# Patient Record
Sex: FEMALE | Race: BLACK OR AFRICAN AMERICAN | NOT HISPANIC OR LATINO
[De-identification: names, ages, dates, MRNs, and addresses within clinical notes are randomized per-mention and may not be internally consistent; named-entity substitution may affect disease eponyms.]

---

## 2021-02-03 ENCOUNTER — APPOINTMENT (OUTPATIENT)
Dept: RHEUMATOLOGY | Facility: CLINIC | Age: 41
End: 2021-02-03

## 2021-02-09 ENCOUNTER — APPOINTMENT (OUTPATIENT)
Dept: RHEUMATOLOGY | Facility: CLINIC | Age: 41
End: 2021-02-09

## 2021-02-09 VITALS
DIASTOLIC BLOOD PRESSURE: 86 MMHG | BODY MASS INDEX: 22.35 KG/M2 | OXYGEN SATURATION: 96 % | HEART RATE: 100 BPM | RESPIRATION RATE: 16 BRPM | WEIGHT: 142.4 LBS | SYSTOLIC BLOOD PRESSURE: 118 MMHG | HEIGHT: 67 IN

## 2021-02-09 RX ORDER — ZOLPIDEM TARTRATE 10 MG/1
10 TABLET ORAL
Qty: 30 | Refills: 0 | Status: ACTIVE | COMMUNITY
Start: 2021-02-09

## 2021-02-09 RX ORDER — PREDNISONE 5 MG/1
5 TABLET ORAL DAILY
Qty: 90 | Refills: 1 | Status: ACTIVE | COMMUNITY
Start: 2021-02-09

## 2021-02-09 RX ORDER — LOSARTAN POTASSIUM 25 MG/1
25 TABLET, FILM COATED ORAL DAILY
Qty: 90 | Refills: 1 | Status: ACTIVE | COMMUNITY
Start: 2021-02-09

## 2021-02-09 RX ORDER — HYDROXYCHLOROQUINE SULFATE 200 MG/1
200 TABLET, FILM COATED ORAL
Qty: 180 | Refills: 0 | Status: ACTIVE | COMMUNITY
Start: 2021-02-09

## 2021-02-09 RX ORDER — PANTOPRAZOLE 20 MG/1
20 TABLET, DELAYED RELEASE ORAL DAILY
Qty: 30 | Refills: 2 | Status: ACTIVE | COMMUNITY
Start: 2021-02-09

## 2021-02-09 NOTE — PHYSICAL EXAM
[General Appearance - Alert] : alert [General Appearance - In No Acute Distress] : in no acute distress [General Appearance - Well Nourished] : well nourished [Extraocular Movements] : extraocular movements were intact [Outer Ear] : the ears and nose were normal in appearance [Examination Of The Oral Cavity] : the lips and gums were normal [Oropharynx] : the oropharynx was normal [] : no respiratory distress [Neck Appearance] : the appearance of the neck was normal [Auscultation Breath Sounds / Voice Sounds] : lungs were clear to auscultation bilaterally [Heart Rate And Rhythm] : heart rate was normal and rhythm regular [Heart Sounds] : normal S1 and S2 [Murmurs] : no murmurs [Heart Sounds Pericardial Friction Rub] : no pericardial rub [Cervical Lymph Nodes Enlarged Posterior Bilaterally] : posterior cervical [Supraclavicular Lymph Nodes Enlarged Bilaterally] : supraclavicular [Cervical Lymph Nodes Enlarged Anterior Bilaterally] : anterior cervical [Axillary Lymph Nodes Enlarged Bilaterally] : axillary [No CVA Tenderness] : no ~M costovertebral angle tenderness [No Spinal Tenderness] : no spinal tenderness [No Focal Deficits] : no focal deficits [FreeTextEntry1] : see hpi

## 2021-02-09 NOTE — HISTORY OF PRESENT ILLNESS
[FreeTextEntry1] : f/u SLE, diagnosed 2004.  Her features have included discoid rash, photosensitivity, arhritis, leukopenia, nephritis (class V), JAMAR, DNA, Sm.  She has additionally had alopecia, fever, costochondritis, lymphadenopathy, elevated cpk's, Raynaud's, E nodusum.  She has participated in clinical trials including studies of belimumab as well as vagus nerve stimulation.  She has chronic damage (scarring alopecia).  She additionally has had elevated transaminases, she has had a liver biopsy and results were consistent with drug reaction vs autoimmune related.  Withdrawal of potential drugs has not resulted in improvement of her enzymes.\par \par February 9, 2021  PtGA 6\par Not seen at the [facility] since January 2020.  She states that has been doing relatively well over the year and has continued on plaquenil 400mg and prednisone 5mg.  She was in the ER in August after experiencing nosebleeds for 3 days.  Her discoid lesions on her scalp have been relatively well controlled and only recently has there been activity in the right temporal region.  She has additionally noted recent alopecia. She continues to experience joint pains and swelling, she continues with costochondritis with pain extending to right ribs, +mild fatigue, she has also had eary satiety (has not followed up with GI secondary to COVID) and has lost ~15 lbs.  She denies fever, other rash, lymphadenopathy, chills,oral ulcers, dry eyes, chest pain, shortness of breath, cough, anosmia, ageusia, nausea, vomiting, diarrhea, abdominal pain.\par \par PE:  /86 142.4 lbs\par chronic scarring of scalp with active hypertrophic discoid lesion amidst right temporal scarring, synovitis of rt elbow and 2nd MCP, L 2nd and 5th PIP, tender L elbow, 1st rt MCP, 3-5 MCPs; Left MCPs and bilateral PIPs. +costochondral tenderness; no lymphadenopathy, oral/nasal ulcerations, nodules, periungal erythema. \par Lungs: Clear without wheeze, rales, rhonch; Cor: S1S2 without murmur, gallop or rub; Abd: soft, NT, BS+;, Back: no spinal or paraspinal tenderness; Ext: no CCE; Neuro: non-focal \par \par Imp:\par SLE w clinical activity with weight loss, musculoskeletal and cutaneous activity (discoid as well as alopecia).  Will check labs; have discussed options of belimumab vs clinical trial.\par COVID-  she will continue to isolate, practice social distancing, masks and hand hygiene.  Have discussed risks and benefits of covid vaccine.  Have discussed postponing (if possible initiation of a biologic or entry into a clinical trial until vaccinated)\par HM:  she continues on low dose steroids, was given a course of weekly vit D by her pcp; will check level.  Will consider obtaining DEXA.\par \par Infections: no;  cSLEDAI =6

## 2021-03-08 ENCOUNTER — APPOINTMENT (OUTPATIENT)
Dept: RHEUMATOLOGY | Facility: CLINIC | Age: 41
End: 2021-03-08

## 2021-03-22 ENCOUNTER — LABORATORY RESULT (OUTPATIENT)
Age: 41
End: 2021-03-22

## 2021-03-22 ENCOUNTER — APPOINTMENT (OUTPATIENT)
Dept: RHEUMATOLOGY | Facility: CLINIC | Age: 41
End: 2021-03-22

## 2021-03-22 VITALS
DIASTOLIC BLOOD PRESSURE: 94 MMHG | RESPIRATION RATE: 14 BRPM | BODY MASS INDEX: 22.65 KG/M2 | WEIGHT: 144.6 LBS | HEART RATE: 92 BPM | OXYGEN SATURATION: 98 % | SYSTOLIC BLOOD PRESSURE: 129 MMHG

## 2021-03-22 NOTE — PHYSICAL EXAM
[General Appearance - Alert] : alert [General Appearance - In No Acute Distress] : in no acute distress [General Appearance - Well Nourished] : well nourished [Extraocular Movements] : extraocular movements were intact [Outer Ear] : the ears and nose were normal in appearance [Examination Of The Oral Cavity] : the lips and gums were normal [Oropharynx] : the oropharynx was normal [Neck Appearance] : the appearance of the neck was normal [] : no respiratory distress [Auscultation Breath Sounds / Voice Sounds] : lungs were clear to auscultation bilaterally [Heart Rate And Rhythm] : heart rate was normal and rhythm regular [Heart Sounds] : normal S1 and S2 [Murmurs] : no murmurs [Heart Sounds Pericardial Friction Rub] : no pericardial rub [Cervical Lymph Nodes Enlarged Posterior Bilaterally] : posterior cervical [Cervical Lymph Nodes Enlarged Anterior Bilaterally] : anterior cervical [Supraclavicular Lymph Nodes Enlarged Bilaterally] : supraclavicular [Axillary Lymph Nodes Enlarged Bilaterally] : axillary [No CVA Tenderness] : no ~M costovertebral angle tenderness [No Spinal Tenderness] : no spinal tenderness [FreeTextEntry1] : see hpi [No Focal Deficits] : no focal deficits

## 2021-03-22 NOTE — HISTORY OF PRESENT ILLNESS
[FreeTextEntry1] : f/u SLE, diagnosed 2004.  Her features have included discoid rash, photosensitivity, arhritis, leukopenia, nephritis (class V), JAMAR, DNA, Sm.  She has additionally had alopecia, fever, costochondritis, lymphadenopathy, elevated cpk's, Raynaud's, E nodusum.  She has participated in clinical trials including studies of belimumab as well as vagus nerve stimulation.  She has chronic damage (scarring alopecia).  She additionally has had elevated transaminases, she has had a liver biopsy and results were consistent with drug reaction vs autoimmune related.  Withdrawal of potential drugs has not resulted in improvement of her enzymes; treatment with 80mg prednisone additionally without effect.\par \par February 9, 2021  PtGA 6\par Not seen at the [facility] since January 2020.  She states that has been doing relatively well over the year and has continued on plaquenil 400mg and prednisone 5mg.  She was in the ER in August after experiencing nosebleeds for 3 days.  Her discoid lesions on her scalp have been relatively well controlled and only recently has there been activity in the right temporal region.  She has additionally noted recent alopecia. She continues to experience joint pains and swelling, she continues with costochondritis with pain extending to right ribs, +mild fatigue, she has also had eary satiety (has not followed up with GI secondary to COVID) and has lost ~15 lbs.  She denies fever, other rash, lymphadenopathy, chills,oral ulcers, dry eyes, chest pain, shortness of breath, cough, anosmia, ageusia, nausea, vomiting, diarrhea, abdominal pain.\par \par Mar 22, 2021 PtGA 6.2\par f/u SLE; she notes increase in her joint pains, particularly her hands as well as chest wall tenderness despite increased steroids to 10mg.  She was evaluated recently by Dr Magana and received meds for sinusitis.  Of concern, her most recent labs with increased Upcr, transaminases, DNA and ESR both elevated (C3 normal, C4 was not performed).  No fever, rash, anorexia, weight loss, alopecia, lymphadenopathy, chills, joint pain/swelling/stiffness, oral ulcers, fatigue, dry eyes, chest pain, shortness of breath, cough, anosmia, ageusia, nausea, vomiting, diarrhea, abdominal pain.  She has an appointment with GI tomorrow for evaluation of her liver abnormality.\par \par \par PE:  /94 144.4 lbs\par chronic scarring of scalp with active hypertrophic discoid lesion amidst right temporal scarring, synovitis of rt elbow and 2nd MCP, tender L elbow, and 1st rt MCP, 3-5 MCPs bilaterally with right>left; +costochondral tenderness; +oral ulceration on hard palatte; no lymphadenopathy, nodules, periungal erythema. Lungs: Clear without wheeze, rales, rhonch; Cor: S1S2 without murmur, gallop or rub; Abd: soft, NT, BS+;, Back: no spinal or paraspinal tenderness; Ext: no CCE; Neuro: non-focal \par \par Imp:\par SLE w clinical activity with weight loss, musculoskeletal and cutaneous activity (discoid, oral ulcer as well as alopecia), Upcr elevated.  She is seeing GI tomorrow, have given most recent labs and have asked that GI call me.  Will give voltaren gel; Will check labs; have discussed belimumab previously--may need to evaluate kidneys.  Will defer treatment decisions until after GI..\par COVID-  she will continue to isolate, practice social distancing, masks and hand hygiene.  Have discussed risks and benefits of covid vaccine.  Have discussed postponing (if possible initiation of a biologic or entry into a clinical trial until vaccinated)\par HM:  she continues on low dose steroids, was given a course of weekly vit D by her pcp; will give vit D.  Will consider obtaining DEXA.\par \par Infections: no;  cSLEDAI =6

## 2021-04-05 LAB
ALBUMIN SERPL ELPH-MCNC: 3.5 G/DL
ALP BLD-CCNC: 158 U/L
ALT SERPL-CCNC: 90 U/L
ANION GAP SERPL CALC-SCNC: 18 MMOL/L
APPEARANCE: ABNORMAL
AST SERPL-CCNC: 204 U/L
BASOPHILS # BLD AUTO: 0.01 K/UL
BASOPHILS NFR BLD AUTO: 0.2 %
BILIRUB SERPL-MCNC: 1 MG/DL
BILIRUBIN URINE: ABNORMAL
BLOOD URINE: NEGATIVE
BUN SERPL-MCNC: 5 MG/DL
C3 SERPL-MCNC: 131 MG/DL
C4 SERPL-MCNC: 16 MG/DL
CALCIUM SERPL-MCNC: 9 MG/DL
CHLORIDE SERPL-SCNC: 96 MMOL/L
CO2 SERPL-SCNC: 22 MMOL/L
COLOR: YELLOW
CREAT SERPL-MCNC: 0.61 MG/DL
CREAT SPEC-SCNC: 296 MG/DL
CREAT/PROT UR: 0.8 RATIO
DSDNA AB SER-ACNC: 124 IU/ML
EOSINOPHIL # BLD AUTO: 0.01 K/UL
EOSINOPHIL NFR BLD AUTO: 0.2 %
ERYTHROCYTE [SEDIMENTATION RATE] IN BLOOD BY WESTERGREN METHOD: 101 MM/HR
GGT SERPL-CCNC: 803 U/L
GLUCOSE QUALITATIVE U: NEGATIVE
GLUCOSE SERPL-MCNC: 49 MG/DL
HCT VFR BLD CALC: 42.1 %
HGB BLD-MCNC: 14.1 G/DL
IMM GRANULOCYTES NFR BLD AUTO: 0.7 %
KETONES URINE: NEGATIVE
LEUKOCYTE ESTERASE URINE: NEGATIVE
LYMPHOCYTES # BLD AUTO: 0.78 K/UL
LYMPHOCYTES NFR BLD AUTO: 18.4 %
MAN DIFF?: NORMAL
MCHC RBC-ENTMCNC: 33.5 GM/DL
MCHC RBC-ENTMCNC: 35.1 PG
MCV RBC AUTO: 104.7 FL
MONOCYTES # BLD AUTO: 0.32 K/UL
MONOCYTES NFR BLD AUTO: 7.5 %
NEUTROPHILS # BLD AUTO: 3.09 K/UL
NEUTROPHILS NFR BLD AUTO: 73 %
NITRITE URINE: NEGATIVE
PH URINE: 6.5
PLATELET # BLD AUTO: 211 K/UL
POTASSIUM SERPL-SCNC: 3.9 MMOL/L
PROT SERPL-MCNC: 7.4 G/DL
PROT UR-MCNC: 235 MG/DL
PROTEIN URINE: ABNORMAL
RBC # BLD: 4.02 M/UL
RBC # FLD: 13.4 %
SODIUM SERPL-SCNC: 137 MMOL/L
SPECIFIC GRAVITY URINE: 1.02
UROBILINOGEN URINE: ABNORMAL
WBC # FLD AUTO: 4.24 K/UL

## 2022-03-02 ENCOUNTER — APPOINTMENT (OUTPATIENT)
Dept: RHEUMATOLOGY | Facility: CLINIC | Age: 42
End: 2022-03-02

## 2022-03-09 ENCOUNTER — APPOINTMENT (OUTPATIENT)
Dept: RHEUMATOLOGY | Facility: CLINIC | Age: 42
End: 2022-03-09

## 2022-03-16 ENCOUNTER — APPOINTMENT (OUTPATIENT)
Dept: RHEUMATOLOGY | Facility: CLINIC | Age: 42
End: 2022-03-16

## 2022-03-16 ENCOUNTER — LABORATORY RESULT (OUTPATIENT)
Age: 42
End: 2022-03-16

## 2022-03-16 VITALS
DIASTOLIC BLOOD PRESSURE: 89 MMHG | SYSTOLIC BLOOD PRESSURE: 138 MMHG | HEART RATE: 107 BPM | WEIGHT: 131.5 LBS | RESPIRATION RATE: 15 BRPM | HEIGHT: 67 IN | BODY MASS INDEX: 20.64 KG/M2 | TEMPERATURE: 97.7 F | OXYGEN SATURATION: 98 %

## 2022-03-17 NOTE — PHYSICAL EXAM
[General Appearance - Alert] : alert [General Appearance - In No Acute Distress] : in no acute distress [General Appearance - Well Nourished] : well nourished [Extraocular Movements] : extraocular movements were intact [Outer Ear] : the ears and nose were normal in appearance [Examination Of The Oral Cavity] : the lips and gums were normal [Oropharynx] : the oropharynx was normal [Neck Appearance] : the appearance of the neck was normal [] : no respiratory distress [Auscultation Breath Sounds / Voice Sounds] : lungs were clear to auscultation bilaterally [Heart Sounds] : normal S1 and S2 [Heart Rate And Rhythm] : heart rate was normal and rhythm regular [Murmurs] : no murmurs [Heart Sounds Pericardial Friction Rub] : no pericardial rub [Bowel Sounds] : normal bowel sounds [Abdomen Soft] : soft [Abdomen Tenderness] : non-tender [Cervical Lymph Nodes Enlarged Anterior Bilaterally] : anterior cervical [Cervical Lymph Nodes Enlarged Posterior Bilaterally] : posterior cervical [Supraclavicular Lymph Nodes Enlarged Bilaterally] : supraclavicular [No CVA Tenderness] : no ~M costovertebral angle tenderness [No Spinal Tenderness] : no spinal tenderness [Abnormal Walk] : normal gait [No Focal Deficits] : no focal deficits [FreeTextEntry1] : see hpi

## 2022-03-17 NOTE — HISTORY OF PRESENT ILLNESS
[FreeTextEntry1] : f/u SLE, diagnosed 2004.  Her features have included discoid rash, photosensitivity, arhritis, leukopenia, nephritis (class V), JAMAR, DNA, Sm.  She has additionally had alopecia, fever, costochondritis, lymphadenopathy, elevated cpk's, Raynaud's, E nodusum.  She has participated in clinical trials including studies of belimumab as well as vagus nerve stimulation.  She has chronic damage (scarring alopecia).  She additionally has had elevated transaminases, she has had a liver biopsy and results were consistent with drug reaction vs autoimmune related.  Withdrawal of potential drugs has not resulted in improvement of her enzymes; treatment with 80mg prednisone additionally without effect.\par \par February 9, 2021  PtGA 6\par Not seen at the [facility] since January 2020.  She states that has been doing relatively well over the year and has continued on plaquenil 400mg and prednisone 5mg.  She was in the ER in August after experiencing nosebleeds for 3 days.  Her discoid lesions on her scalp have been relatively well controlled and only recently has there been activity in the right temporal region.  She has additionally noted recent alopecia. She continues to experience joint pains and swelling, she continues with costochondritis with pain extending to right ribs, +mild fatigue, she has also had eary satiety (has not followed up with GI secondary to COVID) and has lost ~15 lbs.  She denies fever, other rash, lymphadenopathy, chills,oral ulcers, dry eyes, chest pain, shortness of breath, cough, anosmia, ageusia, nausea, vomiting, diarrhea, abdominal pain.\par \par Mar 22, 2021 PtGA 6.2\par f/u SLE; she notes increase in her joint pains, particularly her hands as well as chest wall tenderness despite increased steroids to 10mg.  She was evaluated recently by Dr Magana and received meds for sinusitis.  Of concern, her most recent labs with increased Upcr, transaminases, DNA and ESR both elevated (C3 normal, C4 was not performed).  No fever, rash, anorexia, weight loss, alopecia, lymphadenopathy, chills, joint pain/swelling/stiffness, oral ulcers, fatigue, dry eyes, chest pain, shortness of breath, cough, anosmia, ageusia, nausea, vomiting, diarrhea, abdominal pain.  She has an appointment with GI tomorrow for evaluation of her liver abnormality.\par \par Mar 16, 2022 PtGA 5.4\par f/u SLE--she has not been seen for a year (despite calls for follow-up as well as a call to her pcp regarding my concern).  She states that she has not been doing well, but there was too much going on for her to focus on her own health.  She has had continued joint pains, costocondritis, skin inflammation (with alopecia and extension of her scarring alopecia).  She was additionally hospitalized for 3 days last april with vomiting; she received an outpatient endoscopy and colonscopy and was told of ulcers in her esophagus and given nexium.  She was referred to hepatology for her continued elevation of her lft's but did not make the appointment.  Of note, she has had loss of appetite and early satiety (with a 15 lb weight loss).  She has received the COVID Pfizer vaccine on 9/24/21 and 10/14/21 with fatigue, chills after the second vaccination.  Additionally, she has had COVID in December (she had a headache, cough and congestion) and prolonged fatigue and congestion following the acute illness.  Her current symptoms include anorexia, joint pains, costochondritis, alopecia, scalp rash and fatigue.  No fever,lymphadenopathy, chills, joral ulcers, cough, anosmia, ageusia, nausea, vomiting, diarrhea, abdominal pain. \par PE:  /89 131.5 lbs\par chronic scarring of scalp with active hypertrophic discoid lesion amidst right temporal scarring,and crown; the scarred regions are larger than previous;  synovitis of rt elbow and 3rd and 4thPIPs and L 2nd and 3rd PIP with tenderness of MCPs, PIPs and right knee.   +costochondral tenderness; no oral/nasal ulcers (previously seen oral ulceration on hard palatte is not present); no lymphadenopathy, nodules, periungal erythema. Lungs: Clear without wheeze, rales, rhonch; Cor: S1S2 without murmur, gallop or rub; Abd: soft, NT, BS+;, Back: no spinal or paraspinal tenderness; Ext: no CCE; Neuro: non-focal \par \par Imp:\par SLE w clinical activity with weight loss, musculoskeletal and cutaneous activity (discoid, alocpecia, No recent labs, including no Upcr (which was elevated last year).  Will check labs, but she needs evaluation by GI and hepatology.  Unclear if there is renal involvement.  Have discussed difficulty in moving ahead with treatment as we do not know if there hepatic or renal issues are present, or if present, are related to an autoimmune driven inflammatoy etiology.  She will make hepatology appointment.  In addition, she will need opthoalmology follow-up.  \par COVID-  now almost 3 months since infection--have advised that she receive booster.  She will continue to follow cdc guidlelines. \par HM:  she continues on low dose steroids, was given a course of weekly vit D by her pcp;  Will consider obtaining DEXA in the future\par \par Infections: no;  cSLEDAI =8

## 2022-04-13 ENCOUNTER — APPOINTMENT (OUTPATIENT)
Dept: RHEUMATOLOGY | Facility: CLINIC | Age: 42
End: 2022-04-13

## 2022-04-18 LAB
ALBUMIN SERPL ELPH-MCNC: 3.5 G/DL
ALP BLD-CCNC: 173 U/L
ALT SERPL-CCNC: 85 U/L
ANION GAP SERPL CALC-SCNC: 18 MMOL/L
APPEARANCE: CLEAR
AST SERPL-CCNC: 167 U/L
BASOPHILS # BLD AUTO: 0.01 K/UL
BASOPHILS NFR BLD AUTO: 0.3 %
BILIRUB SERPL-MCNC: 1.2 MG/DL
BILIRUBIN URINE: NEGATIVE
BLOOD URINE: NEGATIVE
BUN SERPL-MCNC: 8 MG/DL
C3 SERPL-MCNC: 138 MG/DL
C4 SERPL-MCNC: 12 MG/DL
CALCIUM SERPL-MCNC: 8.9 MG/DL
CHLORIDE SERPL-SCNC: 96 MMOL/L
CO2 SERPL-SCNC: 23 MMOL/L
COLOR: YELLOW
CREAT SERPL-MCNC: 0.64 MG/DL
CREAT SPEC-SCNC: 152 MG/DL
CREAT/PROT UR: 2 RATIO
DSDNA AB SER-ACNC: 95 IU/ML
EGFR: 113 ML/MIN/1.73M2
EOSINOPHIL # BLD AUTO: 0.01 K/UL
EOSINOPHIL NFR BLD AUTO: 0.3 %
ERYTHROCYTE [SEDIMENTATION RATE] IN BLOOD BY WESTERGREN METHOD: 79 MM/HR
GGT SERPL-CCNC: 896 U/L
GLUCOSE QUALITATIVE U: NEGATIVE
GLUCOSE SERPL-MCNC: 80 MG/DL
HCT VFR BLD CALC: 43.5 %
HGB BLD-MCNC: 14.2 G/DL
IMM GRANULOCYTES NFR BLD AUTO: 0.3 %
KETONES URINE: NEGATIVE
LEUKOCYTE ESTERASE URINE: NEGATIVE
LYMPHOCYTES # BLD AUTO: 0.75 K/UL
LYMPHOCYTES NFR BLD AUTO: 24.6 %
MAN DIFF?: NORMAL
MCHC RBC-ENTMCNC: 32.6 GM/DL
MCHC RBC-ENTMCNC: 35 PG
MCV RBC AUTO: 107.1 FL
MONOCYTES # BLD AUTO: 0.32 K/UL
MONOCYTES NFR BLD AUTO: 10.5 %
NEUTROPHILS # BLD AUTO: 1.95 K/UL
NEUTROPHILS NFR BLD AUTO: 64 %
NITRITE URINE: NEGATIVE
PH URINE: 7
PLATELET # BLD AUTO: 199 K/UL
POTASSIUM SERPL-SCNC: 3.8 MMOL/L
PROT SERPL-MCNC: 8 G/DL
PROT UR-MCNC: 299 MG/DL
PROTEIN URINE: ABNORMAL
RBC # BLD: 4.06 M/UL
RBC # FLD: 14.6 %
SODIUM SERPL-SCNC: 137 MMOL/L
SPECIFIC GRAVITY URINE: 1.02
UROBILINOGEN URINE: ABNORMAL
WBC # FLD AUTO: 3.05 K/UL

## 2022-04-19 ENCOUNTER — LABORATORY RESULT (OUTPATIENT)
Age: 42
End: 2022-04-19

## 2022-04-19 ENCOUNTER — APPOINTMENT (OUTPATIENT)
Dept: RHEUMATOLOGY | Facility: CLINIC | Age: 42
End: 2022-04-19

## 2022-04-19 ENCOUNTER — RESULT REVIEW (OUTPATIENT)
Age: 42
End: 2022-04-19

## 2022-04-19 VITALS
WEIGHT: 131.4 LBS | HEART RATE: 86 BPM | RESPIRATION RATE: 16 BRPM | OXYGEN SATURATION: 99 % | BODY MASS INDEX: 20.58 KG/M2 | SYSTOLIC BLOOD PRESSURE: 151 MMHG | DIASTOLIC BLOOD PRESSURE: 88 MMHG

## 2022-04-19 RX ORDER — MUPIROCIN 20 MG/G
2 OINTMENT TOPICAL TWICE DAILY
Qty: 1 | Refills: 2 | Status: COMPLETED | COMMUNITY
Start: 2022-04-19

## 2022-04-19 NOTE — HISTORY OF PRESENT ILLNESS
[FreeTextEntry1] : f/u SLE, diagnosed 2004.  Her features have included discoid rash, photosensitivity, arhritis, leukopenia, nephritis (class V), JAMAR, DNA, Sm.  She has additionally had alopecia, fever, costochondritis, lymphadenopathy, elevated cpk's, Raynaud's, E nodusum.  She has participated in clinical trials including studies of belimumab as well as vagus nerve stimulation.  She has chronic damage (scarring alopecia).  She additionally has had elevated transaminases, she has had a liver biopsy and results were consistent with drug reaction vs autoimmune related.  Withdrawal of potential drugs has not resulted in improvement of her enzymes; treatment with 80mg prednisone additionally without effect.\par \par February 9, 2021  PtGA 6\par Not seen at the [facility] since January 2020.  She states that has been doing relatively well over the year and has continued on plaquenil 400mg and prednisone 5mg.  She was in the ER in August after experiencing nosebleeds for 3 days.  Her discoid lesions on her scalp have been relatively well controlled and only recently has there been activity in the right temporal region.  She has additionally noted recent alopecia. She continues to experience joint pains and swelling, she continues with costochondritis with pain extending to right ribs, +mild fatigue, she has also had eary satiety (has not followed up with GI secondary to COVID) and has lost ~15 lbs.  She denies fever, other rash, lymphadenopathy, chills,oral ulcers, dry eyes, chest pain, shortness of breath, cough, anosmia, ageusia, nausea, vomiting, diarrhea, abdominal pain.\par \par Mar 22, 2021 PtGA 6.2\par f/u SLE; she notes increase in her joint pains, particularly her hands as well as chest wall tenderness despite increased steroids to 10mg.  She was evaluated recently by Dr Magana and received meds for sinusitis.  Of concern, her most recent labs with increased Upcr, transaminases, DNA and ESR both elevated (C3 normal, C4 was not performed).  No fever, rash, anorexia, weight loss, alopecia, lymphadenopathy, chills, joint pain/swelling/stiffness, oral ulcers, fatigue, dry eyes, chest pain, shortness of breath, cough, anosmia, ageusia, nausea, vomiting, diarrhea, abdominal pain.  She has an appointment with GI tomorrow for evaluation of her liver abnormality.\par \par Mar 16, 2022 PtGA 5.4\par f/u SLE--she has not been seen for a year (despite calls for follow-up as well as a call to her pcp regarding my concern).  She states that she has not been doing well, but there was too much going on for her to focus on her own health.  She has had continued joint pains, costocondritis, skin inflammation (with alopecia and extension of her scarring alopecia).  She was additionally hospitalized for 3 days last april with vomiting; she received an outpatient endoscopy and colonscopy and was told of ulcers in her esophagus and given nexium.  She was referred to hepatology for her continued elevation of her lft's but did not make the appointment.  Of note, she has had loss of appetite and early satiety (with a 15 lb weight loss).  She has received the COVID Pfizer vaccine on 9/24/21 and 10/14/21 with fatigue, chills after the second vaccination.  Additionally, she has had COVID in December (she had a headache, cough and congestion) and prolonged fatigue and congestion following the acute illness.  Her current symptoms include anorexia, joint pains, costochondritis, alopecia, scalp rash and fatigue.  No fever,lymphadenopathy, chills, joral ulcers, cough, anosmia, ageusia, nausea, vomiting, diarrhea, abdominal pain.\par \par April 19, 2022\par f/u SLE-- she is without significant change from last visit.  She does have a follow-up with GI, however, not until June 7.  I have called the office to move the appointment to an earlier date (but have not heard back).  She still notes joint pain affecting her elbows and fingers (right > left), additionally has had a flare of her scalp, but states that it has become less active.  Her costochondritis continues.  No fever, anorexia, further weight loss, other rash, edema, katiana urine (her Upcr was elevated at last visit), nausea, vomiting, diarrhea, abdominal pain,  oral ulcers, fatigue, dry eyes, chest pain, shortness of breath .  \par PE:  /88 131.4 lbs\par chronic scarring of scalp with active hypertrophic discoid lesion amidst right temporal scarring,and crown; the scarred regions are larger than in the past, but not greater than those seen on previous visit;  tr synovitis of rt elbow and 3rd and 4thPIPs and L 2nd and 3rd PIP with tenderness of MCPs, PIPs; additionally with tenderness along bilateral 2nd finger digits; knees without tenderness.   +costochondral tenderness; no oral/nasal ulcers (previously seen oral ulceration on hard palatte is not present); no lymphadenopathy, nodules, periungal erythema. Lungs: Clear without wheeze, rales, rhonch; Cor: S1S2 without murmur, gallop or rub; Abd: soft, NT, BS+;, Back: no spinal or paraspinal tenderness; Ext: no CCE; Neuro: non-focal \par \par Imp:\par SLE w clinical activity with musculoskeletal and cutaneous activity (discoid, alocpecia) with stableization of weight. Labs from last visit continue to show hepatic abnormalities as well as elevated Upcr.   Will check labs, will consider repeat imaging and potential IR based liver biopsy if an earlier appointment cannot be obtained.  Have rediscussed difficulty in moving ahead with treatment given uncertainty of hepatic abnormalities.  In addition, she will need opthoalmology follow-up.  \par COVID-  now almost 4 months since infection--have advised that she receive booster.  She will continue to follow cdc guidlelines. \par HM:  she continues on low dose steroids, was given a course of weekly vit D by her pcp;  Will need  DEXA.\par \par Infections: no;  cSLEDAI =8

## 2022-04-19 NOTE — PHYSICAL EXAM
[General Appearance - Alert] : alert [General Appearance - In No Acute Distress] : in no acute distress [General Appearance - Well Nourished] : well nourished [Extraocular Movements] : extraocular movements were intact [Outer Ear] : the ears and nose were normal in appearance [Examination Of The Oral Cavity] : the lips and gums were normal [Oropharynx] : the oropharynx was normal [Neck Appearance] : the appearance of the neck was normal [] : no respiratory distress [Auscultation Breath Sounds / Voice Sounds] : lungs were clear to auscultation bilaterally [Heart Rate And Rhythm] : heart rate was normal and rhythm regular [Heart Sounds] : normal S1 and S2 [Murmurs] : no murmurs [Heart Sounds Pericardial Friction Rub] : no pericardial rub [Bowel Sounds] : normal bowel sounds [Abdomen Soft] : soft [Abdomen Tenderness] : non-tender [Cervical Lymph Nodes Enlarged Posterior Bilaterally] : posterior cervical [Cervical Lymph Nodes Enlarged Anterior Bilaterally] : anterior cervical [Supraclavicular Lymph Nodes Enlarged Bilaterally] : supraclavicular [No CVA Tenderness] : no ~M costovertebral angle tenderness [No Spinal Tenderness] : no spinal tenderness [Abnormal Walk] : normal gait [No Focal Deficits] : no focal deficits [FreeTextEntry1] : see hpi

## 2022-04-29 RX ORDER — FLUOCINOLONE ACETONIDE 0.11 MG/ML
0.01 OIL TOPICAL
Qty: 1 | Refills: 1 | Status: ACTIVE | COMMUNITY
Start: 2022-04-29 | End: 1900-01-01

## 2022-05-16 ENCOUNTER — APPOINTMENT (OUTPATIENT)
Dept: RHEUMATOLOGY | Facility: CLINIC | Age: 42
End: 2022-05-16

## 2022-05-23 ENCOUNTER — APPOINTMENT (OUTPATIENT)
Dept: RHEUMATOLOGY | Facility: CLINIC | Age: 42
End: 2022-05-23

## 2022-05-23 ENCOUNTER — LABORATORY RESULT (OUTPATIENT)
Age: 42
End: 2022-05-23

## 2022-05-23 VITALS
HEIGHT: 67 IN | TEMPERATURE: 97.8 F | BODY MASS INDEX: 20.76 KG/M2 | HEART RATE: 77 BPM | WEIGHT: 132.25 LBS | DIASTOLIC BLOOD PRESSURE: 82 MMHG | RESPIRATION RATE: 15 BRPM | OXYGEN SATURATION: 99 % | SYSTOLIC BLOOD PRESSURE: 133 MMHG

## 2022-05-23 RX ORDER — CLOBETASOL PROPIONATE 0.5 MG/G
0.05 CREAM TOPICAL TWICE DAILY
Qty: 1 | Refills: 1 | Status: ACTIVE | COMMUNITY
Start: 2022-04-19 | End: 1900-01-01

## 2022-05-23 NOTE — HISTORY OF PRESENT ILLNESS
[FreeTextEntry1] : The Virtual Face to Face Study, visit 1 was conducted at this visit.  A virtual visit was followed by a face to face visit.\par f/u SLE, diagnosed 2004.  Her features have included discoid rash, photosensitivity, arhritis, leukopenia, nephritis (class V), JAMAR, DNA, Sm.  She has additionally had alopecia, fever, costochondritis, lymphadenopathy, elevated cpk's, Raynaud's, E nodusum.  She has participated in clinical trials including studies of belimumab as well as vagus nerve stimulation.  She has chronic damage (scarring alopecia).  She additionally has had elevated transaminases, she has had a liver biopsy and results were consistent with drug reaction vs autoimmune related.  Withdrawal of potential drugs has not resulted in improvement of her enzymes; treatment with 80mg prednisone additionally without effect.\par \par February 9, 2021  PtGA 6\par Not seen at the [facility] since January 2020.  She states that has been doing relatively well over the year and has continued on plaquenil 400mg and prednisone 5mg.  She was in the ER in August after experiencing nosebleeds for 3 days.  Her discoid lesions on her scalp have been relatively well controlled and only recently has there been activity in the right temporal region.  She has additionally noted recent alopecia. She continues to experience joint pains and swelling, she continues with costochondritis with pain extending to right ribs, +mild fatigue, she has also had eary satiety (has not followed up with GI secondary to COVID) and has lost ~15 lbs.  She denies fever, other rash, lymphadenopathy, chills,oral ulcers, dry eyes, chest pain, shortness of breath, cough, anosmia, ageusia, nausea, vomiting, diarrhea, abdominal pain.\par \par Mar 22, 2021 PtGA 6.2\par f/u SLE; she notes increase in her joint pains, particularly her hands as well as chest wall tenderness despite increased steroids to 10mg.  She was evaluated recently by Dr Magana and received meds for sinusitis.  Of concern, her most recent labs with increased Upcr, transaminases, DNA and ESR both elevated (C3 normal, C4 was not performed).  No fever, rash, anorexia, weight loss, alopecia, lymphadenopathy, chills, joint pain/swelling/stiffness, oral ulcers, fatigue, dry eyes, chest pain, shortness of breath, cough, anosmia, ageusia, nausea, vomiting, diarrhea, abdominal pain.  She has an appointment with GI tomorrow for evaluation of her liver abnormality.\par \par Mar 16, 2022 PtGA 5.4\par f/u SLE--she has not been seen for a year (despite calls for follow-up as well as a call to her pcp regarding my concern).  She states that she has not been doing well, but there was too much going on for her to focus on her own health.  She has had continued joint pains, costocondritis, skin inflammation (with alopecia and extension of her scarring alopecia).  She was additionally hospitalized for 3 days last april with vomiting; she received an outpatient endoscopy and colonscopy and was told of ulcers in her esophagus and given nexium.  She was referred to hepatology for her continued elevation of her lft's but did not make the appointment.  Of note, she has had loss of appetite and early satiety (with a 15 lb weight loss).  She has received the COVID Pfizer vaccine on 9/24/21 and 10/14/21 with fatigue, chills after the second vaccination.  Additionally, she has had COVID in December (she had a headache, cough and congestion) and prolonged fatigue and congestion following the acute illness.  Her current symptoms include anorexia, joint pains, costochondritis, alopecia, scalp rash and fatigue.  No fever,lymphadenopathy, chills, joral ulcers, cough, anosmia, ageusia, nausea, vomiting, diarrhea, abdominal pain.\par \par April 19, 2022\par f/u SLE-- she is without significant change from last visit.  She does have a follow-up with GI, however, not until June 7.  I have called the office to move the appointment to an earlier date (but have not heard back).  She still notes joint pain affecting her elbows and fingers (right > left), additionally has had a flare of her scalp, but states that it has become less active.  Her costochondritis continues.  No fever, anorexia, further weight loss, other rash, edema, katiana urine (her Upcr was elevated at last visit), nausea, vomiting, diarrhea, abdominal pain,  oral ulcers, fatigue, dry eyes, chest pain, shortness of breath .  \par \par May 23, 2022\par f/u SLE, she continues without significant change.  She has a visit with GI on 6/7 and w her PDP on 6/9.  Her abdomenal US is scheduled tomorrow morning.  Her most recent labs from April show consistently elevated transaminases; she additionally has proteinuria.  (A call to GI following her appointment to facilitate her work-up was made, but no return and the US was scheduled.)  She continues with hand/finger pain which has been made worse as she is typing for her son; she continues with fatigue and inability to stay asleep, she continues with costochondritis.  She is not tolerating flucinolone to her scalp (her insurance company required documentation of their preferred agent so she was initiated on this) and there has been no significant change/improvement since her last visit.  She continues without fever, anorexia, weight loss, alopecia, lymphadenopathy, chills, oral ulcers, fatigue, dry eyes, chest pain, shortness of breath, cough, anosmia, ageusia, nausea, vomiting, diarrhea, abdominal pain. \par PE:  NAD\par chronic scarring of scalp with active hypertrophic discoid lesion amidst right temporal scarring,and crown; the scarred regions are larger than in the past, but not greater than those seen on previous visit;  tr synovitis of L 2nd and 3rd PIP with tenderness of MCPs, PIPs; additionally with tenderness along bilateral 2nd finger digits; knees without tenderness.   +costochondral tenderness; no oral/nasal ulcers (previously seen oral ulceration on hard palatte is not present), but +palatal erythema; no lymphadenopathy, nodules, periungal erythema. Lungs: Clear without wheeze, rales, rhonch; Cor: S1S2 without murmur, gallop or rub; Abd: soft, NT, BS+;, Back: no spinal or paraspinal tenderness; Ext: no CCE; Neuro: non-focal \par \par Imp:\par SLE w clinical activity with musculoskeletal and cutaneous activity (discoid, alocpecia) with stableization of weight. Labs from last visit continue to show hepatic abnormalities as well as elevated Upcr.   Will check labs, will contact GI to discuss MRI/ and subsequent potential IR based liver biopsy.  Have rediscussed difficulty in moving ahead with treatment given uncertainty of hepatic abnormalities.  In addition, she will need opthoalmology follow-up.  Will prescribe clobetasol as she has not had a response to flucinolone.\par COVID-  now almost 5months since infection--have advised that she receive booster.  She will continue to follow cdc guidlelines. \par HM:  she continues on low dose steroids, was given a course of weekly vit D by her pcp;  Will need  DEXA.\par \par Infections: no;  cSLEDAI =8

## 2022-05-24 ENCOUNTER — APPOINTMENT (OUTPATIENT)
Dept: ULTRASOUND IMAGING | Facility: CLINIC | Age: 42
End: 2022-05-24
Payer: COMMERCIAL

## 2022-05-24 ENCOUNTER — OUTPATIENT (OUTPATIENT)
Dept: OUTPATIENT SERVICES | Facility: HOSPITAL | Age: 42
LOS: 1 days | End: 2022-05-24
Payer: COMMERCIAL

## 2022-05-24 DIAGNOSIS — R79.89 OTHER SPECIFIED ABNORMAL FINDINGS OF BLOOD CHEMISTRY: ICD-10-CM

## 2022-05-24 PROCEDURE — 76705 ECHO EXAM OF ABDOMEN: CPT | Mod: 26

## 2022-05-24 PROCEDURE — 76705 ECHO EXAM OF ABDOMEN: CPT

## 2022-05-31 LAB
ACE BLD-CCNC: 61 U/L
ALBUMIN SERPL ELPH-MCNC: 3.4 G/DL
ALBUMIN SERPL ELPH-MCNC: 3.5 G/DL
ALP BLD-CCNC: 158 U/L
ALP BLD-CCNC: 168 U/L
ALT SERPL-CCNC: 103 U/L
ALT SERPL-CCNC: 104 U/L
ANION GAP SERPL CALC-SCNC: 12 MMOL/L
ANION GAP SERPL CALC-SCNC: 14 MMOL/L
APPEARANCE: ABNORMAL
APPEARANCE: CLEAR
AST SERPL-CCNC: 240 U/L
AST SERPL-CCNC: 249 U/L
BASOPHILS # BLD AUTO: 0.01 K/UL
BASOPHILS # BLD AUTO: 0.02 K/UL
BASOPHILS NFR BLD AUTO: 0.4 %
BASOPHILS NFR BLD AUTO: 0.5 %
BILIRUB SERPL-MCNC: 0.6 MG/DL
BILIRUB SERPL-MCNC: 0.6 MG/DL
BILIRUBIN URINE: ABNORMAL
BILIRUBIN URINE: NEGATIVE
BLOOD URINE: ABNORMAL
BLOOD URINE: NEGATIVE
BUN SERPL-MCNC: 4 MG/DL
BUN SERPL-MCNC: 7 MG/DL
C3 SERPL-MCNC: 106 MG/DL
C3 SERPL-MCNC: 135 MG/DL
C4 SERPL-MCNC: 10 MG/DL
C4 SERPL-MCNC: 13 MG/DL
CALCIUM SERPL-MCNC: 8.4 MG/DL
CALCIUM SERPL-MCNC: 9.2 MG/DL
CHLORIDE SERPL-SCNC: 102 MMOL/L
CHLORIDE SERPL-SCNC: 105 MMOL/L
CK SERPL-CCNC: 43 U/L
CO2 SERPL-SCNC: 22 MMOL/L
CO2 SERPL-SCNC: 24 MMOL/L
COLOR: YELLOW
COLOR: YELLOW
CREAT SERPL-MCNC: 0.5 MG/DL
CREAT SERPL-MCNC: 0.56 MG/DL
CREAT SPEC-SCNC: 109 MG/DL
CREAT SPEC-SCNC: 227 MG/DL
CREAT/PROT UR: 1.8 RATIO
CREAT/PROT UR: 3.2 RATIO
DSDNA AB SER-ACNC: 84 IU/ML
DSDNA AB SER-ACNC: 88 IU/ML
EGFR: 117 ML/MIN/1.73M2
EGFR: 120 ML/MIN/1.73M2
EOSINOPHIL # BLD AUTO: 0.02 K/UL
EOSINOPHIL # BLD AUTO: 0.03 K/UL
EOSINOPHIL NFR BLD AUTO: 0.7 %
EOSINOPHIL NFR BLD AUTO: 0.8 %
ERYTHROCYTE [SEDIMENTATION RATE] IN BLOOD BY WESTERGREN METHOD: 84 MM/HR
ERYTHROCYTE [SEDIMENTATION RATE] IN BLOOD BY WESTERGREN METHOD: 90 MM/HR
GGT SERPL-CCNC: 735 U/L
GGT SERPL-CCNC: 853 U/L
GLUCOSE QUALITATIVE U: NEGATIVE
GLUCOSE QUALITATIVE U: NEGATIVE
GLUCOSE SERPL-MCNC: 70 MG/DL
GLUCOSE SERPL-MCNC: 78 MG/DL
HCT VFR BLD CALC: 36 %
HCT VFR BLD CALC: 40.4 %
HGB BLD-MCNC: 12.1 G/DL
HGB BLD-MCNC: 13.8 G/DL
IGG4 SER-MCNC: 45 MG/DL
IMM GRANULOCYTES NFR BLD AUTO: 0.2 %
IMM GRANULOCYTES NFR BLD AUTO: 0.8 %
KETONES URINE: NEGATIVE
KETONES URINE: NEGATIVE
LEUKOCYTE ESTERASE URINE: NEGATIVE
LEUKOCYTE ESTERASE URINE: NEGATIVE
LKM AB SER QL IF: <20.1 UNITS
LYMPHOCYTES # BLD AUTO: 0.71 K/UL
LYMPHOCYTES # BLD AUTO: 1.16 K/UL
LYMPHOCYTES NFR BLD AUTO: 28.1 %
LYMPHOCYTES NFR BLD AUTO: 28.4 %
MAN DIFF?: NORMAL
MAN DIFF?: NORMAL
MCHC RBC-ENTMCNC: 33.6 GM/DL
MCHC RBC-ENTMCNC: 34.2 GM/DL
MCHC RBC-ENTMCNC: 35 PG
MCHC RBC-ENTMCNC: 35.7 PG
MCV RBC AUTO: 104 FL
MCV RBC AUTO: 104.4 FL
MITOCHONDRIA AB SER IF-ACNC: NORMAL
MONOCYTES # BLD AUTO: 0.29 K/UL
MONOCYTES # BLD AUTO: 0.38 K/UL
MONOCYTES NFR BLD AUTO: 11.5 %
MONOCYTES NFR BLD AUTO: 9.3 %
NEUTROPHILS # BLD AUTO: 1.48 K/UL
NEUTROPHILS # BLD AUTO: 2.48 K/UL
NEUTROPHILS NFR BLD AUTO: 58.4 %
NEUTROPHILS NFR BLD AUTO: 60.9 %
NITRITE URINE: NEGATIVE
NITRITE URINE: NEGATIVE
PH URINE: 6.5
PH URINE: 7
PLATELET # BLD AUTO: 142 K/UL
PLATELET # BLD AUTO: 213 K/UL
POTASSIUM SERPL-SCNC: 3.8 MMOL/L
POTASSIUM SERPL-SCNC: 4.2 MMOL/L
PROT SERPL-MCNC: 7.6 G/DL
PROT SERPL-MCNC: 8 G/DL
PROT UR-MCNC: 353 MG/DL
PROT UR-MCNC: 418 MG/DL
PROTEIN URINE: ABNORMAL
PROTEIN URINE: ABNORMAL
RBC # BLD: 3.46 M/UL
RBC # BLD: 3.87 M/UL
RBC # FLD: 13.1 %
RBC # FLD: 13.4 %
SMOOTH MUSCLE AB SER QL IF: NORMAL
SODIUM SERPL-SCNC: 139 MMOL/L
SODIUM SERPL-SCNC: 141 MMOL/L
SPECIFIC GRAVITY URINE: 1.01
SPECIFIC GRAVITY URINE: 1.02
UROBILINOGEN URINE: ABNORMAL
UROBILINOGEN URINE: NORMAL
WBC # FLD AUTO: 2.53 K/UL
WBC # FLD AUTO: 4.08 K/UL

## 2022-06-20 ENCOUNTER — APPOINTMENT (OUTPATIENT)
Dept: RHEUMATOLOGY | Facility: CLINIC | Age: 42
End: 2022-06-20

## 2022-08-31 ENCOUNTER — APPOINTMENT (OUTPATIENT)
Dept: RHEUMATOLOGY | Facility: CLINIC | Age: 42
End: 2022-08-31

## 2022-09-12 ENCOUNTER — APPOINTMENT (OUTPATIENT)
Dept: RHEUMATOLOGY | Facility: CLINIC | Age: 42
End: 2022-09-12

## 2023-03-09 ENCOUNTER — APPOINTMENT (OUTPATIENT)
Dept: OPHTHALMOLOGY | Facility: CLINIC | Age: 43
End: 2023-03-09
Payer: COMMERCIAL

## 2023-03-09 ENCOUNTER — NON-APPOINTMENT (OUTPATIENT)
Age: 43
End: 2023-03-09

## 2023-03-09 PROCEDURE — 92134 CPTRZ OPH DX IMG PST SGM RTA: CPT

## 2023-03-09 PROCEDURE — 92004 COMPRE OPH EXAM NEW PT 1/>: CPT

## 2023-03-27 ENCOUNTER — APPOINTMENT (OUTPATIENT)
Dept: RHEUMATOLOGY | Facility: CLINIC | Age: 43
End: 2023-03-27

## 2023-03-27 VITALS
RESPIRATION RATE: 14 BRPM | OXYGEN SATURATION: 100 % | TEMPERATURE: 97.8 F | BODY MASS INDEX: 20.56 KG/M2 | HEART RATE: 87 BPM | SYSTOLIC BLOOD PRESSURE: 117 MMHG | WEIGHT: 131.25 LBS | DIASTOLIC BLOOD PRESSURE: 76 MMHG

## 2023-03-27 LAB
ALBUMIN SERPL ELPH-MCNC: 2.5 G/DL
ALP BLD-CCNC: 155 U/L
ALT SERPL-CCNC: 26 U/L
ANION GAP SERPL CALC-SCNC: 10 MMOL/L
APPEARANCE: CLEAR
AST SERPL-CCNC: 69 U/L
BASOPHILS # BLD AUTO: 0.01 K/UL
BASOPHILS NFR BLD AUTO: 0.2 %
BILIRUB SERPL-MCNC: 1.3 MG/DL
BILIRUBIN URINE: NEGATIVE
BLOOD URINE: NEGATIVE
BUN SERPL-MCNC: 9 MG/DL
CALCIUM SERPL-MCNC: 8.8 MG/DL
CHLORIDE SERPL-SCNC: 105 MMOL/L
CO2 SERPL-SCNC: 22 MMOL/L
COLOR: YELLOW
CREAT SERPL-MCNC: 0.69 MG/DL
EGFR: 110 ML/MIN/1.73M2
EOSINOPHIL # BLD AUTO: 0 K/UL
EOSINOPHIL NFR BLD AUTO: 0 %
ERYTHROCYTE [SEDIMENTATION RATE] IN BLOOD BY WESTERGREN METHOD: 78 MM/HR
GGT SERPL-CCNC: 160 U/L
GLUCOSE QUALITATIVE U: NEGATIVE
GLUCOSE SERPL-MCNC: 59 MG/DL
HCT VFR BLD CALC: 28.1 %
HGB BLD-MCNC: 9.1 G/DL
IMM GRANULOCYTES NFR BLD AUTO: 0.5 %
KETONES URINE: NEGATIVE
LEUKOCYTE ESTERASE URINE: NEGATIVE
LYMPHOCYTES # BLD AUTO: 0.78 K/UL
LYMPHOCYTES NFR BLD AUTO: 17.7 %
MAN DIFF?: NORMAL
MCHC RBC-ENTMCNC: 31.1 PG
MCHC RBC-ENTMCNC: 32.4 GM/DL
MCV RBC AUTO: 95.9 FL
MONOCYTES # BLD AUTO: 0.4 K/UL
MONOCYTES NFR BLD AUTO: 9.1 %
NEUTROPHILS # BLD AUTO: 3.19 K/UL
NEUTROPHILS NFR BLD AUTO: 72.5 %
NITRITE URINE: NEGATIVE
PH URINE: 7.5
PLATELET # BLD AUTO: 186 K/UL
POTASSIUM SERPL-SCNC: 4.4 MMOL/L
PROT SERPL-MCNC: 7.4 G/DL
PROTEIN URINE: NORMAL
RBC # BLD: 2.93 M/UL
RBC # FLD: 15.4 %
SODIUM SERPL-SCNC: 138 MMOL/L
SPECIFIC GRAVITY URINE: 1.01
UROBILINOGEN URINE: NORMAL
WBC # FLD AUTO: 4.4 K/UL

## 2023-03-27 NOTE — ASSESSMENT
[FreeTextEntry1] : Imp: SLE w progression of hepatic disease (w admission for ascites 2 m ago), minimal musculoskeletal activity and cutaneous activity is better controlled than in previous encounters.  Her renal disease status is not clear (she had significant proteinuria last assessment-- no change in creatinine).  Will check labs; wlil reach out to GI.  HM:  she continues on low dose steroids, was given a course of weekly vit D by her pcp;  ? recent  DEXA.  Have spoken w Dr Lizama, GI-- biopsy to be faxed; he plans on obtaining a fiberscan to evaluate hepatic status and to refer to [facility] hepatology

## 2023-03-27 NOTE — HISTORY OF PRESENT ILLNESS
[FreeTextEntry1] : f/u SLE, diagnosed 2004.  Her features have included discoid rash, photosensitivity, arhritis, leukopenia, nephritis (class V), JAMAR, DNA, Sm.  She has additionally had alopecia, fever, costochondritis, lymphadenopathy, elevated cpk's, Raynaud's, E nodusum.  She has participated in clinical trials including studies of belimumab as well as vagus nerve stimulation.  She has chronic damage (scarring alopecia).  She additionally has had elevated transaminases, she has had a liver biopsy and results were consistent with drug reaction vs autoimmune related.  Withdrawal of potential drugs has not resulted in improvement of her enzymes; treatment with 80mg prednisone additionally without effect.\par \par February 9, 2021  PtGA 6\par Not seen at the [facility] since January 2020.  She states that has been doing relatively well over the year and has continued on plaquenil 400mg and prednisone 5mg.  She was in the ER in August after experiencing nosebleeds for 3 days.  Her discoid lesions on her scalp have been relatively well controlled and only recently has there been activity in the right temporal region.  She has additionally noted recent alopecia. She continues to experience joint pains and swelling, she continues with costochondritis with pain extending to right ribs, +mild fatigue, she has also had eary satiety (has not followed up with GI secondary to COVID) and has lost ~15 lbs.  She denies fever, other rash, lymphadenopathy, chills,oral ulcers, dry eyes, chest pain, shortness of breath, cough, anosmia, ageusia, nausea, vomiting, diarrhea, abdominal pain.\par \par Mar 22, 2021 PtGA 6.2\par f/u SLE; she notes increase in her joint pains, particularly her hands as well as chest wall tenderness despite increased steroids to 10mg.  She was evaluated recently by Dr Magana and received meds for sinusitis.  Of concern, her most recent labs with increased Upcr, transaminases, DNA and ESR both elevated (C3 normal, C4 was not performed).  No fever, rash, anorexia, weight loss, alopecia, lymphadenopathy, chills, joint pain/swelling/stiffness, oral ulcers, fatigue, dry eyes, chest pain, shortness of breath, cough, anosmia, ageusia, nausea, vomiting, diarrhea, abdominal pain.  She has an appointment with GI tomorrow for evaluation of her liver abnormality.\par \par Mar 16, 2022 PtGA 5.4\par f/u SLE--she has not been seen for a year (despite calls for follow-up as well as a call to her pcp regarding my concern).  She states that she has not been doing well, but there was too much going on for her to focus on her own health.  She has had continued joint pains, costocondritis, skin inflammation (with alopecia and extension of her scarring alopecia).  She was additionally hospitalized for 3 days last april with vomiting; she received an outpatient endoscopy and colonscopy and was told of ulcers in her esophagus and given nexium.  She was referred to hepatology for her continued elevation of her lft's but did not make the appointment.  Of note, she has had loss of appetite and early satiety (with a 15 lb weight loss).  She has received the COVID Pfizer vaccine on 9/24/21 and 10/14/21 with fatigue, chills after the second vaccination.  Additionally, she has had COVID in December (she had a headache, cough and congestion) and prolonged fatigue and congestion following the acute illness.  Her current symptoms include anorexia, joint pains, costochondritis, alopecia, scalp rash and fatigue.  No fever,lymphadenopathy, chills, joral ulcers, cough, anosmia, ageusia, nausea, vomiting, diarrhea, abdominal pain.\par \par April 19, 2022\par f/u SLE-- she is without significant change from last visit.  She does have a follow-up with GI, however, not until June 7.  I have called the office to move the appointment to an earlier date (but have not heard back).  She still notes joint pain affecting her elbows and fingers (right > left), additionally has had a flare of her scalp, but states that it has become less active.  Her costochondritis continues.  No fever, anorexia, further weight loss, other rash, edema, katiana urine (her Upcr was elevated at last visit), nausea, vomiting, diarrhea, abdominal pain,  oral ulcers, fatigue, dry eyes, chest pain, shortness of breath .  \par \par May 23, 2022\par f/u SLE, she continues without significant change.  She has a visit with GI on 6/7 and w her PDP on 6/9.  Her abdomenal US is scheduled tomorrow morning.  Her most recent labs from April show consistently elevated transaminases; she additionally has proteinuria.  (A call to GI following her appointment to facilitate her work-up was made, but no return and the US was scheduled.)  She continues with hand/finger pain which has been made worse as she is typing for her son; she continues with fatigue and inability to stay asleep, she continues with costochondritis.  She is not tolerating flucinolone to her scalp (her insurance company required documentation of their preferred agent so she was initiated on this) and there has been no significant change/improvement since her last visit.  She continues without fever, anorexia, weight loss, alopecia, lymphadenopathy, chills, oral ulcers, fatigue, dry eyes, chest pain, shortness of breath, cough, anosmia, ageusia, nausea, vomiting, diarrhea, abdominal pain. \par \par Mar 27, 2023 PtGA 4.0\par f/u SLE-- not seen for ~110months.  She underwent a liver biopsy (Aug 2022), results not known to me at this time, but she was told that she may have autoimmune hepatitis, pancreatitis and a third item; she was hospitalized in September for 4 days in which she received increased prednisone.  She underwent an upper endoscopy in Sept 2022 which was unremarkable.  She was rehospitalized late Jan to Feb for ascites (w removal of ~3L fluid); she was given high dose steroids and discharged on 80mg with a taper-- she has been on 5mg prednisone since early March as well as lasix and spironolactone.  She was told that she needs a fiberscan (liver) as well as a renal ultrasound.  Currently without active cutaneous disease, however, she has noted costochondral pain and rib tenderness, she has insignificant (~5 min) am stiffness, no joint swelling but tenderness of the 1cmc and 1st and second extensor tendons extending distally.\par PE:  NAD  117/76\par chronic scarring of scalp with areas of scarring alopecia and depigmentationt; no synovitis (previously seen  tr synovitis of L 2nd and 3rd PIP is resolved); tender right cbc with tenderness of 1st and 2nd flexor tendons extending to hand; tender 3rd rt mcp, right lateral epicondyl;  +costochondral tenderness; no oral/nasal ulcers (previously seen oral ulceration on hard palatte remains absent), no lymphadenopathy, nodules, periungal erythema. Lungs: Clear without wheeze, rales, rhonch; Cor: S1S2 without murmur, gallop or rub; Abd: soft, NT, BS+;, Back: no spinal or paraspinal tenderness; Ext: no CCE; Neuro: non-focal \par \par Imp:\par SLE w progression of hepatic disease (w admission for ascites 2 m ago), minimal musculoskeletal activity and cutaneous activity is better controlled than in previous encounters.  Her renal disease status is not clear (she had significant proteinuria last assessment-- no change in creatinine).  Will check labs; wlil reach out to GI.\par \par HM:  she continues on low dose steroids, was given a course of weekly vit D by her pcp;  ? recent  DEXA.\par \par Have spoken w Dr Lizama, GI-- biopsy to be faxed; he plans on obtaining a fiberscan to evaluate hepatic status and to refer to [facility] hepatology\par \par Infections: no;  cSLEDAI =8

## 2023-03-27 NOTE — PHYSICAL EXAM
[General Appearance - Alert] : alert [General Appearance - In No Acute Distress] : in no acute distress [General Appearance - Well Nourished] : well nourished [Outer Ear] : the ears and nose were normal in appearance [Extraocular Movements] : extraocular movements were intact [Examination Of The Oral Cavity] : the lips and gums were normal [Oropharynx] : the oropharynx was normal [Neck Appearance] : the appearance of the neck was normal [] : no respiratory distress [Auscultation Breath Sounds / Voice Sounds] : lungs were clear to auscultation bilaterally [Heart Rate And Rhythm] : heart rate was normal and rhythm regular [Heart Sounds] : normal S1 and S2 [Murmurs] : no murmurs [Heart Sounds Pericardial Friction Rub] : no pericardial rub [Bowel Sounds] : normal bowel sounds [Abdomen Soft] : soft [Abdomen Tenderness] : non-tender [Cervical Lymph Nodes Enlarged Posterior Bilaterally] : posterior cervical [Cervical Lymph Nodes Enlarged Anterior Bilaterally] : anterior cervical [Supraclavicular Lymph Nodes Enlarged Bilaterally] : supraclavicular [No Spinal Tenderness] : no spinal tenderness [No CVA Tenderness] : no ~M costovertebral angle tenderness [Abnormal Walk] : normal gait [No Focal Deficits] : no focal deficits [FreeTextEntry1] : see hpi

## 2023-03-28 LAB
C3 SERPL-MCNC: 65 MG/DL
C4 SERPL-MCNC: 7 MG/DL
CREAT SPEC-SCNC: 87 MG/DL
CREAT/PROT UR: 0.2 RATIO
DSDNA AB SER-ACNC: 99 IU/ML
PROT UR-MCNC: 16 MG/DL

## 2023-04-25 ENCOUNTER — APPOINTMENT (OUTPATIENT)
Dept: OPHTHALMOLOGY | Facility: CLINIC | Age: 43
End: 2023-04-25
Payer: COMMERCIAL

## 2023-04-25 ENCOUNTER — NON-APPOINTMENT (OUTPATIENT)
Age: 43
End: 2023-04-25

## 2023-04-25 PROCEDURE — 92014 COMPRE OPH EXAM EST PT 1/>: CPT

## 2023-04-25 PROCEDURE — 92083 EXTENDED VISUAL FIELD XM: CPT

## 2023-05-08 ENCOUNTER — APPOINTMENT (OUTPATIENT)
Dept: RHEUMATOLOGY | Facility: CLINIC | Age: 43
End: 2023-05-08

## 2023-08-10 ENCOUNTER — APPOINTMENT (OUTPATIENT)
Dept: RHEUMATOLOGY | Facility: CLINIC | Age: 43
End: 2023-08-10
Payer: COMMERCIAL

## 2023-08-10 VITALS
HEIGHT: 67 IN | HEART RATE: 77 BPM | TEMPERATURE: 97.9 F | BODY MASS INDEX: 22.29 KG/M2 | SYSTOLIC BLOOD PRESSURE: 170 MMHG | OXYGEN SATURATION: 98 % | WEIGHT: 142 LBS | DIASTOLIC BLOOD PRESSURE: 104 MMHG

## 2023-08-10 DIAGNOSIS — M86.9 OSTEOMYELITIS, UNSPECIFIED: ICD-10-CM

## 2023-08-10 DIAGNOSIS — M32.9 SYSTEMIC LUPUS ERYTHEMATOSUS, UNSPECIFIED: ICD-10-CM

## 2023-08-10 DIAGNOSIS — Z79.899 OTHER LONG TERM (CURRENT) DRUG THERAPY: ICD-10-CM

## 2023-08-10 DIAGNOSIS — R79.89 OTHER SPECIFIED ABNORMAL FINDINGS OF BLOOD CHEMISTRY: ICD-10-CM

## 2023-08-10 PROCEDURE — 99215 OFFICE O/P EST HI 40 MIN: CPT

## 2023-08-10 NOTE — PHYSICAL EXAM
[TextEntry] : GENERAL: Appears in no acute distress  HEENT: EOMI, PERRLA. No conjunctival erythema. Moist mucous membranes. No nasopharyngeal ulcers  NECK: Supple, no cervical lymphadenopathy, no thyromegaly  CARDIOVASCULAR: RRR. S1, S2 auscultated. No murmurs or rubs.  PULMONARY: Clear to auscultation b/l, no wheezes, rales, or crackles  ABDOMINAL: Soft, nontender, nondistended. Bowel sounds present. No organomegaly.  MSK:  Reducible deformities of b/l hands consistent with lupus SKIN: R clavicular surgical site mildly swollen NEURO: No focal deficits.  PSYCH: AAOx3. Normal affect and thought process.

## 2023-08-10 NOTE — HISTORY OF PRESENT ILLNESS
[FreeTextEntry1] : 44 y/o female presents as follow up for SLE. Pt was diagnosed with SLE in 2004. Pt has had discoid rash, photosensitivity, arthritis, leukopenia, nephritis (class V) alopecia, fever, costochondritis, lymphadenopathy, elevated cpk's, Raynaud's, erythema nodosum, Labwork with JAMAR+, DNA+, Sm+. She has participated in clinical trials including studies of belimumab as well as vagus nerve stimulation. She has chronic damage (scarring alopecia). She additionally has had elevated transaminases, she has had a liver biopsy and results were consistent with drug reaction vs autoimmune related. Withdrawal of potential drugs has not resulted in improvement of her enzymes; treatment with 80mg prednisone additionally without effect. Pt was last seen by Dr. Wright in 3/2023 on HCQ 200mg PO BID. PDN 5mg PO daily. Pt was previously on Cytoxin, Cellcept, Imuran in past which were stopped in the past. Patient was previously on Benlysta IV infusions and felt well on it (joints, discoid lupus, Raynaud's, improved), but it was stopped when she started to have liver issues, and never restarted.  PDN was increased in setting of SLE flare in 5/2023, currently decreased down to 10mg/day. More recently, pt has had progression of hepatic disease with plan for fibroscan and referral to [facility] hepatology. Pt had anemia of unknown origin, follows with hematology and received infusions. Pt was treated for ?osteomyelitis and hospitalized in [facility] in 6/2023 and had debridement and completed treatment with IV Abx, but ID told patient that she may need further Abx and more debridement. Pt currently has joint pains of hands/elbows and tingling of hands/feet related to SLE. Pt last saw ophthalmologist for HCQ screening ~3/2023.

## 2023-08-10 NOTE — ASSESSMENT
[FreeTextEntry1] : 42 y/o female presents as follow up for SLE. Pt was diagnosed with SLE in 2004. Pt has had discoid rash, photosensitivity, arthritis, leukopenia, nephritis (class V) alopecia, fever, costochondritis, lymphadenopathy, elevated cpk's, Raynaud's, erythema nodosum, Labwork with JAMAR+, DNA+, Sm+. She has participated in clinical trials including studies of belimumab as well as vagus nerve stimulation. She has chronic damage (scarring alopecia). She additionally has had elevated transaminases, she has had a liver biopsy and results were consistent with drug reaction vs autoimmune related. Withdrawal of potential drugs has not resulted in improvement of her enzymes; treatment with 80mg prednisone additionally without effect. Pt was last seen by Dr. Wright in 3/2023 on HCQ 200mg PO BID. PDN 5mg PO daily. Pt was previously on Cytoxin, Cellcept, Imuran in past which were stopped in the past. Patient was previously on Benlysta IV infusions and felt well on it (joints, discoid lupus, Raynaud's, improved), but it was stopped when she started to have liver issues, and never restarted.  PDN was increased in setting of SLE flare in 5/2023, currently decreased down to 10mg/day. More recently, pt has had progression of hepatic disease with plan for fibroscan and referral to [facility] hepatology. Pt had anemia of unknown origin, follows with hematology and received infusions. Pt was treated for ?osteomyelitis and hospitalized in [facility] in 6/2023 and had debridement and completed treatment with IV Abx, but ID told patient that she may need further Abx and more debridement. Pt currently has joint pains of hands/elbows and tingling of hands/feet related to SLE. Pt last saw ophthalmologist for HCQ screening ~3/2023.  Pt has complicated SLE with multiple organ involvement. Pt is currently only on HCQ and low dose PDN. Pt continues to have joint inflammation and neuropathy with labwork suggesting active SLE. Currently, pt's picture is complicated by active infection of clavicle which may require further antibiotics and possible repeat surgical debridement. Since Benlysta was not considered to be the causative agent for her liver failure, I would like to consider adding Benlysta SQ or IV when her infections resolve  - Obtain labwork to evalaute lupus activity and medication safety - c/w HCQ 200mg PO BID. HCQ dosage is <5mg/kg/day or <400mg/day to minimize risk of retinal toxicity. Side effects of HCQ were discussed with the patient including but not limited to GI upset, retinal toxicity, QT prolongation, cytopenias, myopathy. Discussed the importance of yearly ophthalmology evaluation. - Continue follow up with ophthalmology, last seen ~3/2023. - c/w PDN 10mg/day for now. Will consider wean on next visit based on lab results and status of pt's other medical problems. - Will discuss about potentially restarting Benlysta SQ vs IV on next visit if her active infection resolved. - Continue follow up with hepatology and hematology. Pt does not follow up regularly with nephro. Will consider referral to nephro if any concerns about active kidney disease. - RTC 3 months for follow up

## 2023-10-25 ENCOUNTER — APPOINTMENT (OUTPATIENT)
Dept: OPHTHALMOLOGY | Facility: CLINIC | Age: 43
End: 2023-10-25
Payer: COMMERCIAL

## 2023-10-25 ENCOUNTER — NON-APPOINTMENT (OUTPATIENT)
Age: 43
End: 2023-10-25

## 2023-10-25 PROCEDURE — 92283 EXTND COLOR VISION XM: CPT

## 2023-10-25 PROCEDURE — 92014 COMPRE OPH EXAM EST PT 1/>: CPT

## 2023-10-25 PROCEDURE — 92134 CPTRZ OPH DX IMG PST SGM RTA: CPT

## 2023-11-08 ENCOUNTER — APPOINTMENT (OUTPATIENT)
Dept: RHEUMATOLOGY | Facility: CLINIC | Age: 43
End: 2023-11-08

## 2024-04-23 ENCOUNTER — APPOINTMENT (OUTPATIENT)
Dept: OPHTHALMOLOGY | Facility: CLINIC | Age: 44
End: 2024-04-23

## 2024-07-22 ENCOUNTER — NON-APPOINTMENT (OUTPATIENT)
Age: 44
End: 2024-07-22

## 2024-07-22 ENCOUNTER — APPOINTMENT (OUTPATIENT)
Dept: OPHTHALMOLOGY | Facility: CLINIC | Age: 44
End: 2024-07-22
Payer: COMMERCIAL

## 2024-07-22 PROCEDURE — 92134 CPTRZ OPH DX IMG PST SGM RTA: CPT

## 2024-07-22 PROCEDURE — 92083 EXTENDED VISUAL FIELD XM: CPT

## 2024-07-22 PROCEDURE — 92014 COMPRE OPH EXAM EST PT 1/>: CPT

## 2025-01-28 ENCOUNTER — APPOINTMENT (OUTPATIENT)
Dept: OPHTHALMOLOGY | Facility: CLINIC | Age: 45
End: 2025-01-28
Payer: COMMERCIAL

## 2025-01-28 ENCOUNTER — NON-APPOINTMENT (OUTPATIENT)
Age: 45
End: 2025-01-28

## 2025-01-28 PROCEDURE — 92134 CPTRZ OPH DX IMG PST SGM RTA: CPT

## 2025-01-28 PROCEDURE — 92283 EXTND COLOR VISION XM: CPT

## 2025-01-28 PROCEDURE — 92014 COMPRE OPH EXAM EST PT 1/>: CPT

## 2025-02-10 ENCOUNTER — HOSPITAL ENCOUNTER (EMERGENCY)
Age: 45
Discharge: HOME | End: 2025-02-10
Payer: COMMERCIAL

## 2025-02-10 VITALS
SYSTOLIC BLOOD PRESSURE: 99 MMHG | DIASTOLIC BLOOD PRESSURE: 68 MMHG | HEART RATE: 63 BPM | RESPIRATION RATE: 18 BRPM | OXYGEN SATURATION: 100 % | TEMPERATURE: 98.2 F

## 2025-02-10 DIAGNOSIS — Z51.89 VISIT FOR WOUND CHECK: Primary | ICD-10-CM

## 2025-02-10 PROCEDURE — 90715 TDAP VACCINE 7 YRS/> IM: CPT | Performed by: PHYSICIAN ASSISTANT

## 2025-02-10 PROCEDURE — 6360000002 HC RX W HCPCS: Performed by: PHYSICIAN ASSISTANT

## 2025-02-10 PROCEDURE — 90471 IMMUNIZATION ADMIN: CPT | Performed by: PHYSICIAN ASSISTANT

## 2025-02-10 PROCEDURE — 99211 OFF/OP EST MAY X REQ PHY/QHP: CPT

## 2025-02-10 RX ORDER — DOXYCYCLINE HYCLATE 100 MG
100 TABLET ORAL 2 TIMES DAILY
Qty: 14 TABLET | Refills: 0 | Status: SHIPPED | OUTPATIENT
Start: 2025-02-10 | End: 2025-02-17

## 2025-02-10 RX ORDER — PREDNISONE 5 MG/1
5 TABLET ORAL DAILY
COMMUNITY

## 2025-02-10 RX ADMIN — TETANUS TOXOID, REDUCED DIPHTHERIA TOXOID AND ACELLULAR PERTUSSIS VACCINE, ADSORBED 0.5 ML: 5; 2.5; 8; 8; 2.5 SUSPENSION INTRAMUSCULAR at 21:21

## 2025-02-11 NOTE — ED PROVIDER NOTES
[facility] URGENT CARE  EMERGENCY DEPARTMENT ENCOUNTER        NAME: Geoffrey Curtis  :  1980  MRN:  [id number]  Date of evaluation: 2/10/2025  Provider: Laith Jensen PA-C  PCP: No primary care provider on file.  Note Started : 9:16 PM EST 2/10/25    Chief Complaint: Sore (Has a sore on the out side of left ankle says went in closet and cut it with a  did it Thursday and it is still bleeding)      This is a 44-year-old female who presents to urgent care with a wound to the posterior ankle for the past 5 days.  She states she accidentally opened up the skin when she brushed up against a .She noticed later there was some bleeding and drainage from the wound.  She denies any fevers or chills.  No bony pain.  She denies any numbness or tingling or weakness.  On first contact patient she appears to be in no acute distress.        Review of Systems  Pertinent positives and negatives are stated within HPI, all other systems reviewed and are negative.     Allergies: Patient has no known allergies.     --------------------------------------------- PAST HISTORY ---------------------------------------------  Past Medical History:  has no past medical history on file.    Past Surgical History:  has no past surgical history on file.    Social History:      Family History: family history is not on file.     The patient’s home medications have been reviewed.    The nursing notes within the ED encounter have been reviewed.     ------------------------------------------------SCREENINGS----------------------------------------------                        CIWA Assessment  BP: 99/68  Pulse: 63           ---------------------------------------------PHYSICAL EXAM --------------------------------------------    Vitals:    02/10/25 2052   BP: 99/68   Pulse: 63   Resp: 18   Temp: 98.2 °F (36.8 °C)   SpO2: 100%     Oxygen Saturation Interpretation: Normal     Physical Exam  Vitals and nursing note reviewed.

## 2025-02-11 NOTE — DISCHARGE INSTRUCTIONS
Keep clean, apply antibiotic ointment or vaseline or aquaphor   Keep pressure off the back of the ankle.

## 2025-07-02 ENCOUNTER — INPATIENT (INPATIENT)
Facility: HOSPITAL | Age: 45
LOS: 41 days | Discharge: HOME HEALTH | DRG: 443 | End: 2025-08-13
Attending: TRANSPLANT SURGERY | Admitting: TRANSPLANT SURGERY
Payer: COMMERCIAL

## 2025-07-02 VITALS — OXYGEN SATURATION: 100 % | HEART RATE: 58 BPM

## 2025-07-02 DIAGNOSIS — K72.00 ACUTE AND SUBACUTE HEPATIC FAILURE WITHOUT COMA: ICD-10-CM

## 2025-07-02 LAB
ALBUMIN SERPL ELPH-MCNC: 2.4 G/DL — LOW (ref 3.3–5)
ALP SERPL-CCNC: 96 U/L — SIGNIFICANT CHANGE UP (ref 40–120)
ALT FLD-CCNC: 26 U/L — SIGNIFICANT CHANGE UP (ref 10–45)
AMMONIA BLD-MCNC: 50 UMOL/L — SIGNIFICANT CHANGE UP (ref 11–55)
ANION GAP SERPL CALC-SCNC: 16 MMOL/L — SIGNIFICANT CHANGE UP (ref 5–17)
APPEARANCE UR: ABNORMAL
APTT BLD: 28.8 SEC — SIGNIFICANT CHANGE UP (ref 26.1–36.8)
AST SERPL-CCNC: 51 U/L — HIGH (ref 10–40)
BACTERIA # UR AUTO: ABNORMAL /HPF
BILIRUB SERPL-MCNC: 8.3 MG/DL — HIGH (ref 0.2–1.2)
BILIRUB UR-MCNC: ABNORMAL
BLD GP AB SCN SERPL QL: NEGATIVE — SIGNIFICANT CHANGE UP
BUN SERPL-MCNC: 44 MG/DL — HIGH (ref 7–23)
CALCIUM SERPL-MCNC: 9 MG/DL — SIGNIFICANT CHANGE UP (ref 8.4–10.5)
CAST: 21 /LPF — HIGH (ref 0–4)
CHLORIDE SERPL-SCNC: 118 MMOL/L — HIGH (ref 96–108)
CO2 SERPL-SCNC: 14 MMOL/L — LOW (ref 22–31)
COLOR SPEC: SIGNIFICANT CHANGE UP
CREAT ?TM UR-MCNC: 142 MG/DL — SIGNIFICANT CHANGE UP
CREAT SERPL-MCNC: 1.99 MG/DL — HIGH (ref 0.5–1.3)
DIFF PNL FLD: ABNORMAL
EGFR: 31 ML/MIN/1.73M2 — LOW
EGFR: 31 ML/MIN/1.73M2 — LOW
FLUAV AG NPH QL: SIGNIFICANT CHANGE UP
FLUBV AG NPH QL: SIGNIFICANT CHANGE UP
GAS PNL BLDA: SIGNIFICANT CHANGE UP
GAS PNL BLDA: SIGNIFICANT CHANGE UP
GAS PNL BLDV: SIGNIFICANT CHANGE UP
GLUCOSE SERPL-MCNC: 109 MG/DL — HIGH (ref 70–99)
GLUCOSE UR QL: NEGATIVE MG/DL — SIGNIFICANT CHANGE UP
HCT VFR BLD CALC: 30.7 % — LOW (ref 34.5–45)
HGB BLD-MCNC: 9.6 G/DL — LOW (ref 11.5–15.5)
IMMATURE PLATELET FRACTION #: 1.4 K/UL — LOW (ref 4.7–11.1)
IMMATURE PLATELET FRACTION %: 3.7 % — SIGNIFICANT CHANGE UP (ref 1.6–4.9)
INR BLD: 2.3 RATIO — HIGH (ref 0.85–1.16)
KETONES UR QL: NEGATIVE MG/DL — SIGNIFICANT CHANGE UP
LEUKOCYTE ESTERASE UR-ACNC: ABNORMAL
MAGNESIUM SERPL-MCNC: 2.4 MG/DL — SIGNIFICANT CHANGE UP (ref 1.6–2.6)
MCHC RBC-ENTMCNC: 30.5 PG — SIGNIFICANT CHANGE UP (ref 27–34)
MCHC RBC-ENTMCNC: 31.3 G/DL — LOW (ref 32–36)
MCV RBC AUTO: 97.5 FL — SIGNIFICANT CHANGE UP (ref 80–100)
NITRITE UR-MCNC: NEGATIVE — SIGNIFICANT CHANGE UP
NRBC # BLD AUTO: 0.07 K/UL — HIGH (ref 0–0)
NRBC # FLD: 0.07 K/UL — HIGH (ref 0–0)
NRBC BLD AUTO-RTO: 1 /100 WBCS — HIGH (ref 0–0)
PH UR: 5.5 — SIGNIFICANT CHANGE UP (ref 5–8)
PHOSPHATE SERPL-MCNC: 6.1 MG/DL — HIGH (ref 2.5–4.5)
PLATELET # BLD AUTO: 39 K/UL — LOW (ref 150–400)
PMV BLD: 11.5 FL — SIGNIFICANT CHANGE UP (ref 7–13)
POTASSIUM SERPL-MCNC: 3.9 MMOL/L — SIGNIFICANT CHANGE UP (ref 3.5–5.3)
POTASSIUM SERPL-SCNC: 3.9 MMOL/L — SIGNIFICANT CHANGE UP (ref 3.5–5.3)
POTASSIUM UR-SCNC: 53 MMOL/L — SIGNIFICANT CHANGE UP
PROT ?TM UR-MCNC: 57 MG/DL — HIGH (ref 0–12)
PROT SERPL-MCNC: 6.6 G/DL — SIGNIFICANT CHANGE UP (ref 6–8.3)
PROT UR-MCNC: 30 MG/DL
PROT/CREAT UR-RTO: 0.4 RATIO — HIGH (ref 0–0.2)
PROTHROM AB SERPL-ACNC: 26 SEC — HIGH (ref 9.9–13.4)
RBC # BLD: 3.15 M/UL — LOW (ref 3.8–5.2)
RBC # FLD: 21.2 % — HIGH (ref 10.3–14.5)
RBC CASTS # UR COMP ASSIST: 35 /HPF — HIGH (ref 0–4)
REVIEW: SIGNIFICANT CHANGE UP
RH IG SCN BLD-IMP: POSITIVE — SIGNIFICANT CHANGE UP
RSV RNA NPH QL NAA+NON-PROBE: SIGNIFICANT CHANGE UP
SARS-COV-2 RNA SPEC QL NAA+PROBE: SIGNIFICANT CHANGE UP
SODIUM SERPL-SCNC: 148 MMOL/L — HIGH (ref 135–145)
SODIUM UR-SCNC: 24 MMOL/L — SIGNIFICANT CHANGE UP
SOURCE RESPIRATORY: SIGNIFICANT CHANGE UP
SP GR SPEC: 1.02 — SIGNIFICANT CHANGE UP (ref 1–1.03)
SQUAMOUS # UR AUTO: >36 /HPF — HIGH (ref 0–5)
UROBILINOGEN FLD QL: 0.2 MG/DL — SIGNIFICANT CHANGE UP (ref 0.2–1)
WBC # BLD: 5.61 K/UL — SIGNIFICANT CHANGE UP (ref 3.8–10.5)
WBC # FLD AUTO: 5.61 K/UL — SIGNIFICANT CHANGE UP (ref 3.8–10.5)
WBC UR QL: 22 /HPF — HIGH (ref 0–5)

## 2025-07-02 PROCEDURE — 84300 ASSAY OF URINE SODIUM: CPT

## 2025-07-02 PROCEDURE — 71045 X-RAY EXAM CHEST 1 VIEW: CPT | Mod: 26

## 2025-07-02 PROCEDURE — 85730 THROMBOPLASTIN TIME PARTIAL: CPT

## 2025-07-02 PROCEDURE — 86900 BLOOD TYPING SEROLOGIC ABO: CPT

## 2025-07-02 PROCEDURE — 86850 RBC ANTIBODY SCREEN: CPT

## 2025-07-02 PROCEDURE — 99291 CRITICAL CARE FIRST HOUR: CPT

## 2025-07-02 PROCEDURE — 81001 URINALYSIS AUTO W/SCOPE: CPT

## 2025-07-02 PROCEDURE — 85027 COMPLETE CBC AUTOMATED: CPT

## 2025-07-02 PROCEDURE — 83605 ASSAY OF LACTIC ACID: CPT

## 2025-07-02 PROCEDURE — 99232 SBSQ HOSP IP/OBS MODERATE 35: CPT | Mod: GC

## 2025-07-02 PROCEDURE — 84133 ASSAY OF URINE POTASSIUM: CPT

## 2025-07-02 PROCEDURE — 82803 BLOOD GASES ANY COMBINATION: CPT

## 2025-07-02 PROCEDURE — 82947 ASSAY GLUCOSE BLOOD QUANT: CPT

## 2025-07-02 PROCEDURE — 71045 X-RAY EXAM CHEST 1 VIEW: CPT

## 2025-07-02 PROCEDURE — 80053 COMPREHEN METABOLIC PANEL: CPT

## 2025-07-02 PROCEDURE — 99232 SBSQ HOSP IP/OBS MODERATE 35: CPT

## 2025-07-02 PROCEDURE — 84132 ASSAY OF SERUM POTASSIUM: CPT

## 2025-07-02 PROCEDURE — 82570 ASSAY OF URINE CREATININE: CPT

## 2025-07-02 PROCEDURE — 83735 ASSAY OF MAGNESIUM: CPT

## 2025-07-02 PROCEDURE — 85610 PROTHROMBIN TIME: CPT

## 2025-07-02 PROCEDURE — 84295 ASSAY OF SERUM SODIUM: CPT

## 2025-07-02 PROCEDURE — 83935 ASSAY OF URINE OSMOLALITY: CPT

## 2025-07-02 PROCEDURE — 82435 ASSAY OF BLOOD CHLORIDE: CPT

## 2025-07-02 PROCEDURE — 94002 VENT MGMT INPAT INIT DAY: CPT

## 2025-07-02 PROCEDURE — 82330 ASSAY OF CALCIUM: CPT

## 2025-07-02 PROCEDURE — 84100 ASSAY OF PHOSPHORUS: CPT

## 2025-07-02 PROCEDURE — 86901 BLOOD TYPING SEROLOGIC RH(D): CPT

## 2025-07-02 PROCEDURE — 82140 ASSAY OF AMMONIA: CPT

## 2025-07-02 PROCEDURE — 0241U: CPT

## 2025-07-02 PROCEDURE — 85018 HEMOGLOBIN: CPT

## 2025-07-02 PROCEDURE — 84156 ASSAY OF PROTEIN URINE: CPT

## 2025-07-02 PROCEDURE — 85014 HEMATOCRIT: CPT

## 2025-07-02 RX ORDER — PIPERACILLIN-TAZO-DEXTROSE,ISO 3.375G/5
3.38 IV SOLUTION, PIGGYBACK PREMIX FROZEN(ML) INTRAVENOUS ONCE
Refills: 0 | Status: COMPLETED | OUTPATIENT
Start: 2025-07-02 | End: 2025-07-02

## 2025-07-02 RX ORDER — SODIUM CHLORIDE 9 G/1000ML
1000 INJECTION, SOLUTION INTRAVENOUS
Refills: 0 | Status: DISCONTINUED | OUTPATIENT
Start: 2025-07-02 | End: 2025-07-02

## 2025-07-02 RX ORDER — DEXMEDETOMIDINE HYDROCHLORIDE IN SODIUM CHLORIDE 4 UG/ML
1.5 INJECTION INTRAVENOUS
Qty: 200 | Refills: 0 | Status: DISCONTINUED | OUTPATIENT
Start: 2025-07-02 | End: 2025-07-04

## 2025-07-02 RX ORDER — SODIUM BICARBONATE 1 MEQ/ML
0.1 SYRINGE (ML) INTRAVENOUS
Qty: 150 | Refills: 0 | Status: DISCONTINUED | OUTPATIENT
Start: 2025-07-02 | End: 2025-07-03

## 2025-07-02 RX ORDER — PIPERACILLIN-TAZO-DEXTROSE,ISO 3.375G/5
3.38 IV SOLUTION, PIGGYBACK PREMIX FROZEN(ML) INTRAVENOUS EVERY 8 HOURS
Refills: 0 | Status: DISCONTINUED | OUTPATIENT
Start: 2025-07-03 | End: 2025-07-09

## 2025-07-02 RX ORDER — PIPERACILLIN-TAZO-DEXTROSE,ISO 3.375G/5
3.38 IV SOLUTION, PIGGYBACK PREMIX FROZEN(ML) INTRAVENOUS ONCE
Refills: 0 | Status: COMPLETED | OUTPATIENT
Start: 2025-07-03 | End: 2025-07-03

## 2025-07-02 RX ORDER — HYDROMORPHONE/SOD CHLOR,ISO/PF 2 MG/10 ML
0.5 SYRINGE (ML) INJECTION
Refills: 0 | Status: DISCONTINUED | OUTPATIENT
Start: 2025-07-02 | End: 2025-07-05

## 2025-07-02 RX ADMIN — DEXMEDETOMIDINE HYDROCHLORIDE IN SODIUM CHLORIDE 30.5 MICROGRAM(S)/KG/HR: 4 INJECTION INTRAVENOUS at 21:07

## 2025-07-02 RX ADMIN — Medication 40 MILLIGRAM(S): at 21:07

## 2025-07-02 RX ADMIN — Medication 50 MEQ/KG/HR: at 23:28

## 2025-07-02 RX ADMIN — Medication 200 GRAM(S): at 21:07

## 2025-07-03 ENCOUNTER — RESULT REVIEW (OUTPATIENT)
Age: 45
End: 2025-07-03

## 2025-07-03 LAB
A1C WITH ESTIMATED AVERAGE GLUCOSE RESULT: 4.9 % — SIGNIFICANT CHANGE UP (ref 4–5.6)
ADD ON TEST-SPECIMEN IN LAB: SIGNIFICANT CHANGE UP
AFP-TM SERPL-MCNC: 3.3 NG/ML — SIGNIFICANT CHANGE UP
ALBUMIN SERPL ELPH-MCNC: 2.2 G/DL — LOW (ref 3.3–5)
ALBUMIN SERPL ELPH-MCNC: 2.3 G/DL — LOW (ref 3.3–5)
ALBUMIN SERPL ELPH-MCNC: 2.3 G/DL — LOW (ref 3.3–5)
ALP SERPL-CCNC: 72 U/L — SIGNIFICANT CHANGE UP (ref 40–120)
ALP SERPL-CCNC: 73 U/L — SIGNIFICANT CHANGE UP (ref 40–120)
ALP SERPL-CCNC: 87 U/L — SIGNIFICANT CHANGE UP (ref 40–120)
ALT FLD-CCNC: 22 U/L — SIGNIFICANT CHANGE UP (ref 10–45)
ALT FLD-CCNC: 23 U/L — SIGNIFICANT CHANGE UP (ref 10–45)
ALT FLD-CCNC: 25 U/L — SIGNIFICANT CHANGE UP (ref 10–45)
ANION GAP SERPL CALC-SCNC: 15 MMOL/L — SIGNIFICANT CHANGE UP (ref 5–17)
ANION GAP SERPL CALC-SCNC: 17 MMOL/L — SIGNIFICANT CHANGE UP (ref 5–17)
ANION GAP SERPL CALC-SCNC: 17 MMOL/L — SIGNIFICANT CHANGE UP (ref 5–17)
APAP SERPL-MCNC: <15 UG/ML — SIGNIFICANT CHANGE UP (ref 10–30)
APTT BLD: 30.2 SEC — SIGNIFICANT CHANGE UP (ref 26.1–36.8)
APTT BLD: 31.4 SEC — SIGNIFICANT CHANGE UP (ref 26.1–36.8)
AST SERPL-CCNC: 46 U/L — HIGH (ref 10–40)
AST SERPL-CCNC: 48 U/L — HIGH (ref 10–40)
AST SERPL-CCNC: 49 U/L — HIGH (ref 10–40)
BILIRUB SERPL-MCNC: 7.3 MG/DL — HIGH (ref 0.2–1.2)
BILIRUB SERPL-MCNC: 7.5 MG/DL — HIGH (ref 0.2–1.2)
BILIRUB SERPL-MCNC: 8.2 MG/DL — HIGH (ref 0.2–1.2)
BUN SERPL-MCNC: 50 MG/DL — HIGH (ref 7–23)
BUN SERPL-MCNC: 57 MG/DL — HIGH (ref 7–23)
BUN SERPL-MCNC: 62 MG/DL — HIGH (ref 7–23)
CALCIUM SERPL-MCNC: 8.4 MG/DL — SIGNIFICANT CHANGE UP (ref 8.4–10.5)
CALCIUM SERPL-MCNC: 8.5 MG/DL — SIGNIFICANT CHANGE UP (ref 8.4–10.5)
CALCIUM SERPL-MCNC: 8.9 MG/DL — SIGNIFICANT CHANGE UP (ref 8.4–10.5)
CERULOPLASMIN SERPL-MCNC: 15 MG/DL — LOW (ref 16–45)
CHLORIDE SERPL-SCNC: 115 MMOL/L — HIGH (ref 96–108)
CHLORIDE SERPL-SCNC: 115 MMOL/L — HIGH (ref 96–108)
CHLORIDE SERPL-SCNC: 120 MMOL/L — HIGH (ref 96–108)
CHOLEST SERPL-MCNC: 178 MG/DL — SIGNIFICANT CHANGE UP
CO2 SERPL-SCNC: 12 MMOL/L — LOW (ref 22–31)
CO2 SERPL-SCNC: 16 MMOL/L — LOW (ref 22–31)
CO2 SERPL-SCNC: 17 MMOL/L — LOW (ref 22–31)
CREAT ?TM UR-MCNC: 143 MG/DL — SIGNIFICANT CHANGE UP
CREAT ?TM UR-MCNC: 39 MG/DL — SIGNIFICANT CHANGE UP
CREAT SERPL-MCNC: 2.12 MG/DL — HIGH (ref 0.5–1.3)
CREAT SERPL-MCNC: 2.21 MG/DL — HIGH (ref 0.5–1.3)
CREAT SERPL-MCNC: 2.48 MG/DL — HIGH (ref 0.5–1.3)
EGFR: 24 ML/MIN/1.73M2 — LOW
EGFR: 24 ML/MIN/1.73M2 — LOW
EGFR: 27 ML/MIN/1.73M2 — LOW
EGFR: 27 ML/MIN/1.73M2 — LOW
EGFR: 29 ML/MIN/1.73M2 — LOW
EGFR: 29 ML/MIN/1.73M2 — LOW
ESTIMATED AVERAGE GLUCOSE: 94 MG/DL — SIGNIFICANT CHANGE UP (ref 68–114)
ETHANOL SERPL-MCNC: <10 MG/DL — SIGNIFICANT CHANGE UP (ref 0–10)
FERRITIN SERPL-MCNC: 266 NG/ML — HIGH (ref 15–150)
GAS PNL BLDA: SIGNIFICANT CHANGE UP
GLUCOSE SERPL-MCNC: 129 MG/DL — HIGH (ref 70–99)
GLUCOSE SERPL-MCNC: 140 MG/DL — HIGH (ref 70–99)
GLUCOSE SERPL-MCNC: 144 MG/DL — HIGH (ref 70–99)
GRAM STN FLD: SIGNIFICANT CHANGE UP
HAV IGG SER QL IA: REACTIVE
HAV IGM SER-ACNC: SIGNIFICANT CHANGE UP
HBV CORE AB SER-ACNC: SIGNIFICANT CHANGE UP
HBV SURFACE AB SER-ACNC: 236.4 MIU/ML — SIGNIFICANT CHANGE UP
HBV SURFACE AB SER-ACNC: REACTIVE — SIGNIFICANT CHANGE UP
HBV SURFACE AG SER-ACNC: SIGNIFICANT CHANGE UP
HCG SERPL-ACNC: <2 MIU/ML — SIGNIFICANT CHANGE UP
HCT VFR BLD CALC: 24.9 % — LOW (ref 34.5–45)
HCT VFR BLD CALC: 25.4 % — LOW (ref 34.5–45)
HCT VFR BLD CALC: 29.3 % — LOW (ref 34.5–45)
HCV AB S/CO SERPL IA: 0.06 S/CO — SIGNIFICANT CHANGE UP
HCV AB SERPL-IMP: SIGNIFICANT CHANGE UP
HDLC SERPL-MCNC: 47 MG/DL — LOW
HGB BLD-MCNC: 8 G/DL — LOW (ref 11.5–15.5)
HGB BLD-MCNC: 8.1 G/DL — LOW (ref 11.5–15.5)
HGB BLD-MCNC: 9.2 G/DL — LOW (ref 11.5–15.5)
HIV 1+2 AB+HIV1 P24 AG SERPL QL IA: SIGNIFICANT CHANGE UP
IGA FLD-MCNC: 781 MG/DL — HIGH (ref 84–499)
IGG FLD-MCNC: 1747 MG/DL — HIGH (ref 610–1660)
IGM SERPL-MCNC: 184 MG/DL — SIGNIFICANT CHANGE UP (ref 35–242)
IMMATURE PLATELET FRACTION #: 1.7 K/UL — LOW (ref 4.7–11.1)
IMMATURE PLATELET FRACTION #: 1.8 K/UL — LOW (ref 4.7–11.1)
IMMATURE PLATELET FRACTION #: 1.8 K/UL — LOW (ref 4.7–11.1)
IMMATURE PLATELET FRACTION %: 3.8 % — SIGNIFICANT CHANGE UP (ref 1.6–4.9)
IMMATURE PLATELET FRACTION %: 4.1 % — SIGNIFICANT CHANGE UP (ref 1.6–4.9)
IMMATURE PLATELET FRACTION %: 4.3 % — SIGNIFICANT CHANGE UP (ref 1.6–4.9)
INR BLD: 2.66 RATIO — HIGH (ref 0.85–1.16)
INR BLD: 2.94 RATIO — HIGH (ref 0.85–1.16)
IRON SATN MFR SERPL: 14 % — SIGNIFICANT CHANGE UP (ref 14–50)
IRON SATN MFR SERPL: 24 UG/DL — LOW (ref 30–160)
LDLC SERPL-MCNC: 117 MG/DL — HIGH
LIPID PNL WITH DIRECT LDL SERPL: 117 MG/DL — HIGH
MAGNESIUM SERPL-MCNC: 2.2 MG/DL — SIGNIFICANT CHANGE UP (ref 1.6–2.6)
MAGNESIUM SERPL-MCNC: 2.3 MG/DL — SIGNIFICANT CHANGE UP (ref 1.6–2.6)
MAGNESIUM SERPL-MCNC: 2.4 MG/DL — SIGNIFICANT CHANGE UP (ref 1.6–2.6)
MCHC RBC-ENTMCNC: 30 PG — SIGNIFICANT CHANGE UP (ref 27–34)
MCHC RBC-ENTMCNC: 30.5 PG — SIGNIFICANT CHANGE UP (ref 27–34)
MCHC RBC-ENTMCNC: 30.5 PG — SIGNIFICANT CHANGE UP (ref 27–34)
MCHC RBC-ENTMCNC: 31.4 G/DL — LOW (ref 32–36)
MCHC RBC-ENTMCNC: 31.9 G/DL — LOW (ref 32–36)
MCHC RBC-ENTMCNC: 32.1 G/DL — SIGNIFICANT CHANGE UP (ref 32–36)
MCV RBC AUTO: 94.1 FL — SIGNIFICANT CHANGE UP (ref 80–100)
MCV RBC AUTO: 95 FL — SIGNIFICANT CHANGE UP (ref 80–100)
MCV RBC AUTO: 97 FL — SIGNIFICANT CHANGE UP (ref 80–100)
MITOCHONDRIA AB SER-ACNC: SIGNIFICANT CHANGE UP
MRSA PCR RESULT.: SIGNIFICANT CHANGE UP
NONHDLC SERPL-MCNC: 132 MG/DL — HIGH
NRBC # BLD AUTO: 0.07 K/UL — HIGH (ref 0–0)
NRBC # BLD AUTO: 0.08 K/UL — HIGH (ref 0–0)
NRBC # BLD AUTO: 0.17 K/UL — HIGH (ref 0–0)
NRBC # FLD: 0.07 K/UL — HIGH (ref 0–0)
NRBC # FLD: 0.08 K/UL — HIGH (ref 0–0)
NRBC # FLD: 0.17 K/UL — HIGH (ref 0–0)
NRBC BLD AUTO-RTO: 0 /100 WBCS — SIGNIFICANT CHANGE UP (ref 0–0)
NRBC BLD AUTO-RTO: 1 /100 WBCS — HIGH (ref 0–0)
NRBC BLD AUTO-RTO: 2 /100 WBCS — HIGH (ref 0–0)
OSMOLALITY UR: 547 MOSM/KG — SIGNIFICANT CHANGE UP (ref 50–1200)
PHOSPHATE SERPL-MCNC: 4.7 MG/DL — HIGH (ref 2.5–4.5)
PHOSPHATE SERPL-MCNC: 5.2 MG/DL — HIGH (ref 2.5–4.5)
PHOSPHATE SERPL-MCNC: 6.2 MG/DL — HIGH (ref 2.5–4.5)
PLATELET # BLD AUTO: 40 K/UL — LOW (ref 150–400)
PLATELET # BLD AUTO: 45 K/UL — LOW (ref 150–400)
PLATELET # BLD AUTO: 47 K/UL — LOW (ref 150–400)
PMV BLD: 10.3 FL — SIGNIFICANT CHANGE UP (ref 7–13)
PMV BLD: 10.8 FL — SIGNIFICANT CHANGE UP (ref 7–13)
PMV BLD: 11.6 FL — SIGNIFICANT CHANGE UP (ref 7–13)
POTASSIUM SERPL-MCNC: 3.4 MMOL/L — LOW (ref 3.5–5.3)
POTASSIUM SERPL-MCNC: 3.6 MMOL/L — SIGNIFICANT CHANGE UP (ref 3.5–5.3)
POTASSIUM SERPL-MCNC: 4 MMOL/L — SIGNIFICANT CHANGE UP (ref 3.5–5.3)
POTASSIUM SERPL-SCNC: 3.4 MMOL/L — LOW (ref 3.5–5.3)
POTASSIUM SERPL-SCNC: 3.6 MMOL/L — SIGNIFICANT CHANGE UP (ref 3.5–5.3)
POTASSIUM SERPL-SCNC: 4 MMOL/L — SIGNIFICANT CHANGE UP (ref 3.5–5.3)
PROCALCITONIN SERPL-MCNC: 5.33 NG/ML — HIGH (ref 0.02–0.1)
PROT ?TM UR-MCNC: 55 MG/DL — HIGH (ref 0–12)
PROT SERPL-MCNC: 5.9 G/DL — LOW (ref 6–8.3)
PROT SERPL-MCNC: 6 G/DL — SIGNIFICANT CHANGE UP (ref 6–8.3)
PROT SERPL-MCNC: 6.3 G/DL — SIGNIFICANT CHANGE UP (ref 6–8.3)
PROT/CREAT UR-RTO: 0.4 RATIO — HIGH (ref 0–0.2)
PROTHROM AB SERPL-ACNC: 30 SEC — HIGH (ref 9.9–13.4)
PROTHROM AB SERPL-ACNC: 33.5 SEC — HIGH (ref 9.9–13.4)
RBC # BLD: 2.62 M/UL — LOW (ref 3.8–5.2)
RBC # BLD: 2.7 M/UL — LOW (ref 3.8–5.2)
RBC # BLD: 3.02 M/UL — LOW (ref 3.8–5.2)
RBC # FLD: 20.6 % — HIGH (ref 10.3–14.5)
RBC # FLD: 20.9 % — HIGH (ref 10.3–14.5)
RBC # FLD: 20.9 % — HIGH (ref 10.3–14.5)
S AUREUS DNA NOSE QL NAA+PROBE: DETECTED
SARS-COV-2 IGG+IGM SERPL QL IA: >250 U/ML — HIGH
SARS-COV-2 IGG+IGM SERPL QL IA: POSITIVE
SMOOTH MUSCLE AB SER-ACNC: SIGNIFICANT CHANGE UP
SODIUM SERPL-SCNC: 146 MMOL/L — HIGH (ref 135–145)
SODIUM SERPL-SCNC: 149 MMOL/L — HIGH (ref 135–145)
SODIUM SERPL-SCNC: 149 MMOL/L — HIGH (ref 135–145)
SPECIMEN SOURCE: SIGNIFICANT CHANGE UP
T3FREE SERPL-MCNC: 0.61 PG/ML — LOW (ref 2–4.4)
TIBC SERPL-MCNC: 173 UG/DL — LOW (ref 220–430)
TRIGL SERPL-MCNC: 80 MG/DL — SIGNIFICANT CHANGE UP
TSH SERPL-MCNC: 0.71 UIU/ML — SIGNIFICANT CHANGE UP (ref 0.27–4.2)
UIBC SERPL-MCNC: 149 UG/DL — SIGNIFICANT CHANGE UP (ref 110–370)
UUN UR-MCNC: 851 MG/DL — SIGNIFICANT CHANGE UP
WBC # BLD: 6.57 K/UL — SIGNIFICANT CHANGE UP (ref 3.8–10.5)
WBC # BLD: 8.22 K/UL — SIGNIFICANT CHANGE UP (ref 3.8–10.5)
WBC # BLD: 8.26 K/UL — SIGNIFICANT CHANGE UP (ref 3.8–10.5)
WBC # FLD AUTO: 6.57 K/UL — SIGNIFICANT CHANGE UP (ref 3.8–10.5)
WBC # FLD AUTO: 8.22 K/UL — SIGNIFICANT CHANGE UP (ref 3.8–10.5)
WBC # FLD AUTO: 8.26 K/UL — SIGNIFICANT CHANGE UP (ref 3.8–10.5)

## 2025-07-03 PROCEDURE — 82784 ASSAY IGA/IGD/IGG/IGM EACH: CPT

## 2025-07-03 PROCEDURE — 86850 RBC ANTIBODY SCREEN: CPT

## 2025-07-03 PROCEDURE — 82728 ASSAY OF FERRITIN: CPT

## 2025-07-03 PROCEDURE — 86780 TREPONEMA PALLIDUM: CPT

## 2025-07-03 PROCEDURE — 82390 ASSAY OF CERULOPLASMIN: CPT

## 2025-07-03 PROCEDURE — 85610 PROTHROMBIN TIME: CPT

## 2025-07-03 PROCEDURE — 99223 1ST HOSP IP/OBS HIGH 75: CPT

## 2025-07-03 PROCEDURE — 99233 SBSQ HOSP IP/OBS HIGH 50: CPT

## 2025-07-03 PROCEDURE — 71045 X-RAY EXAM CHEST 1 VIEW: CPT

## 2025-07-03 PROCEDURE — 86644 CMV ANTIBODY: CPT

## 2025-07-03 PROCEDURE — 80061 LIPID PANEL: CPT

## 2025-07-03 PROCEDURE — 87086 URINE CULTURE/COLONY COUNT: CPT

## 2025-07-03 PROCEDURE — 84156 ASSAY OF PROTEIN URINE: CPT

## 2025-07-03 PROCEDURE — 86381 MITOCHONDRIAL ANTIBODY EACH: CPT

## 2025-07-03 PROCEDURE — 86038 ANTINUCLEAR ANTIBODIES: CPT

## 2025-07-03 PROCEDURE — 86803 HEPATITIS C AB TEST: CPT

## 2025-07-03 PROCEDURE — 82140 ASSAY OF AMMONIA: CPT

## 2025-07-03 PROCEDURE — 87389 HIV-1 AG W/HIV-1&-2 AB AG IA: CPT

## 2025-07-03 PROCEDURE — 85730 THROMBOPLASTIN TIME PARTIAL: CPT

## 2025-07-03 PROCEDURE — 87521 HEPATITIS C PROBE&RVRS TRNSC: CPT

## 2025-07-03 PROCEDURE — 94003 VENT MGMT INPAT SUBQ DAY: CPT

## 2025-07-03 PROCEDURE — 82570 ASSAY OF URINE CREATININE: CPT

## 2025-07-03 PROCEDURE — 83540 ASSAY OF IRON: CPT

## 2025-07-03 PROCEDURE — 84132 ASSAY OF SERUM POTASSIUM: CPT

## 2025-07-03 PROCEDURE — 86255 FLUORESCENT ANTIBODY SCREEN: CPT

## 2025-07-03 PROCEDURE — 86777 TOXOPLASMA ANTIBODY: CPT

## 2025-07-03 PROCEDURE — 80053 COMPREHEN METABOLIC PANEL: CPT

## 2025-07-03 PROCEDURE — 86708 HEPATITIS A ANTIBODY: CPT

## 2025-07-03 PROCEDURE — 82803 BLOOD GASES ANY COMBINATION: CPT

## 2025-07-03 PROCEDURE — 87340 HEPATITIS B SURFACE AG IA: CPT

## 2025-07-03 PROCEDURE — P9047: CPT

## 2025-07-03 PROCEDURE — 83550 IRON BINDING TEST: CPT

## 2025-07-03 PROCEDURE — 82330 ASSAY OF CALCIUM: CPT

## 2025-07-03 PROCEDURE — 93306 TTE W/DOPPLER COMPLETE: CPT | Mod: 26

## 2025-07-03 PROCEDURE — 80307 DRUG TEST PRSMV CHEM ANLYZR: CPT

## 2025-07-03 PROCEDURE — 83735 ASSAY OF MAGNESIUM: CPT

## 2025-07-03 PROCEDURE — 81001 URINALYSIS AUTO W/SCOPE: CPT

## 2025-07-03 PROCEDURE — 83935 ASSAY OF URINE OSMOLALITY: CPT

## 2025-07-03 PROCEDURE — 83605 ASSAY OF LACTIC ACID: CPT

## 2025-07-03 PROCEDURE — 86762 RUBELLA ANTIBODY: CPT

## 2025-07-03 PROCEDURE — 87799 DETECT AGENT NOS DNA QUANT: CPT

## 2025-07-03 PROCEDURE — 84100 ASSAY OF PHOSPHORUS: CPT

## 2025-07-03 PROCEDURE — 85014 HEMATOCRIT: CPT

## 2025-07-03 PROCEDURE — 82435 ASSAY OF BLOOD CHLORIDE: CPT

## 2025-07-03 PROCEDURE — 86787 VARICELLA-ZOSTER ANTIBODY: CPT

## 2025-07-03 PROCEDURE — 86376 MICROSOMAL ANTIBODY EACH: CPT

## 2025-07-03 PROCEDURE — 84702 CHORIONIC GONADOTROPIN TEST: CPT

## 2025-07-03 PROCEDURE — 84481 FREE ASSAY (FT-3): CPT

## 2025-07-03 PROCEDURE — 86900 BLOOD TYPING SEROLOGIC ABO: CPT

## 2025-07-03 PROCEDURE — 86769 SARS-COV-2 COVID-19 ANTIBODY: CPT

## 2025-07-03 PROCEDURE — 87070 CULTURE OTHR SPECIMN AEROBIC: CPT

## 2025-07-03 PROCEDURE — 85018 HEMOGLOBIN: CPT

## 2025-07-03 PROCEDURE — 86480 TB TEST CELL IMMUN MEASURE: CPT

## 2025-07-03 PROCEDURE — 86696 HERPES SIMPLEX TYPE 2 TEST: CPT

## 2025-07-03 PROCEDURE — 82105 ALPHA-FETOPROTEIN SERUM: CPT

## 2025-07-03 PROCEDURE — 86695 HERPES SIMPLEX TYPE 1 TEST: CPT

## 2025-07-03 PROCEDURE — 86704 HEP B CORE ANTIBODY TOTAL: CPT

## 2025-07-03 PROCEDURE — 86665 EPSTEIN-BARR CAPSID VCA: CPT

## 2025-07-03 PROCEDURE — 86664 EPSTEIN-BARR NUCLEAR ANTIGEN: CPT

## 2025-07-03 PROCEDURE — 99232 SBSQ HOSP IP/OBS MODERATE 35: CPT | Mod: GC

## 2025-07-03 PROCEDURE — 94002 VENT MGMT INPAT INIT DAY: CPT

## 2025-07-03 PROCEDURE — 87640 STAPH A DNA AMP PROBE: CPT

## 2025-07-03 PROCEDURE — 36415 COLL VENOUS BLD VENIPUNCTURE: CPT

## 2025-07-03 PROCEDURE — 86901 BLOOD TYPING SEROLOGIC RH(D): CPT

## 2025-07-03 PROCEDURE — 87040 BLOOD CULTURE FOR BACTERIA: CPT

## 2025-07-03 PROCEDURE — 86706 HEP B SURFACE ANTIBODY: CPT

## 2025-07-03 PROCEDURE — 84145 PROCALCITONIN (PCT): CPT

## 2025-07-03 PROCEDURE — 86765 RUBEOLA ANTIBODY: CPT

## 2025-07-03 PROCEDURE — 84295 ASSAY OF SERUM SODIUM: CPT

## 2025-07-03 PROCEDURE — G0480: CPT

## 2025-07-03 PROCEDURE — 87641 MR-STAPH DNA AMP PROBE: CPT

## 2025-07-03 PROCEDURE — 86682 HELMINTH ANTIBODY: CPT

## 2025-07-03 PROCEDURE — 0241U: CPT

## 2025-07-03 PROCEDURE — 94799 UNLISTED PULMONARY SVC/PX: CPT

## 2025-07-03 PROCEDURE — 86663 EPSTEIN-BARR ANTIBODY: CPT

## 2025-07-03 PROCEDURE — 84540 ASSAY OF URINE/UREA-N: CPT

## 2025-07-03 PROCEDURE — 85027 COMPLETE CBC AUTOMATED: CPT

## 2025-07-03 PROCEDURE — 82947 ASSAY GLUCOSE BLOOD QUANT: CPT

## 2025-07-03 PROCEDURE — 86709 HEPATITIS A IGM ANTIBODY: CPT

## 2025-07-03 PROCEDURE — 83036 HEMOGLOBIN GLYCOSYLATED A1C: CPT

## 2025-07-03 PROCEDURE — 84300 ASSAY OF URINE SODIUM: CPT

## 2025-07-03 PROCEDURE — 84133 ASSAY OF URINE POTASSIUM: CPT

## 2025-07-03 PROCEDURE — 71045 X-RAY EXAM CHEST 1 VIEW: CPT | Mod: 26

## 2025-07-03 PROCEDURE — 84443 ASSAY THYROID STIM HORMONE: CPT

## 2025-07-03 PROCEDURE — 86735 MUMPS ANTIBODY: CPT

## 2025-07-03 RX ORDER — FLUCONAZOLE 150 MG
TABLET ORAL
Refills: 0 | Status: DISCONTINUED | OUTPATIENT
Start: 2025-07-03 | End: 2025-07-09

## 2025-07-03 RX ORDER — HYDROMORPHONE/SOD CHLOR,ISO/PF 2 MG/10 ML
0.5 SYRINGE (ML) INJECTION ONCE
Refills: 0 | Status: DISCONTINUED | OUTPATIENT
Start: 2025-07-03 | End: 2025-07-03

## 2025-07-03 RX ORDER — FUROSEMIDE 10 MG/ML
20 INJECTION INTRAMUSCULAR; INTRAVENOUS ONCE
Refills: 0 | Status: COMPLETED | OUTPATIENT
Start: 2025-07-03 | End: 2025-07-03

## 2025-07-03 RX ORDER — ALBUMIN (HUMAN) 12.5 G/50ML
50 INJECTION, SOLUTION INTRAVENOUS ONCE
Refills: 0 | Status: COMPLETED | OUTPATIENT
Start: 2025-07-03 | End: 2025-07-03

## 2025-07-03 RX ORDER — LACTULOSE 10 G/15ML
10 SOLUTION ORAL EVERY 8 HOURS
Refills: 0 | Status: DISCONTINUED | OUTPATIENT
Start: 2025-07-03 | End: 2025-07-10

## 2025-07-03 RX ORDER — FLUCONAZOLE 150 MG
200 TABLET ORAL EVERY 24 HOURS
Refills: 0 | Status: DISCONTINUED | OUTPATIENT
Start: 2025-07-04 | End: 2025-07-09

## 2025-07-03 RX ORDER — VANCOMYCIN HCL IN 5 % DEXTROSE 1.5G/250ML
1000 PLASTIC BAG, INJECTION (ML) INTRAVENOUS ONCE
Refills: 0 | Status: COMPLETED | OUTPATIENT
Start: 2025-07-03 | End: 2025-07-03

## 2025-07-03 RX ORDER — FUROSEMIDE 10 MG/ML
20 INJECTION INTRAMUSCULAR; INTRAVENOUS ONCE
Refills: 0 | Status: DISCONTINUED | OUTPATIENT
Start: 2025-07-03 | End: 2025-07-03

## 2025-07-03 RX ORDER — FLUCONAZOLE 150 MG
200 TABLET ORAL ONCE
Refills: 0 | Status: COMPLETED | OUTPATIENT
Start: 2025-07-03 | End: 2025-07-03

## 2025-07-03 RX ORDER — SODIUM CHLORIDE 9 G/1000ML
1000 INJECTION, SOLUTION INTRAVENOUS
Refills: 0 | Status: DISCONTINUED | OUTPATIENT
Start: 2025-07-03 | End: 2025-07-05

## 2025-07-03 RX ORDER — SODIUM BICARBONATE 1 MEQ/ML
0.05 SYRINGE (ML) INTRAVENOUS
Qty: 75 | Refills: 0 | Status: DISCONTINUED | OUTPATIENT
Start: 2025-07-03 | End: 2025-07-03

## 2025-07-03 RX ORDER — VANCOMYCIN HCL IN 5 % DEXTROSE 1.5G/250ML
500 PLASTIC BAG, INJECTION (ML) INTRAVENOUS ONCE
Refills: 0 | Status: DISCONTINUED | OUTPATIENT
Start: 2025-07-03 | End: 2025-07-03

## 2025-07-03 RX ADMIN — SODIUM CHLORIDE 50 MILLILITER(S): 9 INJECTION, SOLUTION INTRAVENOUS at 11:41

## 2025-07-03 RX ADMIN — SODIUM CHLORIDE 50 MILLILITER(S): 9 INJECTION, SOLUTION INTRAVENOUS at 20:05

## 2025-07-03 RX ADMIN — Medication 0.5 MILLIGRAM(S): at 02:34

## 2025-07-03 RX ADMIN — Medication 0.5 MILLIGRAM(S): at 22:40

## 2025-07-03 RX ADMIN — Medication 100 MILLIGRAM(S): at 11:40

## 2025-07-03 RX ADMIN — Medication 0.5 MILLIGRAM(S): at 23:11

## 2025-07-03 RX ADMIN — Medication 40 MILLIGRAM(S): at 22:41

## 2025-07-03 RX ADMIN — Medication 25 GRAM(S): at 17:08

## 2025-07-03 RX ADMIN — Medication 25 GRAM(S): at 01:11

## 2025-07-03 RX ADMIN — Medication 1 APPLICATION(S): at 05:19

## 2025-07-03 RX ADMIN — DEXMEDETOMIDINE HYDROCHLORIDE IN SODIUM CHLORIDE 30.5 MICROGRAM(S)/KG/HR: 4 INJECTION INTRAVENOUS at 20:05

## 2025-07-03 RX ADMIN — FUROSEMIDE 20 MILLIGRAM(S): 10 INJECTION INTRAMUSCULAR; INTRAVENOUS at 11:45

## 2025-07-03 RX ADMIN — LACTULOSE 10 GRAM(S): 10 SOLUTION ORAL at 22:41

## 2025-07-03 RX ADMIN — DEXMEDETOMIDINE HYDROCHLORIDE IN SODIUM CHLORIDE 30.5 MICROGRAM(S)/KG/HR: 4 INJECTION INTRAVENOUS at 07:43

## 2025-07-03 RX ADMIN — FUROSEMIDE 20 MILLIGRAM(S): 10 INJECTION INTRAMUSCULAR; INTRAVENOUS at 04:33

## 2025-07-03 RX ADMIN — Medication 0.5 MILLIGRAM(S): at 02:05

## 2025-07-03 RX ADMIN — ALBUMIN (HUMAN) 200 MILLILITER(S): 12.5 INJECTION, SOLUTION INTRAVENOUS at 03:35

## 2025-07-03 RX ADMIN — Medication 25 GRAM(S): at 07:43

## 2025-07-03 RX ADMIN — Medication 0.5 MILLIGRAM(S): at 02:30

## 2025-07-03 RX ADMIN — LACTULOSE 10 GRAM(S): 10 SOLUTION ORAL at 17:08

## 2025-07-03 RX ADMIN — Medication 0.5 MILLIGRAM(S): at 02:49

## 2025-07-03 RX ADMIN — Medication 250 MILLIGRAM(S): at 16:00

## 2025-07-03 RX ADMIN — Medication 50 MEQ/KG/HR: at 07:43

## 2025-07-04 LAB
ALBUMIN SERPL ELPH-MCNC: 2 G/DL — LOW (ref 3.3–5)
ALBUMIN SERPL ELPH-MCNC: 2.1 G/DL — LOW (ref 3.3–5)
ALBUMIN SERPL ELPH-MCNC: 2.2 G/DL — LOW (ref 3.3–5)
ALBUMIN SERPL ELPH-MCNC: 2.3 G/DL — LOW (ref 3.3–5)
ALP SERPL-CCNC: 74 U/L — SIGNIFICANT CHANGE UP (ref 40–120)
ALP SERPL-CCNC: 76 U/L — SIGNIFICANT CHANGE UP (ref 40–120)
ALP SERPL-CCNC: 87 U/L — SIGNIFICANT CHANGE UP (ref 40–120)
ALP SERPL-CCNC: 87 U/L — SIGNIFICANT CHANGE UP (ref 40–120)
ALT FLD-CCNC: 21 U/L — SIGNIFICANT CHANGE UP (ref 10–45)
ALT FLD-CCNC: 21 U/L — SIGNIFICANT CHANGE UP (ref 10–45)
ALT FLD-CCNC: 22 U/L — SIGNIFICANT CHANGE UP (ref 10–45)
ALT FLD-CCNC: 22 U/L — SIGNIFICANT CHANGE UP (ref 10–45)
AMMONIA BLD-MCNC: 90 UMOL/L — HIGH (ref 11–55)
ANION GAP SERPL CALC-SCNC: 14 MMOL/L — SIGNIFICANT CHANGE UP (ref 5–17)
ANION GAP SERPL CALC-SCNC: 14 MMOL/L — SIGNIFICANT CHANGE UP (ref 5–17)
ANION GAP SERPL CALC-SCNC: 15 MMOL/L — SIGNIFICANT CHANGE UP (ref 5–17)
ANION GAP SERPL CALC-SCNC: 15 MMOL/L — SIGNIFICANT CHANGE UP (ref 5–17)
APTT BLD: 28.2 SEC — SIGNIFICANT CHANGE UP (ref 26.1–36.8)
APTT BLD: 28.6 SEC — SIGNIFICANT CHANGE UP (ref 26.1–36.8)
AST SERPL-CCNC: 53 U/L — HIGH (ref 10–40)
AST SERPL-CCNC: 55 U/L — HIGH (ref 10–40)
AST SERPL-CCNC: 60 U/L — HIGH (ref 10–40)
AST SERPL-CCNC: 62 U/L — HIGH (ref 10–40)
BILIRUB SERPL-MCNC: 7 MG/DL — HIGH (ref 0.2–1.2)
BILIRUB SERPL-MCNC: 7.2 MG/DL — HIGH (ref 0.2–1.2)
BILIRUB SERPL-MCNC: 7.4 MG/DL — HIGH (ref 0.2–1.2)
BILIRUB SERPL-MCNC: 7.4 MG/DL — HIGH (ref 0.2–1.2)
BUN SERPL-MCNC: 64 MG/DL — HIGH (ref 7–23)
BUN SERPL-MCNC: 65 MG/DL — HIGH (ref 7–23)
BUN SERPL-MCNC: 66 MG/DL — HIGH (ref 7–23)
BUN SERPL-MCNC: 68 MG/DL — HIGH (ref 7–23)
CALCIUM SERPL-MCNC: 8.2 MG/DL — LOW (ref 8.4–10.5)
CALCIUM SERPL-MCNC: 8.2 MG/DL — LOW (ref 8.4–10.5)
CALCIUM SERPL-MCNC: 8.4 MG/DL — SIGNIFICANT CHANGE UP (ref 8.4–10.5)
CALCIUM SERPL-MCNC: 8.4 MG/DL — SIGNIFICANT CHANGE UP (ref 8.4–10.5)
CHLORIDE SERPL-SCNC: 109 MMOL/L — HIGH (ref 96–108)
CHLORIDE SERPL-SCNC: 111 MMOL/L — HIGH (ref 96–108)
CHLORIDE SERPL-SCNC: 112 MMOL/L — HIGH (ref 96–108)
CHLORIDE SERPL-SCNC: 112 MMOL/L — HIGH (ref 96–108)
CO2 SERPL-SCNC: 17 MMOL/L — LOW (ref 22–31)
CO2 SERPL-SCNC: 17 MMOL/L — LOW (ref 22–31)
CO2 SERPL-SCNC: 18 MMOL/L — LOW (ref 22–31)
CO2 SERPL-SCNC: 18 MMOL/L — LOW (ref 22–31)
CREAT SERPL-MCNC: 2.09 MG/DL — HIGH (ref 0.5–1.3)
CREAT SERPL-MCNC: 2.28 MG/DL — HIGH (ref 0.5–1.3)
CREAT SERPL-MCNC: 2.36 MG/DL — HIGH (ref 0.5–1.3)
CREAT SERPL-MCNC: 2.43 MG/DL — HIGH (ref 0.5–1.3)
CULTURE RESULTS: NO GROWTH — SIGNIFICANT CHANGE UP
CULTURE RESULTS: SIGNIFICANT CHANGE UP
EGFR: 24 ML/MIN/1.73M2 — LOW
EGFR: 24 ML/MIN/1.73M2 — LOW
EGFR: 25 ML/MIN/1.73M2 — LOW
EGFR: 25 ML/MIN/1.73M2 — LOW
EGFR: 26 ML/MIN/1.73M2 — LOW
EGFR: 26 ML/MIN/1.73M2 — LOW
EGFR: 29 ML/MIN/1.73M2 — LOW
EGFR: 29 ML/MIN/1.73M2 — LOW
GAS PNL BLDA: SIGNIFICANT CHANGE UP
GLUCOSE SERPL-MCNC: 100 MG/DL — HIGH (ref 70–99)
GLUCOSE SERPL-MCNC: 101 MG/DL — HIGH (ref 70–99)
GLUCOSE SERPL-MCNC: 117 MG/DL — HIGH (ref 70–99)
GLUCOSE SERPL-MCNC: 124 MG/DL — HIGH (ref 70–99)
HCT VFR BLD CALC: 25.3 % — LOW (ref 34.5–45)
HCT VFR BLD CALC: 25.5 % — LOW (ref 34.5–45)
HCT VFR BLD CALC: 28.2 % — LOW (ref 34.5–45)
HCT VFR BLD CALC: 28.2 % — LOW (ref 34.5–45)
HGB BLD-MCNC: 8.2 G/DL — LOW (ref 11.5–15.5)
HGB BLD-MCNC: 8.2 G/DL — LOW (ref 11.5–15.5)
HGB BLD-MCNC: 9 G/DL — LOW (ref 11.5–15.5)
HGB BLD-MCNC: 9.2 G/DL — LOW (ref 11.5–15.5)
IMMATURE PLATELET FRACTION #: 1.5 K/UL — LOW (ref 4.7–11.1)
IMMATURE PLATELET FRACTION #: 1.6 K/UL — LOW (ref 4.7–11.1)
IMMATURE PLATELET FRACTION #: 2.2 K/UL — LOW (ref 4.7–11.1)
IMMATURE PLATELET FRACTION #: 2.7 K/UL — LOW (ref 4.7–11.1)
IMMATURE PLATELET FRACTION %: 3.7 % — SIGNIFICANT CHANGE UP (ref 1.6–4.9)
IMMATURE PLATELET FRACTION %: 4 % — SIGNIFICANT CHANGE UP (ref 1.6–4.9)
IMMATURE PLATELET FRACTION %: 4.3 % — SIGNIFICANT CHANGE UP (ref 1.6–4.9)
IMMATURE PLATELET FRACTION %: 5.2 % — HIGH (ref 1.6–4.9)
INR BLD: 2.51 RATIO — HIGH (ref 0.85–1.16)
INR BLD: 2.77 RATIO — HIGH (ref 0.85–1.16)
MAGNESIUM SERPL-MCNC: 2.1 MG/DL — SIGNIFICANT CHANGE UP (ref 1.6–2.6)
MAGNESIUM SERPL-MCNC: 2.2 MG/DL — SIGNIFICANT CHANGE UP (ref 1.6–2.6)
MCHC RBC-ENTMCNC: 30.5 PG — SIGNIFICANT CHANGE UP (ref 27–34)
MCHC RBC-ENTMCNC: 30.5 PG — SIGNIFICANT CHANGE UP (ref 27–34)
MCHC RBC-ENTMCNC: 30.7 PG — SIGNIFICANT CHANGE UP (ref 27–34)
MCHC RBC-ENTMCNC: 31 PG — SIGNIFICANT CHANGE UP (ref 27–34)
MCHC RBC-ENTMCNC: 31.9 G/DL — LOW (ref 32–36)
MCHC RBC-ENTMCNC: 32.2 G/DL — SIGNIFICANT CHANGE UP (ref 32–36)
MCHC RBC-ENTMCNC: 32.4 G/DL — SIGNIFICANT CHANGE UP (ref 32–36)
MCHC RBC-ENTMCNC: 32.6 G/DL — SIGNIFICANT CHANGE UP (ref 32–36)
MCV RBC AUTO: 94.1 FL — SIGNIFICANT CHANGE UP (ref 80–100)
MCV RBC AUTO: 94.8 FL — SIGNIFICANT CHANGE UP (ref 80–100)
MCV RBC AUTO: 94.9 FL — SIGNIFICANT CHANGE UP (ref 80–100)
MCV RBC AUTO: 96.2 FL — SIGNIFICANT CHANGE UP (ref 80–100)
NRBC # BLD AUTO: 0.06 K/UL — HIGH (ref 0–0)
NRBC # BLD AUTO: 0.07 K/UL — HIGH (ref 0–0)
NRBC # BLD AUTO: 0.08 K/UL — HIGH (ref 0–0)
NRBC # BLD AUTO: 0.08 K/UL — HIGH (ref 0–0)
NRBC # FLD: 0.06 K/UL — HIGH (ref 0–0)
NRBC # FLD: 0.07 K/UL — HIGH (ref 0–0)
NRBC # FLD: 0.08 K/UL — HIGH (ref 0–0)
NRBC # FLD: 0.08 K/UL — HIGH (ref 0–0)
NRBC BLD AUTO-RTO: 0 /100 WBCS — SIGNIFICANT CHANGE UP (ref 0–0)
NRBC BLD AUTO-RTO: 1 /100 WBCS — HIGH (ref 0–0)
PHOSPHATE SERPL-MCNC: 4.2 MG/DL — SIGNIFICANT CHANGE UP (ref 2.5–4.5)
PHOSPHATE SERPL-MCNC: 5 MG/DL — HIGH (ref 2.5–4.5)
PLATELET # BLD AUTO: 40 K/UL — LOW (ref 150–400)
PLATELET # BLD AUTO: 41 K/UL — LOW (ref 150–400)
PLATELET # BLD AUTO: 51 K/UL — LOW (ref 150–400)
PLATELET # BLD AUTO: 51 K/UL — LOW (ref 150–400)
PMV BLD: 11.6 FL — SIGNIFICANT CHANGE UP (ref 7–13)
PMV BLD: 11.7 FL — SIGNIFICANT CHANGE UP (ref 7–13)
PMV BLD: 12.4 FL — SIGNIFICANT CHANGE UP (ref 7–13)
PMV BLD: 12.6 FL — SIGNIFICANT CHANGE UP (ref 7–13)
POTASSIUM SERPL-MCNC: 2.9 MMOL/L — CRITICAL LOW (ref 3.5–5.3)
POTASSIUM SERPL-MCNC: 3.1 MMOL/L — LOW (ref 3.5–5.3)
POTASSIUM SERPL-MCNC: 3.4 MMOL/L — LOW (ref 3.5–5.3)
POTASSIUM SERPL-MCNC: 3.5 MMOL/L — SIGNIFICANT CHANGE UP (ref 3.5–5.3)
POTASSIUM SERPL-SCNC: 2.9 MMOL/L — CRITICAL LOW (ref 3.5–5.3)
POTASSIUM SERPL-SCNC: 3.1 MMOL/L — LOW (ref 3.5–5.3)
POTASSIUM SERPL-SCNC: 3.4 MMOL/L — LOW (ref 3.5–5.3)
POTASSIUM SERPL-SCNC: 3.5 MMOL/L — SIGNIFICANT CHANGE UP (ref 3.5–5.3)
PROT SERPL-MCNC: 5.8 G/DL — LOW (ref 6–8.3)
PROT SERPL-MCNC: 5.9 G/DL — LOW (ref 6–8.3)
PROT SERPL-MCNC: 6.1 G/DL — SIGNIFICANT CHANGE UP (ref 6–8.3)
PROT SERPL-MCNC: 6.1 G/DL — SIGNIFICANT CHANGE UP (ref 6–8.3)
PROTHROM AB SERPL-ACNC: 28.4 SEC — HIGH (ref 9.9–13.4)
PROTHROM AB SERPL-ACNC: 31.5 SEC — HIGH (ref 9.9–13.4)
RBC # BLD: 2.69 M/UL — LOW (ref 3.8–5.2)
RBC # BLD: 2.69 M/UL — LOW (ref 3.8–5.2)
RBC # BLD: 2.93 M/UL — LOW (ref 3.8–5.2)
RBC # BLD: 2.97 M/UL — LOW (ref 3.8–5.2)
RBC # FLD: 20.8 % — HIGH (ref 10.3–14.5)
RBC # FLD: 21.2 % — HIGH (ref 10.3–14.5)
SODIUM SERPL-SCNC: 141 MMOL/L — SIGNIFICANT CHANGE UP (ref 135–145)
SODIUM SERPL-SCNC: 143 MMOL/L — SIGNIFICANT CHANGE UP (ref 135–145)
SODIUM SERPL-SCNC: 144 MMOL/L — SIGNIFICANT CHANGE UP (ref 135–145)
SODIUM SERPL-SCNC: 144 MMOL/L — SIGNIFICANT CHANGE UP (ref 135–145)
SPECIMEN SOURCE: SIGNIFICANT CHANGE UP
SPECIMEN SOURCE: SIGNIFICANT CHANGE UP
UUN UR-MCNC: 315 MG/DL — SIGNIFICANT CHANGE UP
WBC # BLD: 10.14 K/UL — SIGNIFICANT CHANGE UP (ref 3.8–10.5)
WBC # BLD: 14.53 K/UL — HIGH (ref 3.8–10.5)
WBC # BLD: 8.12 K/UL — SIGNIFICANT CHANGE UP (ref 3.8–10.5)
WBC # BLD: 8.47 K/UL — SIGNIFICANT CHANGE UP (ref 3.8–10.5)
WBC # FLD AUTO: 10.14 K/UL — SIGNIFICANT CHANGE UP (ref 3.8–10.5)
WBC # FLD AUTO: 14.53 K/UL — HIGH (ref 3.8–10.5)
WBC # FLD AUTO: 8.12 K/UL — SIGNIFICANT CHANGE UP (ref 3.8–10.5)
WBC # FLD AUTO: 8.47 K/UL — SIGNIFICANT CHANGE UP (ref 3.8–10.5)

## 2025-07-04 PROCEDURE — 36620 INSERTION CATHETER ARTERY: CPT

## 2025-07-04 PROCEDURE — 99233 SBSQ HOSP IP/OBS HIGH 50: CPT

## 2025-07-04 PROCEDURE — 36010 PLACE CATHETER IN VEIN: CPT | Mod: RT

## 2025-07-04 PROCEDURE — 99291 CRITICAL CARE FIRST HOUR: CPT | Mod: 25

## 2025-07-04 PROCEDURE — 31645 BRNCHSC W/THER ASPIR 1ST: CPT

## 2025-07-04 PROCEDURE — 36556 INSERT NON-TUNNEL CV CATH: CPT

## 2025-07-04 PROCEDURE — 99232 SBSQ HOSP IP/OBS MODERATE 35: CPT | Mod: GC

## 2025-07-04 PROCEDURE — 99292 CRITICAL CARE ADDL 30 MIN: CPT | Mod: 25

## 2025-07-04 PROCEDURE — 76937 US GUIDE VASCULAR ACCESS: CPT | Mod: 26

## 2025-07-04 RX ORDER — CISATRACURIUM BESYLATE 10 MG/5ML
3 INJECTION, SOLUTION INTRAVENOUS
Qty: 200 | Refills: 0 | Status: DISCONTINUED | OUTPATIENT
Start: 2025-07-04 | End: 2025-07-04

## 2025-07-04 RX ORDER — FENTANYL CITRATE-0.9 % NACL/PF 100MCG/2ML
100 SYRINGE (ML) INTRAVENOUS ONCE
Refills: 0 | Status: DISCONTINUED | OUTPATIENT
Start: 2025-07-04 | End: 2025-07-04

## 2025-07-04 RX ORDER — NOREPINEPHRINE BITARTRATE 8 MG
0.02 SOLUTION INTRAVENOUS
Qty: 8 | Refills: 0 | Status: DISCONTINUED | OUTPATIENT
Start: 2025-07-04 | End: 2025-07-06

## 2025-07-04 RX ORDER — HYDROMORPHONE/SOD CHLOR,ISO/PF 2 MG/10 ML
0.5 SYRINGE (ML) INJECTION ONCE
Refills: 0 | Status: DISCONTINUED | OUTPATIENT
Start: 2025-07-04 | End: 2025-07-04

## 2025-07-04 RX ORDER — FUROSEMIDE 10 MG/ML
60 INJECTION INTRAMUSCULAR; INTRAVENOUS ONCE
Refills: 0 | Status: COMPLETED | OUTPATIENT
Start: 2025-07-04 | End: 2025-07-04

## 2025-07-04 RX ORDER — MIDAZOLAM IN 0.9 % SOD.CHLORID 1 MG/ML
2 PLASTIC BAG, INJECTION (ML) INTRAVENOUS ONCE
Refills: 0 | Status: DISCONTINUED | OUTPATIENT
Start: 2025-07-04 | End: 2025-07-04

## 2025-07-04 RX ORDER — PROPOFOL 10 MG/ML
20 INJECTION, EMULSION INTRAVENOUS
Qty: 1000 | Refills: 0 | Status: DISCONTINUED | OUTPATIENT
Start: 2025-07-04 | End: 2025-07-06

## 2025-07-04 RX ORDER — CISATRACURIUM BESYLATE 10 MG/5ML
2 INJECTION, SOLUTION INTRAVENOUS
Qty: 200 | Refills: 0 | Status: DISCONTINUED | OUTPATIENT
Start: 2025-07-04 | End: 2025-07-06

## 2025-07-04 RX ORDER — SODIUM BICARBONATE 1 MEQ/ML
0.19 SYRINGE (ML) INTRAVENOUS
Qty: 150 | Refills: 0 | Status: DISCONTINUED | OUTPATIENT
Start: 2025-07-04 | End: 2025-07-04

## 2025-07-04 RX ORDER — VECURONIUM BROMIDE 10 MG
10 VIAL (EA) INTRAVENOUS ONCE
Refills: 0 | Status: COMPLETED | OUTPATIENT
Start: 2025-07-04 | End: 2025-07-04

## 2025-07-04 RX ORDER — ROCURONIUM BROMIDE 10 MG/ML
100 INJECTION, SOLUTION INTRAVENOUS ONCE
Refills: 0 | Status: DISCONTINUED | OUTPATIENT
Start: 2025-07-04 | End: 2025-07-04

## 2025-07-04 RX ADMIN — Medication 100 MILLIEQUIVALENT(S): at 08:38

## 2025-07-04 RX ADMIN — NOREPINEPHRINE BITARTRATE 2.93 MICROGRAM(S)/KG/MIN: 8 SOLUTION at 19:26

## 2025-07-04 RX ADMIN — Medication 25 GRAM(S): at 00:07

## 2025-07-04 RX ADMIN — SODIUM CHLORIDE 50 MILLILITER(S): 9 INJECTION, SOLUTION INTRAVENOUS at 04:35

## 2025-07-04 RX ADMIN — Medication 100 MILLIEQUIVALENT(S): at 13:18

## 2025-07-04 RX ADMIN — Medication 25 GRAM(S): at 08:17

## 2025-07-04 RX ADMIN — PROPOFOL 9.36 MICROGRAM(S)/KG/MIN: 10 INJECTION, EMULSION INTRAVENOUS at 19:26

## 2025-07-04 RX ADMIN — Medication 100 MILLIEQUIVALENT(S): at 00:54

## 2025-07-04 RX ADMIN — Medication 100 MILLIEQUIVALENT(S): at 11:17

## 2025-07-04 RX ADMIN — Medication 100 MILLIEQUIVALENT(S): at 12:18

## 2025-07-04 RX ADMIN — PROPOFOL 9.36 MICROGRAM(S)/KG/MIN: 10 INJECTION, EMULSION INTRAVENOUS at 17:52

## 2025-07-04 RX ADMIN — SODIUM CHLORIDE 30 MILLILITER(S): 9 INJECTION, SOLUTION INTRAVENOUS at 19:25

## 2025-07-04 RX ADMIN — FUROSEMIDE 60 MILLIGRAM(S): 10 INJECTION INTRAMUSCULAR; INTRAVENOUS at 11:16

## 2025-07-04 RX ADMIN — Medication 50 MILLIEQUIVALENT(S): at 19:20

## 2025-07-04 RX ADMIN — Medication 0.5 MILLIGRAM(S): at 07:23

## 2025-07-04 RX ADMIN — Medication 50 MILLIEQUIVALENT(S): at 17:52

## 2025-07-04 RX ADMIN — Medication 40 MILLIGRAM(S): at 21:46

## 2025-07-04 RX ADMIN — Medication 25 GRAM(S): at 17:53

## 2025-07-04 RX ADMIN — CISATRACURIUM BESYLATE 14 MICROGRAM(S)/KG/MIN: 10 INJECTION, SOLUTION INTRAVENOUS at 17:52

## 2025-07-04 RX ADMIN — PROPOFOL 9.36 MICROGRAM(S)/KG/MIN: 10 INJECTION, EMULSION INTRAVENOUS at 11:41

## 2025-07-04 RX ADMIN — Medication 100 MICROGRAM(S): at 13:45

## 2025-07-04 RX ADMIN — Medication 0.5 MILLIGRAM(S): at 06:31

## 2025-07-04 RX ADMIN — Medication 1 APPLICATION(S): at 05:02

## 2025-07-04 RX ADMIN — LACTULOSE 10 GRAM(S): 10 SOLUTION ORAL at 05:02

## 2025-07-04 RX ADMIN — Medication 0.5 MILLIGRAM(S): at 02:08

## 2025-07-04 RX ADMIN — Medication 100 MILLIEQUIVALENT(S): at 06:07

## 2025-07-04 RX ADMIN — Medication 2 MILLIGRAM(S): at 06:50

## 2025-07-04 RX ADMIN — Medication 100 MILLIGRAM(S): at 11:40

## 2025-07-04 RX ADMIN — Medication 0.5 MILLIGRAM(S): at 02:38

## 2025-07-04 RX ADMIN — Medication 10 MILLIGRAM(S): at 17:52

## 2025-07-04 RX ADMIN — NOREPINEPHRINE BITARTRATE 2.93 MICROGRAM(S)/KG/MIN: 8 SOLUTION at 17:52

## 2025-07-04 RX ADMIN — CISATRACURIUM BESYLATE 14 MICROGRAM(S)/KG/MIN: 10 INJECTION, SOLUTION INTRAVENOUS at 20:27

## 2025-07-04 RX ADMIN — Medication 100 MILLIEQUIVALENT(S): at 07:45

## 2025-07-04 RX ADMIN — Medication 100 MILLIEQUIVALENT(S): at 02:01

## 2025-07-04 RX ADMIN — Medication 100 MILLIEQUIVALENT(S): at 03:01

## 2025-07-04 RX ADMIN — DEXMEDETOMIDINE HYDROCHLORIDE IN SODIUM CHLORIDE 30.5 MICROGRAM(S)/KG/HR: 4 INJECTION INTRAVENOUS at 11:40

## 2025-07-04 RX ADMIN — SODIUM CHLORIDE 30 MILLILITER(S): 9 INJECTION, SOLUTION INTRAVENOUS at 11:39

## 2025-07-04 RX ADMIN — SODIUM CHLORIDE 30 MILLILITER(S): 9 INJECTION, SOLUTION INTRAVENOUS at 17:53

## 2025-07-04 RX ADMIN — Medication 25 GRAM(S): at 23:32

## 2025-07-05 LAB
ALBUMIN SERPL ELPH-MCNC: 1.7 G/DL — LOW (ref 3.3–5)
ALBUMIN SERPL ELPH-MCNC: 1.8 G/DL — LOW (ref 3.3–5)
ALBUMIN SERPL ELPH-MCNC: 1.8 G/DL — LOW (ref 3.3–5)
ALBUMIN SERPL ELPH-MCNC: 1.9 G/DL — LOW (ref 3.3–5)
ALBUMIN SERPL ELPH-MCNC: 1.9 G/DL — LOW (ref 3.3–5)
ALP SERPL-CCNC: 65 U/L — SIGNIFICANT CHANGE UP (ref 40–120)
ALP SERPL-CCNC: 68 U/L — SIGNIFICANT CHANGE UP (ref 40–120)
ALP SERPL-CCNC: 69 U/L — SIGNIFICANT CHANGE UP (ref 40–120)
ALP SERPL-CCNC: 71 U/L — SIGNIFICANT CHANGE UP (ref 40–120)
ALP SERPL-CCNC: 77 U/L — SIGNIFICANT CHANGE UP (ref 40–120)
ALT FLD-CCNC: 19 U/L — SIGNIFICANT CHANGE UP (ref 10–45)
ALT FLD-CCNC: 19 U/L — SIGNIFICANT CHANGE UP (ref 10–45)
ALT FLD-CCNC: 20 U/L — SIGNIFICANT CHANGE UP (ref 10–45)
ALT FLD-CCNC: 20 U/L — SIGNIFICANT CHANGE UP (ref 10–45)
ALT FLD-CCNC: 21 U/L — SIGNIFICANT CHANGE UP (ref 10–45)
AMMONIA BLD-MCNC: 58 UMOL/L — HIGH (ref 11–55)
AMMONIA BLD-MCNC: 69 UMOL/L — HIGH (ref 11–55)
ANION GAP SERPL CALC-SCNC: 14 MMOL/L — SIGNIFICANT CHANGE UP (ref 5–17)
ANION GAP SERPL CALC-SCNC: 14 MMOL/L — SIGNIFICANT CHANGE UP (ref 5–17)
ANION GAP SERPL CALC-SCNC: 15 MMOL/L — SIGNIFICANT CHANGE UP (ref 5–17)
APTT BLD: 33.2 SEC — SIGNIFICANT CHANGE UP (ref 26.1–36.8)
APTT BLD: 34.5 SEC — SIGNIFICANT CHANGE UP (ref 26.1–36.8)
AST SERPL-CCNC: 56 U/L — HIGH (ref 10–40)
AST SERPL-CCNC: 57 U/L — HIGH (ref 10–40)
AST SERPL-CCNC: 57 U/L — HIGH (ref 10–40)
AST SERPL-CCNC: 58 U/L — HIGH (ref 10–40)
AST SERPL-CCNC: 59 U/L — HIGH (ref 10–40)
BILIRUB SERPL-MCNC: 5.8 MG/DL — HIGH (ref 0.2–1.2)
BILIRUB SERPL-MCNC: 6.4 MG/DL — HIGH (ref 0.2–1.2)
BILIRUB SERPL-MCNC: 6.5 MG/DL — HIGH (ref 0.2–1.2)
BILIRUB SERPL-MCNC: 6.6 MG/DL — HIGH (ref 0.2–1.2)
BILIRUB SERPL-MCNC: 6.7 MG/DL — HIGH (ref 0.2–1.2)
BUN SERPL-MCNC: 70 MG/DL — HIGH (ref 7–23)
BUN SERPL-MCNC: 72 MG/DL — HIGH (ref 7–23)
BUN SERPL-MCNC: 73 MG/DL — HIGH (ref 7–23)
BUN SERPL-MCNC: 74 MG/DL — HIGH (ref 7–23)
BUN SERPL-MCNC: 76 MG/DL — HIGH (ref 7–23)
CALCIUM SERPL-MCNC: 8 MG/DL — LOW (ref 8.4–10.5)
CALCIUM SERPL-MCNC: 8.1 MG/DL — LOW (ref 8.4–10.5)
CALCIUM SERPL-MCNC: 8.3 MG/DL — LOW (ref 8.4–10.5)
CALCIUM SERPL-MCNC: 8.4 MG/DL — SIGNIFICANT CHANGE UP (ref 8.4–10.5)
CALCIUM SERPL-MCNC: 8.4 MG/DL — SIGNIFICANT CHANGE UP (ref 8.4–10.5)
CHLORIDE SERPL-SCNC: 109 MMOL/L — HIGH (ref 96–108)
CHLORIDE SERPL-SCNC: 110 MMOL/L — HIGH (ref 96–108)
CMV IGG FLD QL: >10 U/ML — HIGH
CMV IGG SERPL-IMP: POSITIVE
CO2 SERPL-SCNC: 17 MMOL/L — LOW (ref 22–31)
CO2 SERPL-SCNC: 17 MMOL/L — LOW (ref 22–31)
CO2 SERPL-SCNC: 18 MMOL/L — LOW (ref 22–31)
CREAT SERPL-MCNC: 2.16 MG/DL — HIGH (ref 0.5–1.3)
CREAT SERPL-MCNC: 2.24 MG/DL — HIGH (ref 0.5–1.3)
CREAT SERPL-MCNC: 2.29 MG/DL — HIGH (ref 0.5–1.3)
CREAT SERPL-MCNC: 2.34 MG/DL — HIGH (ref 0.5–1.3)
CREAT SERPL-MCNC: 2.36 MG/DL — HIGH (ref 0.5–1.3)
EBV EA AB SER IA-ACNC: 87.7 U/ML — HIGH
EBV EA AB TITR SER IF: POSITIVE
EBV EA IGG SER-ACNC: POSITIVE
EBV NA IGG SER IA-ACNC: 196 U/ML — HIGH
EBV PATRN SPEC IB-IMP: SIGNIFICANT CHANGE UP
EBV VCA IGG AVIDITY SER QL IA: POSITIVE
EBV VCA IGM SER IA-ACNC: 44.5 U/ML — HIGH
EBV VCA IGM SER IA-ACNC: >750 U/ML — HIGH
EBV VCA IGM TITR FLD: POSITIVE
EGFR: 25 ML/MIN/1.73M2 — LOW
EGFR: 25 ML/MIN/1.73M2 — LOW
EGFR: 26 ML/MIN/1.73M2 — LOW
EGFR: 27 ML/MIN/1.73M2 — LOW
EGFR: 27 ML/MIN/1.73M2 — LOW
EGFR: 28 ML/MIN/1.73M2 — LOW
EGFR: 28 ML/MIN/1.73M2 — LOW
GAMMA INTERFERON BACKGROUND BLD IA-ACNC: 0.04 IU/ML — SIGNIFICANT CHANGE UP
GAS PNL BLDA: SIGNIFICANT CHANGE UP
GLUCOSE SERPL-MCNC: 81 MG/DL — SIGNIFICANT CHANGE UP (ref 70–99)
GLUCOSE SERPL-MCNC: 82 MG/DL — SIGNIFICANT CHANGE UP (ref 70–99)
GLUCOSE SERPL-MCNC: 84 MG/DL — SIGNIFICANT CHANGE UP (ref 70–99)
GLUCOSE SERPL-MCNC: 86 MG/DL — SIGNIFICANT CHANGE UP (ref 70–99)
GLUCOSE SERPL-MCNC: 94 MG/DL — SIGNIFICANT CHANGE UP (ref 70–99)
HCT VFR BLD CALC: 26.8 % — LOW (ref 34.5–45)
HCT VFR BLD CALC: 26.9 % — LOW (ref 34.5–45)
HCT VFR BLD CALC: 27.1 % — LOW (ref 34.5–45)
HCT VFR BLD CALC: 27.8 % — LOW (ref 34.5–45)
HGB BLD-MCNC: 8.4 G/DL — LOW (ref 11.5–15.5)
HGB BLD-MCNC: 8.4 G/DL — LOW (ref 11.5–15.5)
HGB BLD-MCNC: 8.5 G/DL — LOW (ref 11.5–15.5)
HGB BLD-MCNC: 8.8 G/DL — LOW (ref 11.5–15.5)
HSV1 IGG SER-ACNC: 8.32 INDEX — HIGH
HSV1 IGG SERPL QL IA: POSITIVE
HSV2 IGG FLD-ACNC: 18.6 INDEX — HIGH
HSV2 IGG SERPL QL IA: POSITIVE
IMMATURE PLATELET FRACTION #: 2.1 K/UL — LOW (ref 4.7–11.1)
IMMATURE PLATELET FRACTION #: 2.4 K/UL — LOW (ref 4.7–11.1)
IMMATURE PLATELET FRACTION #: 2.5 K/UL — LOW (ref 4.7–11.1)
IMMATURE PLATELET FRACTION #: 3 K/UL — LOW (ref 4.7–11.1)
IMMATURE PLATELET FRACTION %: 5.5 % — HIGH (ref 1.6–4.9)
IMMATURE PLATELET FRACTION %: 5.7 % — HIGH (ref 1.6–4.9)
IMMATURE PLATELET FRACTION %: 6.2 % — HIGH (ref 1.6–4.9)
IMMATURE PLATELET FRACTION %: 6.9 % — HIGH (ref 1.6–4.9)
INR BLD: 2.65 RATIO — HIGH (ref 0.85–1.16)
INR BLD: 2.74 RATIO — HIGH (ref 0.85–1.16)
M TB IFN-G BLD-IMP: ABNORMAL
M TB IFN-G CD4+ BCKGRND COR BLD-ACNC: 0 IU/ML — SIGNIFICANT CHANGE UP
M TB IFN-G CD4+CD8+ BCKGRND COR BLD-ACNC: 0 IU/ML — SIGNIFICANT CHANGE UP
MAGNESIUM SERPL-MCNC: 2 MG/DL — SIGNIFICANT CHANGE UP (ref 1.6–2.6)
MCHC RBC-ENTMCNC: 30.5 PG — SIGNIFICANT CHANGE UP (ref 27–34)
MCHC RBC-ENTMCNC: 30.6 PG — SIGNIFICANT CHANGE UP (ref 27–34)
MCHC RBC-ENTMCNC: 31 PG — SIGNIFICANT CHANGE UP (ref 27–34)
MCHC RBC-ENTMCNC: 31.2 G/DL — LOW (ref 32–36)
MCHC RBC-ENTMCNC: 31.3 G/DL — LOW (ref 32–36)
MCHC RBC-ENTMCNC: 31.4 G/DL — LOW (ref 32–36)
MCHC RBC-ENTMCNC: 31.4 PG — SIGNIFICANT CHANGE UP (ref 27–34)
MCHC RBC-ENTMCNC: 31.7 G/DL — LOW (ref 32–36)
MCV RBC AUTO: 97.5 FL — SIGNIFICANT CHANGE UP (ref 80–100)
MCV RBC AUTO: 97.5 FL — SIGNIFICANT CHANGE UP (ref 80–100)
MCV RBC AUTO: 99.3 FL — SIGNIFICANT CHANGE UP (ref 80–100)
MCV RBC AUTO: 99.3 FL — SIGNIFICANT CHANGE UP (ref 80–100)
MEV IGG SER-ACNC: 78.9 AU/ML — SIGNIFICANT CHANGE UP
MEV IGG+IGM SER-IMP: POSITIVE — SIGNIFICANT CHANGE UP
MUV AB SER-ACNC: POSITIVE — SIGNIFICANT CHANGE UP
MUV IGG FLD-ACNC: 31.1 AU/ML — SIGNIFICANT CHANGE UP
NRBC # BLD AUTO: 0.03 K/UL — HIGH (ref 0–0)
NRBC # BLD AUTO: 0.04 K/UL — HIGH (ref 0–0)
NRBC # BLD AUTO: 0.05 K/UL — HIGH (ref 0–0)
NRBC # BLD AUTO: 0.06 K/UL — HIGH (ref 0–0)
NRBC # FLD: 0.03 K/UL — HIGH (ref 0–0)
NRBC # FLD: 0.04 K/UL — HIGH (ref 0–0)
NRBC # FLD: 0.05 K/UL — HIGH (ref 0–0)
NRBC # FLD: 0.06 K/UL — HIGH (ref 0–0)
NRBC BLD AUTO-RTO: 0 /100 WBCS — SIGNIFICANT CHANGE UP (ref 0–0)
PHOSPHATE SERPL-MCNC: 4.5 MG/DL — SIGNIFICANT CHANGE UP (ref 2.5–4.5)
PHOSPHATE SERPL-MCNC: 4.9 MG/DL — HIGH (ref 2.5–4.5)
PHOSPHATE SERPL-MCNC: 5.2 MG/DL — HIGH (ref 2.5–4.5)
PHOSPHATE SERPL-MCNC: 5.4 MG/DL — HIGH (ref 2.5–4.5)
PHOSPHATE SERPL-MCNC: 5.5 MG/DL — HIGH (ref 2.5–4.5)
PLATELET # BLD AUTO: 38 K/UL — LOW (ref 150–400)
PLATELET # BLD AUTO: 39 K/UL — LOW (ref 150–400)
PLATELET # BLD AUTO: 43 K/UL — LOW (ref 150–400)
PLATELET # BLD AUTO: 43 K/UL — LOW (ref 150–400)
PMV BLD: 11.6 FL — SIGNIFICANT CHANGE UP (ref 7–13)
PMV BLD: 12.9 FL — SIGNIFICANT CHANGE UP (ref 7–13)
PMV BLD: 14.2 FL — HIGH (ref 7–13)
PMV BLD: SIGNIFICANT CHANGE UP FL (ref 7–13)
POTASSIUM SERPL-MCNC: 3.3 MMOL/L — LOW (ref 3.5–5.3)
POTASSIUM SERPL-MCNC: 3.5 MMOL/L — SIGNIFICANT CHANGE UP (ref 3.5–5.3)
POTASSIUM SERPL-MCNC: 3.8 MMOL/L — SIGNIFICANT CHANGE UP (ref 3.5–5.3)
POTASSIUM SERPL-MCNC: 3.8 MMOL/L — SIGNIFICANT CHANGE UP (ref 3.5–5.3)
POTASSIUM SERPL-MCNC: 4 MMOL/L — SIGNIFICANT CHANGE UP (ref 3.5–5.3)
POTASSIUM SERPL-SCNC: 3.3 MMOL/L — LOW (ref 3.5–5.3)
POTASSIUM SERPL-SCNC: 3.5 MMOL/L — SIGNIFICANT CHANGE UP (ref 3.5–5.3)
POTASSIUM SERPL-SCNC: 3.8 MMOL/L — SIGNIFICANT CHANGE UP (ref 3.5–5.3)
POTASSIUM SERPL-SCNC: 3.8 MMOL/L — SIGNIFICANT CHANGE UP (ref 3.5–5.3)
POTASSIUM SERPL-SCNC: 4 MMOL/L — SIGNIFICANT CHANGE UP (ref 3.5–5.3)
PROT SERPL-MCNC: 5.2 G/DL — LOW (ref 6–8.3)
PROT SERPL-MCNC: 5.3 G/DL — LOW (ref 6–8.3)
PROT SERPL-MCNC: 5.4 G/DL — LOW (ref 6–8.3)
PROT SERPL-MCNC: 5.5 G/DL — LOW (ref 6–8.3)
PROT SERPL-MCNC: 5.5 G/DL — LOW (ref 6–8.3)
PROTHROM AB SERPL-ACNC: 29.9 SEC — HIGH (ref 9.9–13.4)
PROTHROM AB SERPL-ACNC: 31.2 SEC — HIGH (ref 9.9–13.4)
QUANT TB PLUS MITOGEN MINUS NIL: 0 IU/ML — SIGNIFICANT CHANGE UP
RBC # BLD: 2.71 M/UL — LOW (ref 3.8–5.2)
RBC # BLD: 2.75 M/UL — LOW (ref 3.8–5.2)
RBC # BLD: 2.78 M/UL — LOW (ref 3.8–5.2)
RBC # BLD: 2.8 M/UL — LOW (ref 3.8–5.2)
RBC # FLD: 21.2 % — HIGH (ref 10.3–14.5)
RBC # FLD: 21.3 % — HIGH (ref 10.3–14.5)
RBC # FLD: 21.4 % — HIGH (ref 10.3–14.5)
RBC # FLD: 21.4 % — HIGH (ref 10.3–14.5)
RUBV IGG SER-ACNC: 3.33 INDEX — SIGNIFICANT CHANGE UP
RUBV IGG SER-IMP: POSITIVE — SIGNIFICANT CHANGE UP
SODIUM SERPL-SCNC: 140 MMOL/L — SIGNIFICANT CHANGE UP (ref 135–145)
SODIUM SERPL-SCNC: 141 MMOL/L — SIGNIFICANT CHANGE UP (ref 135–145)
SODIUM SERPL-SCNC: 142 MMOL/L — SIGNIFICANT CHANGE UP (ref 135–145)
T GONDII IGG SER QL: <3 IU/ML — SIGNIFICANT CHANGE UP
T GONDII IGG SER QL: NEGATIVE — SIGNIFICANT CHANGE UP
T PALLIDUM AB TITR SER: NEGATIVE — SIGNIFICANT CHANGE UP
VZV IGG FLD QL IA: 13.9 S/CO — SIGNIFICANT CHANGE UP
VZV IGG FLD QL IA: POSITIVE — SIGNIFICANT CHANGE UP
WBC # BLD: 12.69 K/UL — HIGH (ref 3.8–10.5)
WBC # BLD: 12.71 K/UL — HIGH (ref 3.8–10.5)
WBC # BLD: 13.84 K/UL — HIGH (ref 3.8–10.5)
WBC # BLD: 14.23 K/UL — HIGH (ref 3.8–10.5)
WBC # FLD AUTO: 12.69 K/UL — HIGH (ref 3.8–10.5)
WBC # FLD AUTO: 12.71 K/UL — HIGH (ref 3.8–10.5)
WBC # FLD AUTO: 13.84 K/UL — HIGH (ref 3.8–10.5)
WBC # FLD AUTO: 14.23 K/UL — HIGH (ref 3.8–10.5)

## 2025-07-05 PROCEDURE — 82570 ASSAY OF URINE CREATININE: CPT

## 2025-07-05 PROCEDURE — 86663 EPSTEIN-BARR ANTIBODY: CPT

## 2025-07-05 PROCEDURE — 86644 CMV ANTIBODY: CPT

## 2025-07-05 PROCEDURE — 86708 HEPATITIS A ANTIBODY: CPT

## 2025-07-05 PROCEDURE — 84702 CHORIONIC GONADOTROPIN TEST: CPT

## 2025-07-05 PROCEDURE — 87070 CULTURE OTHR SPECIMN AEROBIC: CPT

## 2025-07-05 PROCEDURE — 71045 X-RAY EXAM CHEST 1 VIEW: CPT | Mod: 26

## 2025-07-05 PROCEDURE — 83550 IRON BINDING TEST: CPT

## 2025-07-05 PROCEDURE — 84132 ASSAY OF SERUM POTASSIUM: CPT

## 2025-07-05 PROCEDURE — 86850 RBC ANTIBODY SCREEN: CPT

## 2025-07-05 PROCEDURE — 84443 ASSAY THYROID STIM HORMONE: CPT

## 2025-07-05 PROCEDURE — 82140 ASSAY OF AMMONIA: CPT

## 2025-07-05 PROCEDURE — 83735 ASSAY OF MAGNESIUM: CPT

## 2025-07-05 PROCEDURE — 87389 HIV-1 AG W/HIV-1&-2 AB AG IA: CPT

## 2025-07-05 PROCEDURE — 82390 ASSAY OF CERULOPLASMIN: CPT

## 2025-07-05 PROCEDURE — 86706 HEP B SURFACE ANTIBODY: CPT

## 2025-07-05 PROCEDURE — 93306 TTE W/DOPPLER COMPLETE: CPT

## 2025-07-05 PROCEDURE — 86803 HEPATITIS C AB TEST: CPT

## 2025-07-05 PROCEDURE — 82803 BLOOD GASES ANY COMBINATION: CPT

## 2025-07-05 PROCEDURE — 82784 ASSAY IGA/IGD/IGG/IGM EACH: CPT

## 2025-07-05 PROCEDURE — 84300 ASSAY OF URINE SODIUM: CPT

## 2025-07-05 PROCEDURE — 80053 COMPREHEN METABOLIC PANEL: CPT

## 2025-07-05 PROCEDURE — 85610 PROTHROMBIN TIME: CPT

## 2025-07-05 PROCEDURE — 86664 EPSTEIN-BARR NUCLEAR ANTIGEN: CPT

## 2025-07-05 PROCEDURE — 82105 ALPHA-FETOPROTEIN SERUM: CPT

## 2025-07-05 PROCEDURE — 99232 SBSQ HOSP IP/OBS MODERATE 35: CPT

## 2025-07-05 PROCEDURE — 86480 TB TEST CELL IMMUN MEASURE: CPT

## 2025-07-05 PROCEDURE — 86709 HEPATITIS A IGM ANTIBODY: CPT

## 2025-07-05 PROCEDURE — 94002 VENT MGMT INPAT INIT DAY: CPT

## 2025-07-05 PROCEDURE — 86777 TOXOPLASMA ANTIBODY: CPT

## 2025-07-05 PROCEDURE — 86038 ANTINUCLEAR ANTIBODIES: CPT

## 2025-07-05 PROCEDURE — 84540 ASSAY OF URINE/UREA-N: CPT

## 2025-07-05 PROCEDURE — 86900 BLOOD TYPING SEROLOGIC ABO: CPT

## 2025-07-05 PROCEDURE — 86787 VARICELLA-ZOSTER ANTIBODY: CPT

## 2025-07-05 PROCEDURE — 87521 HEPATITIS C PROBE&RVRS TRNSC: CPT

## 2025-07-05 PROCEDURE — 99232 SBSQ HOSP IP/OBS MODERATE 35: CPT | Mod: GC

## 2025-07-05 PROCEDURE — 86769 SARS-COV-2 COVID-19 ANTIBODY: CPT

## 2025-07-05 PROCEDURE — 82435 ASSAY OF BLOOD CHLORIDE: CPT

## 2025-07-05 PROCEDURE — 86704 HEP B CORE ANTIBODY TOTAL: CPT

## 2025-07-05 PROCEDURE — 87040 BLOOD CULTURE FOR BACTERIA: CPT

## 2025-07-05 PROCEDURE — 86780 TREPONEMA PALLIDUM: CPT

## 2025-07-05 PROCEDURE — 84481 FREE ASSAY (FT-3): CPT

## 2025-07-05 PROCEDURE — 86765 RUBEOLA ANTIBODY: CPT

## 2025-07-05 PROCEDURE — 87086 URINE CULTURE/COLONY COUNT: CPT

## 2025-07-05 PROCEDURE — 86255 FLUORESCENT ANTIBODY SCREEN: CPT

## 2025-07-05 PROCEDURE — 82330 ASSAY OF CALCIUM: CPT

## 2025-07-05 PROCEDURE — 87799 DETECT AGENT NOS DNA QUANT: CPT

## 2025-07-05 PROCEDURE — 86381 MITOCHONDRIAL ANTIBODY EACH: CPT

## 2025-07-05 PROCEDURE — 84100 ASSAY OF PHOSPHORUS: CPT

## 2025-07-05 PROCEDURE — 84133 ASSAY OF URINE POTASSIUM: CPT

## 2025-07-05 PROCEDURE — 80061 LIPID PANEL: CPT

## 2025-07-05 PROCEDURE — 94003 VENT MGMT INPAT SUBQ DAY: CPT

## 2025-07-05 PROCEDURE — P9047: CPT

## 2025-07-05 PROCEDURE — 83935 ASSAY OF URINE OSMOLALITY: CPT

## 2025-07-05 PROCEDURE — 83540 ASSAY OF IRON: CPT

## 2025-07-05 PROCEDURE — 81001 URINALYSIS AUTO W/SCOPE: CPT

## 2025-07-05 PROCEDURE — 94799 UNLISTED PULMONARY SVC/PX: CPT

## 2025-07-05 PROCEDURE — 85014 HEMATOCRIT: CPT

## 2025-07-05 PROCEDURE — 87205 SMEAR GRAM STAIN: CPT

## 2025-07-05 PROCEDURE — 87340 HEPATITIS B SURFACE AG IA: CPT

## 2025-07-05 PROCEDURE — G0480: CPT

## 2025-07-05 PROCEDURE — 80307 DRUG TEST PRSMV CHEM ANLYZR: CPT

## 2025-07-05 PROCEDURE — 84145 PROCALCITONIN (PCT): CPT

## 2025-07-05 PROCEDURE — 85730 THROMBOPLASTIN TIME PARTIAL: CPT

## 2025-07-05 PROCEDURE — 71045 X-RAY EXAM CHEST 1 VIEW: CPT

## 2025-07-05 PROCEDURE — 84156 ASSAY OF PROTEIN URINE: CPT

## 2025-07-05 PROCEDURE — 86682 HELMINTH ANTIBODY: CPT

## 2025-07-05 PROCEDURE — 82947 ASSAY GLUCOSE BLOOD QUANT: CPT

## 2025-07-05 PROCEDURE — 85018 HEMOGLOBIN: CPT

## 2025-07-05 PROCEDURE — 82728 ASSAY OF FERRITIN: CPT

## 2025-07-05 PROCEDURE — 86695 HERPES SIMPLEX TYPE 1 TEST: CPT

## 2025-07-05 PROCEDURE — 0241U: CPT

## 2025-07-05 PROCEDURE — 86665 EPSTEIN-BARR CAPSID VCA: CPT

## 2025-07-05 PROCEDURE — 86901 BLOOD TYPING SEROLOGIC RH(D): CPT

## 2025-07-05 PROCEDURE — 87640 STAPH A DNA AMP PROBE: CPT

## 2025-07-05 PROCEDURE — 83036 HEMOGLOBIN GLYCOSYLATED A1C: CPT

## 2025-07-05 PROCEDURE — 87641 MR-STAPH DNA AMP PROBE: CPT

## 2025-07-05 PROCEDURE — 85027 COMPLETE CBC AUTOMATED: CPT

## 2025-07-05 PROCEDURE — 86762 RUBELLA ANTIBODY: CPT

## 2025-07-05 PROCEDURE — 36415 COLL VENOUS BLD VENIPUNCTURE: CPT

## 2025-07-05 PROCEDURE — 99291 CRITICAL CARE FIRST HOUR: CPT

## 2025-07-05 PROCEDURE — 84295 ASSAY OF SERUM SODIUM: CPT

## 2025-07-05 PROCEDURE — 86696 HERPES SIMPLEX TYPE 2 TEST: CPT

## 2025-07-05 PROCEDURE — 86735 MUMPS ANTIBODY: CPT

## 2025-07-05 PROCEDURE — 83605 ASSAY OF LACTIC ACID: CPT

## 2025-07-05 PROCEDURE — 86376 MICROSOMAL ANTIBODY EACH: CPT

## 2025-07-05 RX ORDER — FUROSEMIDE 10 MG/ML
40 INJECTION INTRAMUSCULAR; INTRAVENOUS ONCE
Refills: 0 | Status: COMPLETED | OUTPATIENT
Start: 2025-07-05 | End: 2025-07-05

## 2025-07-05 RX ORDER — HYDROMORPHONE/SOD CHLOR,ISO/PF 2 MG/10 ML
0.5 SYRINGE (ML) INJECTION
Refills: 0 | Status: DISCONTINUED | OUTPATIENT
Start: 2025-07-05 | End: 2025-07-11

## 2025-07-05 RX ORDER — SODIUM CHLORIDE 9 G/1000ML
1000 INJECTION, SOLUTION INTRAVENOUS
Refills: 0 | Status: DISCONTINUED | OUTPATIENT
Start: 2025-07-05 | End: 2025-07-06

## 2025-07-05 RX ADMIN — FUROSEMIDE 40 MILLIGRAM(S): 10 INJECTION INTRAMUSCULAR; INTRAVENOUS at 09:25

## 2025-07-05 RX ADMIN — NOREPINEPHRINE BITARTRATE 2.93 MICROGRAM(S)/KG/MIN: 8 SOLUTION at 19:43

## 2025-07-05 RX ADMIN — Medication 25 GRAM(S): at 07:14

## 2025-07-05 RX ADMIN — Medication 15 MILLILITER(S): at 17:05

## 2025-07-05 RX ADMIN — Medication 40 MILLIGRAM(S): at 21:24

## 2025-07-05 RX ADMIN — LACTULOSE 10 GRAM(S): 10 SOLUTION ORAL at 21:24

## 2025-07-05 RX ADMIN — Medication 25 GRAM(S): at 15:02

## 2025-07-05 RX ADMIN — Medication 100 MILLIGRAM(S): at 09:24

## 2025-07-05 RX ADMIN — Medication 1 APPLICATION(S): at 06:32

## 2025-07-05 RX ADMIN — SODIUM CHLORIDE 30 MILLILITER(S): 9 INJECTION, SOLUTION INTRAVENOUS at 07:16

## 2025-07-05 RX ADMIN — PROPOFOL 9.36 MICROGRAM(S)/KG/MIN: 10 INJECTION, EMULSION INTRAVENOUS at 07:16

## 2025-07-05 RX ADMIN — NOREPINEPHRINE BITARTRATE 2.93 MICROGRAM(S)/KG/MIN: 8 SOLUTION at 07:16

## 2025-07-05 RX ADMIN — LACTULOSE 10 GRAM(S): 10 SOLUTION ORAL at 14:48

## 2025-07-05 RX ADMIN — SODIUM CHLORIDE 30 MILLILITER(S): 9 INJECTION, SOLUTION INTRAVENOUS at 08:59

## 2025-07-05 RX ADMIN — Medication 25 GRAM(S): at 23:10

## 2025-07-05 RX ADMIN — PROPOFOL 9.36 MICROGRAM(S)/KG/MIN: 10 INJECTION, EMULSION INTRAVENOUS at 19:43

## 2025-07-05 RX ADMIN — CISATRACURIUM BESYLATE 9.36 MICROGRAM(S)/KG/MIN: 10 INJECTION, SOLUTION INTRAVENOUS at 07:17

## 2025-07-05 RX ADMIN — SODIUM CHLORIDE 30 MILLILITER(S): 9 INJECTION, SOLUTION INTRAVENOUS at 19:44

## 2025-07-06 DIAGNOSIS — J96.01 ACUTE RESPIRATORY FAILURE WITH HYPOXIA: ICD-10-CM

## 2025-07-06 DIAGNOSIS — N17.9 ACUTE KIDNEY FAILURE, UNSPECIFIED: ICD-10-CM

## 2025-07-06 DIAGNOSIS — I95.9 HYPOTENSION, UNSPECIFIED: ICD-10-CM

## 2025-07-06 DIAGNOSIS — K72.90 HEPATIC FAILURE, UNSPECIFIED WITHOUT COMA: ICD-10-CM

## 2025-07-06 LAB
ALBUMIN SERPL ELPH-MCNC: 1.7 G/DL — LOW (ref 3.3–5)
ALBUMIN SERPL ELPH-MCNC: 1.9 G/DL — LOW (ref 3.3–5)
ALBUMIN SERPL ELPH-MCNC: 1.9 G/DL — LOW (ref 3.3–5)
ALP SERPL-CCNC: 60 U/L — SIGNIFICANT CHANGE UP (ref 40–120)
ALP SERPL-CCNC: 66 U/L — SIGNIFICANT CHANGE UP (ref 40–120)
ALP SERPL-CCNC: 69 U/L — SIGNIFICANT CHANGE UP (ref 40–120)
ALT FLD-CCNC: 18 U/L — SIGNIFICANT CHANGE UP (ref 10–45)
ALT FLD-CCNC: 20 U/L — SIGNIFICANT CHANGE UP (ref 10–45)
ALT FLD-CCNC: 21 U/L — SIGNIFICANT CHANGE UP (ref 10–45)
ANION GAP SERPL CALC-SCNC: 14 MMOL/L — SIGNIFICANT CHANGE UP (ref 5–17)
ANION GAP SERPL CALC-SCNC: 14 MMOL/L — SIGNIFICANT CHANGE UP (ref 5–17)
ANION GAP SERPL CALC-SCNC: 15 MMOL/L — SIGNIFICANT CHANGE UP (ref 5–17)
AST SERPL-CCNC: 54 U/L — HIGH (ref 10–40)
AST SERPL-CCNC: 60 U/L — HIGH (ref 10–40)
AST SERPL-CCNC: 65 U/L — HIGH (ref 10–40)
BILIRUB SERPL-MCNC: 5.7 MG/DL — HIGH (ref 0.2–1.2)
BILIRUB SERPL-MCNC: 5.8 MG/DL — HIGH (ref 0.2–1.2)
BILIRUB SERPL-MCNC: 5.8 MG/DL — HIGH (ref 0.2–1.2)
BLD GP AB SCN SERPL QL: NEGATIVE — SIGNIFICANT CHANGE UP
BUN SERPL-MCNC: 74 MG/DL — HIGH (ref 7–23)
BUN SERPL-MCNC: 75 MG/DL — HIGH (ref 7–23)
BUN SERPL-MCNC: 77 MG/DL — HIGH (ref 7–23)
CALCIUM SERPL-MCNC: 8.1 MG/DL — LOW (ref 8.4–10.5)
CALCIUM SERPL-MCNC: 8.3 MG/DL — LOW (ref 8.4–10.5)
CALCIUM SERPL-MCNC: 8.5 MG/DL — SIGNIFICANT CHANGE UP (ref 8.4–10.5)
CHLORIDE SERPL-SCNC: 108 MMOL/L — SIGNIFICANT CHANGE UP (ref 96–108)
CHLORIDE SERPL-SCNC: 108 MMOL/L — SIGNIFICANT CHANGE UP (ref 96–108)
CHLORIDE SERPL-SCNC: 109 MMOL/L — HIGH (ref 96–108)
CO2 SERPL-SCNC: 19 MMOL/L — LOW (ref 22–31)
CREAT SERPL-MCNC: 2.05 MG/DL — HIGH (ref 0.5–1.3)
CREAT SERPL-MCNC: 2.16 MG/DL — HIGH (ref 0.5–1.3)
CREAT SERPL-MCNC: 2.19 MG/DL — HIGH (ref 0.5–1.3)
EGFR: 28 ML/MIN/1.73M2 — LOW
EGFR: 30 ML/MIN/1.73M2 — LOW
EGFR: 30 ML/MIN/1.73M2 — LOW
GAS PNL BLDA: SIGNIFICANT CHANGE UP
GLUCOSE SERPL-MCNC: 109 MG/DL — HIGH (ref 70–99)
GLUCOSE SERPL-MCNC: 86 MG/DL — SIGNIFICANT CHANGE UP (ref 70–99)
GLUCOSE SERPL-MCNC: 89 MG/DL — SIGNIFICANT CHANGE UP (ref 70–99)
HCT VFR BLD CALC: 24.4 % — LOW (ref 34.5–45)
HCT VFR BLD CALC: 24.7 % — LOW (ref 34.5–45)
HCT VFR BLD CALC: 25.1 % — LOW (ref 34.5–45)
HCT VFR BLD CALC: 26.1 % — LOW (ref 34.5–45)
HGB BLD-MCNC: 7.8 G/DL — LOW (ref 11.5–15.5)
HGB BLD-MCNC: 8 G/DL — LOW (ref 11.5–15.5)
HGB BLD-MCNC: 8.2 G/DL — LOW (ref 11.5–15.5)
HGB BLD-MCNC: 8.2 G/DL — LOW (ref 11.5–15.5)
IMMATURE PLATELET FRACTION #: 2.1 K/UL — LOW (ref 4.7–11.1)
IMMATURE PLATELET FRACTION #: 2.1 K/UL — LOW (ref 4.7–11.1)
IMMATURE PLATELET FRACTION #: 2.2 K/UL — LOW (ref 4.7–11.1)
IMMATURE PLATELET FRACTION #: 2.6 K/UL — LOW (ref 4.7–11.1)
IMMATURE PLATELET FRACTION %: 5.3 % — HIGH (ref 1.6–4.9)
IMMATURE PLATELET FRACTION %: 5.9 % — HIGH (ref 1.6–4.9)
IMMATURE PLATELET FRACTION %: 5.9 % — HIGH (ref 1.6–4.9)
IMMATURE PLATELET FRACTION %: 6 % — HIGH (ref 1.6–4.9)
MAGNESIUM SERPL-MCNC: 1.9 MG/DL — SIGNIFICANT CHANGE UP (ref 1.6–2.6)
MAGNESIUM SERPL-MCNC: 2.1 MG/DL — SIGNIFICANT CHANGE UP (ref 1.6–2.6)
MAGNESIUM SERPL-MCNC: 2.4 MG/DL — SIGNIFICANT CHANGE UP (ref 1.6–2.6)
MCHC RBC-ENTMCNC: 30.8 PG — SIGNIFICANT CHANGE UP (ref 27–34)
MCHC RBC-ENTMCNC: 31.1 PG — SIGNIFICANT CHANGE UP (ref 27–34)
MCHC RBC-ENTMCNC: 31.4 G/DL — LOW (ref 32–36)
MCHC RBC-ENTMCNC: 31.5 PG — SIGNIFICANT CHANGE UP (ref 27–34)
MCHC RBC-ENTMCNC: 31.6 G/DL — LOW (ref 32–36)
MCHC RBC-ENTMCNC: 31.9 PG — SIGNIFICANT CHANGE UP (ref 27–34)
MCHC RBC-ENTMCNC: 32.7 G/DL — SIGNIFICANT CHANGE UP (ref 32–36)
MCHC RBC-ENTMCNC: 32.8 G/DL — SIGNIFICANT CHANGE UP (ref 32–36)
MCV RBC AUTO: 96.5 FL — SIGNIFICANT CHANGE UP (ref 80–100)
MCV RBC AUTO: 97.2 FL — SIGNIFICANT CHANGE UP (ref 80–100)
MCV RBC AUTO: 98.1 FL — SIGNIFICANT CHANGE UP (ref 80–100)
MCV RBC AUTO: 98.4 FL — SIGNIFICANT CHANGE UP (ref 80–100)
NRBC # BLD AUTO: 0.02 K/UL — HIGH (ref 0–0)
NRBC # BLD AUTO: 0.02 K/UL — HIGH (ref 0–0)
NRBC # BLD AUTO: 0.03 K/UL — HIGH (ref 0–0)
NRBC # BLD AUTO: 0.03 K/UL — HIGH (ref 0–0)
NRBC # FLD: 0.02 K/UL — HIGH (ref 0–0)
NRBC # FLD: 0.02 K/UL — HIGH (ref 0–0)
NRBC # FLD: 0.03 K/UL — HIGH (ref 0–0)
NRBC # FLD: 0.03 K/UL — HIGH (ref 0–0)
NRBC BLD AUTO-RTO: 0 /100 WBCS — SIGNIFICANT CHANGE UP (ref 0–0)
PHOSPHATE SERPL-MCNC: 4 MG/DL — SIGNIFICANT CHANGE UP (ref 2.5–4.5)
PHOSPHATE SERPL-MCNC: 4 MG/DL — SIGNIFICANT CHANGE UP (ref 2.5–4.5)
PHOSPHATE SERPL-MCNC: 4.3 MG/DL — SIGNIFICANT CHANGE UP (ref 2.5–4.5)
PLATELET # BLD AUTO: 36 K/UL — LOW (ref 150–400)
PLATELET # BLD AUTO: 37 K/UL — LOW (ref 150–400)
PLATELET # BLD AUTO: 39 K/UL — LOW (ref 150–400)
PLATELET # BLD AUTO: 43 K/UL — LOW (ref 150–400)
PMV BLD: 11.7 FL — SIGNIFICANT CHANGE UP (ref 7–13)
PMV BLD: 12.7 FL — SIGNIFICANT CHANGE UP (ref 7–13)
PMV BLD: 13.8 FL — HIGH (ref 7–13)
PMV BLD: SIGNIFICANT CHANGE UP FL (ref 7–13)
POTASSIUM SERPL-MCNC: 3.5 MMOL/L — SIGNIFICANT CHANGE UP (ref 3.5–5.3)
POTASSIUM SERPL-MCNC: 3.7 MMOL/L — SIGNIFICANT CHANGE UP (ref 3.5–5.3)
POTASSIUM SERPL-MCNC: 3.7 MMOL/L — SIGNIFICANT CHANGE UP (ref 3.5–5.3)
POTASSIUM SERPL-SCNC: 3.5 MMOL/L — SIGNIFICANT CHANGE UP (ref 3.5–5.3)
POTASSIUM SERPL-SCNC: 3.7 MMOL/L — SIGNIFICANT CHANGE UP (ref 3.5–5.3)
POTASSIUM SERPL-SCNC: 3.7 MMOL/L — SIGNIFICANT CHANGE UP (ref 3.5–5.3)
PROT SERPL-MCNC: 5.3 G/DL — LOW (ref 6–8.3)
PROT SERPL-MCNC: 5.3 G/DL — LOW (ref 6–8.3)
PROT SERPL-MCNC: 5.4 G/DL — LOW (ref 6–8.3)
RBC # BLD: 2.51 M/UL — LOW (ref 3.8–5.2)
RBC # BLD: 2.51 M/UL — LOW (ref 3.8–5.2)
RBC # BLD: 2.6 M/UL — LOW (ref 3.8–5.2)
RBC # BLD: 2.66 M/UL — LOW (ref 3.8–5.2)
RBC # FLD: 21.1 % — HIGH (ref 10.3–14.5)
RBC # FLD: 21.2 % — HIGH (ref 10.3–14.5)
RBC # FLD: 21.2 % — HIGH (ref 10.3–14.5)
RBC # FLD: 21.3 % — HIGH (ref 10.3–14.5)
RH IG SCN BLD-IMP: POSITIVE — SIGNIFICANT CHANGE UP
SODIUM SERPL-SCNC: 141 MMOL/L — SIGNIFICANT CHANGE UP (ref 135–145)
SODIUM SERPL-SCNC: 142 MMOL/L — SIGNIFICANT CHANGE UP (ref 135–145)
SODIUM SERPL-SCNC: 142 MMOL/L — SIGNIFICANT CHANGE UP (ref 135–145)
WBC # BLD: 10.32 K/UL — SIGNIFICANT CHANGE UP (ref 3.8–10.5)
WBC # BLD: 10.8 K/UL — HIGH (ref 3.8–10.5)
WBC # BLD: 12.79 K/UL — HIGH (ref 3.8–10.5)
WBC # BLD: 13.13 K/UL — HIGH (ref 3.8–10.5)
WBC # FLD AUTO: 10.32 K/UL — SIGNIFICANT CHANGE UP (ref 3.8–10.5)
WBC # FLD AUTO: 10.8 K/UL — HIGH (ref 3.8–10.5)
WBC # FLD AUTO: 12.79 K/UL — HIGH (ref 3.8–10.5)
WBC # FLD AUTO: 13.13 K/UL — HIGH (ref 3.8–10.5)

## 2025-07-06 PROCEDURE — 99291 CRITICAL CARE FIRST HOUR: CPT

## 2025-07-06 PROCEDURE — 71045 X-RAY EXAM CHEST 1 VIEW: CPT | Mod: 26

## 2025-07-06 PROCEDURE — 99222 1ST HOSP IP/OBS MODERATE 55: CPT

## 2025-07-06 PROCEDURE — 99232 SBSQ HOSP IP/OBS MODERATE 35: CPT | Mod: GC

## 2025-07-06 PROCEDURE — 99232 SBSQ HOSP IP/OBS MODERATE 35: CPT

## 2025-07-06 RX ORDER — ALBUMIN (HUMAN) 12.5 G/50ML
250 INJECTION, SOLUTION INTRAVENOUS ONCE
Refills: 0 | Status: COMPLETED | OUTPATIENT
Start: 2025-07-06 | End: 2025-07-06

## 2025-07-06 RX ORDER — MAGNESIUM SULFATE 500 MG/ML
2 SYRINGE (ML) INJECTION ONCE
Refills: 0 | Status: COMPLETED | OUTPATIENT
Start: 2025-07-06 | End: 2025-07-06

## 2025-07-06 RX ORDER — FUROSEMIDE 10 MG/ML
40 INJECTION INTRAMUSCULAR; INTRAVENOUS ONCE
Refills: 0 | Status: COMPLETED | OUTPATIENT
Start: 2025-07-06 | End: 2025-07-06

## 2025-07-06 RX ORDER — CISATRACURIUM BESYLATE 10 MG/5ML
1 INJECTION, SOLUTION INTRAVENOUS
Qty: 200 | Refills: 0 | Status: DISCONTINUED | OUTPATIENT
Start: 2025-07-06 | End: 2025-07-07

## 2025-07-06 RX ORDER — PROPOFOL 10 MG/ML
50 INJECTION, EMULSION INTRAVENOUS
Qty: 1000 | Refills: 0 | Status: DISCONTINUED | OUTPATIENT
Start: 2025-07-06 | End: 2025-07-07

## 2025-07-06 RX ADMIN — Medication 15 MILLILITER(S): at 17:04

## 2025-07-06 RX ADMIN — Medication 20 MILLIEQUIVALENT(S): at 14:35

## 2025-07-06 RX ADMIN — Medication 100 MILLIEQUIVALENT(S): at 02:27

## 2025-07-06 RX ADMIN — Medication 25 GRAM(S): at 07:30

## 2025-07-06 RX ADMIN — PROPOFOL 9.36 MICROGRAM(S)/KG/MIN: 10 INJECTION, EMULSION INTRAVENOUS at 07:29

## 2025-07-06 RX ADMIN — Medication 15 MILLILITER(S): at 05:10

## 2025-07-06 RX ADMIN — Medication 100 MILLIEQUIVALENT(S): at 01:22

## 2025-07-06 RX ADMIN — Medication 100 MILLIGRAM(S): at 09:39

## 2025-07-06 RX ADMIN — PROPOFOL 9.36 MICROGRAM(S)/KG/MIN: 10 INJECTION, EMULSION INTRAVENOUS at 11:26

## 2025-07-06 RX ADMIN — NOREPINEPHRINE BITARTRATE 2.93 MICROGRAM(S)/KG/MIN: 8 SOLUTION at 07:28

## 2025-07-06 RX ADMIN — CISATRACURIUM BESYLATE 9.36 MICROGRAM(S)/KG/MIN: 10 INJECTION, SOLUTION INTRAVENOUS at 00:14

## 2025-07-06 RX ADMIN — CISATRACURIUM BESYLATE 4.68 MICROGRAM(S)/KG/MIN: 10 INJECTION, SOLUTION INTRAVENOUS at 11:10

## 2025-07-06 RX ADMIN — LACTULOSE 10 GRAM(S): 10 SOLUTION ORAL at 21:16

## 2025-07-06 RX ADMIN — Medication 25 GRAM(S): at 23:29

## 2025-07-06 RX ADMIN — SODIUM CHLORIDE 40 MILLILITER(S): 9 INJECTION, SOLUTION INTRAVENOUS at 07:29

## 2025-07-06 RX ADMIN — Medication 100 MILLIEQUIVALENT(S): at 00:19

## 2025-07-06 RX ADMIN — PROPOFOL 23.4 MICROGRAM(S)/KG/MIN: 10 INJECTION, EMULSION INTRAVENOUS at 23:29

## 2025-07-06 RX ADMIN — Medication 25 GRAM(S): at 16:47

## 2025-07-06 RX ADMIN — FUROSEMIDE 40 MILLIGRAM(S): 10 INJECTION INTRAMUSCULAR; INTRAVENOUS at 10:08

## 2025-07-06 RX ADMIN — ALBUMIN (HUMAN) 500 MILLILITER(S): 12.5 INJECTION, SOLUTION INTRAVENOUS at 00:19

## 2025-07-06 RX ADMIN — PROPOFOL 9.36 MICROGRAM(S)/KG/MIN: 10 INJECTION, EMULSION INTRAVENOUS at 19:15

## 2025-07-06 RX ADMIN — SODIUM CHLORIDE 40 MILLILITER(S): 9 INJECTION, SOLUTION INTRAVENOUS at 00:15

## 2025-07-06 RX ADMIN — Medication 25 GRAM(S): at 11:10

## 2025-07-06 RX ADMIN — NOREPINEPHRINE BITARTRATE 2.93 MICROGRAM(S)/KG/MIN: 8 SOLUTION at 19:11

## 2025-07-06 RX ADMIN — LACTULOSE 10 GRAM(S): 10 SOLUTION ORAL at 05:11

## 2025-07-06 RX ADMIN — PROPOFOL 9.36 MICROGRAM(S)/KG/MIN: 10 INJECTION, EMULSION INTRAVENOUS at 02:36

## 2025-07-06 RX ADMIN — CISATRACURIUM BESYLATE 4.68 MICROGRAM(S)/KG/MIN: 10 INJECTION, SOLUTION INTRAVENOUS at 23:29

## 2025-07-06 RX ADMIN — LACTULOSE 10 GRAM(S): 10 SOLUTION ORAL at 14:22

## 2025-07-06 RX ADMIN — Medication 40 MILLIGRAM(S): at 21:16

## 2025-07-06 RX ADMIN — Medication 1 APPLICATION(S): at 05:10

## 2025-07-06 RX ADMIN — CISATRACURIUM BESYLATE 9.36 MICROGRAM(S)/KG/MIN: 10 INJECTION, SOLUTION INTRAVENOUS at 07:29

## 2025-07-07 ENCOUNTER — RESULT REVIEW (OUTPATIENT)
Age: 45
End: 2025-07-07

## 2025-07-07 LAB
ALBUMIN SERPL ELPH-MCNC: 1.5 G/DL — LOW (ref 3.3–5)
ALBUMIN SERPL ELPH-MCNC: 1.8 G/DL — LOW (ref 3.3–5)
ALP SERPL-CCNC: 107 U/L — SIGNIFICANT CHANGE UP (ref 40–120)
ALP SERPL-CCNC: 70 U/L — SIGNIFICANT CHANGE UP (ref 40–120)
ALP SERPL-CCNC: 77 U/L — SIGNIFICANT CHANGE UP (ref 40–120)
ALP SERPL-CCNC: 85 U/L — SIGNIFICANT CHANGE UP (ref 40–120)
ALT FLD-CCNC: 20 U/L — SIGNIFICANT CHANGE UP (ref 10–45)
ALT FLD-CCNC: 20 U/L — SIGNIFICANT CHANGE UP (ref 10–45)
ALT FLD-CCNC: 22 U/L — SIGNIFICANT CHANGE UP (ref 10–45)
ALT FLD-CCNC: 25 U/L — SIGNIFICANT CHANGE UP (ref 10–45)
AMMONIA BLD-MCNC: 51 UMOL/L — SIGNIFICANT CHANGE UP (ref 11–55)
ANION GAP SERPL CALC-SCNC: 13 MMOL/L — SIGNIFICANT CHANGE UP (ref 5–17)
ANION GAP SERPL CALC-SCNC: 14 MMOL/L — SIGNIFICANT CHANGE UP (ref 5–17)
APTT BLD: 35.4 SEC — SIGNIFICANT CHANGE UP (ref 26.1–36.8)
AST SERPL-CCNC: 63 U/L — HIGH (ref 10–40)
AST SERPL-CCNC: 66 U/L — HIGH (ref 10–40)
AST SERPL-CCNC: 74 U/L — HIGH (ref 10–40)
AST SERPL-CCNC: 83 U/L — HIGH (ref 10–40)
BILIRUB SERPL-MCNC: 4.4 MG/DL — HIGH (ref 0.2–1.2)
BILIRUB SERPL-MCNC: 4.4 MG/DL — HIGH (ref 0.2–1.2)
BILIRUB SERPL-MCNC: 4.8 MG/DL — HIGH (ref 0.2–1.2)
BILIRUB SERPL-MCNC: 4.9 MG/DL — HIGH (ref 0.2–1.2)
BILIRUB SERPL-MCNC: 5.3 MG/DL — HIGH (ref 0.2–1.2)
BUN SERPL-MCNC: 78 MG/DL — HIGH (ref 7–23)
BUN SERPL-MCNC: 78 MG/DL — HIGH (ref 7–23)
BUN SERPL-MCNC: 79 MG/DL — HIGH (ref 7–23)
BUN SERPL-MCNC: 80 MG/DL — HIGH (ref 7–23)
CALCIUM SERPL-MCNC: 8 MG/DL — LOW (ref 8.4–10.5)
CALCIUM SERPL-MCNC: 8 MG/DL — LOW (ref 8.4–10.5)
CALCIUM SERPL-MCNC: 8.1 MG/DL — LOW (ref 8.4–10.5)
CALCIUM SERPL-MCNC: 8.2 MG/DL — LOW (ref 8.4–10.5)
CHLORIDE SERPL-SCNC: 108 MMOL/L — SIGNIFICANT CHANGE UP (ref 96–108)
CHLORIDE SERPL-SCNC: 109 MMOL/L — HIGH (ref 96–108)
CHLORIDE SERPL-SCNC: 110 MMOL/L — HIGH (ref 96–108)
CHLORIDE SERPL-SCNC: 110 MMOL/L — HIGH (ref 96–108)
CO2 SERPL-SCNC: 19 MMOL/L — LOW (ref 22–31)
CO2 SERPL-SCNC: 20 MMOL/L — LOW (ref 22–31)
CREAT SERPL-MCNC: 1.92 MG/DL — HIGH (ref 0.5–1.3)
CREAT SERPL-MCNC: 1.95 MG/DL — HIGH (ref 0.5–1.3)
CREAT SERPL-MCNC: 1.95 MG/DL — HIGH (ref 0.5–1.3)
CREAT SERPL-MCNC: 2.12 MG/DL — HIGH (ref 0.5–1.3)
CREAT SERPL-MCNC: 2.17 MG/DL — HIGH (ref 0.5–1.3)
EBV DNA SERPL NAA+PROBE-ACNC: SIGNIFICANT CHANGE UP IU/ML
EBVPCR LOG: SIGNIFICANT CHANGE UP LOG10IU/ML
EGFR: 28 ML/MIN/1.73M2 — LOW
EGFR: 28 ML/MIN/1.73M2 — LOW
EGFR: 29 ML/MIN/1.73M2 — LOW
EGFR: 29 ML/MIN/1.73M2 — LOW
EGFR: 32 ML/MIN/1.73M2 — LOW
GAS PNL BLDA: SIGNIFICANT CHANGE UP
GLUCOSE SERPL-MCNC: 103 MG/DL — HIGH (ref 70–99)
GLUCOSE SERPL-MCNC: 118 MG/DL — HIGH (ref 70–99)
GLUCOSE SERPL-MCNC: 125 MG/DL — HIGH (ref 70–99)
GLUCOSE SERPL-MCNC: 96 MG/DL — SIGNIFICANT CHANGE UP (ref 70–99)
HCT VFR BLD CALC: 21.9 % — LOW (ref 34.5–45)
HCT VFR BLD CALC: 22 % — LOW (ref 34.5–45)
HCT VFR BLD CALC: 22.5 % — LOW (ref 34.5–45)
HCT VFR BLD CALC: 24 % — LOW (ref 34.5–45)
HCV RNA FLD QL NAA+PROBE: SIGNIFICANT CHANGE UP
HCV RNA SPEC QL PROBE+SIG AMP: SIGNIFICANT CHANGE UP
HGB BLD-MCNC: 7.2 G/DL — LOW (ref 11.5–15.5)
HGB BLD-MCNC: 7.4 G/DL — LOW (ref 11.5–15.5)
HGB BLD-MCNC: 7.5 G/DL — LOW (ref 11.5–15.5)
HGB BLD-MCNC: 7.8 G/DL — LOW (ref 11.5–15.5)
IMMATURE PLATELET FRACTION #: 1.7 K/UL — LOW (ref 4.7–11.1)
IMMATURE PLATELET FRACTION #: 1.9 K/UL — LOW (ref 4.7–11.1)
IMMATURE PLATELET FRACTION #: 2.1 K/UL — LOW (ref 4.7–11.1)
IMMATURE PLATELET FRACTION #: 2.4 K/UL — LOW (ref 4.7–11.1)
IMMATURE PLATELET FRACTION %: 5.2 % — HIGH (ref 1.6–4.9)
IMMATURE PLATELET FRACTION %: 5.4 % — HIGH (ref 1.6–4.9)
IMMATURE PLATELET FRACTION %: 5.5 % — HIGH (ref 1.6–4.9)
IMMATURE PLATELET FRACTION %: 6.7 % — HIGH (ref 1.6–4.9)
INR BLD: 2.31 RATIO — HIGH (ref 0.85–1.16)
INR BLD: 2.37 RATIO — HIGH (ref 0.85–1.16)
LKM AB SER-ACNC: <20.1 UNITS — SIGNIFICANT CHANGE UP (ref 0–20)
MAGNESIUM SERPL-MCNC: 2.2 MG/DL — SIGNIFICANT CHANGE UP (ref 1.6–2.6)
MAGNESIUM SERPL-MCNC: 2.2 MG/DL — SIGNIFICANT CHANGE UP (ref 1.6–2.6)
MAGNESIUM SERPL-MCNC: 2.3 MG/DL — SIGNIFICANT CHANGE UP (ref 1.6–2.6)
MAGNESIUM SERPL-MCNC: 2.4 MG/DL — SIGNIFICANT CHANGE UP (ref 1.6–2.6)
MCHC RBC-ENTMCNC: 31 PG — SIGNIFICANT CHANGE UP (ref 27–34)
MCHC RBC-ENTMCNC: 32 PG — SIGNIFICANT CHANGE UP (ref 27–34)
MCHC RBC-ENTMCNC: 32.3 PG — SIGNIFICANT CHANGE UP (ref 27–34)
MCHC RBC-ENTMCNC: 32.5 G/DL — SIGNIFICANT CHANGE UP (ref 32–36)
MCHC RBC-ENTMCNC: 32.5 PG — SIGNIFICANT CHANGE UP (ref 27–34)
MCHC RBC-ENTMCNC: 32.9 G/DL — SIGNIFICANT CHANGE UP (ref 32–36)
MCHC RBC-ENTMCNC: 33.3 G/DL — SIGNIFICANT CHANGE UP (ref 32–36)
MCHC RBC-ENTMCNC: 33.6 G/DL — SIGNIFICANT CHANGE UP (ref 32–36)
MCV RBC AUTO: 95.2 FL — SIGNIFICANT CHANGE UP (ref 80–100)
MCV RBC AUTO: 96.5 FL — SIGNIFICANT CHANGE UP (ref 80–100)
MCV RBC AUTO: 97 FL — SIGNIFICANT CHANGE UP (ref 80–100)
MCV RBC AUTO: 97.3 FL — SIGNIFICANT CHANGE UP (ref 80–100)
MELD SCORE WITH DIALYSIS: 35 POINTS — SIGNIFICANT CHANGE UP
MELD SCORE WITHOUT DIALYSIS: 28 POINTS — SIGNIFICANT CHANGE UP
NRBC # BLD AUTO: 0.02 K/UL — HIGH (ref 0–0)
NRBC # FLD: 0.02 K/UL — HIGH (ref 0–0)
NRBC BLD AUTO-RTO: 0 /100 WBCS — SIGNIFICANT CHANGE UP (ref 0–0)
NRBC BLD AUTO-RTO: 0 /100 WBCS — SIGNIFICANT CHANGE UP (ref 0–0)
PHOSPHATE SERPL-MCNC: 3.2 MG/DL — SIGNIFICANT CHANGE UP (ref 2.5–4.5)
PHOSPHATE SERPL-MCNC: 3.5 MG/DL — SIGNIFICANT CHANGE UP (ref 2.5–4.5)
PHOSPHATE SERPL-MCNC: 3.6 MG/DL — SIGNIFICANT CHANGE UP (ref 2.5–4.5)
PHOSPHATE SERPL-MCNC: 3.9 MG/DL — SIGNIFICANT CHANGE UP (ref 2.5–4.5)
PLATELET # BLD AUTO: 31 K/UL — LOW (ref 150–400)
PLATELET # BLD AUTO: 32 K/UL — LOW (ref 150–400)
PLATELET # BLD AUTO: 34 K/UL — LOW (ref 150–400)
PLATELET # BLD AUTO: 45 K/UL — LOW (ref 150–400)
PMV BLD: 10.9 FL — SIGNIFICANT CHANGE UP (ref 7–13)
PMV BLD: SIGNIFICANT CHANGE UP FL (ref 7–13)
POTASSIUM SERPL-MCNC: 3.3 MMOL/L — LOW (ref 3.5–5.3)
POTASSIUM SERPL-MCNC: 3.4 MMOL/L — LOW (ref 3.5–5.3)
POTASSIUM SERPL-MCNC: 3.6 MMOL/L — SIGNIFICANT CHANGE UP (ref 3.5–5.3)
POTASSIUM SERPL-MCNC: 3.7 MMOL/L — SIGNIFICANT CHANGE UP (ref 3.5–5.3)
POTASSIUM SERPL-SCNC: 3.3 MMOL/L — LOW (ref 3.5–5.3)
POTASSIUM SERPL-SCNC: 3.4 MMOL/L — LOW (ref 3.5–5.3)
POTASSIUM SERPL-SCNC: 3.6 MMOL/L — SIGNIFICANT CHANGE UP (ref 3.5–5.3)
POTASSIUM SERPL-SCNC: 3.7 MMOL/L — SIGNIFICANT CHANGE UP (ref 3.5–5.3)
PROCALCITONIN SERPL-MCNC: 3.21 NG/ML — HIGH (ref 0.02–0.1)
PROT SERPL-MCNC: 5 G/DL — LOW (ref 6–8.3)
PROT SERPL-MCNC: 5 G/DL — LOW (ref 6–8.3)
PROT SERPL-MCNC: 5.2 G/DL — LOW (ref 6–8.3)
PROT SERPL-MCNC: 5.5 G/DL — LOW (ref 6–8.3)
PROTHROM AB SERPL-ACNC: 26.3 SEC — HIGH (ref 9.9–13.4)
PROTHROM AB SERPL-ACNC: 26.8 SEC — HIGH (ref 9.9–13.4)
RAPIDTEG MAXIMUM AMPLITUDE: 55.9 MM — SIGNIFICANT CHANGE UP (ref 52–70)
RBC # BLD: 2.25 M/UL — LOW (ref 3.8–5.2)
RBC # BLD: 2.28 M/UL — LOW (ref 3.8–5.2)
RBC # BLD: 2.32 M/UL — LOW (ref 3.8–5.2)
RBC # BLD: 2.52 M/UL — LOW (ref 3.8–5.2)
RBC # FLD: 20.8 % — HIGH (ref 10.3–14.5)
RBC # FLD: 21 % — HIGH (ref 10.3–14.5)
SODIUM SERPL-SCNC: 142 MMOL/L — SIGNIFICANT CHANGE UP (ref 135–145)
SODIUM SERPL-SCNC: 143 MMOL/L — SIGNIFICANT CHANGE UP (ref 135–145)
STRONGYLOIDES AB SER-ACNC: NEGATIVE — SIGNIFICANT CHANGE UP
TEG FUNCTIONAL FIBRINOGEN: 21.9 MM — SIGNIFICANT CHANGE UP (ref 15–32)
TEG LY30 (LYSIS): 0 % — SIGNIFICANT CHANGE UP (ref 0–2.6)
TEG REACTION TIME: 9 MIN — SIGNIFICANT CHANGE UP (ref 4.6–9.1)
UREA FRACTIONAL EXCRETION FEUREA CALC RESULT: 20 % — SIGNIFICANT CHANGE UP
WBC # BLD: 12.29 K/UL — HIGH (ref 3.8–10.5)
WBC # BLD: 7.92 K/UL — SIGNIFICANT CHANGE UP (ref 3.8–10.5)
WBC # BLD: 8.62 K/UL — SIGNIFICANT CHANGE UP (ref 3.8–10.5)
WBC # BLD: 8.77 K/UL — SIGNIFICANT CHANGE UP (ref 3.8–10.5)
WBC # FLD AUTO: 12.29 K/UL — HIGH (ref 3.8–10.5)
WBC # FLD AUTO: 7.92 K/UL — SIGNIFICANT CHANGE UP (ref 3.8–10.5)
WBC # FLD AUTO: 8.62 K/UL — SIGNIFICANT CHANGE UP (ref 3.8–10.5)
WBC # FLD AUTO: 8.77 K/UL — SIGNIFICANT CHANGE UP (ref 3.8–10.5)

## 2025-07-07 PROCEDURE — 99233 SBSQ HOSP IP/OBS HIGH 50: CPT

## 2025-07-07 PROCEDURE — 71045 X-RAY EXAM CHEST 1 VIEW: CPT | Mod: 26

## 2025-07-07 PROCEDURE — 99232 SBSQ HOSP IP/OBS MODERATE 35: CPT | Mod: GC

## 2025-07-07 PROCEDURE — 99291 CRITICAL CARE FIRST HOUR: CPT | Mod: GC

## 2025-07-07 PROCEDURE — 93308 TTE F-UP OR LMTD: CPT | Mod: 26

## 2025-07-07 PROCEDURE — 99232 SBSQ HOSP IP/OBS MODERATE 35: CPT

## 2025-07-07 RX ORDER — FUROSEMIDE 10 MG/ML
40 INJECTION INTRAMUSCULAR; INTRAVENOUS ONCE
Refills: 0 | Status: COMPLETED | OUTPATIENT
Start: 2025-07-07 | End: 2025-07-07

## 2025-07-07 RX ADMIN — PROPOFOL 23.4 MICROGRAM(S)/KG/MIN: 10 INJECTION, EMULSION INTRAVENOUS at 00:57

## 2025-07-07 RX ADMIN — LACTULOSE 10 GRAM(S): 10 SOLUTION ORAL at 13:10

## 2025-07-07 RX ADMIN — Medication 25 GRAM(S): at 09:28

## 2025-07-07 RX ADMIN — Medication 1 APPLICATION(S): at 05:24

## 2025-07-07 RX ADMIN — Medication 100 MILLIEQUIVALENT(S): at 09:28

## 2025-07-07 RX ADMIN — Medication 15 MILLILITER(S): at 05:24

## 2025-07-07 RX ADMIN — CISATRACURIUM BESYLATE 4.68 MICROGRAM(S)/KG/MIN: 10 INJECTION, SOLUTION INTRAVENOUS at 09:27

## 2025-07-07 RX ADMIN — Medication 100 MILLIGRAM(S): at 09:27

## 2025-07-07 RX ADMIN — PROPOFOL 23.4 MICROGRAM(S)/KG/MIN: 10 INJECTION, EMULSION INTRAVENOUS at 09:27

## 2025-07-07 RX ADMIN — Medication 100 MILLIEQUIVALENT(S): at 01:43

## 2025-07-07 RX ADMIN — PROPOFOL 23.4 MICROGRAM(S)/KG/MIN: 10 INJECTION, EMULSION INTRAVENOUS at 19:38

## 2025-07-07 RX ADMIN — LACTULOSE 10 GRAM(S): 10 SOLUTION ORAL at 05:24

## 2025-07-07 RX ADMIN — Medication 25 GRAM(S): at 15:13

## 2025-07-07 RX ADMIN — LACTULOSE 10 GRAM(S): 10 SOLUTION ORAL at 21:32

## 2025-07-07 RX ADMIN — Medication 100 MILLIEQUIVALENT(S): at 06:32

## 2025-07-07 RX ADMIN — FUROSEMIDE 40 MILLIGRAM(S): 10 INJECTION INTRAMUSCULAR; INTRAVENOUS at 13:11

## 2025-07-07 RX ADMIN — Medication 15 MILLILITER(S): at 17:15

## 2025-07-07 RX ADMIN — Medication 40 MILLIGRAM(S): at 21:32

## 2025-07-07 RX ADMIN — PROPOFOL 23.4 MICROGRAM(S)/KG/MIN: 10 INJECTION, EMULSION INTRAVENOUS at 05:25

## 2025-07-08 LAB
ALBUMIN SERPL ELPH-MCNC: 1.8 G/DL — LOW (ref 3.3–5)
ALBUMIN SERPL ELPH-MCNC: 1.9 G/DL — LOW (ref 3.3–5)
ALBUMIN SERPL ELPH-MCNC: 2.1 G/DL — LOW (ref 3.3–5)
ALP SERPL-CCNC: 121 U/L — HIGH (ref 40–120)
ALP SERPL-CCNC: 125 U/L — HIGH (ref 40–120)
ALP SERPL-CCNC: 161 U/L — HIGH (ref 40–120)
ALT FLD-CCNC: 33 U/L — SIGNIFICANT CHANGE UP (ref 10–45)
ALT FLD-CCNC: 35 U/L — SIGNIFICANT CHANGE UP (ref 10–45)
ALT FLD-CCNC: 43 U/L — SIGNIFICANT CHANGE UP (ref 10–45)
AMMONIA BLD-MCNC: 55 UMOL/L — SIGNIFICANT CHANGE UP (ref 11–55)
ANA PAT FLD IF-IMP: ABNORMAL
ANA PATTERN 2: ABNORMAL
ANA TITR SER: ABNORMAL
ANION GAP SERPL CALC-SCNC: 12 MMOL/L — SIGNIFICANT CHANGE UP (ref 5–17)
ANION GAP SERPL CALC-SCNC: 14 MMOL/L — SIGNIFICANT CHANGE UP (ref 5–17)
ANION GAP SERPL CALC-SCNC: 17 MMOL/L — SIGNIFICANT CHANGE UP (ref 5–17)
ANTI NUCLEAR FACTOR TITER 2: ABNORMAL
APTT BLD: 32.8 SEC — SIGNIFICANT CHANGE UP (ref 26.1–36.8)
AST SERPL-CCNC: 108 U/L — HIGH (ref 10–40)
AST SERPL-CCNC: 112 U/L — HIGH (ref 10–40)
AST SERPL-CCNC: 132 U/L — HIGH (ref 10–40)
BILIRUB SERPL-MCNC: 4.8 MG/DL — HIGH (ref 0.2–1.2)
BILIRUB SERPL-MCNC: 4.9 MG/DL — HIGH (ref 0.2–1.2)
BILIRUB SERPL-MCNC: 5.8 MG/DL — HIGH (ref 0.2–1.2)
BUN SERPL-MCNC: 77 MG/DL — HIGH (ref 7–23)
BUN SERPL-MCNC: 80 MG/DL — HIGH (ref 7–23)
BUN SERPL-MCNC: 84 MG/DL — HIGH (ref 7–23)
CALCIUM SERPL-MCNC: 8.4 MG/DL — SIGNIFICANT CHANGE UP (ref 8.4–10.5)
CALCIUM SERPL-MCNC: 8.5 MG/DL — SIGNIFICANT CHANGE UP (ref 8.4–10.5)
CALCIUM SERPL-MCNC: 8.9 MG/DL — SIGNIFICANT CHANGE UP (ref 8.4–10.5)
CHLORIDE SERPL-SCNC: 110 MMOL/L — HIGH (ref 96–108)
CHLORIDE SERPL-SCNC: 111 MMOL/L — HIGH (ref 96–108)
CHLORIDE SERPL-SCNC: 112 MMOL/L — HIGH (ref 96–108)
CO2 SERPL-SCNC: 19 MMOL/L — LOW (ref 22–31)
CO2 SERPL-SCNC: 19 MMOL/L — LOW (ref 22–31)
CO2 SERPL-SCNC: 20 MMOL/L — LOW (ref 22–31)
CREAT SERPL-MCNC: 1.76 MG/DL — HIGH (ref 0.5–1.3)
CREAT SERPL-MCNC: 1.87 MG/DL — HIGH (ref 0.5–1.3)
CREAT SERPL-MCNC: 1.88 MG/DL — HIGH (ref 0.5–1.3)
CULTURE RESULTS: SIGNIFICANT CHANGE UP
CULTURE RESULTS: SIGNIFICANT CHANGE UP
EGFR: 33 ML/MIN/1.73M2 — LOW
EGFR: 36 ML/MIN/1.73M2 — LOW
EGFR: 36 ML/MIN/1.73M2 — LOW
GAS PNL BLDA: SIGNIFICANT CHANGE UP
GAS PNL BLDV: SIGNIFICANT CHANGE UP
GLUCOSE SERPL-MCNC: 119 MG/DL — HIGH (ref 70–99)
GLUCOSE SERPL-MCNC: 122 MG/DL — HIGH (ref 70–99)
GLUCOSE SERPL-MCNC: 123 MG/DL — HIGH (ref 70–99)
HCT VFR BLD CALC: 23.5 % — LOW (ref 34.5–45)
HCT VFR BLD CALC: 23.7 % — LOW (ref 34.5–45)
HCT VFR BLD CALC: 24.4 % — LOW (ref 34.5–45)
HGB BLD-MCNC: 7.5 G/DL — LOW (ref 11.5–15.5)
HGB BLD-MCNC: 7.5 G/DL — LOW (ref 11.5–15.5)
HGB BLD-MCNC: 7.9 G/DL — LOW (ref 11.5–15.5)
IMMATURE PLATELET FRACTION #: 2.5 K/UL — LOW (ref 4.7–11.1)
IMMATURE PLATELET FRACTION #: 2.6 K/UL — LOW (ref 4.7–11.1)
IMMATURE PLATELET FRACTION #: 2.7 K/UL — LOW (ref 4.7–11.1)
IMMATURE PLATELET FRACTION %: 4.7 % — SIGNIFICANT CHANGE UP (ref 1.6–4.9)
IMMATURE PLATELET FRACTION %: 6 % — HIGH (ref 1.6–4.9)
IMMATURE PLATELET FRACTION %: 6.1 % — HIGH (ref 1.6–4.9)
INR BLD: 2.12 RATIO — HIGH (ref 0.85–1.16)
MAGNESIUM SERPL-MCNC: 2 MG/DL — SIGNIFICANT CHANGE UP (ref 1.6–2.6)
MAGNESIUM SERPL-MCNC: 2 MG/DL — SIGNIFICANT CHANGE UP (ref 1.6–2.6)
MAGNESIUM SERPL-MCNC: 2.1 MG/DL — SIGNIFICANT CHANGE UP (ref 1.6–2.6)
MCHC RBC-ENTMCNC: 30 PG — SIGNIFICANT CHANGE UP (ref 27–34)
MCHC RBC-ENTMCNC: 30.2 PG — SIGNIFICANT CHANGE UP (ref 27–34)
MCHC RBC-ENTMCNC: 30.4 PG — SIGNIFICANT CHANGE UP (ref 27–34)
MCHC RBC-ENTMCNC: 31.6 G/DL — LOW (ref 32–36)
MCHC RBC-ENTMCNC: 31.9 G/DL — LOW (ref 32–36)
MCHC RBC-ENTMCNC: 32.4 G/DL — SIGNIFICANT CHANGE UP (ref 32–36)
MCV RBC AUTO: 92.8 FL — SIGNIFICANT CHANGE UP (ref 80–100)
MCV RBC AUTO: 95.1 FL — SIGNIFICANT CHANGE UP (ref 80–100)
MCV RBC AUTO: 95.6 FL — SIGNIFICANT CHANGE UP (ref 80–100)
NRBC # BLD AUTO: 0 K/UL — SIGNIFICANT CHANGE UP (ref 0–0)
NRBC # FLD: 0 K/UL — SIGNIFICANT CHANGE UP (ref 0–0)
PETH 16:0/18:1: 317 NG/ML — HIGH
PETH 16:0/18:2: 269 NG/ML — HIGH
PETH COMMENTS: SIGNIFICANT CHANGE UP
PHOSPHATE SERPL-MCNC: 3.5 MG/DL — SIGNIFICANT CHANGE UP (ref 2.5–4.5)
PHOSPHATE SERPL-MCNC: 3.5 MG/DL — SIGNIFICANT CHANGE UP (ref 2.5–4.5)
PHOSPHATE SERPL-MCNC: 3.7 MG/DL — SIGNIFICANT CHANGE UP (ref 2.5–4.5)
PLATELET # BLD AUTO: 41 K/UL — LOW (ref 150–400)
PLATELET # BLD AUTO: 42 K/UL — LOW (ref 150–400)
PLATELET # BLD AUTO: 57 K/UL — LOW (ref 150–400)
PMV BLD: SIGNIFICANT CHANGE UP FL (ref 7–13)
POTASSIUM SERPL-MCNC: 3.4 MMOL/L — LOW (ref 3.5–5.3)
POTASSIUM SERPL-MCNC: 3.4 MMOL/L — LOW (ref 3.5–5.3)
POTASSIUM SERPL-MCNC: 3.6 MMOL/L — SIGNIFICANT CHANGE UP (ref 3.5–5.3)
POTASSIUM SERPL-SCNC: 3.4 MMOL/L — LOW (ref 3.5–5.3)
POTASSIUM SERPL-SCNC: 3.4 MMOL/L — LOW (ref 3.5–5.3)
POTASSIUM SERPL-SCNC: 3.6 MMOL/L — SIGNIFICANT CHANGE UP (ref 3.5–5.3)
PROT SERPL-MCNC: 5.4 G/DL — LOW (ref 6–8.3)
PROT SERPL-MCNC: 5.5 G/DL — LOW (ref 6–8.3)
PROT SERPL-MCNC: 6 G/DL — SIGNIFICANT CHANGE UP (ref 6–8.3)
PROTHROM AB SERPL-ACNC: 24.2 SEC — HIGH (ref 9.9–13.4)
RBC # BLD: 2.47 M/UL — LOW (ref 3.8–5.2)
RBC # BLD: 2.48 M/UL — LOW (ref 3.8–5.2)
RBC # BLD: 2.63 M/UL — LOW (ref 3.8–5.2)
RBC # FLD: 20.6 % — HIGH (ref 10.3–14.5)
RBC # FLD: 20.7 % — HIGH (ref 10.3–14.5)
RBC # FLD: 21 % — HIGH (ref 10.3–14.5)
SODIUM SERPL-SCNC: 142 MMOL/L — SIGNIFICANT CHANGE UP (ref 135–145)
SODIUM SERPL-SCNC: 144 MMOL/L — SIGNIFICANT CHANGE UP (ref 135–145)
SODIUM SERPL-SCNC: 148 MMOL/L — HIGH (ref 135–145)
SPECIMEN SOURCE: SIGNIFICANT CHANGE UP
SPECIMEN SOURCE: SIGNIFICANT CHANGE UP
WBC # BLD: 10.56 K/UL — HIGH (ref 3.8–10.5)
WBC # BLD: 11.67 K/UL — HIGH (ref 3.8–10.5)
WBC # BLD: 12.74 K/UL — HIGH (ref 3.8–10.5)
WBC # FLD AUTO: 10.56 K/UL — HIGH (ref 3.8–10.5)
WBC # FLD AUTO: 11.67 K/UL — HIGH (ref 3.8–10.5)
WBC # FLD AUTO: 12.74 K/UL — HIGH (ref 3.8–10.5)

## 2025-07-08 PROCEDURE — 99232 SBSQ HOSP IP/OBS MODERATE 35: CPT | Mod: GC

## 2025-07-08 PROCEDURE — 99291 CRITICAL CARE FIRST HOUR: CPT | Mod: GC

## 2025-07-08 PROCEDURE — 99233 SBSQ HOSP IP/OBS HIGH 50: CPT

## 2025-07-08 PROCEDURE — 71045 X-RAY EXAM CHEST 1 VIEW: CPT | Mod: 26

## 2025-07-08 PROCEDURE — 99232 SBSQ HOSP IP/OBS MODERATE 35: CPT | Mod: 25

## 2025-07-08 RX ORDER — FUROSEMIDE 10 MG/ML
40 INJECTION INTRAMUSCULAR; INTRAVENOUS ONCE
Refills: 0 | Status: COMPLETED | OUTPATIENT
Start: 2025-07-08 | End: 2025-07-08

## 2025-07-08 RX ORDER — DEXMEDETOMIDINE HYDROCHLORIDE IN SODIUM CHLORIDE 4 UG/ML
0.2 INJECTION INTRAVENOUS
Qty: 200 | Refills: 0 | Status: DISCONTINUED | OUTPATIENT
Start: 2025-07-08 | End: 2025-07-09

## 2025-07-08 RX ADMIN — Medication 25 GRAM(S): at 16:36

## 2025-07-08 RX ADMIN — LACTULOSE 10 GRAM(S): 10 SOLUTION ORAL at 05:46

## 2025-07-08 RX ADMIN — Medication 100 MILLIGRAM(S): at 09:59

## 2025-07-08 RX ADMIN — Medication 40 MILLIEQUIVALENT(S): at 19:54

## 2025-07-08 RX ADMIN — Medication 0.5 MILLIGRAM(S): at 19:54

## 2025-07-08 RX ADMIN — Medication 100 MILLIEQUIVALENT(S): at 02:34

## 2025-07-08 RX ADMIN — Medication 0.5 MILLIGRAM(S): at 20:24

## 2025-07-08 RX ADMIN — Medication 25 GRAM(S): at 08:29

## 2025-07-08 RX ADMIN — LACTULOSE 10 GRAM(S): 10 SOLUTION ORAL at 14:10

## 2025-07-08 RX ADMIN — FUROSEMIDE 40 MILLIGRAM(S): 10 INJECTION INTRAMUSCULAR; INTRAVENOUS at 02:34

## 2025-07-08 RX ADMIN — Medication 25 GRAM(S): at 00:01

## 2025-07-08 RX ADMIN — DEXMEDETOMIDINE HYDROCHLORIDE IN SODIUM CHLORIDE 3.9 MICROGRAM(S)/KG/HR: 4 INJECTION INTRAVENOUS at 23:01

## 2025-07-08 RX ADMIN — Medication 40 MILLIGRAM(S): at 21:27

## 2025-07-08 RX ADMIN — Medication 15 MILLILITER(S): at 17:12

## 2025-07-08 RX ADMIN — LACTULOSE 10 GRAM(S): 10 SOLUTION ORAL at 21:27

## 2025-07-08 RX ADMIN — Medication 15 MILLILITER(S): at 05:46

## 2025-07-08 RX ADMIN — Medication 40 MILLIEQUIVALENT(S): at 05:46

## 2025-07-08 RX ADMIN — Medication 1 APPLICATION(S): at 05:46

## 2025-07-08 RX ADMIN — Medication 25 GRAM(S): at 23:01

## 2025-07-08 RX ADMIN — FUROSEMIDE 40 MILLIGRAM(S): 10 INJECTION INTRAMUSCULAR; INTRAVENOUS at 09:59

## 2025-07-09 LAB
ALBUMIN SERPL ELPH-MCNC: 2 G/DL — LOW (ref 3.3–5)
ALBUMIN SERPL ELPH-MCNC: 2.1 G/DL — LOW (ref 3.3–5)
ALBUMIN SERPL ELPH-MCNC: 2.2 G/DL — LOW (ref 3.3–5)
ALBUMIN SERPL ELPH-MCNC: 2.2 G/DL — LOW (ref 3.3–5)
ALP SERPL-CCNC: 142 U/L — HIGH (ref 40–120)
ALP SERPL-CCNC: 143 U/L — HIGH (ref 40–120)
ALP SERPL-CCNC: 152 U/L — HIGH (ref 40–120)
ALP SERPL-CCNC: 153 U/L — HIGH (ref 40–120)
ALT FLD-CCNC: 38 U/L — SIGNIFICANT CHANGE UP (ref 10–45)
ALT FLD-CCNC: 40 U/L — SIGNIFICANT CHANGE UP (ref 10–45)
ALT FLD-CCNC: 41 U/L — SIGNIFICANT CHANGE UP (ref 10–45)
ALT FLD-CCNC: 45 U/L — SIGNIFICANT CHANGE UP (ref 10–45)
AMMONIA BLD-MCNC: 53 UMOL/L — SIGNIFICANT CHANGE UP (ref 11–55)
AMPHET UR-MCNC: NEGATIVE NG/ML — SIGNIFICANT CHANGE UP
ANION GAP SERPL CALC-SCNC: 11 MMOL/L — SIGNIFICANT CHANGE UP (ref 5–17)
ANION GAP SERPL CALC-SCNC: 11 MMOL/L — SIGNIFICANT CHANGE UP (ref 5–17)
ANION GAP SERPL CALC-SCNC: 13 MMOL/L — SIGNIFICANT CHANGE UP (ref 5–17)
APTT BLD: 34 SEC — SIGNIFICANT CHANGE UP (ref 26.1–36.8)
APTT BLD: 35.4 SEC — SIGNIFICANT CHANGE UP (ref 26.1–36.8)
AST SERPL-CCNC: 111 U/L — HIGH (ref 10–40)
AST SERPL-CCNC: 111 U/L — HIGH (ref 10–40)
AST SERPL-CCNC: 113 U/L — HIGH (ref 10–40)
AST SERPL-CCNC: 121 U/L — HIGH (ref 10–40)
BARBITURATES UR QL SCN: NEGATIVE NG/ML — SIGNIFICANT CHANGE UP
BARBITURATES UR-MCNC: NEGATIVE NG/ML — SIGNIFICANT CHANGE UP
BENZODIAZ UR-MCNC: NEGATIVE NG/ML — SIGNIFICANT CHANGE UP
BILIRUB SERPL-MCNC: 5 MG/DL — HIGH (ref 0.2–1.2)
BILIRUB SERPL-MCNC: 5.2 MG/DL — HIGH (ref 0.2–1.2)
BILIRUB SERPL-MCNC: 5.5 MG/DL — HIGH (ref 0.2–1.2)
BILIRUB SERPL-MCNC: 5.6 MG/DL — HIGH (ref 0.2–1.2)
BLD GP AB SCN SERPL QL: NEGATIVE — SIGNIFICANT CHANGE UP
BUN SERPL-MCNC: 70 MG/DL — HIGH (ref 7–23)
BUN SERPL-MCNC: 78 MG/DL — HIGH (ref 7–23)
BUN SERPL-MCNC: 82 MG/DL — HIGH (ref 7–23)
BUN SERPL-MCNC: 83 MG/DL — HIGH (ref 7–23)
CALCIUM SERPL-MCNC: 8.4 MG/DL — SIGNIFICANT CHANGE UP (ref 8.4–10.5)
CALCIUM SERPL-MCNC: 8.5 MG/DL — SIGNIFICANT CHANGE UP (ref 8.4–10.5)
CALCIUM SERPL-MCNC: 8.6 MG/DL — SIGNIFICANT CHANGE UP (ref 8.4–10.5)
CALCIUM SERPL-MCNC: 8.7 MG/DL — SIGNIFICANT CHANGE UP (ref 8.4–10.5)
CHLORIDE SERPL-SCNC: 115 MMOL/L — HIGH (ref 96–108)
CHLORIDE SERPL-SCNC: 115 MMOL/L — HIGH (ref 96–108)
CHLORIDE SERPL-SCNC: 116 MMOL/L — HIGH (ref 96–108)
CHLORIDE SERPL-SCNC: 117 MMOL/L — HIGH (ref 96–108)
CO2 SERPL-SCNC: 21 MMOL/L — LOW (ref 22–31)
CO2 SERPL-SCNC: 23 MMOL/L — SIGNIFICANT CHANGE UP (ref 22–31)
COCAINE METAB.OTHER UR-MCNC: NEGATIVE NG/ML — SIGNIFICANT CHANGE UP
CODEINE UR CFM-MCNC: NEGATIVE — SIGNIFICANT CHANGE UP
CREAT SERPL-MCNC: 1.28 MG/DL — SIGNIFICANT CHANGE UP (ref 0.5–1.3)
CREAT SERPL-MCNC: 1.4 MG/DL — HIGH (ref 0.5–1.3)
CREAT SERPL-MCNC: 1.61 MG/DL — HIGH (ref 0.5–1.3)
CREAT SERPL-MCNC: 1.68 MG/DL — HIGH (ref 0.5–1.3)
CREATININE, URINE THERAPEUTIC: 124.7 MG/DL — SIGNIFICANT CHANGE UP (ref 20–300)
EGFR: 38 ML/MIN/1.73M2 — LOW
EGFR: 38 ML/MIN/1.73M2 — LOW
EGFR: 40 ML/MIN/1.73M2 — LOW
EGFR: 40 ML/MIN/1.73M2 — LOW
EGFR: 47 ML/MIN/1.73M2 — LOW
EGFR: 47 ML/MIN/1.73M2 — LOW
EGFR: 53 ML/MIN/1.73M2 — LOW
EGFR: 53 ML/MIN/1.73M2 — LOW
FENTANYL UR CFM-MCNC: 34.5 NG/ML — SIGNIFICANT CHANGE UP
FENTANYL UR CFM-MCNC: POSITIVE
FENTANYL UR QL SCN: SIGNIFICANT CHANGE UP NG/ML
FENTANYL+NORFENTANYL UR QL SCN: 35 NG/ML — SIGNIFICANT CHANGE UP
FENTANYL+NORFENTANYL UR QL SCN: POSITIVE
GAS PNL BLDA: SIGNIFICANT CHANGE UP
GLUCOSE SERPL-MCNC: 118 MG/DL — HIGH (ref 70–99)
GLUCOSE SERPL-MCNC: 124 MG/DL — HIGH (ref 70–99)
GLUCOSE SERPL-MCNC: 138 MG/DL — HIGH (ref 70–99)
GLUCOSE SERPL-MCNC: 94 MG/DL — SIGNIFICANT CHANGE UP (ref 70–99)
HCT VFR BLD CALC: 19.5 % — CRITICAL LOW (ref 34.5–45)
HCT VFR BLD CALC: 22.6 % — LOW (ref 34.5–45)
HCT VFR BLD CALC: 23.4 % — LOW (ref 34.5–45)
HCT VFR BLD CALC: 24.4 % — LOW (ref 34.5–45)
HGB BLD-MCNC: 6.2 G/DL — CRITICAL LOW (ref 11.5–15.5)
HGB BLD-MCNC: 7.1 G/DL — LOW (ref 11.5–15.5)
HGB BLD-MCNC: 7.6 G/DL — LOW (ref 11.5–15.5)
HGB BLD-MCNC: 7.9 G/DL — LOW (ref 11.5–15.5)
HYDROCODONE UR CFM-MCNC: NEGATIVE — SIGNIFICANT CHANGE UP
HYDROMORPHONE UR CFM-MCNC: NEGATIVE — SIGNIFICANT CHANGE UP
IMMATURE PLATELET FRACTION #: 2 K/UL — LOW (ref 4.7–11.1)
IMMATURE PLATELET FRACTION #: 2 K/UL — LOW (ref 4.7–11.1)
IMMATURE PLATELET FRACTION #: 2.1 K/UL — LOW (ref 4.7–11.1)
IMMATURE PLATELET FRACTION #: 2.2 K/UL — LOW (ref 4.7–11.1)
IMMATURE PLATELET FRACTION %: 6 % — HIGH (ref 1.6–4.9)
IMMATURE PLATELET FRACTION %: 6.1 % — HIGH (ref 1.6–4.9)
IMMATURE PLATELET FRACTION %: 6.7 % — HIGH (ref 1.6–4.9)
IMMATURE PLATELET FRACTION %: 7.2 % — HIGH (ref 1.6–4.9)
INR BLD: 2.27 RATIO — HIGH (ref 0.85–1.16)
INR BLD: 2.42 RATIO — HIGH (ref 0.85–1.16)
MAGNESIUM SERPL-MCNC: 2 MG/DL — SIGNIFICANT CHANGE UP (ref 1.6–2.6)
MCHC RBC-ENTMCNC: 29.5 PG — SIGNIFICANT CHANGE UP (ref 27–34)
MCHC RBC-ENTMCNC: 29.5 PG — SIGNIFICANT CHANGE UP (ref 27–34)
MCHC RBC-ENTMCNC: 29.7 PG — SIGNIFICANT CHANGE UP (ref 27–34)
MCHC RBC-ENTMCNC: 30.2 PG — SIGNIFICANT CHANGE UP (ref 27–34)
MCHC RBC-ENTMCNC: 31.4 G/DL — LOW (ref 32–36)
MCHC RBC-ENTMCNC: 31.8 G/DL — LOW (ref 32–36)
MCHC RBC-ENTMCNC: 32.4 G/DL — SIGNIFICANT CHANGE UP (ref 32–36)
MCHC RBC-ENTMCNC: 32.5 G/DL — SIGNIFICANT CHANGE UP (ref 32–36)
MCV RBC AUTO: 90.7 FL — SIGNIFICANT CHANGE UP (ref 80–100)
MCV RBC AUTO: 91.7 FL — SIGNIFICANT CHANGE UP (ref 80–100)
MCV RBC AUTO: 93.8 FL — SIGNIFICANT CHANGE UP (ref 80–100)
MCV RBC AUTO: 95.1 FL — SIGNIFICANT CHANGE UP (ref 80–100)
METHADONE UR QL SCN: NEGATIVE NG/ML — SIGNIFICANT CHANGE UP
MORPHINE SERPL-MCNC: POSITIVE
MORPHINE UR QL CFM: 702 NG/ML — SIGNIFICANT CHANGE UP
NORFENTANYL UR-MCNC: POSITIVE
NRBC # BLD AUTO: 0 K/UL — SIGNIFICANT CHANGE UP (ref 0–0)
NRBC # BLD AUTO: 0.03 K/UL — HIGH (ref 0–0)
NRBC # FLD: 0 K/UL — SIGNIFICANT CHANGE UP (ref 0–0)
NRBC # FLD: 0.03 K/UL — HIGH (ref 0–0)
NRBC BLD AUTO-RTO: 0 /100 WBCS — SIGNIFICANT CHANGE UP (ref 0–0)
OPIATES UR CFM-MCNC: POSITIVE
OPIATES UR-MCNC: SIGNIFICANT CHANGE UP NG/ML
OXYCODONE UR QL CFM: NEGATIVE — SIGNIFICANT CHANGE UP
OXYCODONE UR QL SCN: SIGNIFICANT CHANGE UP NG/ML
OXYCODONE UR QL: 330 NG/ML — SIGNIFICANT CHANGE UP
OXYCODONE UR-MCNC: POSITIVE
OXYCODONE+OXYMORPHONE UR QL SCN: POSITIVE
PCP UR-MCNC: NEGATIVE NG/ML — SIGNIFICANT CHANGE UP
PH, URINE RESULT: 5.4 — SIGNIFICANT CHANGE UP (ref 4.5–8.9)
PHOSPHATE SERPL-MCNC: 2.6 MG/DL — SIGNIFICANT CHANGE UP (ref 2.5–4.5)
PHOSPHATE SERPL-MCNC: 3.3 MG/DL — SIGNIFICANT CHANGE UP (ref 2.5–4.5)
PHOSPHATE SERPL-MCNC: 3.4 MG/DL — SIGNIFICANT CHANGE UP (ref 2.5–4.5)
PHOSPHATE SERPL-MCNC: 3.7 MG/DL — SIGNIFICANT CHANGE UP (ref 2.5–4.5)
PLATELET # BLD AUTO: 28 K/UL — LOW (ref 150–400)
PLATELET # BLD AUTO: 33 K/UL — LOW (ref 150–400)
PLATELET # BLD AUTO: 34 K/UL — LOW (ref 150–400)
PLATELET # BLD AUTO: 35 K/UL — LOW (ref 150–400)
PMV BLD: 11.2 FL — SIGNIFICANT CHANGE UP (ref 7–13)
PMV BLD: SIGNIFICANT CHANGE UP FL (ref 7–13)
POTASSIUM SERPL-MCNC: 3.5 MMOL/L — SIGNIFICANT CHANGE UP (ref 3.5–5.3)
POTASSIUM SERPL-MCNC: 3.6 MMOL/L — SIGNIFICANT CHANGE UP (ref 3.5–5.3)
POTASSIUM SERPL-MCNC: 3.8 MMOL/L — SIGNIFICANT CHANGE UP (ref 3.5–5.3)
POTASSIUM SERPL-MCNC: 3.9 MMOL/L — SIGNIFICANT CHANGE UP (ref 3.5–5.3)
POTASSIUM SERPL-SCNC: 3.5 MMOL/L — SIGNIFICANT CHANGE UP (ref 3.5–5.3)
POTASSIUM SERPL-SCNC: 3.6 MMOL/L — SIGNIFICANT CHANGE UP (ref 3.5–5.3)
POTASSIUM SERPL-SCNC: 3.8 MMOL/L — SIGNIFICANT CHANGE UP (ref 3.5–5.3)
POTASSIUM SERPL-SCNC: 3.9 MMOL/L — SIGNIFICANT CHANGE UP (ref 3.5–5.3)
PROT SERPL-MCNC: 5.3 G/DL — LOW (ref 6–8.3)
PROT SERPL-MCNC: 5.6 G/DL — LOW (ref 6–8.3)
PROTHROM AB SERPL-ACNC: 25.7 SEC — HIGH (ref 9.9–13.4)
PROTHROM AB SERPL-ACNC: 27.4 SEC — HIGH (ref 9.9–13.4)
RAPIDTEG MAXIMUM AMPLITUDE: 55.1 MM — SIGNIFICANT CHANGE UP (ref 52–70)
RBC # BLD: 2.05 M/UL — LOW (ref 3.8–5.2)
RBC # BLD: 2.41 M/UL — LOW (ref 3.8–5.2)
RBC # BLD: 2.58 M/UL — LOW (ref 3.8–5.2)
RBC # BLD: 2.66 M/UL — LOW (ref 3.8–5.2)
RBC # FLD: 20.9 % — HIGH (ref 10.3–14.5)
RBC # FLD: 21 % — HIGH (ref 10.3–14.5)
RBC # FLD: 21.1 % — HIGH (ref 10.3–14.5)
RBC # FLD: 22.6 % — HIGH (ref 10.3–14.5)
RH IG SCN BLD-IMP: POSITIVE — SIGNIFICANT CHANGE UP
SODIUM SERPL-SCNC: 147 MMOL/L — HIGH (ref 135–145)
SODIUM SERPL-SCNC: 149 MMOL/L — HIGH (ref 135–145)
SODIUM SERPL-SCNC: 150 MMOL/L — HIGH (ref 135–145)
SODIUM SERPL-SCNC: 151 MMOL/L — HIGH (ref 135–145)
TEG FUNCTIONAL FIBRINOGEN: 23.1 MM — SIGNIFICANT CHANGE UP (ref 15–32)
TEG LY30 (LYSIS): 0.3 % — SIGNIFICANT CHANGE UP (ref 0–2.6)
TEG REACTION TIME: 12.1 MIN — HIGH (ref 4.6–9.1)
THC UR QL: NEGATIVE NG/ML — SIGNIFICANT CHANGE UP
WBC # BLD: 5.47 K/UL — SIGNIFICANT CHANGE UP (ref 3.8–10.5)
WBC # BLD: 6.53 K/UL — SIGNIFICANT CHANGE UP (ref 3.8–10.5)
WBC # BLD: 7.24 K/UL — SIGNIFICANT CHANGE UP (ref 3.8–10.5)
WBC # BLD: 7.7 K/UL — SIGNIFICANT CHANGE UP (ref 3.8–10.5)
WBC # FLD AUTO: 5.47 K/UL — SIGNIFICANT CHANGE UP (ref 3.8–10.5)
WBC # FLD AUTO: 6.53 K/UL — SIGNIFICANT CHANGE UP (ref 3.8–10.5)
WBC # FLD AUTO: 7.24 K/UL — SIGNIFICANT CHANGE UP (ref 3.8–10.5)
WBC # FLD AUTO: 7.7 K/UL — SIGNIFICANT CHANGE UP (ref 3.8–10.5)

## 2025-07-09 PROCEDURE — 99232 SBSQ HOSP IP/OBS MODERATE 35: CPT

## 2025-07-09 PROCEDURE — 71045 X-RAY EXAM CHEST 1 VIEW: CPT | Mod: 26

## 2025-07-09 PROCEDURE — 99232 SBSQ HOSP IP/OBS MODERATE 35: CPT | Mod: GC

## 2025-07-09 RX ORDER — HYDROMORPHONE/SOD CHLOR,ISO/PF 2 MG/10 ML
0.25 SYRINGE (ML) INJECTION ONCE
Refills: 0 | Status: DISCONTINUED | OUTPATIENT
Start: 2025-07-09 | End: 2025-07-09

## 2025-07-09 RX ORDER — ALBUMIN (HUMAN) 12.5 G/50ML
50 INJECTION, SOLUTION INTRAVENOUS ONCE
Refills: 0 | Status: COMPLETED | OUTPATIENT
Start: 2025-07-09 | End: 2025-07-09

## 2025-07-09 RX ADMIN — LACTULOSE 10 GRAM(S): 10 SOLUTION ORAL at 05:59

## 2025-07-09 RX ADMIN — Medication 15 MILLILITER(S): at 06:00

## 2025-07-09 RX ADMIN — LACTULOSE 10 GRAM(S): 10 SOLUTION ORAL at 13:03

## 2025-07-09 RX ADMIN — LACTULOSE 10 GRAM(S): 10 SOLUTION ORAL at 21:45

## 2025-07-09 RX ADMIN — Medication 0.25 MILLIGRAM(S): at 14:53

## 2025-07-09 RX ADMIN — Medication 0.5 MILLIGRAM(S): at 00:38

## 2025-07-09 RX ADMIN — Medication 100 MILLIGRAM(S): at 09:09

## 2025-07-09 RX ADMIN — Medication 100 MILLIEQUIVALENT(S): at 20:07

## 2025-07-09 RX ADMIN — Medication 15 MILLILITER(S): at 18:26

## 2025-07-09 RX ADMIN — ALBUMIN (HUMAN) 200 MILLILITER(S): 12.5 INJECTION, SOLUTION INTRAVENOUS at 02:34

## 2025-07-09 RX ADMIN — Medication 0.5 MILLIGRAM(S): at 01:08

## 2025-07-09 RX ADMIN — Medication 0.25 MILLIGRAM(S): at 15:12

## 2025-07-09 RX ADMIN — Medication 100 MILLIEQUIVALENT(S): at 21:45

## 2025-07-09 RX ADMIN — Medication 25 GRAM(S): at 08:13

## 2025-07-09 RX ADMIN — Medication 40 MILLIGRAM(S): at 21:45

## 2025-07-09 RX ADMIN — Medication 40 MILLIEQUIVALENT(S): at 02:34

## 2025-07-09 RX ADMIN — Medication 1 APPLICATION(S): at 05:59

## 2025-07-10 LAB
ALBUMIN SERPL ELPH-MCNC: 2.2 G/DL — LOW (ref 3.3–5)
ALBUMIN SERPL ELPH-MCNC: 2.2 G/DL — LOW (ref 3.3–5)
ALP SERPL-CCNC: 136 U/L — HIGH (ref 40–120)
ALP SERPL-CCNC: 161 U/L — HIGH (ref 40–120)
ALT FLD-CCNC: 49 U/L — HIGH (ref 10–45)
ALT FLD-CCNC: 58 U/L — HIGH (ref 10–45)
AMMONIA BLD-MCNC: 69 UMOL/L — HIGH (ref 11–55)
ANION GAP SERPL CALC-SCNC: 10 MMOL/L — SIGNIFICANT CHANGE UP (ref 5–17)
ANION GAP SERPL CALC-SCNC: 12 MMOL/L — SIGNIFICANT CHANGE UP (ref 5–17)
APTT BLD: 34.8 SEC — SIGNIFICANT CHANGE UP (ref 26.1–36.8)
AST SERPL-CCNC: 121 U/L — HIGH (ref 10–40)
AST SERPL-CCNC: 129 U/L — HIGH (ref 10–40)
BILIRUB SERPL-MCNC: 5.6 MG/DL — HIGH (ref 0.2–1.2)
BILIRUB SERPL-MCNC: 5.9 MG/DL — HIGH (ref 0.2–1.2)
BUN SERPL-MCNC: 64 MG/DL — HIGH (ref 7–23)
BUN SERPL-MCNC: 68 MG/DL — HIGH (ref 7–23)
CALCIUM SERPL-MCNC: 8.7 MG/DL — SIGNIFICANT CHANGE UP (ref 8.4–10.5)
CALCIUM SERPL-MCNC: 9 MG/DL — SIGNIFICANT CHANGE UP (ref 8.4–10.5)
CHLORIDE SERPL-SCNC: 117 MMOL/L — HIGH (ref 96–108)
CHLORIDE SERPL-SCNC: 117 MMOL/L — HIGH (ref 96–108)
CO2 SERPL-SCNC: 23 MMOL/L — SIGNIFICANT CHANGE UP (ref 22–31)
CO2 SERPL-SCNC: 24 MMOL/L — SIGNIFICANT CHANGE UP (ref 22–31)
CREAT SERPL-MCNC: 0.93 MG/DL — SIGNIFICANT CHANGE UP (ref 0.5–1.3)
CREAT SERPL-MCNC: 1.15 MG/DL — SIGNIFICANT CHANGE UP (ref 0.5–1.3)
EGFR: 60 ML/MIN/1.73M2 — SIGNIFICANT CHANGE UP
EGFR: 60 ML/MIN/1.73M2 — SIGNIFICANT CHANGE UP
EGFR: 77 ML/MIN/1.73M2 — SIGNIFICANT CHANGE UP
EGFR: 77 ML/MIN/1.73M2 — SIGNIFICANT CHANGE UP
GAS PNL BLDA: SIGNIFICANT CHANGE UP
GAS PNL BLDA: SIGNIFICANT CHANGE UP
GLUCOSE SERPL-MCNC: 113 MG/DL — HIGH (ref 70–99)
GLUCOSE SERPL-MCNC: 116 MG/DL — HIGH (ref 70–99)
HCT VFR BLD CALC: 23.8 % — LOW (ref 34.5–45)
HCT VFR BLD CALC: 24.8 % — LOW (ref 34.5–45)
HGB BLD-MCNC: 7.8 G/DL — LOW (ref 11.5–15.5)
HGB BLD-MCNC: 7.8 G/DL — LOW (ref 11.5–15.5)
IMMATURE PLATELET FRACTION #: 2.5 K/UL — LOW (ref 4.7–11.1)
IMMATURE PLATELET FRACTION #: 4.5 K/UL — LOW (ref 4.7–11.1)
IMMATURE PLATELET FRACTION %: 5.9 % — HIGH (ref 1.6–4.9)
IMMATURE PLATELET FRACTION %: 7.8 % — HIGH (ref 1.6–4.9)
INR BLD: 2.2 RATIO — HIGH (ref 0.85–1.16)
MAGNESIUM SERPL-MCNC: 1.9 MG/DL — SIGNIFICANT CHANGE UP (ref 1.6–2.6)
MAGNESIUM SERPL-MCNC: 2 MG/DL — SIGNIFICANT CHANGE UP (ref 1.6–2.6)
MCHC RBC-ENTMCNC: 28.6 PG — SIGNIFICANT CHANGE UP (ref 27–34)
MCHC RBC-ENTMCNC: 29.7 PG — SIGNIFICANT CHANGE UP (ref 27–34)
MCHC RBC-ENTMCNC: 31.5 G/DL — LOW (ref 32–36)
MCHC RBC-ENTMCNC: 32.8 G/DL — SIGNIFICANT CHANGE UP (ref 32–36)
MCV RBC AUTO: 90.5 FL — SIGNIFICANT CHANGE UP (ref 80–100)
MCV RBC AUTO: 90.8 FL — SIGNIFICANT CHANGE UP (ref 80–100)
NRBC # BLD AUTO: 0 K/UL — SIGNIFICANT CHANGE UP (ref 0–0)
NRBC # BLD AUTO: 0 K/UL — SIGNIFICANT CHANGE UP (ref 0–0)
NRBC # FLD: 0 K/UL — SIGNIFICANT CHANGE UP (ref 0–0)
NRBC # FLD: 0 K/UL — SIGNIFICANT CHANGE UP (ref 0–0)
PHOSPHATE SERPL-MCNC: 2.7 MG/DL — SIGNIFICANT CHANGE UP (ref 2.5–4.5)
PHOSPHATE SERPL-MCNC: 3.2 MG/DL — SIGNIFICANT CHANGE UP (ref 2.5–4.5)
PLATELET # BLD AUTO: 42 K/UL — LOW (ref 150–400)
PLATELET # BLD AUTO: 58 K/UL — LOW (ref 150–400)
PMV BLD: SIGNIFICANT CHANGE UP FL (ref 7–13)
PMV BLD: SIGNIFICANT CHANGE UP FL (ref 7–13)
POTASSIUM SERPL-MCNC: 3.7 MMOL/L — SIGNIFICANT CHANGE UP (ref 3.5–5.3)
POTASSIUM SERPL-MCNC: 3.7 MMOL/L — SIGNIFICANT CHANGE UP (ref 3.5–5.3)
POTASSIUM SERPL-SCNC: 3.7 MMOL/L — SIGNIFICANT CHANGE UP (ref 3.5–5.3)
POTASSIUM SERPL-SCNC: 3.7 MMOL/L — SIGNIFICANT CHANGE UP (ref 3.5–5.3)
PROT SERPL-MCNC: 5.7 G/DL — LOW (ref 6–8.3)
PROT SERPL-MCNC: 6.2 G/DL — SIGNIFICANT CHANGE UP (ref 6–8.3)
PROTHROM AB SERPL-ACNC: 25.1 SEC — HIGH (ref 9.9–13.4)
RBC # BLD: 2.63 M/UL — LOW (ref 3.8–5.2)
RBC # BLD: 2.73 M/UL — LOW (ref 3.8–5.2)
RBC # FLD: 22.6 % — HIGH (ref 10.3–14.5)
RBC # FLD: 23.4 % — HIGH (ref 10.3–14.5)
SODIUM SERPL-SCNC: 150 MMOL/L — HIGH (ref 135–145)
SODIUM SERPL-SCNC: 153 MMOL/L — HIGH (ref 135–145)
WBC # BLD: 8.18 K/UL — SIGNIFICANT CHANGE UP (ref 3.8–10.5)
WBC # BLD: 9.85 K/UL — SIGNIFICANT CHANGE UP (ref 3.8–10.5)
WBC # FLD AUTO: 8.18 K/UL — SIGNIFICANT CHANGE UP (ref 3.8–10.5)
WBC # FLD AUTO: 9.85 K/UL — SIGNIFICANT CHANGE UP (ref 3.8–10.5)

## 2025-07-10 PROCEDURE — 86765 RUBEOLA ANTIBODY: CPT

## 2025-07-10 PROCEDURE — 84443 ASSAY THYROID STIM HORMONE: CPT

## 2025-07-10 PROCEDURE — 93306 TTE W/DOPPLER COMPLETE: CPT

## 2025-07-10 PROCEDURE — 82803 BLOOD GASES ANY COMBINATION: CPT

## 2025-07-10 PROCEDURE — 83605 ASSAY OF LACTIC ACID: CPT

## 2025-07-10 PROCEDURE — 86695 HERPES SIMPLEX TYPE 1 TEST: CPT

## 2025-07-10 PROCEDURE — 82247 BILIRUBIN TOTAL: CPT

## 2025-07-10 PROCEDURE — 86769 SARS-COV-2 COVID-19 ANTIBODY: CPT

## 2025-07-10 PROCEDURE — 86780 TREPONEMA PALLIDUM: CPT

## 2025-07-10 PROCEDURE — 86850 RBC ANTIBODY SCREEN: CPT

## 2025-07-10 PROCEDURE — 82105 ALPHA-FETOPROTEIN SERUM: CPT

## 2025-07-10 PROCEDURE — 81001 URINALYSIS AUTO W/SCOPE: CPT

## 2025-07-10 PROCEDURE — 97161 PT EVAL LOW COMPLEX 20 MIN: CPT

## 2025-07-10 PROCEDURE — 94003 VENT MGMT INPAT SUBQ DAY: CPT

## 2025-07-10 PROCEDURE — 87637 SARSCOV2&INF A&B&RSV AMP PRB: CPT

## 2025-07-10 PROCEDURE — 86735 MUMPS ANTIBODY: CPT

## 2025-07-10 PROCEDURE — 87040 BLOOD CULTURE FOR BACTERIA: CPT

## 2025-07-10 PROCEDURE — 82947 ASSAY GLUCOSE BLOOD QUANT: CPT

## 2025-07-10 PROCEDURE — 85027 COMPLETE CBC AUTOMATED: CPT

## 2025-07-10 PROCEDURE — 86704 HEP B CORE ANTIBODY TOTAL: CPT

## 2025-07-10 PROCEDURE — 82390 ASSAY OF CERULOPLASMIN: CPT

## 2025-07-10 PROCEDURE — P9045: CPT

## 2025-07-10 PROCEDURE — 87086 URINE CULTURE/COLONY COUNT: CPT

## 2025-07-10 PROCEDURE — 84145 PROCALCITONIN (PCT): CPT

## 2025-07-10 PROCEDURE — 84481 FREE ASSAY (FT-3): CPT

## 2025-07-10 PROCEDURE — 97165 OT EVAL LOW COMPLEX 30 MIN: CPT

## 2025-07-10 PROCEDURE — G0480: CPT

## 2025-07-10 PROCEDURE — 85610 PROTHROMBIN TIME: CPT

## 2025-07-10 PROCEDURE — 82570 ASSAY OF URINE CREATININE: CPT

## 2025-07-10 PROCEDURE — 86381 MITOCHONDRIAL ANTIBODY EACH: CPT

## 2025-07-10 PROCEDURE — 86901 BLOOD TYPING SEROLOGIC RH(D): CPT

## 2025-07-10 PROCEDURE — 87205 SMEAR GRAM STAIN: CPT

## 2025-07-10 PROCEDURE — 84295 ASSAY OF SERUM SODIUM: CPT

## 2025-07-10 PROCEDURE — 83735 ASSAY OF MAGNESIUM: CPT

## 2025-07-10 PROCEDURE — 83036 HEMOGLOBIN GLYCOSYLATED A1C: CPT

## 2025-07-10 PROCEDURE — 86682 HELMINTH ANTIBODY: CPT

## 2025-07-10 PROCEDURE — 86480 TB TEST CELL IMMUN MEASURE: CPT

## 2025-07-10 PROCEDURE — 97110 THERAPEUTIC EXERCISES: CPT

## 2025-07-10 PROCEDURE — 86038 ANTINUCLEAR ANTIBODIES: CPT

## 2025-07-10 PROCEDURE — 84300 ASSAY OF URINE SODIUM: CPT

## 2025-07-10 PROCEDURE — 80053 COMPREHEN METABOLIC PANEL: CPT

## 2025-07-10 PROCEDURE — 71045 X-RAY EXAM CHEST 1 VIEW: CPT | Mod: 26

## 2025-07-10 PROCEDURE — 87799 DETECT AGENT NOS DNA QUANT: CPT

## 2025-07-10 PROCEDURE — 93308 TTE F-UP OR LMTD: CPT

## 2025-07-10 PROCEDURE — 99232 SBSQ HOSP IP/OBS MODERATE 35: CPT | Mod: GC

## 2025-07-10 PROCEDURE — 85730 THROMBOPLASTIN TIME PARTIAL: CPT

## 2025-07-10 PROCEDURE — 84133 ASSAY OF URINE POTASSIUM: CPT

## 2025-07-10 PROCEDURE — 36415 COLL VENOUS BLD VENIPUNCTURE: CPT

## 2025-07-10 PROCEDURE — 86787 VARICELLA-ZOSTER ANTIBODY: CPT

## 2025-07-10 PROCEDURE — 83550 IRON BINDING TEST: CPT

## 2025-07-10 PROCEDURE — 86644 CMV ANTIBODY: CPT

## 2025-07-10 PROCEDURE — 97530 THERAPEUTIC ACTIVITIES: CPT

## 2025-07-10 PROCEDURE — 86900 BLOOD TYPING SEROLOGIC ABO: CPT

## 2025-07-10 PROCEDURE — 86803 HEPATITIS C AB TEST: CPT

## 2025-07-10 PROCEDURE — 94002 VENT MGMT INPAT INIT DAY: CPT

## 2025-07-10 PROCEDURE — 85014 HEMATOCRIT: CPT

## 2025-07-10 PROCEDURE — 85396 CLOTTING ASSAY WHOLE BLOOD: CPT

## 2025-07-10 PROCEDURE — 86696 HERPES SIMPLEX TYPE 2 TEST: CPT

## 2025-07-10 PROCEDURE — 86777 TOXOPLASMA ANTIBODY: CPT

## 2025-07-10 PROCEDURE — P9047: CPT

## 2025-07-10 PROCEDURE — 82728 ASSAY OF FERRITIN: CPT

## 2025-07-10 PROCEDURE — 87070 CULTURE OTHR SPECIMN AEROBIC: CPT

## 2025-07-10 PROCEDURE — 87389 HIV-1 AG W/HIV-1&-2 AB AG IA: CPT

## 2025-07-10 PROCEDURE — 82784 ASSAY IGA/IGD/IGG/IGM EACH: CPT

## 2025-07-10 PROCEDURE — 87521 HEPATITIS C PROBE&RVRS TRNSC: CPT

## 2025-07-10 PROCEDURE — 82435 ASSAY OF BLOOD CHLORIDE: CPT

## 2025-07-10 PROCEDURE — 71045 X-RAY EXAM CHEST 1 VIEW: CPT

## 2025-07-10 PROCEDURE — 80061 LIPID PANEL: CPT

## 2025-07-10 PROCEDURE — 86665 EPSTEIN-BARR CAPSID VCA: CPT

## 2025-07-10 PROCEDURE — 82330 ASSAY OF CALCIUM: CPT

## 2025-07-10 PROCEDURE — 84132 ASSAY OF SERUM POTASSIUM: CPT

## 2025-07-10 PROCEDURE — 84702 CHORIONIC GONADOTROPIN TEST: CPT

## 2025-07-10 PROCEDURE — 84540 ASSAY OF URINE/UREA-N: CPT

## 2025-07-10 PROCEDURE — 83540 ASSAY OF IRON: CPT

## 2025-07-10 PROCEDURE — 86706 HEP B SURFACE ANTIBODY: CPT

## 2025-07-10 PROCEDURE — 86376 MICROSOMAL ANTIBODY EACH: CPT

## 2025-07-10 PROCEDURE — 86663 EPSTEIN-BARR ANTIBODY: CPT

## 2025-07-10 PROCEDURE — 82140 ASSAY OF AMMONIA: CPT

## 2025-07-10 PROCEDURE — 84156 ASSAY OF PROTEIN URINE: CPT

## 2025-07-10 PROCEDURE — 87340 HEPATITIS B SURFACE AG IA: CPT

## 2025-07-10 PROCEDURE — 85018 HEMOGLOBIN: CPT

## 2025-07-10 PROCEDURE — 87641 MR-STAPH DNA AMP PROBE: CPT

## 2025-07-10 PROCEDURE — 86709 HEPATITIS A IGM ANTIBODY: CPT

## 2025-07-10 PROCEDURE — 99232 SBSQ HOSP IP/OBS MODERATE 35: CPT

## 2025-07-10 PROCEDURE — 80307 DRUG TEST PRSMV CHEM ANLYZR: CPT

## 2025-07-10 PROCEDURE — 86708 HEPATITIS A ANTIBODY: CPT

## 2025-07-10 PROCEDURE — 86664 EPSTEIN-BARR NUCLEAR ANTIGEN: CPT

## 2025-07-10 PROCEDURE — 82565 ASSAY OF CREATININE: CPT

## 2025-07-10 PROCEDURE — 86762 RUBELLA ANTIBODY: CPT

## 2025-07-10 PROCEDURE — 84100 ASSAY OF PHOSPHORUS: CPT

## 2025-07-10 PROCEDURE — 94799 UNLISTED PULMONARY SVC/PX: CPT

## 2025-07-10 PROCEDURE — 83935 ASSAY OF URINE OSMOLALITY: CPT

## 2025-07-10 PROCEDURE — 86255 FLUORESCENT ANTIBODY SCREEN: CPT

## 2025-07-10 PROCEDURE — 86923 COMPATIBILITY TEST ELECTRIC: CPT

## 2025-07-10 PROCEDURE — 87640 STAPH A DNA AMP PROBE: CPT

## 2025-07-10 RX ORDER — CYST/ALA/Q10/PHOS.SER/DHA/BROC 100-20-50
15 POWDER (GRAM) ORAL DAILY
Refills: 0 | Status: DISCONTINUED | OUTPATIENT
Start: 2025-07-10 | End: 2025-07-15

## 2025-07-10 RX ORDER — FUROSEMIDE 10 MG/ML
20 INJECTION INTRAMUSCULAR; INTRAVENOUS ONCE
Refills: 0 | Status: COMPLETED | OUTPATIENT
Start: 2025-07-10 | End: 2025-07-10

## 2025-07-10 RX ORDER — MELATONIN 5 MG
5 TABLET ORAL AT BEDTIME
Refills: 0 | Status: DISCONTINUED | OUTPATIENT
Start: 2025-07-10 | End: 2025-07-30

## 2025-07-10 RX ORDER — HYDROMORPHONE/SOD CHLOR,ISO/PF 2 MG/10 ML
0.25 SYRINGE (ML) INJECTION ONCE
Refills: 0 | Status: DISCONTINUED | OUTPATIENT
Start: 2025-07-10 | End: 2025-07-10

## 2025-07-10 RX ORDER — LACTULOSE 10 G/15ML
10 SOLUTION ORAL EVERY 8 HOURS
Refills: 0 | Status: DISCONTINUED | OUTPATIENT
Start: 2025-07-10 | End: 2025-07-12

## 2025-07-10 RX ADMIN — FUROSEMIDE 20 MILLIGRAM(S): 10 INJECTION INTRAMUSCULAR; INTRAVENOUS at 13:09

## 2025-07-10 RX ADMIN — Medication 0.5 MILLIGRAM(S): at 21:52

## 2025-07-10 RX ADMIN — Medication 40 MILLIGRAM(S): at 21:22

## 2025-07-10 RX ADMIN — LACTULOSE 10 GRAM(S): 10 SOLUTION ORAL at 05:48

## 2025-07-10 RX ADMIN — Medication 0.25 MILLIGRAM(S): at 02:11

## 2025-07-10 RX ADMIN — Medication 5 MILLIGRAM(S): at 21:22

## 2025-07-10 RX ADMIN — Medication 15 MILLILITER(S): at 18:12

## 2025-07-10 RX ADMIN — Medication 15 MILLILITER(S): at 05:48

## 2025-07-10 RX ADMIN — Medication 0.25 MILLIGRAM(S): at 02:25

## 2025-07-10 RX ADMIN — Medication 15 MILLILITER(S): at 18:14

## 2025-07-10 RX ADMIN — LACTULOSE 10 GRAM(S): 10 SOLUTION ORAL at 13:09

## 2025-07-10 RX ADMIN — Medication 0.5 MILLIGRAM(S): at 22:07

## 2025-07-10 RX ADMIN — Medication 1 APPLICATION(S): at 05:48

## 2025-07-11 LAB
ALBUMIN SERPL ELPH-MCNC: 2 G/DL — LOW (ref 3.3–5)
ALBUMIN SERPL ELPH-MCNC: 2.2 G/DL — LOW (ref 3.3–5)
ALP SERPL-CCNC: 170 U/L — HIGH (ref 40–120)
ALP SERPL-CCNC: 206 U/L — HIGH (ref 40–120)
ALT FLD-CCNC: 58 U/L — HIGH (ref 10–45)
ALT FLD-CCNC: 70 U/L — HIGH (ref 10–45)
AMMONIA BLD-MCNC: 72 UMOL/L — HIGH (ref 11–55)
ANION GAP SERPL CALC-SCNC: 11 MMOL/L — SIGNIFICANT CHANGE UP (ref 5–17)
ANION GAP SERPL CALC-SCNC: 7 MMOL/L — SIGNIFICANT CHANGE UP (ref 5–17)
APPEARANCE UR: CLEAR — SIGNIFICANT CHANGE UP
APTT BLD: 34.7 SEC — SIGNIFICANT CHANGE UP (ref 26.1–36.8)
AST SERPL-CCNC: 120 U/L — HIGH (ref 10–40)
AST SERPL-CCNC: 153 U/L — HIGH (ref 10–40)
BACTERIA # UR AUTO: NEGATIVE /HPF — SIGNIFICANT CHANGE UP
BILIRUB SERPL-MCNC: 5 MG/DL — HIGH (ref 0.2–1.2)
BILIRUB SERPL-MCNC: 5.4 MG/DL — HIGH (ref 0.2–1.2)
BILIRUB UR-MCNC: NEGATIVE — SIGNIFICANT CHANGE UP
BUN SERPL-MCNC: 48 MG/DL — HIGH (ref 7–23)
BUN SERPL-MCNC: 57 MG/DL — HIGH (ref 7–23)
CALCIUM SERPL-MCNC: 8.7 MG/DL — SIGNIFICANT CHANGE UP (ref 8.4–10.5)
CALCIUM SERPL-MCNC: 8.8 MG/DL — SIGNIFICANT CHANGE UP (ref 8.4–10.5)
CAST: 2 /LPF — SIGNIFICANT CHANGE UP (ref 0–4)
CHLORIDE SERPL-SCNC: 114 MMOL/L — HIGH (ref 96–108)
CHLORIDE SERPL-SCNC: 116 MMOL/L — HIGH (ref 96–108)
CO2 SERPL-SCNC: 24 MMOL/L — SIGNIFICANT CHANGE UP (ref 22–31)
CO2 SERPL-SCNC: 24 MMOL/L — SIGNIFICANT CHANGE UP (ref 22–31)
COLOR SPEC: YELLOW — SIGNIFICANT CHANGE UP
CREAT SERPL-MCNC: 0.68 MG/DL — SIGNIFICANT CHANGE UP (ref 0.5–1.3)
CREAT SERPL-MCNC: 0.79 MG/DL — SIGNIFICANT CHANGE UP (ref 0.5–1.3)
DIFF PNL FLD: ABNORMAL
EGFR: 109 ML/MIN/1.73M2 — SIGNIFICANT CHANGE UP
EGFR: 109 ML/MIN/1.73M2 — SIGNIFICANT CHANGE UP
EGFR: 94 ML/MIN/1.73M2 — SIGNIFICANT CHANGE UP
EGFR: 94 ML/MIN/1.73M2 — SIGNIFICANT CHANGE UP
GAS PNL BLDA: SIGNIFICANT CHANGE UP
GAS PNL BLDA: SIGNIFICANT CHANGE UP
GLUCOSE SERPL-MCNC: 124 MG/DL — HIGH (ref 70–99)
GLUCOSE SERPL-MCNC: 148 MG/DL — HIGH (ref 70–99)
GLUCOSE UR QL: NEGATIVE MG/DL — SIGNIFICANT CHANGE UP
HCT VFR BLD CALC: 23.1 % — LOW (ref 34.5–45)
HCT VFR BLD CALC: 24 % — LOW (ref 34.5–45)
HGB BLD-MCNC: 7.4 G/DL — LOW (ref 11.5–15.5)
HGB BLD-MCNC: 7.7 G/DL — LOW (ref 11.5–15.5)
IMMATURE PLATELET FRACTION #: 3.9 K/UL — LOW (ref 4.7–11.1)
IMMATURE PLATELET FRACTION #: 5.2 K/UL — SIGNIFICANT CHANGE UP (ref 4.7–11.1)
IMMATURE PLATELET FRACTION %: 7.3 % — HIGH (ref 1.6–4.9)
IMMATURE PLATELET FRACTION %: 7.5 % — HIGH (ref 1.6–4.9)
INR BLD: 2.51 RATIO — HIGH (ref 0.85–1.16)
KETONES UR QL: NEGATIVE MG/DL — SIGNIFICANT CHANGE UP
LEUKOCYTE ESTERASE UR-ACNC: ABNORMAL
MAGNESIUM SERPL-MCNC: 1.7 MG/DL — SIGNIFICANT CHANGE UP (ref 1.6–2.6)
MAGNESIUM SERPL-MCNC: 1.9 MG/DL — SIGNIFICANT CHANGE UP (ref 1.6–2.6)
MCHC RBC-ENTMCNC: 29.8 PG — SIGNIFICANT CHANGE UP (ref 27–34)
MCHC RBC-ENTMCNC: 30 PG — SIGNIFICANT CHANGE UP (ref 27–34)
MCHC RBC-ENTMCNC: 32 G/DL — SIGNIFICANT CHANGE UP (ref 32–36)
MCHC RBC-ENTMCNC: 32.1 G/DL — SIGNIFICANT CHANGE UP (ref 32–36)
MCV RBC AUTO: 93.1 FL — SIGNIFICANT CHANGE UP (ref 80–100)
MCV RBC AUTO: 93.4 FL — SIGNIFICANT CHANGE UP (ref 80–100)
NITRITE UR-MCNC: NEGATIVE — SIGNIFICANT CHANGE UP
NRBC # BLD AUTO: 0 K/UL — SIGNIFICANT CHANGE UP (ref 0–0)
NRBC # BLD AUTO: 0 K/UL — SIGNIFICANT CHANGE UP (ref 0–0)
NRBC # FLD: 0 K/UL — SIGNIFICANT CHANGE UP (ref 0–0)
NRBC # FLD: 0 K/UL — SIGNIFICANT CHANGE UP (ref 0–0)
NRBC BLD AUTO-RTO: 0 /100 WBCS — SIGNIFICANT CHANGE UP (ref 0–0)
NRBC BLD AUTO-RTO: 0 /100 WBCS — SIGNIFICANT CHANGE UP (ref 0–0)
PH UR: 7 — SIGNIFICANT CHANGE UP (ref 5–8)
PHOSPHATE SERPL-MCNC: 2 MG/DL — LOW (ref 2.5–4.5)
PHOSPHATE SERPL-MCNC: 3.2 MG/DL — SIGNIFICANT CHANGE UP (ref 2.5–4.5)
PLATELET # BLD AUTO: 54 K/UL — LOW (ref 150–400)
PLATELET # BLD AUTO: 69 K/UL — LOW (ref 150–400)
PMV BLD: 11.8 FL — SIGNIFICANT CHANGE UP (ref 7–13)
PMV BLD: 12.4 FL — SIGNIFICANT CHANGE UP (ref 7–13)
POTASSIUM SERPL-MCNC: 3.6 MMOL/L — SIGNIFICANT CHANGE UP (ref 3.5–5.3)
POTASSIUM SERPL-MCNC: 4 MMOL/L — SIGNIFICANT CHANGE UP (ref 3.5–5.3)
POTASSIUM SERPL-SCNC: 3.6 MMOL/L — SIGNIFICANT CHANGE UP (ref 3.5–5.3)
POTASSIUM SERPL-SCNC: 4 MMOL/L — SIGNIFICANT CHANGE UP (ref 3.5–5.3)
PROCALCITONIN SERPL-MCNC: 0.77 NG/ML — HIGH (ref 0.02–0.1)
PROT SERPL-MCNC: 5.7 G/DL — LOW (ref 6–8.3)
PROT SERPL-MCNC: 6 G/DL — SIGNIFICANT CHANGE UP (ref 6–8.3)
PROT UR-MCNC: NEGATIVE MG/DL — SIGNIFICANT CHANGE UP
PROTHROM AB SERPL-ACNC: 28.3 SEC — HIGH (ref 9.9–13.4)
RBC # BLD: 2.48 M/UL — LOW (ref 3.8–5.2)
RBC # BLD: 2.57 M/UL — LOW (ref 3.8–5.2)
RBC # FLD: 23.9 % — HIGH (ref 10.3–14.5)
RBC # FLD: 23.9 % — HIGH (ref 10.3–14.5)
RBC CASTS # UR COMP ASSIST: 2 /HPF — SIGNIFICANT CHANGE UP (ref 0–4)
SODIUM SERPL-SCNC: 145 MMOL/L — SIGNIFICANT CHANGE UP (ref 135–145)
SODIUM SERPL-SCNC: 151 MMOL/L — HIGH (ref 135–145)
SP GR SPEC: 1.01 — SIGNIFICANT CHANGE UP (ref 1–1.03)
SQUAMOUS # UR AUTO: 0 /HPF — SIGNIFICANT CHANGE UP (ref 0–5)
UROBILINOGEN FLD QL: 0.2 MG/DL — SIGNIFICANT CHANGE UP (ref 0.2–1)
WBC # BLD: 7.18 K/UL — SIGNIFICANT CHANGE UP (ref 3.8–10.5)
WBC # BLD: 9.95 K/UL — SIGNIFICANT CHANGE UP (ref 3.8–10.5)
WBC # FLD AUTO: 7.18 K/UL — SIGNIFICANT CHANGE UP (ref 3.8–10.5)
WBC # FLD AUTO: 9.95 K/UL — SIGNIFICANT CHANGE UP (ref 3.8–10.5)
WBC UR QL: 0 /HPF — SIGNIFICANT CHANGE UP (ref 0–5)

## 2025-07-11 PROCEDURE — 93970 EXTREMITY STUDY: CPT | Mod: 26

## 2025-07-11 PROCEDURE — 99223 1ST HOSP IP/OBS HIGH 75: CPT

## 2025-07-11 PROCEDURE — 99233 SBSQ HOSP IP/OBS HIGH 50: CPT

## 2025-07-11 PROCEDURE — 99291 CRITICAL CARE FIRST HOUR: CPT | Mod: GC

## 2025-07-11 PROCEDURE — 99232 SBSQ HOSP IP/OBS MODERATE 35: CPT

## 2025-07-11 PROCEDURE — 71045 X-RAY EXAM CHEST 1 VIEW: CPT | Mod: 26

## 2025-07-11 PROCEDURE — 99232 SBSQ HOSP IP/OBS MODERATE 35: CPT | Mod: GC

## 2025-07-11 RX ORDER — ONDANSETRON HCL/PF 4 MG/2 ML
4 VIAL (ML) INJECTION ONCE
Refills: 0 | Status: COMPLETED | OUTPATIENT
Start: 2025-07-11 | End: 2025-07-11

## 2025-07-11 RX ORDER — FUROSEMIDE 10 MG/ML
20 INJECTION INTRAMUSCULAR; INTRAVENOUS ONCE
Refills: 0 | Status: COMPLETED | OUTPATIENT
Start: 2025-07-11 | End: 2025-07-11

## 2025-07-11 RX ORDER — PIPERACILLIN-TAZO-DEXTROSE,ISO 3.375G/5
3.38 IV SOLUTION, PIGGYBACK PREMIX FROZEN(ML) INTRAVENOUS ONCE
Refills: 0 | Status: COMPLETED | OUTPATIENT
Start: 2025-07-11 | End: 2025-07-11

## 2025-07-11 RX ORDER — PIPERACILLIN-TAZO-DEXTROSE,ISO 3.375G/5
3.38 IV SOLUTION, PIGGYBACK PREMIX FROZEN(ML) INTRAVENOUS EVERY 8 HOURS
Refills: 0 | Status: DISCONTINUED | OUTPATIENT
Start: 2025-07-12 | End: 2025-07-14

## 2025-07-11 RX ORDER — MAGNESIUM SULFATE 500 MG/ML
2 SYRINGE (ML) INJECTION ONCE
Refills: 0 | Status: COMPLETED | OUTPATIENT
Start: 2025-07-11 | End: 2025-07-11

## 2025-07-11 RX ORDER — PIPERACILLIN-TAZO-DEXTROSE,ISO 3.375G/5
3.38 IV SOLUTION, PIGGYBACK PREMIX FROZEN(ML) INTRAVENOUS ONCE
Refills: 0 | Status: DISCONTINUED | OUTPATIENT
Start: 2025-07-11 | End: 2025-07-11

## 2025-07-11 RX ORDER — OXYCODONE HYDROCHLORIDE 30 MG/1
2.5 TABLET ORAL EVERY 4 HOURS
Refills: 0 | Status: DISCONTINUED | OUTPATIENT
Start: 2025-07-11 | End: 2025-07-12

## 2025-07-11 RX ORDER — SODIUM PHOSPHATE,DIBASIC DIHYD
30 POWDER (GRAM) MISCELLANEOUS ONCE
Refills: 0 | Status: DISCONTINUED | OUTPATIENT
Start: 2025-07-11 | End: 2025-07-11

## 2025-07-11 RX ORDER — PIPERACILLIN-TAZO-DEXTROSE,ISO 3.375G/5
3.38 IV SOLUTION, PIGGYBACK PREMIX FROZEN(ML) INTRAVENOUS ONCE
Refills: 0 | Status: COMPLETED | OUTPATIENT
Start: 2025-07-12 | End: 2025-07-12

## 2025-07-11 RX ORDER — OXYCODONE HYDROCHLORIDE 30 MG/1
5 TABLET ORAL EVERY 4 HOURS
Refills: 0 | Status: DISCONTINUED | OUTPATIENT
Start: 2025-07-11 | End: 2025-07-12

## 2025-07-11 RX ORDER — SODIUM PHOSPHATE,DIBASIC DIHYD
30 POWDER (GRAM) MISCELLANEOUS ONCE
Refills: 0 | Status: COMPLETED | OUTPATIENT
Start: 2025-07-11 | End: 2025-07-11

## 2025-07-11 RX ADMIN — Medication 4 MILLIGRAM(S): at 20:22

## 2025-07-11 RX ADMIN — Medication 15 MILLILITER(S): at 11:37

## 2025-07-11 RX ADMIN — Medication 0.5 MILLIGRAM(S): at 20:23

## 2025-07-11 RX ADMIN — Medication 5 MILLIGRAM(S): at 21:21

## 2025-07-11 RX ADMIN — Medication 15 MILLILITER(S): at 05:55

## 2025-07-11 RX ADMIN — Medication 255 MILLIMOLE(S): at 17:17

## 2025-07-11 RX ADMIN — Medication 0.5 MILLIGRAM(S): at 19:54

## 2025-07-11 RX ADMIN — Medication 25 GRAM(S): at 16:29

## 2025-07-11 RX ADMIN — Medication 1 APPLICATION(S): at 05:55

## 2025-07-11 RX ADMIN — Medication 40 MILLIGRAM(S): at 21:22

## 2025-07-11 RX ADMIN — LACTULOSE 10 GRAM(S): 10 SOLUTION ORAL at 05:55

## 2025-07-11 RX ADMIN — FUROSEMIDE 20 MILLIGRAM(S): 10 INJECTION INTRAMUSCULAR; INTRAVENOUS at 11:37

## 2025-07-11 RX ADMIN — FUROSEMIDE 20 MILLIGRAM(S): 10 INJECTION INTRAMUSCULAR; INTRAVENOUS at 19:55

## 2025-07-11 RX ADMIN — Medication 40 MILLIEQUIVALENT(S): at 06:30

## 2025-07-11 RX ADMIN — Medication 25 GRAM(S): at 19:48

## 2025-07-11 RX ADMIN — Medication 15 MILLILITER(S): at 17:17

## 2025-07-11 RX ADMIN — Medication 200 GRAM(S): at 16:03

## 2025-07-12 LAB
-  STAPHYLOCOCCUS EPIDERMIDIS: SIGNIFICANT CHANGE UP
ALBUMIN SERPL ELPH-MCNC: 2 G/DL — LOW (ref 3.3–5)
ALBUMIN SERPL ELPH-MCNC: 2.3 G/DL — LOW (ref 3.3–5)
ALP SERPL-CCNC: 201 U/L — HIGH (ref 40–120)
ALP SERPL-CCNC: 241 U/L — HIGH (ref 40–120)
ALT FLD-CCNC: 72 U/L — HIGH (ref 10–45)
ALT FLD-CCNC: 93 U/L — HIGH (ref 10–45)
AMMONIA BLD-MCNC: 53 UMOL/L — SIGNIFICANT CHANGE UP (ref 11–55)
ANION GAP SERPL CALC-SCNC: 11 MMOL/L — SIGNIFICANT CHANGE UP (ref 5–17)
ANION GAP SERPL CALC-SCNC: 12 MMOL/L — SIGNIFICANT CHANGE UP (ref 5–17)
APTT BLD: 33.8 SEC — SIGNIFICANT CHANGE UP (ref 26.1–36.8)
AST SERPL-CCNC: 152 U/L — HIGH (ref 10–40)
AST SERPL-CCNC: 193 U/L — HIGH (ref 10–40)
BILIRUB SERPL-MCNC: 5 MG/DL — HIGH (ref 0.2–1.2)
BILIRUB SERPL-MCNC: 5.8 MG/DL — HIGH (ref 0.2–1.2)
BUN SERPL-MCNC: 38 MG/DL — HIGH (ref 7–23)
BUN SERPL-MCNC: 43 MG/DL — HIGH (ref 7–23)
CALCIUM SERPL-MCNC: 8.5 MG/DL — SIGNIFICANT CHANGE UP (ref 8.4–10.5)
CALCIUM SERPL-MCNC: 8.5 MG/DL — SIGNIFICANT CHANGE UP (ref 8.4–10.5)
CHLORIDE SERPL-SCNC: 111 MMOL/L — HIGH (ref 96–108)
CHLORIDE SERPL-SCNC: 111 MMOL/L — HIGH (ref 96–108)
CO2 SERPL-SCNC: 21 MMOL/L — LOW (ref 22–31)
CO2 SERPL-SCNC: 23 MMOL/L — SIGNIFICANT CHANGE UP (ref 22–31)
CREAT SERPL-MCNC: 0.73 MG/DL — SIGNIFICANT CHANGE UP (ref 0.5–1.3)
CREAT SERPL-MCNC: 0.74 MG/DL — SIGNIFICANT CHANGE UP (ref 0.5–1.3)
EGFR: 102 ML/MIN/1.73M2 — SIGNIFICANT CHANGE UP
EGFR: 102 ML/MIN/1.73M2 — SIGNIFICANT CHANGE UP
EGFR: 103 ML/MIN/1.73M2 — SIGNIFICANT CHANGE UP
EGFR: 103 ML/MIN/1.73M2 — SIGNIFICANT CHANGE UP
GAS PNL BLDA: SIGNIFICANT CHANGE UP
GAS PNL BLDA: SIGNIFICANT CHANGE UP
GLUCOSE SERPL-MCNC: 122 MG/DL — HIGH (ref 70–99)
GLUCOSE SERPL-MCNC: 153 MG/DL — HIGH (ref 70–99)
GRAM STN FLD: ABNORMAL
HCT VFR BLD CALC: 21.8 % — LOW (ref 34.5–45)
HCT VFR BLD CALC: 24 % — LOW (ref 34.5–45)
HCT VFR BLD CALC: 25.2 % — LOW (ref 34.5–45)
HGB BLD-MCNC: 6.9 G/DL — CRITICAL LOW (ref 11.5–15.5)
HGB BLD-MCNC: 7.7 G/DL — LOW (ref 11.5–15.5)
HGB BLD-MCNC: 8.1 G/DL — LOW (ref 11.5–15.5)
IMMATURE PLATELET FRACTION #: 5.1 K/UL — SIGNIFICANT CHANGE UP (ref 4.7–11.1)
IMMATURE PLATELET FRACTION #: 5.2 K/UL — SIGNIFICANT CHANGE UP (ref 4.7–11.1)
IMMATURE PLATELET FRACTION #: 6.3 K/UL — SIGNIFICANT CHANGE UP (ref 4.7–11.1)
IMMATURE PLATELET FRACTION %: 7.8 % — HIGH (ref 1.6–4.9)
IMMATURE PLATELET FRACTION %: 7.9 % — HIGH (ref 1.6–4.9)
IMMATURE PLATELET FRACTION %: 8.4 % — HIGH (ref 1.6–4.9)
INR BLD: 2.45 RATIO — HIGH (ref 0.85–1.16)
MAGNESIUM SERPL-MCNC: 1.9 MG/DL — SIGNIFICANT CHANGE UP (ref 1.6–2.6)
MAGNESIUM SERPL-MCNC: 2.3 MG/DL — SIGNIFICANT CHANGE UP (ref 1.6–2.6)
MCHC RBC-ENTMCNC: 29.7 PG — SIGNIFICANT CHANGE UP (ref 27–34)
MCHC RBC-ENTMCNC: 29.7 PG — SIGNIFICANT CHANGE UP (ref 27–34)
MCHC RBC-ENTMCNC: 29.9 PG — SIGNIFICANT CHANGE UP (ref 27–34)
MCHC RBC-ENTMCNC: 31.7 G/DL — LOW (ref 32–36)
MCHC RBC-ENTMCNC: 32.1 G/DL — SIGNIFICANT CHANGE UP (ref 32–36)
MCHC RBC-ENTMCNC: 32.1 G/DL — SIGNIFICANT CHANGE UP (ref 32–36)
MCV RBC AUTO: 92.7 FL — SIGNIFICANT CHANGE UP (ref 80–100)
MCV RBC AUTO: 93 FL — SIGNIFICANT CHANGE UP (ref 80–100)
MCV RBC AUTO: 94 FL — SIGNIFICANT CHANGE UP (ref 80–100)
METHOD TYPE: SIGNIFICANT CHANGE UP
NRBC # BLD AUTO: 0.02 K/UL — HIGH (ref 0–0)
NRBC # BLD AUTO: 0.02 K/UL — HIGH (ref 0–0)
NRBC # BLD AUTO: 0.03 K/UL — HIGH (ref 0–0)
NRBC # FLD: 0.02 K/UL — HIGH (ref 0–0)
NRBC # FLD: 0.02 K/UL — HIGH (ref 0–0)
NRBC # FLD: 0.03 K/UL — HIGH (ref 0–0)
NRBC BLD AUTO-RTO: 0 /100 WBCS — SIGNIFICANT CHANGE UP (ref 0–0)
NRBC BLD AUTO-RTO: 0 /100 WBCS — SIGNIFICANT CHANGE UP (ref 0–0)
PHOSPHATE SERPL-MCNC: 2.6 MG/DL — SIGNIFICANT CHANGE UP (ref 2.5–4.5)
PHOSPHATE SERPL-MCNC: 3.4 MG/DL — SIGNIFICANT CHANGE UP (ref 2.5–4.5)
PLATELET # BLD AUTO: 61 K/UL — LOW (ref 150–400)
PLATELET # BLD AUTO: 66 K/UL — LOW (ref 150–400)
PLATELET # BLD AUTO: 81 K/UL — LOW (ref 150–400)
PMV BLD: 12.4 FL — SIGNIFICANT CHANGE UP (ref 7–13)
PMV BLD: 13.4 FL — HIGH (ref 7–13)
PMV BLD: SIGNIFICANT CHANGE UP FL (ref 7–13)
POTASSIUM SERPL-MCNC: 3.6 MMOL/L — SIGNIFICANT CHANGE UP (ref 3.5–5.3)
POTASSIUM SERPL-MCNC: 4.1 MMOL/L — SIGNIFICANT CHANGE UP (ref 3.5–5.3)
POTASSIUM SERPL-SCNC: 3.6 MMOL/L — SIGNIFICANT CHANGE UP (ref 3.5–5.3)
POTASSIUM SERPL-SCNC: 4.1 MMOL/L — SIGNIFICANT CHANGE UP (ref 3.5–5.3)
PROT SERPL-MCNC: 5.5 G/DL — LOW (ref 6–8.3)
PROT SERPL-MCNC: 6.1 G/DL — SIGNIFICANT CHANGE UP (ref 6–8.3)
PROTHROM AB SERPL-ACNC: 27.9 SEC — HIGH (ref 9.9–13.4)
RBC # BLD: 2.32 M/UL — LOW (ref 3.8–5.2)
RBC # BLD: 2.59 M/UL — LOW (ref 3.8–5.2)
RBC # BLD: 2.71 M/UL — LOW (ref 3.8–5.2)
RBC # FLD: 23.4 % — HIGH (ref 10.3–14.5)
RBC # FLD: 24 % — HIGH (ref 10.3–14.5)
RBC # FLD: 24.2 % — HIGH (ref 10.3–14.5)
SODIUM SERPL-SCNC: 144 MMOL/L — SIGNIFICANT CHANGE UP (ref 135–145)
SODIUM SERPL-SCNC: 145 MMOL/L — SIGNIFICANT CHANGE UP (ref 135–145)
SPECIMEN SOURCE: SIGNIFICANT CHANGE UP
WBC # BLD: 11.03 K/UL — HIGH (ref 3.8–10.5)
WBC # BLD: 7.98 K/UL — SIGNIFICANT CHANGE UP (ref 3.8–10.5)
WBC # BLD: 8.79 K/UL — SIGNIFICANT CHANGE UP (ref 3.8–10.5)
WBC # FLD AUTO: 11.03 K/UL — HIGH (ref 3.8–10.5)
WBC # FLD AUTO: 7.98 K/UL — SIGNIFICANT CHANGE UP (ref 3.8–10.5)
WBC # FLD AUTO: 8.79 K/UL — SIGNIFICANT CHANGE UP (ref 3.8–10.5)

## 2025-07-12 PROCEDURE — 71045 X-RAY EXAM CHEST 1 VIEW: CPT | Mod: 26

## 2025-07-12 PROCEDURE — 99232 SBSQ HOSP IP/OBS MODERATE 35: CPT

## 2025-07-12 RX ORDER — METOCLOPRAMIDE HCL 10 MG
10 TABLET ORAL EVERY 8 HOURS
Refills: 0 | Status: DISCONTINUED | OUTPATIENT
Start: 2025-07-12 | End: 2025-07-12

## 2025-07-12 RX ORDER — VANCOMYCIN HCL IN 5 % DEXTROSE 1.5G/250ML
1000 PLASTIC BAG, INJECTION (ML) INTRAVENOUS ONCE
Refills: 0 | Status: COMPLETED | OUTPATIENT
Start: 2025-07-12 | End: 2025-07-12

## 2025-07-12 RX ORDER — LACTULOSE 10 G/15ML
10 SOLUTION ORAL DAILY
Refills: 0 | Status: DISCONTINUED | OUTPATIENT
Start: 2025-07-13 | End: 2025-07-15

## 2025-07-12 RX ORDER — SODIUM PHOSPHATE,DIBASIC DIHYD
15 POWDER (GRAM) MISCELLANEOUS ONCE
Refills: 0 | Status: COMPLETED | OUTPATIENT
Start: 2025-07-12 | End: 2025-07-12

## 2025-07-12 RX ORDER — FUROSEMIDE 10 MG/ML
20 INJECTION INTRAMUSCULAR; INTRAVENOUS ONCE
Refills: 0 | Status: COMPLETED | OUTPATIENT
Start: 2025-07-12 | End: 2025-07-12

## 2025-07-12 RX ORDER — HYDROMORPHONE/SOD CHLOR,ISO/PF 2 MG/10 ML
0.5 SYRINGE (ML) INJECTION ONCE
Refills: 0 | Status: DISCONTINUED | OUTPATIENT
Start: 2025-07-12 | End: 2025-07-12

## 2025-07-12 RX ORDER — POLYETHYLENE GLYCOL 3350 17 G/17G
17 POWDER, FOR SOLUTION ORAL
Refills: 0 | Status: DISCONTINUED | OUTPATIENT
Start: 2025-07-12 | End: 2025-07-15

## 2025-07-12 RX ORDER — MAGNESIUM SULFATE 500 MG/ML
2 SYRINGE (ML) INJECTION ONCE
Refills: 0 | Status: COMPLETED | OUTPATIENT
Start: 2025-07-12 | End: 2025-07-12

## 2025-07-12 RX ORDER — METOCLOPRAMIDE HCL 10 MG
10 TABLET ORAL EVERY 8 HOURS
Refills: 0 | Status: COMPLETED | OUTPATIENT
Start: 2025-07-12 | End: 2025-07-12

## 2025-07-12 RX ADMIN — Medication 250 MILLIGRAM(S): at 18:55

## 2025-07-12 RX ADMIN — Medication 10 MILLIGRAM(S): at 04:23

## 2025-07-12 RX ADMIN — FUROSEMIDE 20 MILLIGRAM(S): 10 INJECTION INTRAMUSCULAR; INTRAVENOUS at 12:34

## 2025-07-12 RX ADMIN — Medication 25 GRAM(S): at 21:54

## 2025-07-12 RX ADMIN — Medication 10 MILLIGRAM(S): at 11:30

## 2025-07-12 RX ADMIN — Medication 3.38 GRAM(S): at 08:18

## 2025-07-12 RX ADMIN — Medication 15 MILLILITER(S): at 06:23

## 2025-07-12 RX ADMIN — POLYETHYLENE GLYCOL 3350 17 GRAM(S): 17 POWDER, FOR SOLUTION ORAL at 17:43

## 2025-07-12 RX ADMIN — Medication 200 GRAM(S): at 02:22

## 2025-07-12 RX ADMIN — Medication 10 MILLIGRAM(S): at 19:27

## 2025-07-12 RX ADMIN — Medication 40 MILLIGRAM(S): at 21:54

## 2025-07-12 RX ADMIN — LACTULOSE 10 GRAM(S): 10 SOLUTION ORAL at 06:24

## 2025-07-12 RX ADMIN — Medication 1 APPLICATION(S): at 06:32

## 2025-07-12 RX ADMIN — Medication 15 MILLILITER(S): at 11:31

## 2025-07-12 RX ADMIN — Medication 25 GRAM(S): at 13:36

## 2025-07-12 RX ADMIN — FUROSEMIDE 20 MILLIGRAM(S): 10 INJECTION INTRAMUSCULAR; INTRAVENOUS at 23:15

## 2025-07-12 RX ADMIN — Medication 5 MILLIGRAM(S): at 21:55

## 2025-07-12 RX ADMIN — Medication 25 GRAM(S): at 08:11

## 2025-07-12 RX ADMIN — Medication 40 MILLIEQUIVALENT(S): at 02:22

## 2025-07-12 RX ADMIN — Medication 0.5 MILLIGRAM(S): at 03:33

## 2025-07-12 RX ADMIN — Medication 25 GRAM(S): at 01:08

## 2025-07-12 RX ADMIN — Medication 255 MILLIMOLE(S): at 14:31

## 2025-07-12 RX ADMIN — Medication 0.5 MILLIGRAM(S): at 04:00

## 2025-07-13 LAB
ALBUMIN SERPL ELPH-MCNC: 2 G/DL — LOW (ref 3.3–5)
ALBUMIN SERPL ELPH-MCNC: 2.1 G/DL — LOW (ref 3.3–5)
ALP SERPL-CCNC: 250 U/L — HIGH (ref 40–120)
ALP SERPL-CCNC: 272 U/L — HIGH (ref 40–120)
ALT FLD-CCNC: 88 U/L — HIGH (ref 10–45)
ALT FLD-CCNC: 96 U/L — HIGH (ref 10–45)
AMMONIA BLD-MCNC: 59 UMOL/L — HIGH (ref 11–55)
AMMONIA BLD-MCNC: 61 UMOL/L — HIGH (ref 11–55)
ANION GAP SERPL CALC-SCNC: 11 MMOL/L — SIGNIFICANT CHANGE UP (ref 5–17)
ANION GAP SERPL CALC-SCNC: 11 MMOL/L — SIGNIFICANT CHANGE UP (ref 5–17)
APTT BLD: 35.6 SEC — SIGNIFICANT CHANGE UP (ref 26.1–36.8)
APTT BLD: 36.1 SEC — SIGNIFICANT CHANGE UP (ref 26.1–36.8)
AST SERPL-CCNC: 171 U/L — HIGH (ref 10–40)
AST SERPL-CCNC: 195 U/L — HIGH (ref 10–40)
BILIRUB SERPL-MCNC: 5.9 MG/DL — HIGH (ref 0.2–1.2)
BILIRUB SERPL-MCNC: 6.3 MG/DL — HIGH (ref 0.2–1.2)
BUN SERPL-MCNC: 38 MG/DL — HIGH (ref 7–23)
BUN SERPL-MCNC: 38 MG/DL — HIGH (ref 7–23)
CALCIUM SERPL-MCNC: 8.1 MG/DL — LOW (ref 8.4–10.5)
CALCIUM SERPL-MCNC: 8.4 MG/DL — SIGNIFICANT CHANGE UP (ref 8.4–10.5)
CHLORIDE SERPL-SCNC: 104 MMOL/L — SIGNIFICANT CHANGE UP (ref 96–108)
CHLORIDE SERPL-SCNC: 108 MMOL/L — SIGNIFICANT CHANGE UP (ref 96–108)
CO2 SERPL-SCNC: 22 MMOL/L — SIGNIFICANT CHANGE UP (ref 22–31)
CO2 SERPL-SCNC: 23 MMOL/L — SIGNIFICANT CHANGE UP (ref 22–31)
CREAT SERPL-MCNC: 0.79 MG/DL — SIGNIFICANT CHANGE UP (ref 0.5–1.3)
CREAT SERPL-MCNC: 0.8 MG/DL — SIGNIFICANT CHANGE UP (ref 0.5–1.3)
CULTURE RESULTS: ABNORMAL
EGFR: 93 ML/MIN/1.73M2 — SIGNIFICANT CHANGE UP
EGFR: 93 ML/MIN/1.73M2 — SIGNIFICANT CHANGE UP
EGFR: 94 ML/MIN/1.73M2 — SIGNIFICANT CHANGE UP
EGFR: 94 ML/MIN/1.73M2 — SIGNIFICANT CHANGE UP
GAS PNL BLDA: SIGNIFICANT CHANGE UP
GLUCOSE SERPL-MCNC: 124 MG/DL — HIGH (ref 70–99)
GLUCOSE SERPL-MCNC: 137 MG/DL — HIGH (ref 70–99)
HCT VFR BLD CALC: 23.3 % — LOW (ref 34.5–45)
HCT VFR BLD CALC: 23.7 % — LOW (ref 34.5–45)
HCT VFR BLD CALC: 24.5 % — LOW (ref 34.5–45)
HGB BLD-MCNC: 7.5 G/DL — LOW (ref 11.5–15.5)
HGB BLD-MCNC: 7.7 G/DL — LOW (ref 11.5–15.5)
HGB BLD-MCNC: 8 G/DL — LOW (ref 11.5–15.5)
IMMATURE PLATELET FRACTION #: 5.7 K/UL — SIGNIFICANT CHANGE UP (ref 4.7–11.1)
IMMATURE PLATELET FRACTION #: 6.7 K/UL — SIGNIFICANT CHANGE UP (ref 4.7–11.1)
IMMATURE PLATELET FRACTION %: 6.8 % — HIGH (ref 1.6–4.9)
IMMATURE PLATELET FRACTION %: 8.5 % — HIGH (ref 1.6–4.9)
INR BLD: 2.47 RATIO — HIGH (ref 0.85–1.16)
INR BLD: 2.62 RATIO — HIGH (ref 0.85–1.16)
MAGNESIUM SERPL-MCNC: 1.9 MG/DL — SIGNIFICANT CHANGE UP (ref 1.6–2.6)
MAGNESIUM SERPL-MCNC: 2 MG/DL — SIGNIFICANT CHANGE UP (ref 1.6–2.6)
MCHC RBC-ENTMCNC: 29.4 PG — SIGNIFICANT CHANGE UP (ref 27–34)
MCHC RBC-ENTMCNC: 30 PG — SIGNIFICANT CHANGE UP (ref 27–34)
MCHC RBC-ENTMCNC: 30.3 PG — SIGNIFICANT CHANGE UP (ref 27–34)
MCHC RBC-ENTMCNC: 32.2 G/DL — SIGNIFICANT CHANGE UP (ref 32–36)
MCHC RBC-ENTMCNC: 32.5 G/DL — SIGNIFICANT CHANGE UP (ref 32–36)
MCHC RBC-ENTMCNC: 32.7 G/DL — SIGNIFICANT CHANGE UP (ref 32–36)
MCV RBC AUTO: 91.4 FL — SIGNIFICANT CHANGE UP (ref 80–100)
MCV RBC AUTO: 91.8 FL — SIGNIFICANT CHANGE UP (ref 80–100)
MCV RBC AUTO: 93.3 FL — SIGNIFICANT CHANGE UP (ref 80–100)
NRBC # BLD AUTO: 0.04 K/UL — HIGH (ref 0–0)
NRBC # BLD AUTO: 0.05 K/UL — HIGH (ref 0–0)
NRBC # BLD AUTO: 0.06 K/UL — HIGH (ref 0–0)
NRBC # FLD: 0.04 K/UL — HIGH (ref 0–0)
NRBC # FLD: 0.05 K/UL — HIGH (ref 0–0)
NRBC # FLD: 0.06 K/UL — HIGH (ref 0–0)
NRBC BLD AUTO-RTO: 0 /100 WBCS — SIGNIFICANT CHANGE UP (ref 0–0)
ORGANISM # SPEC MICROSCOPIC CNT: ABNORMAL
ORGANISM # SPEC MICROSCOPIC CNT: ABNORMAL
PHOSPHATE SERPL-MCNC: 3.1 MG/DL — SIGNIFICANT CHANGE UP (ref 2.5–4.5)
PHOSPHATE SERPL-MCNC: 3.3 MG/DL — SIGNIFICANT CHANGE UP (ref 2.5–4.5)
PLATELET # BLD AUTO: 79 K/UL — LOW (ref 150–400)
PLATELET # BLD AUTO: 84 K/UL — LOW (ref 150–400)
PLATELET # BLD AUTO: 97 K/UL — LOW (ref 150–400)
PMV BLD: 12.5 FL — SIGNIFICANT CHANGE UP (ref 7–13)
PMV BLD: 12.9 FL — SIGNIFICANT CHANGE UP (ref 7–13)
PMV BLD: 12.9 FL — SIGNIFICANT CHANGE UP (ref 7–13)
POTASSIUM SERPL-MCNC: 3.9 MMOL/L — SIGNIFICANT CHANGE UP (ref 3.5–5.3)
POTASSIUM SERPL-MCNC: 4.2 MMOL/L — SIGNIFICANT CHANGE UP (ref 3.5–5.3)
POTASSIUM SERPL-SCNC: 3.9 MMOL/L — SIGNIFICANT CHANGE UP (ref 3.5–5.3)
POTASSIUM SERPL-SCNC: 4.2 MMOL/L — SIGNIFICANT CHANGE UP (ref 3.5–5.3)
PROT SERPL-MCNC: 6 G/DL — SIGNIFICANT CHANGE UP (ref 6–8.3)
PROT SERPL-MCNC: 6.1 G/DL — SIGNIFICANT CHANGE UP (ref 6–8.3)
PROTHROM AB SERPL-ACNC: 27.9 SEC — HIGH (ref 9.9–13.4)
PROTHROM AB SERPL-ACNC: 29.9 SEC — HIGH (ref 9.9–13.4)
RBC # BLD: 2.54 M/UL — LOW (ref 3.8–5.2)
RBC # BLD: 2.55 M/UL — LOW (ref 3.8–5.2)
RBC # BLD: 2.67 M/UL — LOW (ref 3.8–5.2)
RBC # FLD: 23.9 % — HIGH (ref 10.3–14.5)
RBC # FLD: 24.1 % — HIGH (ref 10.3–14.5)
RBC # FLD: 24.5 % — HIGH (ref 10.3–14.5)
SODIUM SERPL-SCNC: 137 MMOL/L — SIGNIFICANT CHANGE UP (ref 135–145)
SODIUM SERPL-SCNC: 142 MMOL/L — SIGNIFICANT CHANGE UP (ref 135–145)
SPECIMEN SOURCE: SIGNIFICANT CHANGE UP
WBC # BLD: 10.21 K/UL — SIGNIFICANT CHANGE UP (ref 3.8–10.5)
WBC # BLD: 11 K/UL — HIGH (ref 3.8–10.5)
WBC # BLD: 12.37 K/UL — HIGH (ref 3.8–10.5)
WBC # FLD AUTO: 10.21 K/UL — SIGNIFICANT CHANGE UP (ref 3.8–10.5)
WBC # FLD AUTO: 11 K/UL — HIGH (ref 3.8–10.5)
WBC # FLD AUTO: 12.37 K/UL — HIGH (ref 3.8–10.5)

## 2025-07-13 PROCEDURE — 99232 SBSQ HOSP IP/OBS MODERATE 35: CPT | Mod: GC

## 2025-07-13 PROCEDURE — 71045 X-RAY EXAM CHEST 1 VIEW: CPT | Mod: 26

## 2025-07-13 PROCEDURE — 99233 SBSQ HOSP IP/OBS HIGH 50: CPT

## 2025-07-13 RX ORDER — ACETAMINOPHEN 500 MG/5ML
500 LIQUID (ML) ORAL ONCE
Refills: 0 | Status: COMPLETED | OUTPATIENT
Start: 2025-07-13 | End: 2025-07-13

## 2025-07-13 RX ORDER — FUROSEMIDE 10 MG/ML
20 INJECTION INTRAMUSCULAR; INTRAVENOUS ONCE
Refills: 0 | Status: COMPLETED | OUTPATIENT
Start: 2025-07-13 | End: 2025-07-13

## 2025-07-13 RX ORDER — MAGNESIUM SULFATE 500 MG/ML
2 SYRINGE (ML) INJECTION ONCE
Refills: 0 | Status: COMPLETED | OUTPATIENT
Start: 2025-07-13 | End: 2025-07-13

## 2025-07-13 RX ADMIN — Medication 25 GRAM(S): at 21:20

## 2025-07-13 RX ADMIN — Medication 1 APPLICATION(S): at 06:06

## 2025-07-13 RX ADMIN — Medication 50 GRAM(S): at 02:34

## 2025-07-13 RX ADMIN — POLYETHYLENE GLYCOL 3350 17 GRAM(S): 17 POWDER, FOR SOLUTION ORAL at 06:07

## 2025-07-13 RX ADMIN — Medication 25 GRAM(S): at 13:38

## 2025-07-13 RX ADMIN — Medication 200 MILLIGRAM(S): at 20:11

## 2025-07-13 RX ADMIN — Medication 10 MILLIEQUIVALENT(S): at 02:34

## 2025-07-13 RX ADMIN — Medication 500 MILLIGRAM(S): at 21:00

## 2025-07-13 RX ADMIN — Medication 25 GRAM(S): at 06:06

## 2025-07-13 RX ADMIN — LACTULOSE 10 GRAM(S): 10 SOLUTION ORAL at 11:23

## 2025-07-13 RX ADMIN — Medication 15 MILLILITER(S): at 11:24

## 2025-07-13 RX ADMIN — FUROSEMIDE 20 MILLIGRAM(S): 10 INJECTION INTRAMUSCULAR; INTRAVENOUS at 11:23

## 2025-07-13 RX ADMIN — Medication 5 MILLIGRAM(S): at 22:21

## 2025-07-13 RX ADMIN — Medication 40 MILLIGRAM(S): at 21:19

## 2025-07-13 RX ADMIN — POLYETHYLENE GLYCOL 3350 17 GRAM(S): 17 POWDER, FOR SOLUTION ORAL at 18:05

## 2025-07-14 ENCOUNTER — APPOINTMENT (OUTPATIENT)
Dept: OPHTHALMOLOGY | Facility: CLINIC | Age: 45
End: 2025-07-14

## 2025-07-14 LAB
ALBUMIN SERPL ELPH-MCNC: 2.1 G/DL — LOW (ref 3.3–5)
ALP SERPL-CCNC: 260 U/L — HIGH (ref 40–120)
ALT FLD-CCNC: 82 U/L — HIGH (ref 10–45)
AMMONIA BLD-MCNC: 44 UMOL/L — SIGNIFICANT CHANGE UP (ref 11–55)
ANION GAP SERPL CALC-SCNC: 11 MMOL/L — SIGNIFICANT CHANGE UP (ref 5–17)
APTT BLD: 36.7 SEC — SIGNIFICANT CHANGE UP (ref 26.1–36.8)
AST SERPL-CCNC: 156 U/L — HIGH (ref 10–40)
BILIRUB SERPL-MCNC: 6.6 MG/DL — HIGH (ref 0.2–1.2)
BUN SERPL-MCNC: 35 MG/DL — HIGH (ref 7–23)
CALCIUM SERPL-MCNC: 8.5 MG/DL — SIGNIFICANT CHANGE UP (ref 8.4–10.5)
CHLORIDE SERPL-SCNC: 104 MMOL/L — SIGNIFICANT CHANGE UP (ref 96–108)
CO2 SERPL-SCNC: 21 MMOL/L — LOW (ref 22–31)
CREAT SERPL-MCNC: 0.97 MG/DL — SIGNIFICANT CHANGE UP (ref 0.5–1.3)
EGFR: 73 ML/MIN/1.73M2 — SIGNIFICANT CHANGE UP
EGFR: 73 ML/MIN/1.73M2 — SIGNIFICANT CHANGE UP
GAS PNL BLDA: SIGNIFICANT CHANGE UP
GAS PNL BLDA: SIGNIFICANT CHANGE UP
GLUCOSE SERPL-MCNC: 93 MG/DL — SIGNIFICANT CHANGE UP (ref 70–99)
HCT VFR BLD CALC: 23 % — LOW (ref 34.5–45)
HCT VFR BLD CALC: 23.3 % — LOW (ref 34.5–45)
HGB BLD-MCNC: 7.6 G/DL — LOW (ref 11.5–15.5)
HGB BLD-MCNC: 7.7 G/DL — LOW (ref 11.5–15.5)
IMMATURE PLATELET FRACTION #: 5.6 K/UL — SIGNIFICANT CHANGE UP (ref 4.7–11.1)
IMMATURE PLATELET FRACTION #: 6.2 K/UL — SIGNIFICANT CHANGE UP (ref 4.7–11.1)
IMMATURE PLATELET FRACTION %: 6.7 % — HIGH (ref 1.6–4.9)
IMMATURE PLATELET FRACTION %: 7.2 % — HIGH (ref 1.6–4.9)
INR BLD: 2.61 RATIO — HIGH (ref 0.85–1.16)
MAGNESIUM SERPL-MCNC: 1.7 MG/DL — SIGNIFICANT CHANGE UP (ref 1.6–2.6)
MCHC RBC-ENTMCNC: 30.2 PG — SIGNIFICANT CHANGE UP (ref 27–34)
MCHC RBC-ENTMCNC: 30.4 PG — SIGNIFICANT CHANGE UP (ref 27–34)
MCHC RBC-ENTMCNC: 33 G/DL — SIGNIFICANT CHANGE UP (ref 32–36)
MCHC RBC-ENTMCNC: 33 G/DL — SIGNIFICANT CHANGE UP (ref 32–36)
MCV RBC AUTO: 91.4 FL — SIGNIFICANT CHANGE UP (ref 80–100)
MCV RBC AUTO: 92 FL — SIGNIFICANT CHANGE UP (ref 80–100)
NRBC # BLD AUTO: 0.03 K/UL — HIGH (ref 0–0)
NRBC # BLD AUTO: 0.04 K/UL — HIGH (ref 0–0)
NRBC # FLD: 0.03 K/UL — HIGH (ref 0–0)
NRBC # FLD: 0.04 K/UL — HIGH (ref 0–0)
NRBC BLD AUTO-RTO: 0 /100 WBCS — SIGNIFICANT CHANGE UP (ref 0–0)
NRBC BLD AUTO-RTO: 0 /100 WBCS — SIGNIFICANT CHANGE UP (ref 0–0)
PHOSPHATE SERPL-MCNC: 3 MG/DL — SIGNIFICANT CHANGE UP (ref 2.5–4.5)
PLATELET # BLD AUTO: 84 K/UL — LOW (ref 150–400)
PLATELET # BLD AUTO: 86 K/UL — LOW (ref 150–400)
PMV BLD: 12.4 FL — SIGNIFICANT CHANGE UP (ref 7–13)
PMV BLD: 13.2 FL — HIGH (ref 7–13)
POTASSIUM SERPL-MCNC: 3.8 MMOL/L — SIGNIFICANT CHANGE UP (ref 3.5–5.3)
POTASSIUM SERPL-SCNC: 3.8 MMOL/L — SIGNIFICANT CHANGE UP (ref 3.5–5.3)
PROCALCITONIN SERPL-MCNC: 0.58 NG/ML — HIGH (ref 0.02–0.1)
PROT SERPL-MCNC: 6.1 G/DL — SIGNIFICANT CHANGE UP (ref 6–8.3)
PROTHROM AB SERPL-ACNC: 29.5 SEC — HIGH (ref 9.9–13.4)
RBC # BLD: 2.5 M/UL — LOW (ref 3.8–5.2)
RBC # BLD: 2.55 M/UL — LOW (ref 3.8–5.2)
RBC # FLD: 24.4 % — HIGH (ref 10.3–14.5)
RBC # FLD: 24.7 % — HIGH (ref 10.3–14.5)
SODIUM SERPL-SCNC: 136 MMOL/L — SIGNIFICANT CHANGE UP (ref 135–145)
WBC # BLD: 8.58 K/UL — SIGNIFICANT CHANGE UP (ref 3.8–10.5)
WBC # BLD: 9.01 K/UL — SIGNIFICANT CHANGE UP (ref 3.8–10.5)
WBC # FLD AUTO: 8.58 K/UL — SIGNIFICANT CHANGE UP (ref 3.8–10.5)
WBC # FLD AUTO: 9.01 K/UL — SIGNIFICANT CHANGE UP (ref 3.8–10.5)

## 2025-07-14 PROCEDURE — 99232 SBSQ HOSP IP/OBS MODERATE 35: CPT

## 2025-07-14 PROCEDURE — 99233 SBSQ HOSP IP/OBS HIGH 50: CPT

## 2025-07-14 PROCEDURE — 71045 X-RAY EXAM CHEST 1 VIEW: CPT | Mod: 26

## 2025-07-14 PROCEDURE — 99232 SBSQ HOSP IP/OBS MODERATE 35: CPT | Mod: GC

## 2025-07-14 RX ORDER — FUROSEMIDE 10 MG/ML
20 INJECTION INTRAMUSCULAR; INTRAVENOUS ONCE
Refills: 0 | Status: COMPLETED | OUTPATIENT
Start: 2025-07-14 | End: 2025-07-14

## 2025-07-14 RX ORDER — ONDANSETRON HCL/PF 4 MG/2 ML
4 VIAL (ML) INJECTION ONCE
Refills: 0 | Status: COMPLETED | OUTPATIENT
Start: 2025-07-14 | End: 2025-07-14

## 2025-07-14 RX ORDER — MAGNESIUM SULFATE 500 MG/ML
2 SYRINGE (ML) INJECTION ONCE
Refills: 0 | Status: COMPLETED | OUTPATIENT
Start: 2025-07-14 | End: 2025-07-15

## 2025-07-14 RX ORDER — ACETAMINOPHEN 500 MG/5ML
500 LIQUID (ML) ORAL ONCE
Refills: 0 | Status: DISCONTINUED | OUTPATIENT
Start: 2025-07-14 | End: 2025-07-15

## 2025-07-14 RX ORDER — PIPERACILLIN-TAZO-DEXTROSE,ISO 3.375G/5
3.38 IV SOLUTION, PIGGYBACK PREMIX FROZEN(ML) INTRAVENOUS EVERY 8 HOURS
Refills: 0 | Status: DISCONTINUED | OUTPATIENT
Start: 2025-07-14 | End: 2025-07-16

## 2025-07-14 RX ADMIN — Medication 25 GRAM(S): at 21:30

## 2025-07-14 RX ADMIN — Medication 15 MILLILITER(S): at 12:39

## 2025-07-14 RX ADMIN — Medication 25 GRAM(S): at 13:07

## 2025-07-14 RX ADMIN — Medication 5 MILLIGRAM(S): at 21:30

## 2025-07-14 RX ADMIN — POLYETHYLENE GLYCOL 3350 17 GRAM(S): 17 POWDER, FOR SOLUTION ORAL at 17:58

## 2025-07-14 RX ADMIN — FUROSEMIDE 20 MILLIGRAM(S): 10 INJECTION INTRAMUSCULAR; INTRAVENOUS at 13:07

## 2025-07-14 RX ADMIN — Medication 4 MILLIGRAM(S): at 06:19

## 2025-07-14 RX ADMIN — Medication 4 MILLIGRAM(S): at 22:23

## 2025-07-14 RX ADMIN — Medication 1 APPLICATION(S): at 05:59

## 2025-07-14 RX ADMIN — Medication 25 GRAM(S): at 05:59

## 2025-07-14 RX ADMIN — Medication 40 MILLIGRAM(S): at 21:30

## 2025-07-14 RX ADMIN — Medication 4 MILLIGRAM(S): at 13:24

## 2025-07-15 LAB
ALBUMIN SERPL ELPH-MCNC: 2.1 G/DL — LOW (ref 3.3–5)
ALP SERPL-CCNC: 215 U/L — HIGH (ref 40–120)
ALT FLD-CCNC: 88 U/L — HIGH (ref 10–45)
ANION GAP SERPL CALC-SCNC: 12 MMOL/L — SIGNIFICANT CHANGE UP (ref 5–17)
APTT BLD: 39.2 SEC — HIGH (ref 26.1–36.8)
AST SERPL-CCNC: 161 U/L — HIGH (ref 10–40)
BILIRUB SERPL-MCNC: 7.1 MG/DL — HIGH (ref 0.2–1.2)
BUN SERPL-MCNC: 26 MG/DL — HIGH (ref 7–23)
CALCIUM SERPL-MCNC: 8.5 MG/DL — SIGNIFICANT CHANGE UP (ref 8.4–10.5)
CHLORIDE SERPL-SCNC: 104 MMOL/L — SIGNIFICANT CHANGE UP (ref 96–108)
CO2 SERPL-SCNC: 20 MMOL/L — LOW (ref 22–31)
CREAT SERPL-MCNC: 1.01 MG/DL — SIGNIFICANT CHANGE UP (ref 0.5–1.3)
EGFR: 70 ML/MIN/1.73M2 — SIGNIFICANT CHANGE UP
EGFR: 70 ML/MIN/1.73M2 — SIGNIFICANT CHANGE UP
GLUCOSE SERPL-MCNC: 121 MG/DL — HIGH (ref 70–99)
HCT VFR BLD CALC: 23 % — LOW (ref 34.5–45)
HGB BLD-MCNC: 7.3 G/DL — LOW (ref 11.5–15.5)
IMMATURE PLATELET FRACTION #: 5.9 K/UL — SIGNIFICANT CHANGE UP (ref 4.7–11.1)
IMMATURE PLATELET FRACTION %: 7.8 % — HIGH (ref 1.6–4.9)
INR BLD: 2.49 RATIO — HIGH (ref 0.85–1.16)
MAGNESIUM SERPL-MCNC: 1.8 MG/DL — SIGNIFICANT CHANGE UP (ref 1.6–2.6)
MCHC RBC-ENTMCNC: 29.4 PG — SIGNIFICANT CHANGE UP (ref 27–34)
MCHC RBC-ENTMCNC: 31.7 G/DL — LOW (ref 32–36)
MCV RBC AUTO: 92.7 FL — SIGNIFICANT CHANGE UP (ref 80–100)
NRBC # BLD AUTO: 0.04 K/UL — HIGH (ref 0–0)
NRBC # FLD: 0.04 K/UL — HIGH (ref 0–0)
NRBC BLD AUTO-RTO: 0 /100 WBCS — SIGNIFICANT CHANGE UP (ref 0–0)
PHOSPHATE SERPL-MCNC: 2.9 MG/DL — SIGNIFICANT CHANGE UP (ref 2.5–4.5)
PLATELET # BLD AUTO: 76 K/UL — LOW (ref 150–400)
PMV BLD: 11.7 FL — SIGNIFICANT CHANGE UP (ref 7–13)
POTASSIUM SERPL-MCNC: 3.7 MMOL/L — SIGNIFICANT CHANGE UP (ref 3.5–5.3)
POTASSIUM SERPL-SCNC: 3.7 MMOL/L — SIGNIFICANT CHANGE UP (ref 3.5–5.3)
PROT SERPL-MCNC: 5.7 G/DL — LOW (ref 6–8.3)
PROTHROM AB SERPL-ACNC: 28.4 SEC — HIGH (ref 9.9–13.4)
RBC # BLD: 2.48 M/UL — LOW (ref 3.8–5.2)
RBC # FLD: 25.2 % — HIGH (ref 10.3–14.5)
SODIUM SERPL-SCNC: 136 MMOL/L — SIGNIFICANT CHANGE UP (ref 135–145)
WBC # BLD: 6.18 K/UL — SIGNIFICANT CHANGE UP (ref 3.8–10.5)
WBC # FLD AUTO: 6.18 K/UL — SIGNIFICANT CHANGE UP (ref 3.8–10.5)

## 2025-07-15 PROCEDURE — 99232 SBSQ HOSP IP/OBS MODERATE 35: CPT | Mod: GC

## 2025-07-15 PROCEDURE — 74177 CT ABD & PELVIS W/CONTRAST: CPT | Mod: 26

## 2025-07-15 PROCEDURE — 99232 SBSQ HOSP IP/OBS MODERATE 35: CPT

## 2025-07-15 PROCEDURE — 99233 SBSQ HOSP IP/OBS HIGH 50: CPT

## 2025-07-15 RX ORDER — LACTULOSE 10 G/15ML
10 SOLUTION ORAL DAILY
Refills: 0 | Status: DISCONTINUED | OUTPATIENT
Start: 2025-07-15 | End: 2025-07-21

## 2025-07-15 RX ORDER — B1/B2/B3/B5/B6/B12/VIT C/FOLIC 500-0.5 MG
1 TABLET ORAL DAILY
Refills: 0 | Status: DISCONTINUED | OUTPATIENT
Start: 2025-07-15 | End: 2025-07-27

## 2025-07-15 RX ORDER — MAGNESIUM SULFATE 500 MG/ML
2 SYRINGE (ML) INJECTION ONCE
Refills: 0 | Status: COMPLETED | OUTPATIENT
Start: 2025-07-15 | End: 2025-07-16

## 2025-07-15 RX ORDER — MAGNESIUM SULFATE 500 MG/ML
2 SYRINGE (ML) INJECTION ONCE
Refills: 0 | Status: DISCONTINUED | OUTPATIENT
Start: 2025-07-15 | End: 2025-07-15

## 2025-07-15 RX ORDER — ALBUMIN (HUMAN) 12.5 G/50ML
50 INJECTION, SOLUTION INTRAVENOUS EVERY 8 HOURS
Refills: 0 | Status: COMPLETED | OUTPATIENT
Start: 2025-07-15 | End: 2025-07-16

## 2025-07-15 RX ORDER — POLYETHYLENE GLYCOL 3350 17 G/17G
17 POWDER, FOR SOLUTION ORAL
Refills: 0 | Status: DISCONTINUED | OUTPATIENT
Start: 2025-07-15 | End: 2025-07-30

## 2025-07-15 RX ADMIN — Medication 1 APPLICATION(S): at 05:08

## 2025-07-15 RX ADMIN — LACTULOSE 10 GRAM(S): 10 SOLUTION ORAL at 12:28

## 2025-07-15 RX ADMIN — Medication 25 GRAM(S): at 22:32

## 2025-07-15 RX ADMIN — POLYETHYLENE GLYCOL 3350 17 GRAM(S): 17 POWDER, FOR SOLUTION ORAL at 05:08

## 2025-07-15 RX ADMIN — Medication 1 TABLET(S): at 12:29

## 2025-07-15 RX ADMIN — Medication 25 GRAM(S): at 05:07

## 2025-07-15 RX ADMIN — Medication 25 GRAM(S): at 00:28

## 2025-07-15 RX ADMIN — ALBUMIN (HUMAN) 50 MILLILITER(S): 12.5 INJECTION, SOLUTION INTRAVENOUS at 13:13

## 2025-07-15 RX ADMIN — ALBUMIN (HUMAN) 50 MILLILITER(S): 12.5 INJECTION, SOLUTION INTRAVENOUS at 22:32

## 2025-07-15 RX ADMIN — Medication 25 GRAM(S): at 13:13

## 2025-07-15 RX ADMIN — Medication 20 MILLIEQUIVALENT(S): at 00:27

## 2025-07-16 ENCOUNTER — RESULT REVIEW (OUTPATIENT)
Age: 45
End: 2025-07-16

## 2025-07-16 LAB
-  CUTIBACTERIUM ACNES: SIGNIFICANT CHANGE UP
AMMONIA BLD-MCNC: 42 UMOL/L — SIGNIFICANT CHANGE UP (ref 11–55)
BLD GP AB SCN SERPL QL: NEGATIVE — SIGNIFICANT CHANGE UP
GRAM STN FLD: ABNORMAL
H CAPSUL AG SER IA-MCNC: SIGNIFICANT CHANGE UP NG/ML
H CAPSUL AG SPEC-ACNC: SIGNIFICANT CHANGE UP
H CAPSUL AG UR QL IA: SIGNIFICANT CHANGE UP
HISTPL INTERPRETATION: NEGATIVE — SIGNIFICANT CHANGE UP
HISTPL SPECIMEN TYPE: SIGNIFICANT CHANGE UP
METHOD TYPE: SIGNIFICANT CHANGE UP
RH IG SCN BLD-IMP: POSITIVE — SIGNIFICANT CHANGE UP

## 2025-07-16 PROCEDURE — 82105 ALPHA-FETOPROTEIN SERUM: CPT

## 2025-07-16 PROCEDURE — 87070 CULTURE OTHR SPECIMN AEROBIC: CPT

## 2025-07-16 PROCEDURE — 93970 EXTREMITY STUDY: CPT

## 2025-07-16 PROCEDURE — 86765 RUBEOLA ANTIBODY: CPT

## 2025-07-16 PROCEDURE — 86038 ANTINUCLEAR ANTIBODIES: CPT

## 2025-07-16 PROCEDURE — 80307 DRUG TEST PRSMV CHEM ANLYZR: CPT

## 2025-07-16 PROCEDURE — 82803 BLOOD GASES ANY COMBINATION: CPT

## 2025-07-16 PROCEDURE — 87624 HPV HI-RISK TYP POOLED RSLT: CPT

## 2025-07-16 PROCEDURE — 87086 URINE CULTURE/COLONY COUNT: CPT

## 2025-07-16 PROCEDURE — 86780 TREPONEMA PALLIDUM: CPT

## 2025-07-16 PROCEDURE — 93306 TTE W/DOPPLER COMPLETE: CPT

## 2025-07-16 PROCEDURE — 87205 SMEAR GRAM STAIN: CPT

## 2025-07-16 PROCEDURE — 81001 URINALYSIS AUTO W/SCOPE: CPT

## 2025-07-16 PROCEDURE — 86663 EPSTEIN-BARR ANTIBODY: CPT

## 2025-07-16 PROCEDURE — 99222 1ST HOSP IP/OBS MODERATE 55: CPT

## 2025-07-16 PROCEDURE — 83935 ASSAY OF URINE OSMOLALITY: CPT

## 2025-07-16 PROCEDURE — 74177 CT ABD & PELVIS W/CONTRAST: CPT

## 2025-07-16 PROCEDURE — 84132 ASSAY OF SERUM POTASSIUM: CPT

## 2025-07-16 PROCEDURE — 86762 RUBELLA ANTIBODY: CPT

## 2025-07-16 PROCEDURE — 71045 X-RAY EXAM CHEST 1 VIEW: CPT | Mod: 26

## 2025-07-16 PROCEDURE — 86255 FLUORESCENT ANTIBODY SCREEN: CPT

## 2025-07-16 PROCEDURE — 99232 SBSQ HOSP IP/OBS MODERATE 35: CPT

## 2025-07-16 PROCEDURE — 86704 HEP B CORE ANTIBODY TOTAL: CPT

## 2025-07-16 PROCEDURE — 97530 THERAPEUTIC ACTIVITIES: CPT

## 2025-07-16 PROCEDURE — 94002 VENT MGMT INPAT INIT DAY: CPT

## 2025-07-16 PROCEDURE — 87077 CULTURE AEROBIC IDENTIFY: CPT

## 2025-07-16 PROCEDURE — 99233 SBSQ HOSP IP/OBS HIGH 50: CPT

## 2025-07-16 PROCEDURE — 84300 ASSAY OF URINE SODIUM: CPT

## 2025-07-16 PROCEDURE — 84156 ASSAY OF PROTEIN URINE: CPT

## 2025-07-16 PROCEDURE — 99232 SBSQ HOSP IP/OBS MODERATE 35: CPT | Mod: GC

## 2025-07-16 PROCEDURE — 82247 BILIRUBIN TOTAL: CPT

## 2025-07-16 PROCEDURE — P9045: CPT

## 2025-07-16 PROCEDURE — 80053 COMPREHEN METABOLIC PANEL: CPT

## 2025-07-16 PROCEDURE — 87389 HIV-1 AG W/HIV-1&-2 AB AG IA: CPT

## 2025-07-16 PROCEDURE — 87521 HEPATITIS C PROBE&RVRS TRNSC: CPT

## 2025-07-16 PROCEDURE — 94003 VENT MGMT INPAT SUBQ DAY: CPT

## 2025-07-16 PROCEDURE — 97165 OT EVAL LOW COMPLEX 30 MIN: CPT

## 2025-07-16 PROCEDURE — 84100 ASSAY OF PHOSPHORUS: CPT

## 2025-07-16 PROCEDURE — G0480: CPT

## 2025-07-16 PROCEDURE — 86901 BLOOD TYPING SEROLOGIC RH(D): CPT

## 2025-07-16 PROCEDURE — 86644 CMV ANTIBODY: CPT

## 2025-07-16 PROCEDURE — 82728 ASSAY OF FERRITIN: CPT

## 2025-07-16 PROCEDURE — 86787 VARICELLA-ZOSTER ANTIBODY: CPT

## 2025-07-16 PROCEDURE — 86709 HEPATITIS A IGM ANTIBODY: CPT

## 2025-07-16 PROCEDURE — 86695 HERPES SIMPLEX TYPE 1 TEST: CPT

## 2025-07-16 PROCEDURE — 84443 ASSAY THYROID STIM HORMONE: CPT

## 2025-07-16 PROCEDURE — 87641 MR-STAPH DNA AMP PROBE: CPT

## 2025-07-16 PROCEDURE — 86708 HEPATITIS A ANTIBODY: CPT

## 2025-07-16 PROCEDURE — 87799 DETECT AGENT NOS DNA QUANT: CPT

## 2025-07-16 PROCEDURE — 86803 HEPATITIS C AB TEST: CPT

## 2025-07-16 PROCEDURE — 86706 HEP B SURFACE ANTIBODY: CPT

## 2025-07-16 PROCEDURE — 83605 ASSAY OF LACTIC ACID: CPT

## 2025-07-16 PROCEDURE — 85396 CLOTTING ASSAY WHOLE BLOOD: CPT

## 2025-07-16 PROCEDURE — 87340 HEPATITIS B SURFACE AG IA: CPT

## 2025-07-16 PROCEDURE — 97110 THERAPEUTIC EXERCISES: CPT

## 2025-07-16 PROCEDURE — 85730 THROMBOPLASTIN TIME PARTIAL: CPT

## 2025-07-16 PROCEDURE — 86777 TOXOPLASMA ANTIBODY: CPT

## 2025-07-16 PROCEDURE — 86985 SPLIT BLOOD OR PRODUCTS: CPT

## 2025-07-16 PROCEDURE — 86682 HELMINTH ANTIBODY: CPT

## 2025-07-16 PROCEDURE — 71045 X-RAY EXAM CHEST 1 VIEW: CPT

## 2025-07-16 PROCEDURE — 94799 UNLISTED PULMONARY SVC/PX: CPT

## 2025-07-16 PROCEDURE — 86664 EPSTEIN-BARR NUCLEAR ANTIGEN: CPT

## 2025-07-16 PROCEDURE — 86769 SARS-COV-2 COVID-19 ANTIBODY: CPT

## 2025-07-16 PROCEDURE — 82140 ASSAY OF AMMONIA: CPT

## 2025-07-16 PROCEDURE — 87637 SARSCOV2&INF A&B&RSV AMP PRB: CPT

## 2025-07-16 PROCEDURE — 85027 COMPLETE CBC AUTOMATED: CPT

## 2025-07-16 PROCEDURE — 86696 HERPES SIMPLEX TYPE 2 TEST: CPT

## 2025-07-16 PROCEDURE — 82947 ASSAY GLUCOSE BLOOD QUANT: CPT

## 2025-07-16 PROCEDURE — 86376 MICROSOMAL ANTIBODY EACH: CPT

## 2025-07-16 PROCEDURE — 87040 BLOOD CULTURE FOR BACTERIA: CPT

## 2025-07-16 PROCEDURE — 97116 GAIT TRAINING THERAPY: CPT

## 2025-07-16 PROCEDURE — 84540 ASSAY OF URINE/UREA-N: CPT

## 2025-07-16 PROCEDURE — 83540 ASSAY OF IRON: CPT

## 2025-07-16 PROCEDURE — 82570 ASSAY OF URINE CREATININE: CPT

## 2025-07-16 PROCEDURE — 92610 EVALUATE SWALLOWING FUNCTION: CPT

## 2025-07-16 PROCEDURE — 97161 PT EVAL LOW COMPLEX 20 MIN: CPT

## 2025-07-16 PROCEDURE — 80061 LIPID PANEL: CPT

## 2025-07-16 PROCEDURE — 84145 PROCALCITONIN (PCT): CPT

## 2025-07-16 PROCEDURE — 86735 MUMPS ANTIBODY: CPT

## 2025-07-16 PROCEDURE — 82330 ASSAY OF CALCIUM: CPT

## 2025-07-16 PROCEDURE — 85610 PROTHROMBIN TIME: CPT

## 2025-07-16 PROCEDURE — 83036 HEMOGLOBIN GLYCOSYLATED A1C: CPT

## 2025-07-16 PROCEDURE — 87385 HISTOPLASMA CAPSUL AG IA: CPT

## 2025-07-16 PROCEDURE — 82435 ASSAY OF BLOOD CHLORIDE: CPT

## 2025-07-16 PROCEDURE — 86381 MITOCHONDRIAL ANTIBODY EACH: CPT

## 2025-07-16 PROCEDURE — P9011: CPT

## 2025-07-16 PROCEDURE — 87640 STAPH A DNA AMP PROBE: CPT

## 2025-07-16 PROCEDURE — 92612 ENDOSCOPY SWALLOW (FEES) VID: CPT

## 2025-07-16 PROCEDURE — 86923 COMPATIBILITY TEST ELECTRIC: CPT

## 2025-07-16 PROCEDURE — 84481 FREE ASSAY (FT-3): CPT

## 2025-07-16 PROCEDURE — P9047: CPT

## 2025-07-16 PROCEDURE — 86900 BLOOD TYPING SEROLOGIC ABO: CPT

## 2025-07-16 PROCEDURE — 86665 EPSTEIN-BARR CAPSID VCA: CPT

## 2025-07-16 PROCEDURE — 93308 TTE F-UP OR LMTD: CPT

## 2025-07-16 PROCEDURE — 87150 DNA/RNA AMPLIFIED PROBE: CPT

## 2025-07-16 PROCEDURE — 86850 RBC ANTIBODY SCREEN: CPT

## 2025-07-16 PROCEDURE — 86480 TB TEST CELL IMMUN MEASURE: CPT

## 2025-07-16 PROCEDURE — 83550 IRON BINDING TEST: CPT

## 2025-07-16 PROCEDURE — 85014 HEMATOCRIT: CPT

## 2025-07-16 PROCEDURE — P9016: CPT

## 2025-07-16 PROCEDURE — 84295 ASSAY OF SERUM SODIUM: CPT

## 2025-07-16 PROCEDURE — 84702 CHORIONIC GONADOTROPIN TEST: CPT

## 2025-07-16 PROCEDURE — 85018 HEMOGLOBIN: CPT

## 2025-07-16 PROCEDURE — 36415 COLL VENOUS BLD VENIPUNCTURE: CPT

## 2025-07-16 PROCEDURE — 82784 ASSAY IGA/IGD/IGG/IGM EACH: CPT

## 2025-07-16 PROCEDURE — 97112 NEUROMUSCULAR REEDUCATION: CPT

## 2025-07-16 PROCEDURE — 83735 ASSAY OF MAGNESIUM: CPT

## 2025-07-16 PROCEDURE — 84133 ASSAY OF URINE POTASSIUM: CPT

## 2025-07-16 PROCEDURE — 82390 ASSAY OF CERULOPLASMIN: CPT

## 2025-07-16 PROCEDURE — 82565 ASSAY OF CREATININE: CPT

## 2025-07-16 RX ORDER — ONDANSETRON HCL/PF 4 MG/2 ML
4 VIAL (ML) INJECTION ONCE
Refills: 0 | Status: COMPLETED | OUTPATIENT
Start: 2025-07-16 | End: 2025-07-16

## 2025-07-16 RX ORDER — ACETAMINOPHEN 500 MG/5ML
500 LIQUID (ML) ORAL ONCE
Refills: 0 | Status: COMPLETED | OUTPATIENT
Start: 2025-07-16 | End: 2025-07-16

## 2025-07-16 RX ORDER — POLYETHYLENE GLYCOL-3350 AND ELECTROLYTES 236; 6.74; 5.86; 2.97; 22.74 G/274.31G; G/274.31G; G/274.31G; G/274.31G; G/274.31G
4000 POWDER, FOR SOLUTION ORAL ONCE
Refills: 0 | Status: COMPLETED | OUTPATIENT
Start: 2025-07-16 | End: 2025-07-16

## 2025-07-16 RX ORDER — BACITRACIN 500 UNIT/G
1 OINTMENT (GRAM) TOPICAL EVERY 12 HOURS
Refills: 0 | Status: DISCONTINUED | OUTPATIENT
Start: 2025-07-16 | End: 2025-07-30

## 2025-07-16 RX ORDER — SPIRONOLACTONE 25 MG
25 TABLET ORAL DAILY
Refills: 0 | Status: DISCONTINUED | OUTPATIENT
Start: 2025-07-16 | End: 2025-07-24

## 2025-07-16 RX ORDER — FUROSEMIDE 10 MG/ML
20 INJECTION INTRAMUSCULAR; INTRAVENOUS DAILY
Refills: 0 | Status: DISCONTINUED | OUTPATIENT
Start: 2025-07-16 | End: 2025-07-20

## 2025-07-16 RX ADMIN — Medication 500 MILLIGRAM(S): at 04:29

## 2025-07-16 RX ADMIN — POLYETHYLENE GLYCOL 3350 17 GRAM(S): 17 POWDER, FOR SOLUTION ORAL at 18:41

## 2025-07-16 RX ADMIN — ALBUMIN (HUMAN) 50 MILLILITER(S): 12.5 INJECTION, SOLUTION INTRAVENOUS at 06:22

## 2025-07-16 RX ADMIN — Medication 20 MILLIEQUIVALENT(S): at 01:16

## 2025-07-16 RX ADMIN — Medication 1 APPLICATION(S): at 06:24

## 2025-07-16 RX ADMIN — POLYETHYLENE GLYCOL 3350 17 GRAM(S): 17 POWDER, FOR SOLUTION ORAL at 06:22

## 2025-07-16 RX ADMIN — Medication 25 MILLIGRAM(S): at 11:15

## 2025-07-16 RX ADMIN — Medication 25 GRAM(S): at 03:59

## 2025-07-16 RX ADMIN — Medication 4 MILLIGRAM(S): at 22:54

## 2025-07-16 RX ADMIN — Medication 200 MILLIGRAM(S): at 03:59

## 2025-07-16 RX ADMIN — Medication 25 GRAM(S): at 06:21

## 2025-07-16 RX ADMIN — Medication 1 TABLET(S): at 11:14

## 2025-07-16 RX ADMIN — POLYETHYLENE GLYCOL-3350 AND ELECTROLYTES 4000 MILLILITER(S): 236; 6.74; 5.86; 2.97; 22.74 POWDER, FOR SOLUTION ORAL at 18:41

## 2025-07-16 RX ADMIN — FUROSEMIDE 20 MILLIGRAM(S): 10 INJECTION INTRAMUSCULAR; INTRAVENOUS at 11:15

## 2025-07-16 RX ADMIN — Medication 40 MILLIGRAM(S): at 06:24

## 2025-07-16 RX ADMIN — Medication 4 MILLIGRAM(S): at 01:59

## 2025-07-17 LAB
ALBUMIN SERPL ELPH-MCNC: 2.4 G/DL — LOW (ref 3.3–5)
ALBUMIN SERPL ELPH-MCNC: 2.5 G/DL — LOW (ref 3.3–5)
ALP SERPL-CCNC: 181 U/L — HIGH (ref 40–120)
ALP SERPL-CCNC: 186 U/L — HIGH (ref 40–120)
ALT FLD-CCNC: 105 U/L — HIGH (ref 10–45)
ALT FLD-CCNC: 105 U/L — HIGH (ref 10–45)
AMMONIA BLD-MCNC: 23 UMOL/L — SIGNIFICANT CHANGE UP (ref 11–55)
AMMONIA BLD-MCNC: 25 UMOL/L — SIGNIFICANT CHANGE UP (ref 11–55)
ANION GAP SERPL CALC-SCNC: 13 MMOL/L — SIGNIFICANT CHANGE UP (ref 5–17)
ANION GAP SERPL CALC-SCNC: 13 MMOL/L — SIGNIFICANT CHANGE UP (ref 5–17)
APTT 50/50 2HOUR INCUB: 31.3 SEC — SIGNIFICANT CHANGE UP (ref 26.1–37.8)
APTT BLD: 30.5 SEC — SIGNIFICANT CHANGE UP (ref 26.1–37.8)
APTT BLD: 40.1 SEC — HIGH (ref 26.1–36.8)
APTT BLD: 40.8 SEC — HIGH (ref 26.1–36.8)
APTT BLD: 43.6 SEC — HIGH (ref 26.1–36.8)
AST SERPL-CCNC: 156 U/L — HIGH (ref 10–40)
AST SERPL-CCNC: 160 U/L — HIGH (ref 10–40)
B2 GLYCOPROT1 IGA SER QL: 13.9 U/ML — SIGNIFICANT CHANGE UP
B2 GLYCOPROT1 IGG SER-ACNC: <1.4 U/ML — SIGNIFICANT CHANGE UP
B2 GLYCOPROT1 IGM SER-ACNC: <1.5 U/ML — SIGNIFICANT CHANGE UP
BILIRUB SERPL-MCNC: 7.9 MG/DL — HIGH (ref 0.2–1.2)
BILIRUB SERPL-MCNC: 9.3 MG/DL — HIGH (ref 0.2–1.2)
BUN SERPL-MCNC: 15 MG/DL — SIGNIFICANT CHANGE UP (ref 7–23)
BUN SERPL-MCNC: 17 MG/DL — SIGNIFICANT CHANGE UP (ref 7–23)
CALCIUM SERPL-MCNC: 8.3 MG/DL — LOW (ref 8.4–10.5)
CALCIUM SERPL-MCNC: 9 MG/DL — SIGNIFICANT CHANGE UP (ref 8.4–10.5)
CARDIOLIPIN IGM SER-MCNC: <1.5 MPL U/ML — SIGNIFICANT CHANGE UP
CARDIOLIPIN IGM SER-MCNC: <1.6 GPL U/ML — SIGNIFICANT CHANGE UP
CHLORIDE SERPL-SCNC: 103 MMOL/L — SIGNIFICANT CHANGE UP (ref 96–108)
CHLORIDE SERPL-SCNC: 104 MMOL/L — SIGNIFICANT CHANGE UP (ref 96–108)
CO2 SERPL-SCNC: 17 MMOL/L — LOW (ref 22–31)
CO2 SERPL-SCNC: 19 MMOL/L — LOW (ref 22–31)
CREAT SERPL-MCNC: 0.89 MG/DL — SIGNIFICANT CHANGE UP (ref 0.5–1.3)
CREAT SERPL-MCNC: 0.96 MG/DL — SIGNIFICANT CHANGE UP (ref 0.5–1.3)
DEPRECATED CARDIOLIPIN IGA SER: 2.8 APL U/ML — SIGNIFICANT CHANGE UP
DRVVT RATIO: 0.7 RATIO — SIGNIFICANT CHANGE UP (ref 0–1.21)
DRVVT SCREEN TO CONFIRM RATIO: SIGNIFICANT CHANGE UP
EGFR: 74 ML/MIN/1.73M2 — SIGNIFICANT CHANGE UP
EGFR: 74 ML/MIN/1.73M2 — SIGNIFICANT CHANGE UP
EGFR: 81 ML/MIN/1.73M2 — SIGNIFICANT CHANGE UP
EGFR: 81 ML/MIN/1.73M2 — SIGNIFICANT CHANGE UP
GLUCOSE SERPL-MCNC: 112 MG/DL — HIGH (ref 70–99)
GLUCOSE SERPL-MCNC: 84 MG/DL — SIGNIFICANT CHANGE UP (ref 70–99)
HCG UR QL: NEGATIVE — SIGNIFICANT CHANGE UP
HCT VFR BLD CALC: 23.2 % — LOW (ref 34.5–45)
HCT VFR BLD CALC: 24.6 % — LOW (ref 34.5–45)
HGB BLD-MCNC: 7.3 G/DL — LOW (ref 11.5–15.5)
HGB BLD-MCNC: 7.9 G/DL — LOW (ref 11.5–15.5)
HPV HIGH+LOW RISK DNA PNL CVX: SIGNIFICANT CHANGE UP
IMMATURE PLATELET FRACTION #: 3.5 K/UL — LOW (ref 4.7–11.1)
IMMATURE PLATELET FRACTION #: 4.4 K/UL — LOW (ref 4.7–11.1)
IMMATURE PLATELET FRACTION %: 5.3 % — HIGH (ref 1.6–4.9)
IMMATURE PLATELET FRACTION %: 6.7 % — HIGH (ref 1.6–4.9)
INR BLD: 2.74 RATIO — HIGH (ref 0.85–1.16)
LYSOSOMAL ACID LIPASE INTERPRETATION: SIGNIFICANT CHANGE UP
LYSOSOMAL ACID LIPASE, RESULT: 20 CD:384473389 — LOW (ref 54–220)
MAGNESIUM SERPL-MCNC: 1.7 MG/DL — SIGNIFICANT CHANGE UP (ref 1.6–2.6)
MAGNESIUM SERPL-MCNC: 1.7 MG/DL — SIGNIFICANT CHANGE UP (ref 1.6–2.6)
MCHC RBC-ENTMCNC: 30 PG — SIGNIFICANT CHANGE UP (ref 27–34)
MCHC RBC-ENTMCNC: 30.3 PG — SIGNIFICANT CHANGE UP (ref 27–34)
MCHC RBC-ENTMCNC: 31.5 G/DL — LOW (ref 32–36)
MCHC RBC-ENTMCNC: 32.1 G/DL — SIGNIFICANT CHANGE UP (ref 32–36)
MCV RBC AUTO: 94.3 FL — SIGNIFICANT CHANGE UP (ref 80–100)
MCV RBC AUTO: 95.5 FL — SIGNIFICANT CHANGE UP (ref 80–100)
NORMALIZED SCT PPP-RTO: 0.31 RATIO — SIGNIFICANT CHANGE UP (ref 0–1.16)
NORMALIZED SCT PPP-RTO: SIGNIFICANT CHANGE UP
NRBC # BLD AUTO: 0.02 K/UL — HIGH (ref 0–0)
NRBC # BLD AUTO: 0.02 K/UL — HIGH (ref 0–0)
NRBC # FLD: 0.02 K/UL — HIGH (ref 0–0)
NRBC # FLD: 0.02 K/UL — HIGH (ref 0–0)
NRBC BLD AUTO-RTO: 0 /100 WBCS — SIGNIFICANT CHANGE UP (ref 0–0)
NRBC BLD AUTO-RTO: 0 /100 WBCS — SIGNIFICANT CHANGE UP (ref 0–0)
PAT CTL 2H: 31 SEC — SIGNIFICANT CHANGE UP (ref 26.1–37.8)
PHOSPHATE SERPL-MCNC: 2.1 MG/DL — LOW (ref 2.5–4.5)
PHOSPHATE SERPL-MCNC: 3.2 MG/DL — SIGNIFICANT CHANGE UP (ref 2.5–4.5)
PLATELET # BLD AUTO: 66 K/UL — LOW (ref 150–400)
PLATELET # BLD AUTO: 66 K/UL — LOW (ref 150–400)
PMV BLD: 12 FL — SIGNIFICANT CHANGE UP (ref 7–13)
PMV BLD: 12.8 FL — SIGNIFICANT CHANGE UP (ref 7–13)
POTASSIUM SERPL-MCNC: 3.9 MMOL/L — SIGNIFICANT CHANGE UP (ref 3.5–5.3)
POTASSIUM SERPL-MCNC: 4 MMOL/L — SIGNIFICANT CHANGE UP (ref 3.5–5.3)
POTASSIUM SERPL-SCNC: 3.9 MMOL/L — SIGNIFICANT CHANGE UP (ref 3.5–5.3)
POTASSIUM SERPL-SCNC: 4 MMOL/L — SIGNIFICANT CHANGE UP (ref 3.5–5.3)
PROT SERPL-MCNC: 6 G/DL — SIGNIFICANT CHANGE UP (ref 6–8.3)
PROT SERPL-MCNC: 6.2 G/DL — SIGNIFICANT CHANGE UP (ref 6–8.3)
PROTHROM AB SERPL-ACNC: 30.9 SEC — HIGH (ref 9.9–13.4)
RAPIDTEG MAXIMUM AMPLITUDE: 53.6 MM — SIGNIFICANT CHANGE UP (ref 52–70)
RBC # BLD: 2.43 M/UL — LOW (ref 3.8–5.2)
RBC # BLD: 2.61 M/UL — LOW (ref 3.8–5.2)
RBC # FLD: 25.3 % — HIGH (ref 10.3–14.5)
RBC # FLD: 25.5 % — HIGH (ref 10.3–14.5)
SODIUM SERPL-SCNC: 133 MMOL/L — LOW (ref 135–145)
SODIUM SERPL-SCNC: 136 MMOL/L — SIGNIFICANT CHANGE UP (ref 135–145)
TEG FUNCTIONAL FIBRINOGEN: 18.4 MM — SIGNIFICANT CHANGE UP (ref 15–32)
TEG LY30 (LYSIS): 0.6 % — SIGNIFICANT CHANGE UP (ref 0–2.6)
TEG REACTION TIME: 11.7 MIN — HIGH (ref 4.6–9.1)
WBC # BLD: 4.97 K/UL — SIGNIFICANT CHANGE UP (ref 3.8–10.5)
WBC # BLD: 5.24 K/UL — SIGNIFICANT CHANGE UP (ref 3.8–10.5)
WBC # FLD AUTO: 4.97 K/UL — SIGNIFICANT CHANGE UP (ref 3.8–10.5)
WBC # FLD AUTO: 5.24 K/UL — SIGNIFICANT CHANGE UP (ref 3.8–10.5)

## 2025-07-17 PROCEDURE — 45378 DIAGNOSTIC COLONOSCOPY: CPT | Mod: GC

## 2025-07-17 PROCEDURE — 84481 FREE ASSAY (FT-3): CPT

## 2025-07-17 PROCEDURE — 71045 X-RAY EXAM CHEST 1 VIEW: CPT

## 2025-07-17 PROCEDURE — 80053 COMPREHEN METABOLIC PANEL: CPT

## 2025-07-17 PROCEDURE — 86480 TB TEST CELL IMMUN MEASURE: CPT

## 2025-07-17 PROCEDURE — 86803 HEPATITIS C AB TEST: CPT

## 2025-07-17 PROCEDURE — 82390 ASSAY OF CERULOPLASMIN: CPT

## 2025-07-17 PROCEDURE — 86376 MICROSOMAL ANTIBODY EACH: CPT

## 2025-07-17 PROCEDURE — 82728 ASSAY OF FERRITIN: CPT

## 2025-07-17 PROCEDURE — 82565 ASSAY OF CREATININE: CPT

## 2025-07-17 PROCEDURE — P9011: CPT

## 2025-07-17 PROCEDURE — 93306 TTE W/DOPPLER COMPLETE: CPT

## 2025-07-17 PROCEDURE — 87040 BLOOD CULTURE FOR BACTERIA: CPT

## 2025-07-17 PROCEDURE — 84133 ASSAY OF URINE POTASSIUM: CPT

## 2025-07-17 PROCEDURE — 86850 RBC ANTIBODY SCREEN: CPT

## 2025-07-17 PROCEDURE — 86901 BLOOD TYPING SEROLOGIC RH(D): CPT

## 2025-07-17 PROCEDURE — 97530 THERAPEUTIC ACTIVITIES: CPT

## 2025-07-17 PROCEDURE — 86780 TREPONEMA PALLIDUM: CPT

## 2025-07-17 PROCEDURE — 85610 PROTHROMBIN TIME: CPT

## 2025-07-17 PROCEDURE — 86663 EPSTEIN-BARR ANTIBODY: CPT

## 2025-07-17 PROCEDURE — 85027 COMPLETE CBC AUTOMATED: CPT

## 2025-07-17 PROCEDURE — 97112 NEUROMUSCULAR REEDUCATION: CPT

## 2025-07-17 PROCEDURE — 86644 CMV ANTIBODY: CPT

## 2025-07-17 PROCEDURE — 86704 HEP B CORE ANTIBODY TOTAL: CPT

## 2025-07-17 PROCEDURE — 86706 HEP B SURFACE ANTIBODY: CPT

## 2025-07-17 PROCEDURE — 86709 HEPATITIS A IGM ANTIBODY: CPT

## 2025-07-17 PROCEDURE — 82435 ASSAY OF BLOOD CHLORIDE: CPT

## 2025-07-17 PROCEDURE — 94799 UNLISTED PULMONARY SVC/PX: CPT

## 2025-07-17 PROCEDURE — 83605 ASSAY OF LACTIC ACID: CPT

## 2025-07-17 PROCEDURE — 92612 ENDOSCOPY SWALLOW (FEES) VID: CPT

## 2025-07-17 PROCEDURE — 86664 EPSTEIN-BARR NUCLEAR ANTIGEN: CPT

## 2025-07-17 PROCEDURE — 81025 URINE PREGNANCY TEST: CPT

## 2025-07-17 PROCEDURE — 87086 URINE CULTURE/COLONY COUNT: CPT

## 2025-07-17 PROCEDURE — 97165 OT EVAL LOW COMPLEX 30 MIN: CPT

## 2025-07-17 PROCEDURE — 82803 BLOOD GASES ANY COMBINATION: CPT

## 2025-07-17 PROCEDURE — 83550 IRON BINDING TEST: CPT

## 2025-07-17 PROCEDURE — 85732 THROMBOPLASTIN TIME PARTIAL: CPT

## 2025-07-17 PROCEDURE — 84540 ASSAY OF URINE/UREA-N: CPT

## 2025-07-17 PROCEDURE — 86696 HERPES SIMPLEX TYPE 2 TEST: CPT

## 2025-07-17 PROCEDURE — 87340 HEPATITIS B SURFACE AG IA: CPT

## 2025-07-17 PROCEDURE — 86769 SARS-COV-2 COVID-19 ANTIBODY: CPT

## 2025-07-17 PROCEDURE — 97116 GAIT TRAINING THERAPY: CPT

## 2025-07-17 PROCEDURE — 93308 TTE F-UP OR LMTD: CPT

## 2025-07-17 PROCEDURE — 83735 ASSAY OF MAGNESIUM: CPT

## 2025-07-17 PROCEDURE — 86381 MITOCHONDRIAL ANTIBODY EACH: CPT

## 2025-07-17 PROCEDURE — 94002 VENT MGMT INPAT INIT DAY: CPT

## 2025-07-17 PROCEDURE — 86787 VARICELLA-ZOSTER ANTIBODY: CPT

## 2025-07-17 PROCEDURE — 83540 ASSAY OF IRON: CPT

## 2025-07-17 PROCEDURE — 86146 BETA-2 GLYCOPROTEIN ANTIBODY: CPT

## 2025-07-17 PROCEDURE — 86708 HEPATITIS A ANTIBODY: CPT

## 2025-07-17 PROCEDURE — P9016: CPT

## 2025-07-17 PROCEDURE — 97535 SELF CARE MNGMENT TRAINING: CPT

## 2025-07-17 PROCEDURE — 87641 MR-STAPH DNA AMP PROBE: CPT

## 2025-07-17 PROCEDURE — 84443 ASSAY THYROID STIM HORMONE: CPT

## 2025-07-17 PROCEDURE — 82330 ASSAY OF CALCIUM: CPT

## 2025-07-17 PROCEDURE — 80061 LIPID PANEL: CPT

## 2025-07-17 PROCEDURE — 86038 ANTINUCLEAR ANTIBODIES: CPT

## 2025-07-17 PROCEDURE — 86762 RUBELLA ANTIBODY: CPT

## 2025-07-17 PROCEDURE — 87150 DNA/RNA AMPLIFIED PROBE: CPT

## 2025-07-17 PROCEDURE — 44360 SMALL BOWEL ENDOSCOPY: CPT | Mod: GC

## 2025-07-17 PROCEDURE — P9045: CPT

## 2025-07-17 PROCEDURE — 84702 CHORIONIC GONADOTROPIN TEST: CPT

## 2025-07-17 PROCEDURE — 85014 HEMATOCRIT: CPT

## 2025-07-17 PROCEDURE — 82570 ASSAY OF URINE CREATININE: CPT

## 2025-07-17 PROCEDURE — 86255 FLUORESCENT ANTIBODY SCREEN: CPT

## 2025-07-17 PROCEDURE — 83036 HEMOGLOBIN GLYCOSYLATED A1C: CPT

## 2025-07-17 PROCEDURE — 86923 COMPATIBILITY TEST ELECTRIC: CPT

## 2025-07-17 PROCEDURE — 82784 ASSAY IGA/IGD/IGG/IGM EACH: CPT

## 2025-07-17 PROCEDURE — 84295 ASSAY OF SERUM SODIUM: CPT

## 2025-07-17 PROCEDURE — 87385 HISTOPLASMA CAPSUL AG IA: CPT

## 2025-07-17 PROCEDURE — 86665 EPSTEIN-BARR CAPSID VCA: CPT

## 2025-07-17 PROCEDURE — 84100 ASSAY OF PHOSPHORUS: CPT

## 2025-07-17 PROCEDURE — 80307 DRUG TEST PRSMV CHEM ANLYZR: CPT

## 2025-07-17 PROCEDURE — 84300 ASSAY OF URINE SODIUM: CPT

## 2025-07-17 PROCEDURE — 97161 PT EVAL LOW COMPLEX 20 MIN: CPT

## 2025-07-17 PROCEDURE — 86147 CARDIOLIPIN ANTIBODY EA IG: CPT

## 2025-07-17 PROCEDURE — 85730 THROMBOPLASTIN TIME PARTIAL: CPT

## 2025-07-17 PROCEDURE — 74177 CT ABD & PELVIS W/CONTRAST: CPT

## 2025-07-17 PROCEDURE — 85018 HEMOGLOBIN: CPT

## 2025-07-17 PROCEDURE — 86682 HELMINTH ANTIBODY: CPT

## 2025-07-17 PROCEDURE — 87077 CULTURE AEROBIC IDENTIFY: CPT

## 2025-07-17 PROCEDURE — 87070 CULTURE OTHR SPECIMN AEROBIC: CPT

## 2025-07-17 PROCEDURE — 82140 ASSAY OF AMMONIA: CPT

## 2025-07-17 PROCEDURE — 86900 BLOOD TYPING SEROLOGIC ABO: CPT

## 2025-07-17 PROCEDURE — 82105 ALPHA-FETOPROTEIN SERUM: CPT

## 2025-07-17 PROCEDURE — 86765 RUBEOLA ANTIBODY: CPT

## 2025-07-17 PROCEDURE — 71045 X-RAY EXAM CHEST 1 VIEW: CPT | Mod: 26

## 2025-07-17 PROCEDURE — 87637 SARSCOV2&INF A&B&RSV AMP PRB: CPT

## 2025-07-17 PROCEDURE — 87389 HIV-1 AG W/HIV-1&-2 AB AG IA: CPT

## 2025-07-17 PROCEDURE — 92610 EVALUATE SWALLOWING FUNCTION: CPT

## 2025-07-17 PROCEDURE — 86777 TOXOPLASMA ANTIBODY: CPT

## 2025-07-17 PROCEDURE — 85613 RUSSELL VIPER VENOM DILUTED: CPT

## 2025-07-17 PROCEDURE — 99232 SBSQ HOSP IP/OBS MODERATE 35: CPT

## 2025-07-17 PROCEDURE — 82947 ASSAY GLUCOSE BLOOD QUANT: CPT

## 2025-07-17 PROCEDURE — 94003 VENT MGMT INPAT SUBQ DAY: CPT

## 2025-07-17 PROCEDURE — 97110 THERAPEUTIC EXERCISES: CPT

## 2025-07-17 PROCEDURE — 86695 HERPES SIMPLEX TYPE 1 TEST: CPT

## 2025-07-17 PROCEDURE — P9047: CPT

## 2025-07-17 PROCEDURE — 86985 SPLIT BLOOD OR PRODUCTS: CPT

## 2025-07-17 PROCEDURE — 93970 EXTREMITY STUDY: CPT

## 2025-07-17 PROCEDURE — 99232 SBSQ HOSP IP/OBS MODERATE 35: CPT | Mod: GC,25

## 2025-07-17 PROCEDURE — 87640 STAPH A DNA AMP PROBE: CPT

## 2025-07-17 PROCEDURE — 86735 MUMPS ANTIBODY: CPT

## 2025-07-17 PROCEDURE — 84145 PROCALCITONIN (PCT): CPT

## 2025-07-17 PROCEDURE — 87624 HPV HI-RISK TYP POOLED RSLT: CPT

## 2025-07-17 PROCEDURE — 85396 CLOTTING ASSAY WHOLE BLOOD: CPT

## 2025-07-17 PROCEDURE — 83935 ASSAY OF URINE OSMOLALITY: CPT

## 2025-07-17 PROCEDURE — 81001 URINALYSIS AUTO W/SCOPE: CPT

## 2025-07-17 PROCEDURE — 84156 ASSAY OF PROTEIN URINE: CPT

## 2025-07-17 PROCEDURE — G0480: CPT

## 2025-07-17 PROCEDURE — 87521 HEPATITIS C PROBE&RVRS TRNSC: CPT

## 2025-07-17 PROCEDURE — 87799 DETECT AGENT NOS DNA QUANT: CPT

## 2025-07-17 PROCEDURE — 36415 COLL VENOUS BLD VENIPUNCTURE: CPT

## 2025-07-17 PROCEDURE — 87205 SMEAR GRAM STAIN: CPT

## 2025-07-17 PROCEDURE — 84132 ASSAY OF SERUM POTASSIUM: CPT

## 2025-07-17 PROCEDURE — 82247 BILIRUBIN TOTAL: CPT

## 2025-07-17 DEVICE — NET RETRV ROT ROTH 2.5MMX230CM: Type: IMPLANTABLE DEVICE | Status: FUNCTIONAL

## 2025-07-17 RX ORDER — MAGNESIUM SULFATE 500 MG/ML
2 SYRINGE (ML) INJECTION ONCE
Refills: 0 | Status: COMPLETED | OUTPATIENT
Start: 2025-07-17 | End: 2025-07-17

## 2025-07-17 RX ORDER — SODIUM PHOSPHATE,DIBASIC DIHYD
30 POWDER (GRAM) MISCELLANEOUS ONCE
Refills: 0 | Status: COMPLETED | OUTPATIENT
Start: 2025-07-17 | End: 2025-07-17

## 2025-07-17 RX ORDER — ACETAMINOPHEN 500 MG/5ML
650 LIQUID (ML) ORAL ONCE
Refills: 0 | Status: COMPLETED | OUTPATIENT
Start: 2025-07-17 | End: 2025-07-17

## 2025-07-17 RX ORDER — MAGNESIUM SULFATE 500 MG/ML
2 SYRINGE (ML) INJECTION ONCE
Refills: 0 | Status: COMPLETED | OUTPATIENT
Start: 2025-07-17 | End: 2025-07-19

## 2025-07-17 RX ADMIN — FUROSEMIDE 20 MILLIGRAM(S): 10 INJECTION INTRAMUSCULAR; INTRAVENOUS at 06:58

## 2025-07-17 RX ADMIN — Medication 255 MILLIMOLE(S): at 03:44

## 2025-07-17 RX ADMIN — Medication 25 MILLIGRAM(S): at 06:59

## 2025-07-17 RX ADMIN — Medication 40 MILLIGRAM(S): at 06:59

## 2025-07-17 RX ADMIN — Medication 25 GRAM(S): at 03:45

## 2025-07-17 RX ADMIN — Medication 1 APPLICATION(S): at 06:57

## 2025-07-17 RX ADMIN — Medication 5 MILLIGRAM(S): at 22:47

## 2025-07-17 RX ADMIN — Medication 650 MILLIGRAM(S): at 23:13

## 2025-07-18 ENCOUNTER — NON-APPOINTMENT (OUTPATIENT)
Age: 45
End: 2025-07-18

## 2025-07-18 LAB
ALBUMIN SERPL ELPH-MCNC: 2.5 G/DL — LOW (ref 3.3–5)
ALP SERPL-CCNC: 170 U/L — HIGH (ref 40–120)
ALT FLD-CCNC: 90 U/L — HIGH (ref 10–45)
AMMONIA BLD-MCNC: 39 UMOL/L — SIGNIFICANT CHANGE UP (ref 11–55)
ANION GAP SERPL CALC-SCNC: 13 MMOL/L — SIGNIFICANT CHANGE UP (ref 5–17)
APTT BLD: 38.4 SEC — HIGH (ref 26.1–36.8)
AST SERPL-CCNC: 126 U/L — HIGH (ref 10–40)
BILIRUB SERPL-MCNC: 9.3 MG/DL — HIGH (ref 0.2–1.2)
BLD GP AB SCN SERPL QL: NEGATIVE — SIGNIFICANT CHANGE UP
BUN SERPL-MCNC: 14 MG/DL — SIGNIFICANT CHANGE UP (ref 7–23)
CALCIUM SERPL-MCNC: 9.1 MG/DL — SIGNIFICANT CHANGE UP (ref 8.4–10.5)
CHLORIDE SERPL-SCNC: 105 MMOL/L — SIGNIFICANT CHANGE UP (ref 96–108)
CO2 SERPL-SCNC: 18 MMOL/L — LOW (ref 22–31)
CREAT SERPL-MCNC: 0.99 MG/DL — SIGNIFICANT CHANGE UP (ref 0.5–1.3)
CULTURE RESULTS: ABNORMAL
CULTURE RESULTS: SIGNIFICANT CHANGE UP
CULTURE RESULTS: SIGNIFICANT CHANGE UP
EGFR: 72 ML/MIN/1.73M2 — SIGNIFICANT CHANGE UP
EGFR: 72 ML/MIN/1.73M2 — SIGNIFICANT CHANGE UP
GLUCOSE SERPL-MCNC: 75 MG/DL — SIGNIFICANT CHANGE UP (ref 70–99)
HCT VFR BLD CALC: 25.6 % — LOW (ref 34.5–45)
HGB BLD-MCNC: 8.1 G/DL — LOW (ref 11.5–15.5)
IMMATURE PLATELET FRACTION #: 3.9 K/UL — LOW (ref 4.7–11.1)
IMMATURE PLATELET FRACTION %: 6.4 % — HIGH (ref 1.6–4.9)
INR BLD: 2.53 RATIO — HIGH (ref 0.85–1.16)
MAGNESIUM SERPL-MCNC: 1.6 MG/DL — SIGNIFICANT CHANGE UP (ref 1.6–2.6)
MCHC RBC-ENTMCNC: 30.6 PG — SIGNIFICANT CHANGE UP (ref 27–34)
MCHC RBC-ENTMCNC: 31.6 G/DL — LOW (ref 32–36)
MCV RBC AUTO: 96.6 FL — SIGNIFICANT CHANGE UP (ref 80–100)
NRBC # BLD AUTO: 0 K/UL — SIGNIFICANT CHANGE UP (ref 0–0)
NRBC # FLD: 0 K/UL — SIGNIFICANT CHANGE UP (ref 0–0)
NRBC BLD AUTO-RTO: 0 /100 WBCS — SIGNIFICANT CHANGE UP (ref 0–0)
ORGANISM # SPEC MICROSCOPIC CNT: ABNORMAL
ORGANISM # SPEC MICROSCOPIC CNT: ABNORMAL
PHOSPHATE SERPL-MCNC: 3 MG/DL — SIGNIFICANT CHANGE UP (ref 2.5–4.5)
PLATELET # BLD AUTO: 61 K/UL — LOW (ref 150–400)
PMV BLD: 11.5 FL — SIGNIFICANT CHANGE UP (ref 7–13)
POTASSIUM SERPL-MCNC: 4 MMOL/L — SIGNIFICANT CHANGE UP (ref 3.5–5.3)
POTASSIUM SERPL-SCNC: 4 MMOL/L — SIGNIFICANT CHANGE UP (ref 3.5–5.3)
PROT SERPL-MCNC: 6.3 G/DL — SIGNIFICANT CHANGE UP (ref 6–8.3)
PROTHROM AB SERPL-ACNC: 28.6 SEC — HIGH (ref 9.9–13.4)
RBC # BLD: 2.65 M/UL — LOW (ref 3.8–5.2)
RBC # FLD: 26.1 % — HIGH (ref 10.3–14.5)
RH IG SCN BLD-IMP: POSITIVE — SIGNIFICANT CHANGE UP
SODIUM SERPL-SCNC: 136 MMOL/L — SIGNIFICANT CHANGE UP (ref 135–145)
SPECIMEN SOURCE: SIGNIFICANT CHANGE UP
WBC # BLD: 4.3 K/UL — SIGNIFICANT CHANGE UP (ref 3.8–10.5)
WBC # FLD AUTO: 4.3 K/UL — SIGNIFICANT CHANGE UP (ref 3.8–10.5)

## 2025-07-18 PROCEDURE — 86735 MUMPS ANTIBODY: CPT

## 2025-07-18 PROCEDURE — 87799 DETECT AGENT NOS DNA QUANT: CPT

## 2025-07-18 PROCEDURE — 99232 SBSQ HOSP IP/OBS MODERATE 35: CPT | Mod: GC

## 2025-07-18 PROCEDURE — 87086 URINE CULTURE/COLONY COUNT: CPT

## 2025-07-18 PROCEDURE — 86708 HEPATITIS A ANTIBODY: CPT

## 2025-07-18 PROCEDURE — 92612 ENDOSCOPY SWALLOW (FEES) VID: CPT

## 2025-07-18 PROCEDURE — 84443 ASSAY THYROID STIM HORMONE: CPT

## 2025-07-18 PROCEDURE — 84702 CHORIONIC GONADOTROPIN TEST: CPT

## 2025-07-18 PROCEDURE — 80307 DRUG TEST PRSMV CHEM ANLYZR: CPT

## 2025-07-18 PROCEDURE — 84481 FREE ASSAY (FT-3): CPT

## 2025-07-18 PROCEDURE — 92526 ORAL FUNCTION THERAPY: CPT

## 2025-07-18 PROCEDURE — 86901 BLOOD TYPING SEROLOGIC RH(D): CPT

## 2025-07-18 PROCEDURE — 82570 ASSAY OF URINE CREATININE: CPT

## 2025-07-18 PROCEDURE — 86147 CARDIOLIPIN ANTIBODY EA IG: CPT

## 2025-07-18 PROCEDURE — 84540 ASSAY OF URINE/UREA-N: CPT

## 2025-07-18 PROCEDURE — 86695 HERPES SIMPLEX TYPE 1 TEST: CPT

## 2025-07-18 PROCEDURE — 97535 SELF CARE MNGMENT TRAINING: CPT

## 2025-07-18 PROCEDURE — 36415 COLL VENOUS BLD VENIPUNCTURE: CPT

## 2025-07-18 PROCEDURE — 86376 MICROSOMAL ANTIBODY EACH: CPT

## 2025-07-18 PROCEDURE — P9047: CPT

## 2025-07-18 PROCEDURE — 87640 STAPH A DNA AMP PROBE: CPT

## 2025-07-18 PROCEDURE — 81025 URINE PREGNANCY TEST: CPT

## 2025-07-18 PROCEDURE — 83540 ASSAY OF IRON: CPT

## 2025-07-18 PROCEDURE — G0480: CPT

## 2025-07-18 PROCEDURE — 97110 THERAPEUTIC EXERCISES: CPT

## 2025-07-18 PROCEDURE — 86769 SARS-COV-2 COVID-19 ANTIBODY: CPT

## 2025-07-18 PROCEDURE — 82803 BLOOD GASES ANY COMBINATION: CPT

## 2025-07-18 PROCEDURE — 97165 OT EVAL LOW COMPLEX 30 MIN: CPT

## 2025-07-18 PROCEDURE — 86038 ANTINUCLEAR ANTIBODIES: CPT

## 2025-07-18 PROCEDURE — 87637 SARSCOV2&INF A&B&RSV AMP PRB: CPT

## 2025-07-18 PROCEDURE — 87340 HEPATITIS B SURFACE AG IA: CPT

## 2025-07-18 PROCEDURE — 97530 THERAPEUTIC ACTIVITIES: CPT

## 2025-07-18 PROCEDURE — 85014 HEMATOCRIT: CPT

## 2025-07-18 PROCEDURE — 85613 RUSSELL VIPER VENOM DILUTED: CPT

## 2025-07-18 PROCEDURE — P9016: CPT

## 2025-07-18 PROCEDURE — 93308 TTE F-UP OR LMTD: CPT

## 2025-07-18 PROCEDURE — 82390 ASSAY OF CERULOPLASMIN: CPT

## 2025-07-18 PROCEDURE — 82140 ASSAY OF AMMONIA: CPT

## 2025-07-18 PROCEDURE — 87077 CULTURE AEROBIC IDENTIFY: CPT

## 2025-07-18 PROCEDURE — 86480 TB TEST CELL IMMUN MEASURE: CPT

## 2025-07-18 PROCEDURE — 94799 UNLISTED PULMONARY SVC/PX: CPT

## 2025-07-18 PROCEDURE — 80053 COMPREHEN METABOLIC PANEL: CPT

## 2025-07-18 PROCEDURE — 84133 ASSAY OF URINE POTASSIUM: CPT

## 2025-07-18 PROCEDURE — 81001 URINALYSIS AUTO W/SCOPE: CPT

## 2025-07-18 PROCEDURE — 80061 LIPID PANEL: CPT

## 2025-07-18 PROCEDURE — P9045: CPT

## 2025-07-18 PROCEDURE — 92610 EVALUATE SWALLOWING FUNCTION: CPT

## 2025-07-18 PROCEDURE — 83036 HEMOGLOBIN GLYCOSYLATED A1C: CPT

## 2025-07-18 PROCEDURE — 94002 VENT MGMT INPAT INIT DAY: CPT

## 2025-07-18 PROCEDURE — 87385 HISTOPLASMA CAPSUL AG IA: CPT

## 2025-07-18 PROCEDURE — 97116 GAIT TRAINING THERAPY: CPT

## 2025-07-18 PROCEDURE — 85732 THROMBOPLASTIN TIME PARTIAL: CPT

## 2025-07-18 PROCEDURE — 87070 CULTURE OTHR SPECIMN AEROBIC: CPT

## 2025-07-18 PROCEDURE — 84300 ASSAY OF URINE SODIUM: CPT

## 2025-07-18 PROCEDURE — 86803 HEPATITIS C AB TEST: CPT

## 2025-07-18 PROCEDURE — 99223 1ST HOSP IP/OBS HIGH 75: CPT

## 2025-07-18 PROCEDURE — 76856 US EXAM PELVIC COMPLETE: CPT | Mod: 26

## 2025-07-18 PROCEDURE — 84156 ASSAY OF PROTEIN URINE: CPT

## 2025-07-18 PROCEDURE — 82105 ALPHA-FETOPROTEIN SERUM: CPT

## 2025-07-18 PROCEDURE — 86696 HERPES SIMPLEX TYPE 2 TEST: CPT

## 2025-07-18 PROCEDURE — 86704 HEP B CORE ANTIBODY TOTAL: CPT

## 2025-07-18 PROCEDURE — 85018 HEMOGLOBIN: CPT

## 2025-07-18 PROCEDURE — 86850 RBC ANTIBODY SCREEN: CPT

## 2025-07-18 PROCEDURE — 97112 NEUROMUSCULAR REEDUCATION: CPT

## 2025-07-18 PROCEDURE — 86706 HEP B SURFACE ANTIBODY: CPT

## 2025-07-18 PROCEDURE — 86664 EPSTEIN-BARR NUCLEAR ANTIGEN: CPT

## 2025-07-18 PROCEDURE — 99418 PROLNG IP/OBS E/M EA 15 MIN: CPT

## 2025-07-18 PROCEDURE — 86682 HELMINTH ANTIBODY: CPT

## 2025-07-18 PROCEDURE — 71045 X-RAY EXAM CHEST 1 VIEW: CPT

## 2025-07-18 PROCEDURE — 83550 IRON BINDING TEST: CPT

## 2025-07-18 PROCEDURE — 87040 BLOOD CULTURE FOR BACTERIA: CPT

## 2025-07-18 PROCEDURE — 84100 ASSAY OF PHOSPHORUS: CPT

## 2025-07-18 PROCEDURE — 83605 ASSAY OF LACTIC ACID: CPT

## 2025-07-18 PROCEDURE — 86923 COMPATIBILITY TEST ELECTRIC: CPT

## 2025-07-18 PROCEDURE — 93970 EXTREMITY STUDY: CPT

## 2025-07-18 PROCEDURE — 86900 BLOOD TYPING SEROLOGIC ABO: CPT

## 2025-07-18 PROCEDURE — 87624 HPV HI-RISK TYP POOLED RSLT: CPT

## 2025-07-18 PROCEDURE — 87205 SMEAR GRAM STAIN: CPT

## 2025-07-18 PROCEDURE — 86665 EPSTEIN-BARR CAPSID VCA: CPT

## 2025-07-18 PROCEDURE — 86709 HEPATITIS A IGM ANTIBODY: CPT

## 2025-07-18 PROCEDURE — 85027 COMPLETE CBC AUTOMATED: CPT

## 2025-07-18 PROCEDURE — 87389 HIV-1 AG W/HIV-1&-2 AB AG IA: CPT

## 2025-07-18 PROCEDURE — 86381 MITOCHONDRIAL ANTIBODY EACH: CPT

## 2025-07-18 PROCEDURE — 86255 FLUORESCENT ANTIBODY SCREEN: CPT

## 2025-07-18 PROCEDURE — 86787 VARICELLA-ZOSTER ANTIBODY: CPT

## 2025-07-18 PROCEDURE — 82947 ASSAY GLUCOSE BLOOD QUANT: CPT

## 2025-07-18 PROCEDURE — 82247 BILIRUBIN TOTAL: CPT

## 2025-07-18 PROCEDURE — 93306 TTE W/DOPPLER COMPLETE: CPT

## 2025-07-18 PROCEDURE — 76856 US EXAM PELVIC COMPLETE: CPT

## 2025-07-18 PROCEDURE — 82435 ASSAY OF BLOOD CHLORIDE: CPT

## 2025-07-18 PROCEDURE — 86765 RUBEOLA ANTIBODY: CPT

## 2025-07-18 PROCEDURE — 94003 VENT MGMT INPAT SUBQ DAY: CPT

## 2025-07-18 PROCEDURE — 86663 EPSTEIN-BARR ANTIBODY: CPT

## 2025-07-18 PROCEDURE — 83935 ASSAY OF URINE OSMOLALITY: CPT

## 2025-07-18 PROCEDURE — 85396 CLOTTING ASSAY WHOLE BLOOD: CPT

## 2025-07-18 PROCEDURE — 85730 THROMBOPLASTIN TIME PARTIAL: CPT

## 2025-07-18 PROCEDURE — 84295 ASSAY OF SERUM SODIUM: CPT

## 2025-07-18 PROCEDURE — 84132 ASSAY OF SERUM POTASSIUM: CPT

## 2025-07-18 PROCEDURE — 82565 ASSAY OF CREATININE: CPT

## 2025-07-18 PROCEDURE — 82784 ASSAY IGA/IGD/IGG/IGM EACH: CPT

## 2025-07-18 PROCEDURE — 97161 PT EVAL LOW COMPLEX 20 MIN: CPT

## 2025-07-18 PROCEDURE — 86777 TOXOPLASMA ANTIBODY: CPT

## 2025-07-18 PROCEDURE — 87521 HEPATITIS C PROBE&RVRS TRNSC: CPT

## 2025-07-18 PROCEDURE — 82330 ASSAY OF CALCIUM: CPT

## 2025-07-18 PROCEDURE — 87150 DNA/RNA AMPLIFIED PROBE: CPT

## 2025-07-18 PROCEDURE — 84145 PROCALCITONIN (PCT): CPT

## 2025-07-18 PROCEDURE — 74177 CT ABD & PELVIS W/CONTRAST: CPT

## 2025-07-18 PROCEDURE — 86762 RUBELLA ANTIBODY: CPT

## 2025-07-18 PROCEDURE — P9011: CPT

## 2025-07-18 PROCEDURE — 83735 ASSAY OF MAGNESIUM: CPT

## 2025-07-18 PROCEDURE — 82728 ASSAY OF FERRITIN: CPT

## 2025-07-18 PROCEDURE — 86146 BETA-2 GLYCOPROTEIN ANTIBODY: CPT

## 2025-07-18 PROCEDURE — 86985 SPLIT BLOOD OR PRODUCTS: CPT

## 2025-07-18 PROCEDURE — 87641 MR-STAPH DNA AMP PROBE: CPT

## 2025-07-18 PROCEDURE — 86644 CMV ANTIBODY: CPT

## 2025-07-18 PROCEDURE — 86780 TREPONEMA PALLIDUM: CPT

## 2025-07-18 PROCEDURE — 85610 PROTHROMBIN TIME: CPT

## 2025-07-18 RX ORDER — HYDROMORPHONE/SOD CHLOR,ISO/PF 2 MG/10 ML
0.5 SYRINGE (ML) INJECTION ONCE
Refills: 0 | Status: DISCONTINUED | OUTPATIENT
Start: 2025-07-18 | End: 2025-07-18

## 2025-07-18 RX ADMIN — Medication 0.5 MILLIGRAM(S): at 19:38

## 2025-07-18 RX ADMIN — Medication 650 MILLIGRAM(S): at 00:13

## 2025-07-18 RX ADMIN — Medication 25 MILLIGRAM(S): at 05:34

## 2025-07-18 RX ADMIN — Medication 40 MILLIGRAM(S): at 05:33

## 2025-07-18 RX ADMIN — Medication 0.5 MILLIGRAM(S): at 18:47

## 2025-07-18 RX ADMIN — FUROSEMIDE 20 MILLIGRAM(S): 10 INJECTION INTRAMUSCULAR; INTRAVENOUS at 05:34

## 2025-07-18 RX ADMIN — Medication 1 TABLET(S): at 17:11

## 2025-07-18 RX ADMIN — Medication 1 APPLICATION(S): at 05:39

## 2025-07-18 RX ADMIN — Medication 1 APPLICATION(S): at 17:11

## 2025-07-18 RX ADMIN — Medication 1 APPLICATION(S): at 05:33

## 2025-07-19 LAB
ALBUMIN SERPL ELPH-MCNC: 2.5 G/DL — LOW (ref 3.3–5)
ALP SERPL-CCNC: 237 U/L — HIGH (ref 40–120)
ALT FLD-CCNC: 84 U/L — HIGH (ref 10–45)
AMMONIA BLD-MCNC: 60 UMOL/L — HIGH (ref 11–55)
ANION GAP SERPL CALC-SCNC: 13 MMOL/L — SIGNIFICANT CHANGE UP (ref 5–17)
APTT BLD: 40.9 SEC — HIGH (ref 26.1–36.8)
AST SERPL-CCNC: 111 U/L — HIGH (ref 10–40)
BILIRUB SERPL-MCNC: 9.1 MG/DL — HIGH (ref 0.2–1.2)
BUN SERPL-MCNC: 10 MG/DL — SIGNIFICANT CHANGE UP (ref 7–23)
CALCIUM SERPL-MCNC: 9 MG/DL — SIGNIFICANT CHANGE UP (ref 8.4–10.5)
CANCER AG125 SERPL-ACNC: 281 U/ML — HIGH
CANCER AG19-9 SERPL-ACNC: 366 U/ML — HIGH
CEA SERPL-MCNC: 11.5 NG/ML — HIGH (ref 0–3.8)
CHLORIDE SERPL-SCNC: 105 MMOL/L — SIGNIFICANT CHANGE UP (ref 96–108)
CO2 SERPL-SCNC: 17 MMOL/L — LOW (ref 22–31)
CREAT SERPL-MCNC: 0.82 MG/DL — SIGNIFICANT CHANGE UP (ref 0.5–1.3)
CYTOLOGY SPEC DOC CYTO: SIGNIFICANT CHANGE UP
EGFR: 90 ML/MIN/1.73M2 — SIGNIFICANT CHANGE UP
EGFR: 90 ML/MIN/1.73M2 — SIGNIFICANT CHANGE UP
GLUCOSE SERPL-MCNC: 75 MG/DL — SIGNIFICANT CHANGE UP (ref 70–99)
HCT VFR BLD CALC: 26.2 % — LOW (ref 34.5–45)
HGB BLD-MCNC: 8 G/DL — LOW (ref 11.5–15.5)
IMMATURE PLATELET FRACTION #: 4 K/UL — LOW (ref 4.7–11.1)
IMMATURE PLATELET FRACTION %: 6.2 % — HIGH (ref 1.6–4.9)
INR BLD: 2.51 RATIO — HIGH (ref 0.85–1.16)
MAGNESIUM SERPL-MCNC: 1.4 MG/DL — LOW (ref 1.6–2.6)
MCHC RBC-ENTMCNC: 29.6 PG — SIGNIFICANT CHANGE UP (ref 27–34)
MCHC RBC-ENTMCNC: 30.5 G/DL — LOW (ref 32–36)
MCV RBC AUTO: 97 FL — SIGNIFICANT CHANGE UP (ref 80–100)
NRBC # BLD AUTO: 0 K/UL — SIGNIFICANT CHANGE UP (ref 0–0)
NRBC # FLD: 0 K/UL — SIGNIFICANT CHANGE UP (ref 0–0)
NRBC BLD AUTO-RTO: 0 /100 WBCS — SIGNIFICANT CHANGE UP (ref 0–0)
PHOSPHATE SERPL-MCNC: 2.6 MG/DL — SIGNIFICANT CHANGE UP (ref 2.5–4.5)
PLATELET # BLD AUTO: 64 K/UL — LOW (ref 150–400)
PMV BLD: 12.1 FL — SIGNIFICANT CHANGE UP (ref 7–13)
POTASSIUM SERPL-MCNC: 4.5 MMOL/L — SIGNIFICANT CHANGE UP (ref 3.5–5.3)
POTASSIUM SERPL-SCNC: 4.5 MMOL/L — SIGNIFICANT CHANGE UP (ref 3.5–5.3)
PROT SERPL-MCNC: 6.5 G/DL — SIGNIFICANT CHANGE UP (ref 6–8.3)
PROTHROM AB SERPL-ACNC: 28.4 SEC — HIGH (ref 9.9–13.4)
RBC # BLD: 2.7 M/UL — LOW (ref 3.8–5.2)
RBC # FLD: 25.9 % — HIGH (ref 10.3–14.5)
SODIUM SERPL-SCNC: 135 MMOL/L — SIGNIFICANT CHANGE UP (ref 135–145)
WBC # BLD: 5.37 K/UL — SIGNIFICANT CHANGE UP (ref 3.8–10.5)
WBC # FLD AUTO: 5.37 K/UL — SIGNIFICANT CHANGE UP (ref 3.8–10.5)

## 2025-07-19 PROCEDURE — 99232 SBSQ HOSP IP/OBS MODERATE 35: CPT

## 2025-07-19 RX ORDER — TRAMADOL HYDROCHLORIDE 50 MG/1
25 TABLET, FILM COATED ORAL ONCE
Refills: 0 | Status: DISCONTINUED | OUTPATIENT
Start: 2025-07-19 | End: 2025-07-19

## 2025-07-19 RX ORDER — MAGNESIUM OXIDE 400 MG
400 TABLET ORAL
Refills: 0 | Status: DISCONTINUED | OUTPATIENT
Start: 2025-07-19 | End: 2025-07-20

## 2025-07-19 RX ORDER — ACETAMINOPHEN 500 MG/5ML
650 LIQUID (ML) ORAL ONCE
Refills: 0 | Status: COMPLETED | OUTPATIENT
Start: 2025-07-19 | End: 2025-07-19

## 2025-07-19 RX ORDER — ALPRAZOLAM 0.5 MG
0.5 TABLET, EXTENDED RELEASE 24 HR ORAL ONCE
Refills: 0 | Status: DISCONTINUED | OUTPATIENT
Start: 2025-07-19 | End: 2025-07-20

## 2025-07-19 RX ORDER — OXYCODONE HYDROCHLORIDE 30 MG/1
5 TABLET ORAL ONCE
Refills: 0 | Status: DISCONTINUED | OUTPATIENT
Start: 2025-07-19 | End: 2025-07-19

## 2025-07-19 RX ORDER — MAGNESIUM SULFATE 500 MG/ML
2 SYRINGE (ML) INJECTION ONCE
Refills: 0 | Status: COMPLETED | OUTPATIENT
Start: 2025-07-19 | End: 2025-07-19

## 2025-07-19 RX ORDER — URSODIOL 300 MG/1
500 CAPSULE ORAL
Refills: 0 | Status: DISCONTINUED | OUTPATIENT
Start: 2025-07-19 | End: 2025-07-27

## 2025-07-19 RX ADMIN — Medication 5 MILLIGRAM(S): at 00:16

## 2025-07-19 RX ADMIN — FUROSEMIDE 20 MILLIGRAM(S): 10 INJECTION INTRAMUSCULAR; INTRAVENOUS at 05:37

## 2025-07-19 RX ADMIN — Medication 5 MILLIGRAM(S): at 22:36

## 2025-07-19 RX ADMIN — Medication 40 MILLIGRAM(S): at 05:37

## 2025-07-19 RX ADMIN — OXYCODONE HYDROCHLORIDE 5 MILLIGRAM(S): 30 TABLET ORAL at 00:48

## 2025-07-19 RX ADMIN — Medication 1 TABLET(S): at 12:15

## 2025-07-19 RX ADMIN — URSODIOL 500 MILLIGRAM(S): 300 CAPSULE ORAL at 17:50

## 2025-07-19 RX ADMIN — Medication 25 GRAM(S): at 00:48

## 2025-07-19 RX ADMIN — Medication 400 MILLIGRAM(S): at 17:50

## 2025-07-19 RX ADMIN — TRAMADOL HYDROCHLORIDE 25 MILLIGRAM(S): 50 TABLET, FILM COATED ORAL at 21:50

## 2025-07-19 RX ADMIN — Medication 1 APPLICATION(S): at 05:37

## 2025-07-19 RX ADMIN — Medication 25 GRAM(S): at 12:06

## 2025-07-19 RX ADMIN — Medication 1 APPLICATION(S): at 17:50

## 2025-07-19 RX ADMIN — Medication 25 MILLIGRAM(S): at 05:37

## 2025-07-19 RX ADMIN — Medication 1 APPLICATION(S): at 05:38

## 2025-07-19 RX ADMIN — TRAMADOL HYDROCHLORIDE 25 MILLIGRAM(S): 50 TABLET, FILM COATED ORAL at 20:48

## 2025-07-19 RX ADMIN — OXYCODONE HYDROCHLORIDE 5 MILLIGRAM(S): 30 TABLET ORAL at 01:50

## 2025-07-19 RX ADMIN — Medication 650 MILLIGRAM(S): at 19:30

## 2025-07-19 RX ADMIN — Medication 650 MILLIGRAM(S): at 18:12

## 2025-07-20 LAB
ALBUMIN SERPL ELPH-MCNC: 2.3 G/DL — LOW (ref 3.3–5)
ALP SERPL-CCNC: 205 U/L — HIGH (ref 40–120)
ALT FLD-CCNC: 62 U/L — HIGH (ref 10–45)
AMMONIA BLD-MCNC: 84 UMOL/L — HIGH (ref 11–55)
ANION GAP SERPL CALC-SCNC: 9 MMOL/L — SIGNIFICANT CHANGE UP (ref 5–17)
APPEARANCE UR: CLEAR — SIGNIFICANT CHANGE UP
APTT BLD: 40.7 SEC — HIGH (ref 26.1–36.8)
AST SERPL-CCNC: 77 U/L — HIGH (ref 10–40)
BACTERIA # UR AUTO: ABNORMAL /HPF
BILIRUB SERPL-MCNC: 8.3 MG/DL — HIGH (ref 0.2–1.2)
BILIRUB UR-MCNC: NEGATIVE — SIGNIFICANT CHANGE UP
BUN SERPL-MCNC: 9 MG/DL — SIGNIFICANT CHANGE UP (ref 7–23)
CALCIUM SERPL-MCNC: 8.5 MG/DL — SIGNIFICANT CHANGE UP (ref 8.4–10.5)
CAST: 0 /LPF — SIGNIFICANT CHANGE UP (ref 0–4)
CHLORIDE SERPL-SCNC: 106 MMOL/L — SIGNIFICANT CHANGE UP (ref 96–108)
CO2 SERPL-SCNC: 19 MMOL/L — LOW (ref 22–31)
COLOR SPEC: SIGNIFICANT CHANGE UP
CREAT ?TM UR-MCNC: 21 MG/DL — SIGNIFICANT CHANGE UP
CREAT SERPL-MCNC: 0.85 MG/DL — SIGNIFICANT CHANGE UP (ref 0.5–1.3)
DIFF PNL FLD: ABNORMAL
EGFR: 86 ML/MIN/1.73M2 — SIGNIFICANT CHANGE UP
EGFR: 86 ML/MIN/1.73M2 — SIGNIFICANT CHANGE UP
GLUCOSE SERPL-MCNC: 76 MG/DL — SIGNIFICANT CHANGE UP (ref 70–99)
GLUCOSE UR QL: NEGATIVE MG/DL — SIGNIFICANT CHANGE UP
HCT VFR BLD CALC: 22.7 % — LOW (ref 34.5–45)
HCT VFR BLD CALC: 29.9 % — LOW (ref 34.5–45)
HGB BLD-MCNC: 7.1 G/DL — LOW (ref 11.5–15.5)
HGB BLD-MCNC: 9.5 G/DL — LOW (ref 11.5–15.5)
IGNF NEG CNTRL BLD: SIGNIFICANT CHANGE UP
IMMATURE PLATELET FRACTION #: 1.9 K/UL — LOW (ref 4.7–11.1)
IMMATURE PLATELET FRACTION #: 2.2 K/UL — LOW (ref 4.7–11.1)
IMMATURE PLATELET FRACTION %: 2.7 % — SIGNIFICANT CHANGE UP (ref 1.6–4.9)
IMMATURE PLATELET FRACTION %: 3.7 % — SIGNIFICANT CHANGE UP (ref 1.6–4.9)
INR BLD: 2.95 RATIO — HIGH (ref 0.85–1.16)
KETONES UR QL: NEGATIVE MG/DL — SIGNIFICANT CHANGE UP
LEUKOCYTE ESTERASE UR-ACNC: NEGATIVE — SIGNIFICANT CHANGE UP
M TB IFN-G BLD-IMP: NEGATIVE — SIGNIFICANT CHANGE UP
MAGNESIUM SERPL-MCNC: 1.4 MG/DL — LOW (ref 1.6–2.6)
MCHC RBC-ENTMCNC: 30.2 PG — SIGNIFICANT CHANGE UP (ref 27–34)
MCHC RBC-ENTMCNC: 30.2 PG — SIGNIFICANT CHANGE UP (ref 27–34)
MCHC RBC-ENTMCNC: 31.3 G/DL — LOW (ref 32–36)
MCHC RBC-ENTMCNC: 31.8 G/DL — LOW (ref 32–36)
MCV RBC AUTO: 94.9 FL — SIGNIFICANT CHANGE UP (ref 80–100)
MCV RBC AUTO: 96.6 FL — SIGNIFICANT CHANGE UP (ref 80–100)
MITOGEN IGNF BCKGRD COR BLD-ACNC: 0 — SIGNIFICANT CHANGE UP
MITOGEN IGNF BCKGRD COR BLD-ACNC: 0 — SIGNIFICANT CHANGE UP
MITOGEN IGNF.SPOT COUNT BLD: SIGNIFICANT CHANGE UP
NITRITE UR-MCNC: NEGATIVE — SIGNIFICANT CHANGE UP
NRBC # BLD AUTO: 0 K/UL — SIGNIFICANT CHANGE UP (ref 0–0)
NRBC # BLD AUTO: 0.02 K/UL — HIGH (ref 0–0)
NRBC # FLD: 0 K/UL — SIGNIFICANT CHANGE UP (ref 0–0)
NRBC # FLD: 0.02 K/UL — HIGH (ref 0–0)
NRBC BLD AUTO-RTO: 0 /100 WBCS — SIGNIFICANT CHANGE UP (ref 0–0)
NRBC BLD AUTO-RTO: 0 /100 WBCS — SIGNIFICANT CHANGE UP (ref 0–0)
OSMOLALITY UR: 254 MOS/KG — LOW (ref 300–900)
PH UR: 8 — SIGNIFICANT CHANGE UP (ref 5–8)
PHOSPHATE SERPL-MCNC: 2.5 MG/DL — SIGNIFICANT CHANGE UP (ref 2.5–4.5)
PLATELET # BLD AUTO: 59 K/UL — LOW (ref 150–400)
PLATELET # BLD AUTO: 72 K/UL — LOW (ref 150–400)
PMV BLD: 12.1 FL — SIGNIFICANT CHANGE UP (ref 7–13)
PMV BLD: 12.1 FL — SIGNIFICANT CHANGE UP (ref 7–13)
POTASSIUM SERPL-MCNC: 4.6 MMOL/L — SIGNIFICANT CHANGE UP (ref 3.5–5.3)
POTASSIUM SERPL-SCNC: 4.6 MMOL/L — SIGNIFICANT CHANGE UP (ref 3.5–5.3)
POTASSIUM UR-SCNC: 11 MMOL/L — SIGNIFICANT CHANGE UP
PROT ?TM UR-MCNC: <7 MG/DL — SIGNIFICANT CHANGE UP (ref 0–12)
PROT SERPL-MCNC: 5.8 G/DL — LOW (ref 6–8.3)
PROT UR-MCNC: NEGATIVE MG/DL — SIGNIFICANT CHANGE UP
PROT/CREAT UR-RTO: SIGNIFICANT CHANGE UP (ref 0–0.2)
PROTHROM AB SERPL-ACNC: 33.6 SEC — HIGH (ref 9.9–13.4)
RBC # BLD: 2.35 M/UL — LOW (ref 3.8–5.2)
RBC # BLD: 3.15 M/UL — LOW (ref 3.8–5.2)
RBC # FLD: 24.9 % — HIGH (ref 10.3–14.5)
RBC # FLD: 25.7 % — HIGH (ref 10.3–14.5)
RBC CASTS # UR COMP ASSIST: 2 /HPF — SIGNIFICANT CHANGE UP (ref 0–4)
SODIUM SERPL-SCNC: 134 MMOL/L — LOW (ref 135–145)
SODIUM UR-SCNC: 104 MMOL/L — SIGNIFICANT CHANGE UP
SP GR SPEC: 1.01 — SIGNIFICANT CHANGE UP (ref 1–1.03)
SQUAMOUS # UR AUTO: 1 /HPF — SIGNIFICANT CHANGE UP (ref 0–5)
UROBILINOGEN FLD QL: 0.2 MG/DL — SIGNIFICANT CHANGE UP (ref 0.2–1)
WBC # BLD: 4.73 K/UL — SIGNIFICANT CHANGE UP (ref 3.8–10.5)
WBC # BLD: 5.4 K/UL — SIGNIFICANT CHANGE UP (ref 3.8–10.5)
WBC # FLD AUTO: 4.73 K/UL — SIGNIFICANT CHANGE UP (ref 3.8–10.5)
WBC # FLD AUTO: 5.4 K/UL — SIGNIFICANT CHANGE UP (ref 3.8–10.5)
WBC UR QL: 0 /HPF — SIGNIFICANT CHANGE UP (ref 0–5)

## 2025-07-20 PROCEDURE — 97161 PT EVAL LOW COMPLEX 20 MIN: CPT

## 2025-07-20 PROCEDURE — 86664 EPSTEIN-BARR NUCLEAR ANTIGEN: CPT

## 2025-07-20 PROCEDURE — 87640 STAPH A DNA AMP PROBE: CPT

## 2025-07-20 PROCEDURE — 86704 HEP B CORE ANTIBODY TOTAL: CPT

## 2025-07-20 PROCEDURE — 92610 EVALUATE SWALLOWING FUNCTION: CPT

## 2025-07-20 PROCEDURE — 97112 NEUROMUSCULAR REEDUCATION: CPT

## 2025-07-20 PROCEDURE — 87385 HISTOPLASMA CAPSUL AG IA: CPT

## 2025-07-20 PROCEDURE — 87086 URINE CULTURE/COLONY COUNT: CPT

## 2025-07-20 PROCEDURE — 87150 DNA/RNA AMPLIFIED PROBE: CPT

## 2025-07-20 PROCEDURE — 86644 CMV ANTIBODY: CPT

## 2025-07-20 PROCEDURE — 81001 URINALYSIS AUTO W/SCOPE: CPT

## 2025-07-20 PROCEDURE — 86803 HEPATITIS C AB TEST: CPT

## 2025-07-20 PROCEDURE — 86901 BLOOD TYPING SEROLOGIC RH(D): CPT

## 2025-07-20 PROCEDURE — 84481 FREE ASSAY (FT-3): CPT

## 2025-07-20 PROCEDURE — 87070 CULTURE OTHR SPECIMN AEROBIC: CPT

## 2025-07-20 PROCEDURE — 87521 HEPATITIS C PROBE&RVRS TRNSC: CPT

## 2025-07-20 PROCEDURE — 97165 OT EVAL LOW COMPLEX 30 MIN: CPT

## 2025-07-20 PROCEDURE — 86663 EPSTEIN-BARR ANTIBODY: CPT

## 2025-07-20 PROCEDURE — 71045 X-RAY EXAM CHEST 1 VIEW: CPT | Mod: 26

## 2025-07-20 PROCEDURE — 84133 ASSAY OF URINE POTASSIUM: CPT

## 2025-07-20 PROCEDURE — 85396 CLOTTING ASSAY WHOLE BLOOD: CPT

## 2025-07-20 PROCEDURE — 93306 TTE W/DOPPLER COMPLETE: CPT

## 2025-07-20 PROCEDURE — 87340 HEPATITIS B SURFACE AG IA: CPT

## 2025-07-20 PROCEDURE — 84156 ASSAY OF PROTEIN URINE: CPT

## 2025-07-20 PROCEDURE — 80061 LIPID PANEL: CPT

## 2025-07-20 PROCEDURE — 76856 US EXAM PELVIC COMPLETE: CPT

## 2025-07-20 PROCEDURE — 86696 HERPES SIMPLEX TYPE 2 TEST: CPT

## 2025-07-20 PROCEDURE — 84145 PROCALCITONIN (PCT): CPT

## 2025-07-20 PROCEDURE — 82784 ASSAY IGA/IGD/IGG/IGM EACH: CPT

## 2025-07-20 PROCEDURE — P9045: CPT

## 2025-07-20 PROCEDURE — 85027 COMPLETE CBC AUTOMATED: CPT

## 2025-07-20 PROCEDURE — 83550 IRON BINDING TEST: CPT

## 2025-07-20 PROCEDURE — P9047: CPT

## 2025-07-20 PROCEDURE — 80053 COMPREHEN METABOLIC PANEL: CPT

## 2025-07-20 PROCEDURE — 74018 RADEX ABDOMEN 1 VIEW: CPT | Mod: 26

## 2025-07-20 PROCEDURE — 85613 RUSSELL VIPER VENOM DILUTED: CPT

## 2025-07-20 PROCEDURE — 86769 SARS-COV-2 COVID-19 ANTIBODY: CPT

## 2025-07-20 PROCEDURE — 86376 MICROSOMAL ANTIBODY EACH: CPT

## 2025-07-20 PROCEDURE — 83540 ASSAY OF IRON: CPT

## 2025-07-20 PROCEDURE — 86900 BLOOD TYPING SEROLOGIC ABO: CPT

## 2025-07-20 PROCEDURE — 83935 ASSAY OF URINE OSMOLALITY: CPT

## 2025-07-20 PROCEDURE — 84540 ASSAY OF URINE/UREA-N: CPT

## 2025-07-20 PROCEDURE — 82378 CARCINOEMBRYONIC ANTIGEN: CPT

## 2025-07-20 PROCEDURE — 87624 HPV HI-RISK TYP POOLED RSLT: CPT

## 2025-07-20 PROCEDURE — 71045 X-RAY EXAM CHEST 1 VIEW: CPT

## 2025-07-20 PROCEDURE — 94002 VENT MGMT INPAT INIT DAY: CPT

## 2025-07-20 PROCEDURE — 87641 MR-STAPH DNA AMP PROBE: CPT

## 2025-07-20 PROCEDURE — 82565 ASSAY OF CREATININE: CPT

## 2025-07-20 PROCEDURE — 87389 HIV-1 AG W/HIV-1&-2 AB AG IA: CPT

## 2025-07-20 PROCEDURE — 86255 FLUORESCENT ANTIBODY SCREEN: CPT

## 2025-07-20 PROCEDURE — 86147 CARDIOLIPIN ANTIBODY EA IG: CPT

## 2025-07-20 PROCEDURE — 74018 RADEX ABDOMEN 1 VIEW: CPT

## 2025-07-20 PROCEDURE — 97116 GAIT TRAINING THERAPY: CPT

## 2025-07-20 PROCEDURE — 86708 HEPATITIS A ANTIBODY: CPT

## 2025-07-20 PROCEDURE — 86850 RBC ANTIBODY SCREEN: CPT

## 2025-07-20 PROCEDURE — 82140 ASSAY OF AMMONIA: CPT

## 2025-07-20 PROCEDURE — 82570 ASSAY OF URINE CREATININE: CPT

## 2025-07-20 PROCEDURE — 82330 ASSAY OF CALCIUM: CPT

## 2025-07-20 PROCEDURE — 99232 SBSQ HOSP IP/OBS MODERATE 35: CPT

## 2025-07-20 PROCEDURE — 86695 HERPES SIMPLEX TYPE 1 TEST: CPT

## 2025-07-20 PROCEDURE — 85732 THROMBOPLASTIN TIME PARTIAL: CPT

## 2025-07-20 PROCEDURE — 85014 HEMATOCRIT: CPT

## 2025-07-20 PROCEDURE — 86481 TB AG RESPONSE T-CELL SUSP: CPT

## 2025-07-20 PROCEDURE — 92526 ORAL FUNCTION THERAPY: CPT

## 2025-07-20 PROCEDURE — 85610 PROTHROMBIN TIME: CPT

## 2025-07-20 PROCEDURE — P9059: CPT

## 2025-07-20 PROCEDURE — 82390 ASSAY OF CERULOPLASMIN: CPT

## 2025-07-20 PROCEDURE — 83735 ASSAY OF MAGNESIUM: CPT

## 2025-07-20 PROCEDURE — 86985 SPLIT BLOOD OR PRODUCTS: CPT

## 2025-07-20 PROCEDURE — 86787 VARICELLA-ZOSTER ANTIBODY: CPT

## 2025-07-20 PROCEDURE — 82247 BILIRUBIN TOTAL: CPT

## 2025-07-20 PROCEDURE — 36415 COLL VENOUS BLD VENIPUNCTURE: CPT

## 2025-07-20 PROCEDURE — 84132 ASSAY OF SERUM POTASSIUM: CPT

## 2025-07-20 PROCEDURE — 87040 BLOOD CULTURE FOR BACTERIA: CPT

## 2025-07-20 PROCEDURE — 72197 MRI PELVIS W/O & W/DYE: CPT | Mod: 26

## 2025-07-20 PROCEDURE — 72197 MRI PELVIS W/O & W/DYE: CPT

## 2025-07-20 PROCEDURE — 92612 ENDOSCOPY SWALLOW (FEES) VID: CPT

## 2025-07-20 PROCEDURE — 83605 ASSAY OF LACTIC ACID: CPT

## 2025-07-20 PROCEDURE — 87077 CULTURE AEROBIC IDENTIFY: CPT

## 2025-07-20 PROCEDURE — 83036 HEMOGLOBIN GLYCOSYLATED A1C: CPT

## 2025-07-20 PROCEDURE — 86682 HELMINTH ANTIBODY: CPT

## 2025-07-20 PROCEDURE — G0480: CPT

## 2025-07-20 PROCEDURE — 97530 THERAPEUTIC ACTIVITIES: CPT

## 2025-07-20 PROCEDURE — 86706 HEP B SURFACE ANTIBODY: CPT

## 2025-07-20 PROCEDURE — 86301 IMMUNOASSAY TUMOR CA 19-9: CPT

## 2025-07-20 PROCEDURE — A9585: CPT

## 2025-07-20 PROCEDURE — 81025 URINE PREGNANCY TEST: CPT

## 2025-07-20 PROCEDURE — 86923 COMPATIBILITY TEST ELECTRIC: CPT

## 2025-07-20 PROCEDURE — 86777 TOXOPLASMA ANTIBODY: CPT

## 2025-07-20 PROCEDURE — 84100 ASSAY OF PHOSPHORUS: CPT

## 2025-07-20 PROCEDURE — P9011: CPT

## 2025-07-20 PROCEDURE — 93970 EXTREMITY STUDY: CPT

## 2025-07-20 PROCEDURE — 82435 ASSAY OF BLOOD CHLORIDE: CPT

## 2025-07-20 PROCEDURE — 86665 EPSTEIN-BARR CAPSID VCA: CPT

## 2025-07-20 PROCEDURE — 86709 HEPATITIS A IGM ANTIBODY: CPT

## 2025-07-20 PROCEDURE — 84443 ASSAY THYROID STIM HORMONE: CPT

## 2025-07-20 PROCEDURE — P9016: CPT

## 2025-07-20 PROCEDURE — 82105 ALPHA-FETOPROTEIN SERUM: CPT

## 2025-07-20 PROCEDURE — 86480 TB TEST CELL IMMUN MEASURE: CPT

## 2025-07-20 PROCEDURE — 84300 ASSAY OF URINE SODIUM: CPT

## 2025-07-20 PROCEDURE — 97110 THERAPEUTIC EXERCISES: CPT

## 2025-07-20 PROCEDURE — 94003 VENT MGMT INPAT SUBQ DAY: CPT

## 2025-07-20 PROCEDURE — 86735 MUMPS ANTIBODY: CPT

## 2025-07-20 PROCEDURE — 86304 IMMUNOASSAY TUMOR CA 125: CPT

## 2025-07-20 PROCEDURE — 87205 SMEAR GRAM STAIN: CPT

## 2025-07-20 PROCEDURE — 85730 THROMBOPLASTIN TIME PARTIAL: CPT

## 2025-07-20 PROCEDURE — 97535 SELF CARE MNGMENT TRAINING: CPT

## 2025-07-20 PROCEDURE — 82947 ASSAY GLUCOSE BLOOD QUANT: CPT

## 2025-07-20 PROCEDURE — 93308 TTE F-UP OR LMTD: CPT

## 2025-07-20 PROCEDURE — 87637 SARSCOV2&INF A&B&RSV AMP PRB: CPT

## 2025-07-20 PROCEDURE — 86381 MITOCHONDRIAL ANTIBODY EACH: CPT

## 2025-07-20 PROCEDURE — 82803 BLOOD GASES ANY COMBINATION: CPT

## 2025-07-20 PROCEDURE — 84295 ASSAY OF SERUM SODIUM: CPT

## 2025-07-20 PROCEDURE — 86038 ANTINUCLEAR ANTIBODIES: CPT

## 2025-07-20 PROCEDURE — 82728 ASSAY OF FERRITIN: CPT

## 2025-07-20 PROCEDURE — 87799 DETECT AGENT NOS DNA QUANT: CPT

## 2025-07-20 PROCEDURE — 86305 HUMAN EPIDIDYMIS PROTEIN 4: CPT

## 2025-07-20 PROCEDURE — 80307 DRUG TEST PRSMV CHEM ANLYZR: CPT

## 2025-07-20 PROCEDURE — 84702 CHORIONIC GONADOTROPIN TEST: CPT

## 2025-07-20 PROCEDURE — 86780 TREPONEMA PALLIDUM: CPT

## 2025-07-20 PROCEDURE — 86762 RUBELLA ANTIBODY: CPT

## 2025-07-20 PROCEDURE — 86146 BETA-2 GLYCOPROTEIN ANTIBODY: CPT

## 2025-07-20 PROCEDURE — 86765 RUBEOLA ANTIBODY: CPT

## 2025-07-20 PROCEDURE — 85018 HEMOGLOBIN: CPT

## 2025-07-20 PROCEDURE — 74177 CT ABD & PELVIS W/CONTRAST: CPT

## 2025-07-20 PROCEDURE — 94799 UNLISTED PULMONARY SVC/PX: CPT

## 2025-07-20 RX ORDER — MAGNESIUM OXIDE 400 MG
800 TABLET ORAL
Refills: 0 | Status: DISCONTINUED | OUTPATIENT
Start: 2025-07-20 | End: 2025-07-30

## 2025-07-20 RX ORDER — ALPRAZOLAM 0.5 MG
0.5 TABLET, EXTENDED RELEASE 24 HR ORAL ONCE
Refills: 0 | Status: DISCONTINUED | OUTPATIENT
Start: 2025-07-20 | End: 2025-07-20

## 2025-07-20 RX ORDER — FUROSEMIDE 10 MG/ML
20 INJECTION INTRAMUSCULAR; INTRAVENOUS DAILY
Refills: 0 | Status: DISCONTINUED | OUTPATIENT
Start: 2025-07-21 | End: 2025-07-24

## 2025-07-20 RX ORDER — OXYCODONE HYDROCHLORIDE 30 MG/1
5 TABLET ORAL ONCE
Refills: 0 | Status: DISCONTINUED | OUTPATIENT
Start: 2025-07-20 | End: 2025-07-20

## 2025-07-20 RX ORDER — FUROSEMIDE 10 MG/ML
40 INJECTION INTRAMUSCULAR; INTRAVENOUS ONCE
Refills: 0 | Status: COMPLETED | OUTPATIENT
Start: 2025-07-20 | End: 2025-07-20

## 2025-07-20 RX ORDER — SOD PHOS DI, MONO/K PHOS MONO 250 MG
1 TABLET ORAL
Refills: 0 | Status: COMPLETED | OUTPATIENT
Start: 2025-07-20 | End: 2025-07-21

## 2025-07-20 RX ADMIN — FUROSEMIDE 40 MILLIGRAM(S): 10 INJECTION INTRAMUSCULAR; INTRAVENOUS at 13:29

## 2025-07-20 RX ADMIN — Medication 1 APPLICATION(S): at 05:16

## 2025-07-20 RX ADMIN — Medication 1 APPLICATION(S): at 16:37

## 2025-07-20 RX ADMIN — Medication 800 MILLIGRAM(S): at 22:16

## 2025-07-20 RX ADMIN — Medication 1 TABLET(S): at 16:35

## 2025-07-20 RX ADMIN — OXYCODONE HYDROCHLORIDE 5 MILLIGRAM(S): 30 TABLET ORAL at 01:30

## 2025-07-20 RX ADMIN — Medication 0.5 MILLIGRAM(S): at 18:18

## 2025-07-20 RX ADMIN — FUROSEMIDE 20 MILLIGRAM(S): 10 INJECTION INTRAMUSCULAR; INTRAVENOUS at 05:15

## 2025-07-20 RX ADMIN — POLYETHYLENE GLYCOL 3350 17 GRAM(S): 17 POWDER, FOR SOLUTION ORAL at 16:47

## 2025-07-20 RX ADMIN — Medication 1 TABLET(S): at 13:29

## 2025-07-20 RX ADMIN — Medication 5 MILLIGRAM(S): at 22:16

## 2025-07-20 RX ADMIN — LACTULOSE 10 GRAM(S): 10 SOLUTION ORAL at 13:29

## 2025-07-20 RX ADMIN — Medication 0.5 MILLIGRAM(S): at 16:35

## 2025-07-20 RX ADMIN — OXYCODONE HYDROCHLORIDE 5 MILLIGRAM(S): 30 TABLET ORAL at 00:29

## 2025-07-20 RX ADMIN — URSODIOL 500 MILLIGRAM(S): 300 CAPSULE ORAL at 16:36

## 2025-07-20 RX ADMIN — URSODIOL 500 MILLIGRAM(S): 300 CAPSULE ORAL at 05:16

## 2025-07-20 RX ADMIN — Medication 40 MILLIGRAM(S): at 05:15

## 2025-07-20 RX ADMIN — Medication 25 MILLIGRAM(S): at 05:15

## 2025-07-21 LAB
ALBUMIN SERPL ELPH-MCNC: 2.4 G/DL — LOW (ref 3.3–5)
ALP SERPL-CCNC: 186 U/L — HIGH (ref 40–120)
ALT FLD-CCNC: 55 U/L — HIGH (ref 10–45)
AMMONIA BLD-MCNC: 90 UMOL/L — HIGH (ref 11–55)
ANION GAP SERPL CALC-SCNC: 11 MMOL/L — SIGNIFICANT CHANGE UP (ref 5–17)
APPEARANCE UR: CLEAR — SIGNIFICANT CHANGE UP
APTT BLD: 43.3 SEC — HIGH (ref 26.1–36.8)
AST SERPL-CCNC: 69 U/L — HIGH (ref 10–40)
BACTERIA # UR AUTO: NEGATIVE /HPF — SIGNIFICANT CHANGE UP
BILIRUB SERPL-MCNC: 9.8 MG/DL — HIGH (ref 0.2–1.2)
BILIRUB UR-MCNC: ABNORMAL
BLD GP AB SCN SERPL QL: NEGATIVE — SIGNIFICANT CHANGE UP
BUN SERPL-MCNC: 11 MG/DL — SIGNIFICANT CHANGE UP (ref 7–23)
CALCIUM SERPL-MCNC: 8.6 MG/DL — SIGNIFICANT CHANGE UP (ref 8.4–10.5)
CAST: 0 /LPF — SIGNIFICANT CHANGE UP (ref 0–4)
CHLORIDE SERPL-SCNC: 105 MMOL/L — SIGNIFICANT CHANGE UP (ref 96–108)
CO2 SERPL-SCNC: 20 MMOL/L — LOW (ref 22–31)
COLOR SPEC: SIGNIFICANT CHANGE UP
CREAT SERPL-MCNC: 0.98 MG/DL — SIGNIFICANT CHANGE UP (ref 0.5–1.3)
DIFF PNL FLD: ABNORMAL
EGFR: 73 ML/MIN/1.73M2 — SIGNIFICANT CHANGE UP
EGFR: 73 ML/MIN/1.73M2 — SIGNIFICANT CHANGE UP
GLUCOSE SERPL-MCNC: 100 MG/DL — HIGH (ref 70–99)
GLUCOSE UR QL: NEGATIVE MG/DL — SIGNIFICANT CHANGE UP
HCT VFR BLD CALC: 26.5 % — LOW (ref 34.5–45)
HGB BLD-MCNC: 8.5 G/DL — LOW (ref 11.5–15.5)
IMMATURE PLATELET FRACTION #: 2.2 K/UL — LOW (ref 4.7–11.1)
IMMATURE PLATELET FRACTION %: 3.6 % — SIGNIFICANT CHANGE UP (ref 1.6–4.9)
INR BLD: 2.87 RATIO — HIGH (ref 0.85–1.16)
KETONES UR QL: NEGATIVE MG/DL — SIGNIFICANT CHANGE UP
LEUKOCYTE ESTERASE UR-ACNC: NEGATIVE — SIGNIFICANT CHANGE UP
MAGNESIUM SERPL-MCNC: 1.2 MG/DL — LOW (ref 1.6–2.6)
MCHC RBC-ENTMCNC: 30.1 PG — SIGNIFICANT CHANGE UP (ref 27–34)
MCHC RBC-ENTMCNC: 32.1 G/DL — SIGNIFICANT CHANGE UP (ref 32–36)
MCV RBC AUTO: 94 FL — SIGNIFICANT CHANGE UP (ref 80–100)
NITRITE UR-MCNC: NEGATIVE — SIGNIFICANT CHANGE UP
NRBC # BLD AUTO: 0 K/UL — SIGNIFICANT CHANGE UP (ref 0–0)
NRBC # FLD: 0 K/UL — SIGNIFICANT CHANGE UP (ref 0–0)
NRBC BLD AUTO-RTO: 0 /100 WBCS — SIGNIFICANT CHANGE UP (ref 0–0)
PH UR: 8 — SIGNIFICANT CHANGE UP (ref 5–8)
PHOSPHATE SERPL-MCNC: 3.1 MG/DL — SIGNIFICANT CHANGE UP (ref 2.5–4.5)
PLATELET # BLD AUTO: 60 K/UL — LOW (ref 150–400)
PMV BLD: 10.1 FL — SIGNIFICANT CHANGE UP (ref 7–13)
POTASSIUM SERPL-MCNC: 4.6 MMOL/L — SIGNIFICANT CHANGE UP (ref 3.5–5.3)
POTASSIUM SERPL-SCNC: 4.6 MMOL/L — SIGNIFICANT CHANGE UP (ref 3.5–5.3)
PROT SERPL-MCNC: 6.2 G/DL — SIGNIFICANT CHANGE UP (ref 6–8.3)
PROT UR-MCNC: NEGATIVE MG/DL — SIGNIFICANT CHANGE UP
PROTHROM AB SERPL-ACNC: 32.4 SEC — HIGH (ref 9.9–13.4)
RBC # BLD: 2.82 M/UL — LOW (ref 3.8–5.2)
RBC # FLD: 24.6 % — HIGH (ref 10.3–14.5)
RBC CASTS # UR COMP ASSIST: 4 /HPF — SIGNIFICANT CHANGE UP (ref 0–4)
RH IG SCN BLD-IMP: POSITIVE — SIGNIFICANT CHANGE UP
SODIUM SERPL-SCNC: 136 MMOL/L — SIGNIFICANT CHANGE UP (ref 135–145)
SP GR SPEC: 1.01 — SIGNIFICANT CHANGE UP (ref 1–1.03)
SQUAMOUS # UR AUTO: 1 /HPF — SIGNIFICANT CHANGE UP (ref 0–5)
UROBILINOGEN FLD QL: 0.2 MG/DL — SIGNIFICANT CHANGE UP (ref 0.2–1)
UUN UR-MCNC: 75 MG/DL — SIGNIFICANT CHANGE UP
WBC # BLD: 4.14 K/UL — SIGNIFICANT CHANGE UP (ref 3.8–10.5)
WBC # FLD AUTO: 4.14 K/UL — SIGNIFICANT CHANGE UP (ref 3.8–10.5)
WBC UR QL: 0 /HPF — SIGNIFICANT CHANGE UP (ref 0–5)

## 2025-07-21 PROCEDURE — 76705 ECHO EXAM OF ABDOMEN: CPT | Mod: 26

## 2025-07-21 PROCEDURE — 99232 SBSQ HOSP IP/OBS MODERATE 35: CPT

## 2025-07-21 RX ORDER — MAGNESIUM SULFATE 500 MG/ML
2 SYRINGE (ML) INJECTION ONCE
Refills: 0 | Status: COMPLETED | OUTPATIENT
Start: 2025-07-21 | End: 2025-07-21

## 2025-07-21 RX ORDER — ALBUMIN (HUMAN) 12.5 G/50ML
100 INJECTION, SOLUTION INTRAVENOUS EVERY 8 HOURS
Refills: 0 | Status: COMPLETED | OUTPATIENT
Start: 2025-07-21 | End: 2025-07-24

## 2025-07-21 RX ORDER — CEFTRIAXONE 500 MG/1
1000 INJECTION, POWDER, FOR SOLUTION INTRAMUSCULAR; INTRAVENOUS ONCE
Refills: 0 | Status: COMPLETED | OUTPATIENT
Start: 2025-07-21 | End: 2025-07-21

## 2025-07-21 RX ORDER — CEFTRIAXONE 500 MG/1
INJECTION, POWDER, FOR SOLUTION INTRAMUSCULAR; INTRAVENOUS
Refills: 0 | Status: COMPLETED | OUTPATIENT
Start: 2025-07-21 | End: 2025-07-29

## 2025-07-21 RX ORDER — LACTULOSE 10 G/15ML
20 SOLUTION ORAL
Refills: 0 | Status: DISCONTINUED | OUTPATIENT
Start: 2025-07-21 | End: 2025-07-28

## 2025-07-21 RX ORDER — CEFTRIAXONE 500 MG/1
1000 INJECTION, POWDER, FOR SOLUTION INTRAMUSCULAR; INTRAVENOUS EVERY 24 HOURS
Refills: 0 | Status: COMPLETED | OUTPATIENT
Start: 2025-07-22 | End: 2025-07-28

## 2025-07-21 RX ADMIN — ALBUMIN (HUMAN) 50 MILLILITER(S): 12.5 INJECTION, SOLUTION INTRAVENOUS at 21:33

## 2025-07-21 RX ADMIN — Medication 800 MILLIGRAM(S): at 18:14

## 2025-07-21 RX ADMIN — Medication 25 GRAM(S): at 09:09

## 2025-07-21 RX ADMIN — FUROSEMIDE 20 MILLIGRAM(S): 10 INJECTION INTRAMUSCULAR; INTRAVENOUS at 05:22

## 2025-07-21 RX ADMIN — ALBUMIN (HUMAN) 50 MILLILITER(S): 12.5 INJECTION, SOLUTION INTRAVENOUS at 13:23

## 2025-07-21 RX ADMIN — Medication 800 MILLIGRAM(S): at 09:09

## 2025-07-21 RX ADMIN — URSODIOL 500 MILLIGRAM(S): 300 CAPSULE ORAL at 18:14

## 2025-07-21 RX ADMIN — Medication 1 TABLET(S): at 11:04

## 2025-07-21 RX ADMIN — Medication 25 MILLIGRAM(S): at 05:22

## 2025-07-21 RX ADMIN — CEFTRIAXONE 100 MILLIGRAM(S): 500 INJECTION, POWDER, FOR SOLUTION INTRAMUSCULAR; INTRAVENOUS at 09:09

## 2025-07-21 RX ADMIN — Medication 1 APPLICATION(S): at 05:22

## 2025-07-21 RX ADMIN — LACTULOSE 20 GRAM(S): 10 SOLUTION ORAL at 11:04

## 2025-07-21 RX ADMIN — Medication 1 APPLICATION(S): at 18:14

## 2025-07-21 RX ADMIN — URSODIOL 500 MILLIGRAM(S): 300 CAPSULE ORAL at 05:22

## 2025-07-21 RX ADMIN — POLYETHYLENE GLYCOL 3350 17 GRAM(S): 17 POWDER, FOR SOLUTION ORAL at 18:14

## 2025-07-21 RX ADMIN — Medication 1 TABLET(S): at 09:09

## 2025-07-21 RX ADMIN — LACTULOSE 20 GRAM(S): 10 SOLUTION ORAL at 18:13

## 2025-07-21 RX ADMIN — Medication 40 MILLIGRAM(S): at 05:23

## 2025-07-22 LAB
ALBUMIN SERPL ELPH-MCNC: 2.8 G/DL — LOW (ref 3.3–5)
ALP SERPL-CCNC: 184 U/L — HIGH (ref 40–120)
ALT FLD-CCNC: 40 U/L — SIGNIFICANT CHANGE UP (ref 10–45)
AMMONIA BLD-MCNC: 64 UMOL/L — HIGH (ref 11–55)
ANION GAP SERPL CALC-SCNC: 11 MMOL/L — SIGNIFICANT CHANGE UP (ref 5–17)
APTT BLD: 31.2 SEC — SIGNIFICANT CHANGE UP (ref 26.1–37.8)
APTT BLD: 44.4 SEC — HIGH (ref 26.1–36.8)
APTT BLD: 46.6 SEC — HIGH (ref 26.1–36.8)
APTT BLD: 50.7 SEC — HIGH (ref 26.1–36.8)
AST SERPL-CCNC: 53 U/L — HIGH (ref 10–40)
BILIRUB SERPL-MCNC: 8.7 MG/DL — HIGH (ref 0.2–1.2)
BUN SERPL-MCNC: 10 MG/DL — SIGNIFICANT CHANGE UP (ref 7–23)
CALCIUM SERPL-MCNC: 8.7 MG/DL — SIGNIFICANT CHANGE UP (ref 8.4–10.5)
CHLORIDE SERPL-SCNC: 105 MMOL/L — SIGNIFICANT CHANGE UP (ref 96–108)
CO2 SERPL-SCNC: 20 MMOL/L — LOW (ref 22–31)
CREAT SERPL-MCNC: 0.91 MG/DL — SIGNIFICANT CHANGE UP (ref 0.5–1.3)
CULTURE RESULTS: SIGNIFICANT CHANGE UP
EGFR: 79 ML/MIN/1.73M2 — SIGNIFICANT CHANGE UP
EGFR: 79 ML/MIN/1.73M2 — SIGNIFICANT CHANGE UP
GLUCOSE SERPL-MCNC: 122 MG/DL — HIGH (ref 70–99)
HCT VFR BLD CALC: 24 % — LOW (ref 34.5–45)
HE 4: 250 PMOL/L — HIGH (ref 0–63.6)
HGB BLD-MCNC: 7.7 G/DL — LOW (ref 11.5–15.5)
IMMATURE PLATELET FRACTION #: 1.6 K/UL — LOW (ref 4.7–11.1)
IMMATURE PLATELET FRACTION %: 2.8 % — SIGNIFICANT CHANGE UP (ref 1.6–4.9)
INR BLD: 3.17 RATIO — HIGH (ref 0.85–1.16)
MAGNESIUM SERPL-MCNC: 1.5 MG/DL — LOW (ref 1.6–2.6)
MCHC RBC-ENTMCNC: 30.8 PG — SIGNIFICANT CHANGE UP (ref 27–34)
MCHC RBC-ENTMCNC: 32.1 G/DL — SIGNIFICANT CHANGE UP (ref 32–36)
MCV RBC AUTO: 96 FL — SIGNIFICANT CHANGE UP (ref 80–100)
NRBC # BLD AUTO: 0 K/UL — SIGNIFICANT CHANGE UP (ref 0–0)
NRBC # FLD: 0 K/UL — SIGNIFICANT CHANGE UP (ref 0–0)
NRBC BLD AUTO-RTO: 0 /100 WBCS — SIGNIFICANT CHANGE UP (ref 0–0)
PHOSPHATE SERPL-MCNC: 2.4 MG/DL — LOW (ref 2.5–4.5)
PLATELET # BLD AUTO: 57 K/UL — LOW (ref 150–400)
PMV BLD: 11.4 FL — SIGNIFICANT CHANGE UP (ref 7–13)
POTASSIUM SERPL-MCNC: 4.6 MMOL/L — SIGNIFICANT CHANGE UP (ref 3.5–5.3)
POTASSIUM SERPL-SCNC: 4.6 MMOL/L — SIGNIFICANT CHANGE UP (ref 3.5–5.3)
PROT SERPL-MCNC: 5.8 G/DL — LOW (ref 6–8.3)
PROTHROM AB SERPL-ACNC: 36 SEC — HIGH (ref 9.9–13.4)
RAPIDTEG MAXIMUM AMPLITUDE: <40 MM — LOW (ref 52–70)
RBC # BLD: 2.5 M/UL — LOW (ref 3.8–5.2)
RBC # FLD: 25.2 % — HIGH (ref 10.3–14.5)
SODIUM SERPL-SCNC: 136 MMOL/L — SIGNIFICANT CHANGE UP (ref 135–145)
SPECIMEN SOURCE: SIGNIFICANT CHANGE UP
TEG FUNCTIONAL FIBRINOGEN: 10.7 MM — LOW (ref 15–32)
TEG LY30 (LYSIS): 1 % — SIGNIFICANT CHANGE UP (ref 0–2.6)
TEG REACTION TIME: 10.7 MIN — HIGH (ref 4.6–9.1)
WBC # BLD: 3.3 K/UL — LOW (ref 3.8–10.5)
WBC # FLD AUTO: 3.3 K/UL — LOW (ref 3.8–10.5)

## 2025-07-22 PROCEDURE — 86695 HERPES SIMPLEX TYPE 1 TEST: CPT

## 2025-07-22 PROCEDURE — 82947 ASSAY GLUCOSE BLOOD QUANT: CPT

## 2025-07-22 PROCEDURE — 36415 COLL VENOUS BLD VENIPUNCTURE: CPT

## 2025-07-22 PROCEDURE — P9059: CPT

## 2025-07-22 PROCEDURE — 82570 ASSAY OF URINE CREATININE: CPT

## 2025-07-22 PROCEDURE — 86780 TREPONEMA PALLIDUM: CPT

## 2025-07-22 PROCEDURE — 86481 TB AG RESPONSE T-CELL SUSP: CPT

## 2025-07-22 PROCEDURE — 86850 RBC ANTIBODY SCREEN: CPT

## 2025-07-22 PROCEDURE — 84300 ASSAY OF URINE SODIUM: CPT

## 2025-07-22 PROCEDURE — 82728 ASSAY OF FERRITIN: CPT

## 2025-07-22 PROCEDURE — 80053 COMPREHEN METABOLIC PANEL: CPT

## 2025-07-22 PROCEDURE — 76705 ECHO EXAM OF ABDOMEN: CPT

## 2025-07-22 PROCEDURE — 86147 CARDIOLIPIN ANTIBODY EA IG: CPT

## 2025-07-22 PROCEDURE — 87521 HEPATITIS C PROBE&RVRS TRNSC: CPT

## 2025-07-22 PROCEDURE — 86762 RUBELLA ANTIBODY: CPT

## 2025-07-22 PROCEDURE — 82105 ALPHA-FETOPROTEIN SERUM: CPT

## 2025-07-22 PROCEDURE — 82390 ASSAY OF CERULOPLASMIN: CPT

## 2025-07-22 PROCEDURE — 80307 DRUG TEST PRSMV CHEM ANLYZR: CPT

## 2025-07-22 PROCEDURE — 85730 THROMBOPLASTIN TIME PARTIAL: CPT

## 2025-07-22 PROCEDURE — 86803 HEPATITIS C AB TEST: CPT

## 2025-07-22 PROCEDURE — 86663 EPSTEIN-BARR ANTIBODY: CPT

## 2025-07-22 PROCEDURE — 87150 DNA/RNA AMPLIFIED PROBE: CPT

## 2025-07-22 PROCEDURE — 97116 GAIT TRAINING THERAPY: CPT

## 2025-07-22 PROCEDURE — 83735 ASSAY OF MAGNESIUM: CPT

## 2025-07-22 PROCEDURE — 87637 SARSCOV2&INF A&B&RSV AMP PRB: CPT

## 2025-07-22 PROCEDURE — 82140 ASSAY OF AMMONIA: CPT

## 2025-07-22 PROCEDURE — 87040 BLOOD CULTURE FOR BACTERIA: CPT

## 2025-07-22 PROCEDURE — 97535 SELF CARE MNGMENT TRAINING: CPT

## 2025-07-22 PROCEDURE — 82378 CARCINOEMBRYONIC ANTIGEN: CPT

## 2025-07-22 PROCEDURE — 86255 FLUORESCENT ANTIBODY SCREEN: CPT

## 2025-07-22 PROCEDURE — 97530 THERAPEUTIC ACTIVITIES: CPT

## 2025-07-22 PROCEDURE — 76641 ULTRASOUND BREAST COMPLETE: CPT

## 2025-07-22 PROCEDURE — 82803 BLOOD GASES ANY COMBINATION: CPT

## 2025-07-22 PROCEDURE — A9585: CPT

## 2025-07-22 PROCEDURE — 86146 BETA-2 GLYCOPROTEIN ANTIBODY: CPT

## 2025-07-22 PROCEDURE — 86665 EPSTEIN-BARR CAPSID VCA: CPT

## 2025-07-22 PROCEDURE — 86704 HEP B CORE ANTIBODY TOTAL: CPT

## 2025-07-22 PROCEDURE — 81025 URINE PREGNANCY TEST: CPT

## 2025-07-22 PROCEDURE — 92610 EVALUATE SWALLOWING FUNCTION: CPT

## 2025-07-22 PROCEDURE — 80061 LIPID PANEL: CPT

## 2025-07-22 PROCEDURE — 82247 BILIRUBIN TOTAL: CPT

## 2025-07-22 PROCEDURE — 86985 SPLIT BLOOD OR PRODUCTS: CPT

## 2025-07-22 PROCEDURE — 84540 ASSAY OF URINE/UREA-N: CPT

## 2025-07-22 PROCEDURE — 82330 ASSAY OF CALCIUM: CPT

## 2025-07-22 PROCEDURE — 94799 UNLISTED PULMONARY SVC/PX: CPT

## 2025-07-22 PROCEDURE — 85610 PROTHROMBIN TIME: CPT

## 2025-07-22 PROCEDURE — 72197 MRI PELVIS W/O & W/DYE: CPT

## 2025-07-22 PROCEDURE — 87624 HPV HI-RISK TYP POOLED RSLT: CPT

## 2025-07-22 PROCEDURE — 71045 X-RAY EXAM CHEST 1 VIEW: CPT | Mod: 26

## 2025-07-22 PROCEDURE — 71045 X-RAY EXAM CHEST 1 VIEW: CPT

## 2025-07-22 PROCEDURE — 86900 BLOOD TYPING SEROLOGIC ABO: CPT

## 2025-07-22 PROCEDURE — 86305 HUMAN EPIDIDYMIS PROTEIN 4: CPT

## 2025-07-22 PROCEDURE — 86038 ANTINUCLEAR ANTIBODIES: CPT

## 2025-07-22 PROCEDURE — 76856 US EXAM PELVIC COMPLETE: CPT

## 2025-07-22 PROCEDURE — G0480: CPT

## 2025-07-22 PROCEDURE — 86708 HEPATITIS A ANTIBODY: CPT

## 2025-07-22 PROCEDURE — 86682 HELMINTH ANTIBODY: CPT

## 2025-07-22 PROCEDURE — 84145 PROCALCITONIN (PCT): CPT

## 2025-07-22 PROCEDURE — 85732 THROMBOPLASTIN TIME PARTIAL: CPT

## 2025-07-22 PROCEDURE — 86765 RUBEOLA ANTIBODY: CPT

## 2025-07-22 PROCEDURE — 94003 VENT MGMT INPAT SUBQ DAY: CPT

## 2025-07-22 PROCEDURE — 86696 HERPES SIMPLEX TYPE 2 TEST: CPT

## 2025-07-22 PROCEDURE — 83935 ASSAY OF URINE OSMOLALITY: CPT

## 2025-07-22 PROCEDURE — 83550 IRON BINDING TEST: CPT

## 2025-07-22 PROCEDURE — 86376 MICROSOMAL ANTIBODY EACH: CPT

## 2025-07-22 PROCEDURE — 83540 ASSAY OF IRON: CPT

## 2025-07-22 PROCEDURE — 74177 CT ABD & PELVIS W/CONTRAST: CPT

## 2025-07-22 PROCEDURE — 86735 MUMPS ANTIBODY: CPT

## 2025-07-22 PROCEDURE — 84295 ASSAY OF SERUM SODIUM: CPT

## 2025-07-22 PROCEDURE — 92612 ENDOSCOPY SWALLOW (FEES) VID: CPT

## 2025-07-22 PROCEDURE — 82435 ASSAY OF BLOOD CHLORIDE: CPT

## 2025-07-22 PROCEDURE — 86777 TOXOPLASMA ANTIBODY: CPT

## 2025-07-22 PROCEDURE — 87385 HISTOPLASMA CAPSUL AG IA: CPT

## 2025-07-22 PROCEDURE — 86480 TB TEST CELL IMMUN MEASURE: CPT

## 2025-07-22 PROCEDURE — 97161 PT EVAL LOW COMPLEX 20 MIN: CPT

## 2025-07-22 PROCEDURE — 86709 HEPATITIS A IGM ANTIBODY: CPT

## 2025-07-22 PROCEDURE — 84481 FREE ASSAY (FT-3): CPT

## 2025-07-22 PROCEDURE — 84443 ASSAY THYROID STIM HORMONE: CPT

## 2025-07-22 PROCEDURE — 82784 ASSAY IGA/IGD/IGG/IGM EACH: CPT

## 2025-07-22 PROCEDURE — 85014 HEMATOCRIT: CPT

## 2025-07-22 PROCEDURE — 87205 SMEAR GRAM STAIN: CPT

## 2025-07-22 PROCEDURE — 81001 URINALYSIS AUTO W/SCOPE: CPT

## 2025-07-22 PROCEDURE — P9047: CPT

## 2025-07-22 PROCEDURE — 86644 CMV ANTIBODY: CPT

## 2025-07-22 PROCEDURE — 85027 COMPLETE CBC AUTOMATED: CPT

## 2025-07-22 PROCEDURE — 86787 VARICELLA-ZOSTER ANTIBODY: CPT

## 2025-07-22 PROCEDURE — 97165 OT EVAL LOW COMPLEX 30 MIN: CPT

## 2025-07-22 PROCEDURE — 86664 EPSTEIN-BARR NUCLEAR ANTIGEN: CPT

## 2025-07-22 PROCEDURE — 76641 ULTRASOUND BREAST COMPLETE: CPT | Mod: 26,50

## 2025-07-22 PROCEDURE — 93306 TTE W/DOPPLER COMPLETE: CPT

## 2025-07-22 PROCEDURE — 87086 URINE CULTURE/COLONY COUNT: CPT

## 2025-07-22 PROCEDURE — 86301 IMMUNOASSAY TUMOR CA 19-9: CPT

## 2025-07-22 PROCEDURE — 93308 TTE F-UP OR LMTD: CPT

## 2025-07-22 PROCEDURE — 86923 COMPATIBILITY TEST ELECTRIC: CPT

## 2025-07-22 PROCEDURE — 87640 STAPH A DNA AMP PROBE: CPT

## 2025-07-22 PROCEDURE — P9011: CPT

## 2025-07-22 PROCEDURE — 87641 MR-STAPH DNA AMP PROBE: CPT

## 2025-07-22 PROCEDURE — 86304 IMMUNOASSAY TUMOR CA 125: CPT

## 2025-07-22 PROCEDURE — 86769 SARS-COV-2 COVID-19 ANTIBODY: CPT

## 2025-07-22 PROCEDURE — 97112 NEUROMUSCULAR REEDUCATION: CPT

## 2025-07-22 PROCEDURE — 86381 MITOCHONDRIAL ANTIBODY EACH: CPT

## 2025-07-22 PROCEDURE — 82565 ASSAY OF CREATININE: CPT

## 2025-07-22 PROCEDURE — P9016: CPT

## 2025-07-22 PROCEDURE — 93970 EXTREMITY STUDY: CPT

## 2025-07-22 PROCEDURE — 85613 RUSSELL VIPER VENOM DILUTED: CPT

## 2025-07-22 PROCEDURE — 87077 CULTURE AEROBIC IDENTIFY: CPT

## 2025-07-22 PROCEDURE — 99232 SBSQ HOSP IP/OBS MODERATE 35: CPT

## 2025-07-22 PROCEDURE — 84156 ASSAY OF PROTEIN URINE: CPT

## 2025-07-22 PROCEDURE — P9045: CPT

## 2025-07-22 PROCEDURE — 97110 THERAPEUTIC EXERCISES: CPT

## 2025-07-22 PROCEDURE — 92526 ORAL FUNCTION THERAPY: CPT

## 2025-07-22 PROCEDURE — 83036 HEMOGLOBIN GLYCOSYLATED A1C: CPT

## 2025-07-22 PROCEDURE — 87389 HIV-1 AG W/HIV-1&-2 AB AG IA: CPT

## 2025-07-22 PROCEDURE — 87340 HEPATITIS B SURFACE AG IA: CPT

## 2025-07-22 PROCEDURE — 84133 ASSAY OF URINE POTASSIUM: CPT

## 2025-07-22 PROCEDURE — 94002 VENT MGMT INPAT INIT DAY: CPT

## 2025-07-22 PROCEDURE — 84132 ASSAY OF SERUM POTASSIUM: CPT

## 2025-07-22 PROCEDURE — 85018 HEMOGLOBIN: CPT

## 2025-07-22 PROCEDURE — 86706 HEP B SURFACE ANTIBODY: CPT

## 2025-07-22 PROCEDURE — 87070 CULTURE OTHR SPECIMN AEROBIC: CPT

## 2025-07-22 PROCEDURE — 74018 RADEX ABDOMEN 1 VIEW: CPT

## 2025-07-22 PROCEDURE — 84702 CHORIONIC GONADOTROPIN TEST: CPT

## 2025-07-22 PROCEDURE — 86901 BLOOD TYPING SEROLOGIC RH(D): CPT

## 2025-07-22 PROCEDURE — 83605 ASSAY OF LACTIC ACID: CPT

## 2025-07-22 PROCEDURE — 87799 DETECT AGENT NOS DNA QUANT: CPT

## 2025-07-22 PROCEDURE — 85396 CLOTTING ASSAY WHOLE BLOOD: CPT

## 2025-07-22 PROCEDURE — 84100 ASSAY OF PHOSPHORUS: CPT

## 2025-07-22 RX ORDER — MAGNESIUM SULFATE 500 MG/ML
2 SYRINGE (ML) INJECTION ONCE
Refills: 0 | Status: COMPLETED | OUTPATIENT
Start: 2025-07-22 | End: 2025-07-22

## 2025-07-22 RX ORDER — ALBUMIN (HUMAN) 12.5 G/50ML
50 INJECTION, SOLUTION INTRAVENOUS ONCE
Refills: 0 | Status: DISCONTINUED | OUTPATIENT
Start: 2025-07-22 | End: 2025-07-22

## 2025-07-22 RX ORDER — SODIUM PHOSPHATE,DIBASIC DIHYD
30 POWDER (GRAM) MISCELLANEOUS ONCE
Refills: 0 | Status: COMPLETED | OUTPATIENT
Start: 2025-07-22 | End: 2025-07-22

## 2025-07-22 RX ORDER — TRAMADOL HYDROCHLORIDE 50 MG/1
50 TABLET, FILM COATED ORAL ONCE
Refills: 0 | Status: DISCONTINUED | OUTPATIENT
Start: 2025-07-22 | End: 2025-07-22

## 2025-07-22 RX ADMIN — Medication 255 MILLIMOLE(S): at 09:14

## 2025-07-22 RX ADMIN — TRAMADOL HYDROCHLORIDE 50 MILLIGRAM(S): 50 TABLET, FILM COATED ORAL at 20:36

## 2025-07-22 RX ADMIN — Medication 1 APPLICATION(S): at 18:30

## 2025-07-22 RX ADMIN — Medication 800 MILLIGRAM(S): at 18:30

## 2025-07-22 RX ADMIN — Medication 1 APPLICATION(S): at 06:16

## 2025-07-22 RX ADMIN — ALBUMIN (HUMAN) 50 MILLILITER(S): 12.5 INJECTION, SOLUTION INTRAVENOUS at 06:15

## 2025-07-22 RX ADMIN — LACTULOSE 20 GRAM(S): 10 SOLUTION ORAL at 06:15

## 2025-07-22 RX ADMIN — FUROSEMIDE 20 MILLIGRAM(S): 10 INJECTION INTRAMUSCULAR; INTRAVENOUS at 06:16

## 2025-07-22 RX ADMIN — ALBUMIN (HUMAN) 50 MILLILITER(S): 12.5 INJECTION, SOLUTION INTRAVENOUS at 15:58

## 2025-07-22 RX ADMIN — URSODIOL 500 MILLIGRAM(S): 300 CAPSULE ORAL at 06:16

## 2025-07-22 RX ADMIN — Medication 5 MILLIGRAM(S): at 22:49

## 2025-07-22 RX ADMIN — Medication 1 APPLICATION(S): at 08:26

## 2025-07-22 RX ADMIN — CEFTRIAXONE 100 MILLIGRAM(S): 500 INJECTION, POWDER, FOR SOLUTION INTRAMUSCULAR; INTRAVENOUS at 08:23

## 2025-07-22 RX ADMIN — ALBUMIN (HUMAN) 50 MILLILITER(S): 12.5 INJECTION, SOLUTION INTRAVENOUS at 22:46

## 2025-07-22 RX ADMIN — Medication 25 GRAM(S): at 11:34

## 2025-07-22 RX ADMIN — Medication 40 MILLIGRAM(S): at 06:16

## 2025-07-22 RX ADMIN — Medication 1 TABLET(S): at 11:31

## 2025-07-22 RX ADMIN — URSODIOL 500 MILLIGRAM(S): 300 CAPSULE ORAL at 18:30

## 2025-07-22 RX ADMIN — Medication 800 MILLIGRAM(S): at 08:22

## 2025-07-22 RX ADMIN — Medication 25 MILLIGRAM(S): at 06:17

## 2025-07-23 ENCOUNTER — NON-APPOINTMENT (OUTPATIENT)
Age: 45
End: 2025-07-23

## 2025-07-23 LAB
ALBUMIN SERPL ELPH-MCNC: 3.1 G/DL — LOW (ref 3.3–5)
ALP SERPL-CCNC: 177 U/L — HIGH (ref 40–120)
ALT FLD-CCNC: 34 U/L — SIGNIFICANT CHANGE UP (ref 10–45)
ANION GAP SERPL CALC-SCNC: 11 MMOL/L — SIGNIFICANT CHANGE UP (ref 5–17)
APTT 50/50 2HOUR INCUB: 31.1 SEC — SIGNIFICANT CHANGE UP (ref 26.1–37.8)
APTT BLD: 49.7 SEC — HIGH (ref 26.1–36.8)
AST SERPL-CCNC: 46 U/L — HIGH (ref 10–40)
B2 GLYCOPROT1 IGA SER QL: 9 U/ML — SIGNIFICANT CHANGE UP
B2 GLYCOPROT1 IGG SER-ACNC: <1.4 U/ML — SIGNIFICANT CHANGE UP
B2 GLYCOPROT1 IGM SER-ACNC: <1.5 U/ML — SIGNIFICANT CHANGE UP
BILIRUB SERPL-MCNC: 9 MG/DL — HIGH (ref 0.2–1.2)
BUN SERPL-MCNC: 9 MG/DL — SIGNIFICANT CHANGE UP (ref 7–23)
CALCIUM SERPL-MCNC: 8.5 MG/DL — SIGNIFICANT CHANGE UP (ref 8.4–10.5)
CARDIOLIPIN IGM SER-MCNC: <1.5 MPL U/ML — SIGNIFICANT CHANGE UP
CARDIOLIPIN IGM SER-MCNC: <1.6 GPL U/ML — SIGNIFICANT CHANGE UP
CHLORIDE SERPL-SCNC: 106 MMOL/L — SIGNIFICANT CHANGE UP (ref 96–108)
CO2 SERPL-SCNC: 20 MMOL/L — LOW (ref 22–31)
CREAT SERPL-MCNC: 0.9 MG/DL — SIGNIFICANT CHANGE UP (ref 0.5–1.3)
DEPRECATED CARDIOLIPIN IGA SER: 2.3 APL U/ML — SIGNIFICANT CHANGE UP
DRVVT RATIO: 0.62 RATIO — SIGNIFICANT CHANGE UP (ref 0–1.21)
DRVVT SCREEN TO CONFIRM RATIO: SIGNIFICANT CHANGE UP
EGFR: 80 ML/MIN/1.73M2 — SIGNIFICANT CHANGE UP
EGFR: 80 ML/MIN/1.73M2 — SIGNIFICANT CHANGE UP
GLUCOSE SERPL-MCNC: 86 MG/DL — SIGNIFICANT CHANGE UP (ref 70–99)
HCT VFR BLD CALC: 23.3 % — LOW (ref 34.5–45)
HGB BLD-MCNC: 7.5 G/DL — LOW (ref 11.5–15.5)
IMMATURE PLATELET FRACTION #: 1.4 K/UL — LOW (ref 4.7–11.1)
IMMATURE PLATELET FRACTION %: 2.2 % — SIGNIFICANT CHANGE UP (ref 1.6–4.9)
INR BLD: 3.22 RATIO — HIGH (ref 0.85–1.16)
MAGNESIUM SERPL-MCNC: 1.4 MG/DL — LOW (ref 1.6–2.6)
MCHC RBC-ENTMCNC: 30.6 PG — SIGNIFICANT CHANGE UP (ref 27–34)
MCHC RBC-ENTMCNC: 32.2 G/DL — SIGNIFICANT CHANGE UP (ref 32–36)
MCV RBC AUTO: 95.1 FL — SIGNIFICANT CHANGE UP (ref 80–100)
NORMALIZED SCT PPP-RTO: 0.24 RATIO — SIGNIFICANT CHANGE UP (ref 0–1.16)
NORMALIZED SCT PPP-RTO: SIGNIFICANT CHANGE UP
NRBC # BLD AUTO: 0 K/UL — SIGNIFICANT CHANGE UP (ref 0–0)
NRBC # FLD: 0 K/UL — SIGNIFICANT CHANGE UP (ref 0–0)
NRBC BLD AUTO-RTO: 0 /100 WBCS — SIGNIFICANT CHANGE UP (ref 0–0)
PAT CTL 2H: 31 SEC — SIGNIFICANT CHANGE UP (ref 26.1–37.8)
PHOSPHATE SERPL-MCNC: 2.5 MG/DL — SIGNIFICANT CHANGE UP (ref 2.5–4.5)
PLATELET # BLD AUTO: 62 K/UL — LOW (ref 150–400)
PMV BLD: 11.7 FL — SIGNIFICANT CHANGE UP (ref 7–13)
POTASSIUM SERPL-MCNC: 4.5 MMOL/L — SIGNIFICANT CHANGE UP (ref 3.5–5.3)
POTASSIUM SERPL-SCNC: 4.5 MMOL/L — SIGNIFICANT CHANGE UP (ref 3.5–5.3)
PROT SERPL-MCNC: 5.7 G/DL — LOW (ref 6–8.3)
PROTHROM AB SERPL-ACNC: 36.6 SEC — HIGH (ref 9.9–13.4)
RBC # BLD: 2.45 M/UL — LOW (ref 3.8–5.2)
RBC # FLD: 24.7 % — HIGH (ref 10.3–14.5)
SODIUM SERPL-SCNC: 137 MMOL/L — SIGNIFICANT CHANGE UP (ref 135–145)
WBC # BLD: 3.28 K/UL — LOW (ref 3.8–10.5)
WBC # FLD AUTO: 3.28 K/UL — LOW (ref 3.8–10.5)

## 2025-07-23 PROCEDURE — 99232 SBSQ HOSP IP/OBS MODERATE 35: CPT

## 2025-07-23 PROCEDURE — 99221 1ST HOSP IP/OBS SF/LOW 40: CPT

## 2025-07-23 PROCEDURE — 93971 EXTREMITY STUDY: CPT | Mod: 26,LT

## 2025-07-23 RX ORDER — MAGNESIUM SULFATE 500 MG/ML
2 SYRINGE (ML) INJECTION ONCE
Refills: 0 | Status: COMPLETED | OUTPATIENT
Start: 2025-07-23 | End: 2025-07-23

## 2025-07-23 RX ORDER — TRAMADOL HYDROCHLORIDE 50 MG/1
50 TABLET, FILM COATED ORAL ONCE
Refills: 0 | Status: DISCONTINUED | OUTPATIENT
Start: 2025-07-23 | End: 2025-07-23

## 2025-07-23 RX ADMIN — TRAMADOL HYDROCHLORIDE 50 MILLIGRAM(S): 50 TABLET, FILM COATED ORAL at 23:00

## 2025-07-23 RX ADMIN — ALBUMIN (HUMAN) 50 MILLILITER(S): 12.5 INJECTION, SOLUTION INTRAVENOUS at 05:49

## 2025-07-23 RX ADMIN — Medication 1 TABLET(S): at 13:34

## 2025-07-23 RX ADMIN — Medication 800 MILLIGRAM(S): at 18:01

## 2025-07-23 RX ADMIN — Medication 800 MILLIGRAM(S): at 08:08

## 2025-07-23 RX ADMIN — LACTULOSE 20 GRAM(S): 10 SOLUTION ORAL at 05:46

## 2025-07-23 RX ADMIN — TRAMADOL HYDROCHLORIDE 50 MILLIGRAM(S): 50 TABLET, FILM COATED ORAL at 21:42

## 2025-07-23 RX ADMIN — Medication 1 APPLICATION(S): at 05:47

## 2025-07-23 RX ADMIN — URSODIOL 500 MILLIGRAM(S): 300 CAPSULE ORAL at 05:47

## 2025-07-23 RX ADMIN — Medication 25 MILLIGRAM(S): at 05:47

## 2025-07-23 RX ADMIN — Medication 40 MILLIGRAM(S): at 05:47

## 2025-07-23 RX ADMIN — ALBUMIN (HUMAN) 50 MILLILITER(S): 12.5 INJECTION, SOLUTION INTRAVENOUS at 13:34

## 2025-07-23 RX ADMIN — ALBUMIN (HUMAN) 50 MILLILITER(S): 12.5 INJECTION, SOLUTION INTRAVENOUS at 21:42

## 2025-07-23 RX ADMIN — CEFTRIAXONE 100 MILLIGRAM(S): 500 INJECTION, POWDER, FOR SOLUTION INTRAMUSCULAR; INTRAVENOUS at 08:08

## 2025-07-23 RX ADMIN — FUROSEMIDE 20 MILLIGRAM(S): 10 INJECTION INTRAMUSCULAR; INTRAVENOUS at 05:47

## 2025-07-23 RX ADMIN — Medication 1 APPLICATION(S): at 18:01

## 2025-07-23 RX ADMIN — Medication 25 GRAM(S): at 08:08

## 2025-07-23 RX ADMIN — URSODIOL 500 MILLIGRAM(S): 300 CAPSULE ORAL at 18:01

## 2025-07-23 RX ADMIN — TRAMADOL HYDROCHLORIDE 50 MILLIGRAM(S): 50 TABLET, FILM COATED ORAL at 00:00

## 2025-07-23 RX ADMIN — Medication 5 MILLIGRAM(S): at 21:42

## 2025-07-23 RX ADMIN — POLYETHYLENE GLYCOL 3350 17 GRAM(S): 17 POWDER, FOR SOLUTION ORAL at 05:47

## 2025-07-24 LAB
ALBUMIN SERPL ELPH-MCNC: 3.2 G/DL — LOW (ref 3.3–5)
ALP SERPL-CCNC: 189 U/L — HIGH (ref 40–120)
ALT FLD-CCNC: 29 U/L — SIGNIFICANT CHANGE UP (ref 10–45)
ANION GAP SERPL CALC-SCNC: 10 MMOL/L — SIGNIFICANT CHANGE UP (ref 5–17)
APTT BLD: 52 SEC — HIGH (ref 26.1–36.8)
AST SERPL-CCNC: 42 U/L — HIGH (ref 10–40)
BILIRUB SERPL-MCNC: 9.9 MG/DL — HIGH (ref 0.2–1.2)
BLD GP AB SCN SERPL QL: NEGATIVE — SIGNIFICANT CHANGE UP
BUN SERPL-MCNC: 9 MG/DL — SIGNIFICANT CHANGE UP (ref 7–23)
CALCIUM SERPL-MCNC: 8.7 MG/DL — SIGNIFICANT CHANGE UP (ref 8.4–10.5)
CHLORIDE SERPL-SCNC: 106 MMOL/L — SIGNIFICANT CHANGE UP (ref 96–108)
CO2 SERPL-SCNC: 20 MMOL/L — LOW (ref 22–31)
CREAT SERPL-MCNC: 0.79 MG/DL — SIGNIFICANT CHANGE UP (ref 0.5–1.3)
DSDNA AB SER-ACNC: 9 IU/ML — HIGH
EGFR: 94 ML/MIN/1.73M2 — SIGNIFICANT CHANGE UP
EGFR: 94 ML/MIN/1.73M2 — SIGNIFICANT CHANGE UP
GLUCOSE SERPL-MCNC: 116 MG/DL — HIGH (ref 70–99)
HCT VFR BLD CALC: 23.6 % — LOW (ref 34.5–45)
HGB BLD-MCNC: 7.4 G/DL — LOW (ref 11.5–15.5)
IMMATURE PLATELET FRACTION #: 1.5 K/UL — LOW (ref 4.7–11.1)
IMMATURE PLATELET FRACTION %: 2.5 % — SIGNIFICANT CHANGE UP (ref 1.6–4.9)
INR BLD: 3.54 RATIO — HIGH (ref 0.85–1.16)
MAGNESIUM SERPL-MCNC: 1.6 MG/DL — SIGNIFICANT CHANGE UP (ref 1.6–2.6)
MCHC RBC-ENTMCNC: 30.2 PG — SIGNIFICANT CHANGE UP (ref 27–34)
MCHC RBC-ENTMCNC: 31.4 G/DL — LOW (ref 32–36)
MCV RBC AUTO: 96.3 FL — SIGNIFICANT CHANGE UP (ref 80–100)
NRBC # BLD AUTO: 0 K/UL — SIGNIFICANT CHANGE UP (ref 0–0)
NRBC # FLD: 0 K/UL — SIGNIFICANT CHANGE UP (ref 0–0)
NRBC BLD AUTO-RTO: 0 /100 WBCS — SIGNIFICANT CHANGE UP (ref 0–0)
OSMOLALITY UR: 256 MOS/KG — LOW (ref 300–900)
PHOSPHATE SERPL-MCNC: 1.9 MG/DL — LOW (ref 2.5–4.5)
PLATELET # BLD AUTO: 61 K/UL — LOW (ref 150–400)
PMV BLD: 11.6 FL — SIGNIFICANT CHANGE UP (ref 7–13)
POTASSIUM SERPL-MCNC: 4.8 MMOL/L — SIGNIFICANT CHANGE UP (ref 3.5–5.3)
POTASSIUM SERPL-SCNC: 4.8 MMOL/L — SIGNIFICANT CHANGE UP (ref 3.5–5.3)
POTASSIUM UR-SCNC: 18 MMOL/L — SIGNIFICANT CHANGE UP
PROT SERPL-MCNC: 5.7 G/DL — LOW (ref 6–8.3)
PROTHROM AB SERPL-ACNC: 39.9 SEC — HIGH (ref 9.9–13.4)
RBC # BLD: 2.45 M/UL — LOW (ref 3.8–5.2)
RBC # FLD: 24.6 % — HIGH (ref 10.3–14.5)
RH IG SCN BLD-IMP: POSITIVE — SIGNIFICANT CHANGE UP
SODIUM SERPL-SCNC: 136 MMOL/L — SIGNIFICANT CHANGE UP (ref 135–145)
SODIUM UR-SCNC: 84 MMOL/L — SIGNIFICANT CHANGE UP
WBC # BLD: 3.34 K/UL — LOW (ref 3.8–10.5)
WBC # FLD AUTO: 3.34 K/UL — LOW (ref 3.8–10.5)

## 2025-07-24 PROCEDURE — 99232 SBSQ HOSP IP/OBS MODERATE 35: CPT

## 2025-07-24 PROCEDURE — 71045 X-RAY EXAM CHEST 1 VIEW: CPT | Mod: 26

## 2025-07-24 RX ORDER — SPIRONOLACTONE 25 MG
50 TABLET ORAL ONCE
Refills: 0 | Status: DISCONTINUED | OUTPATIENT
Start: 2025-07-24 | End: 2025-07-24

## 2025-07-24 RX ORDER — TRAMADOL HYDROCHLORIDE 50 MG/1
25 TABLET, FILM COATED ORAL EVERY 6 HOURS
Refills: 0 | Status: DISCONTINUED | OUTPATIENT
Start: 2025-07-24 | End: 2025-07-30

## 2025-07-24 RX ORDER — FUROSEMIDE 10 MG/ML
40 INJECTION INTRAMUSCULAR; INTRAVENOUS ONCE
Refills: 0 | Status: DISCONTINUED | OUTPATIENT
Start: 2025-07-24 | End: 2025-07-24

## 2025-07-24 RX ORDER — FUROSEMIDE 10 MG/ML
40 INJECTION INTRAMUSCULAR; INTRAVENOUS DAILY
Refills: 0 | Status: DISCONTINUED | OUTPATIENT
Start: 2025-07-25 | End: 2025-07-30

## 2025-07-24 RX ORDER — MAGNESIUM SULFATE 500 MG/ML
2 SYRINGE (ML) INJECTION ONCE
Refills: 0 | Status: COMPLETED | OUTPATIENT
Start: 2025-07-24 | End: 2025-07-24

## 2025-07-24 RX ORDER — SOD PHOS DI, MONO/K PHOS MONO 250 MG
2 TABLET ORAL ONCE
Refills: 0 | Status: COMPLETED | OUTPATIENT
Start: 2025-07-24 | End: 2025-07-24

## 2025-07-24 RX ORDER — SPIRONOLACTONE 25 MG
50 TABLET ORAL DAILY
Refills: 0 | Status: DISCONTINUED | OUTPATIENT
Start: 2025-07-25 | End: 2025-07-28

## 2025-07-24 RX ORDER — SPIRONOLACTONE 25 MG
25 TABLET ORAL ONCE
Refills: 0 | Status: COMPLETED | OUTPATIENT
Start: 2025-07-24 | End: 2025-07-24

## 2025-07-24 RX ORDER — TRAMADOL HYDROCHLORIDE 50 MG/1
50 TABLET, FILM COATED ORAL EVERY 8 HOURS
Refills: 0 | Status: DISCONTINUED | OUTPATIENT
Start: 2025-07-24 | End: 2025-07-30

## 2025-07-24 RX ORDER — FUROSEMIDE 10 MG/ML
20 INJECTION INTRAMUSCULAR; INTRAVENOUS ONCE
Refills: 0 | Status: COMPLETED | OUTPATIENT
Start: 2025-07-24 | End: 2025-07-24

## 2025-07-24 RX ADMIN — ALBUMIN (HUMAN) 50 MILLILITER(S): 12.5 INJECTION, SOLUTION INTRAVENOUS at 05:36

## 2025-07-24 RX ADMIN — Medication 40 MILLIGRAM(S): at 05:39

## 2025-07-24 RX ADMIN — FUROSEMIDE 20 MILLIGRAM(S): 10 INJECTION INTRAMUSCULAR; INTRAVENOUS at 05:35

## 2025-07-24 RX ADMIN — Medication 1 APPLICATION(S): at 17:29

## 2025-07-24 RX ADMIN — Medication 800 MILLIGRAM(S): at 17:29

## 2025-07-24 RX ADMIN — Medication 800 MILLIGRAM(S): at 08:19

## 2025-07-24 RX ADMIN — Medication 1 APPLICATION(S): at 05:34

## 2025-07-24 RX ADMIN — TRAMADOL HYDROCHLORIDE 50 MILLIGRAM(S): 50 TABLET, FILM COATED ORAL at 23:29

## 2025-07-24 RX ADMIN — Medication 1 APPLICATION(S): at 05:36

## 2025-07-24 RX ADMIN — FUROSEMIDE 20 MILLIGRAM(S): 10 INJECTION INTRAMUSCULAR; INTRAVENOUS at 13:10

## 2025-07-24 RX ADMIN — URSODIOL 500 MILLIGRAM(S): 300 CAPSULE ORAL at 17:29

## 2025-07-24 RX ADMIN — TRAMADOL HYDROCHLORIDE 50 MILLIGRAM(S): 50 TABLET, FILM COATED ORAL at 22:49

## 2025-07-24 RX ADMIN — Medication 5 MILLIGRAM(S): at 22:49

## 2025-07-24 RX ADMIN — URSODIOL 500 MILLIGRAM(S): 300 CAPSULE ORAL at 05:35

## 2025-07-24 RX ADMIN — CEFTRIAXONE 100 MILLIGRAM(S): 500 INJECTION, POWDER, FOR SOLUTION INTRAMUSCULAR; INTRAVENOUS at 08:21

## 2025-07-24 RX ADMIN — LACTULOSE 20 GRAM(S): 10 SOLUTION ORAL at 05:35

## 2025-07-24 RX ADMIN — Medication 2 PACKET(S): at 08:56

## 2025-07-24 RX ADMIN — Medication 1 TABLET(S): at 13:11

## 2025-07-24 RX ADMIN — Medication 25 GRAM(S): at 08:56

## 2025-07-24 RX ADMIN — Medication 25 MILLIGRAM(S): at 05:35

## 2025-07-24 RX ADMIN — Medication 25 MILLIGRAM(S): at 13:10

## 2025-07-25 LAB
ALBUMIN SERPL ELPH-MCNC: 3.3 G/DL — SIGNIFICANT CHANGE UP (ref 3.3–5)
ALP SERPL-CCNC: 210 U/L — HIGH (ref 40–120)
ALT FLD-CCNC: 24 U/L — SIGNIFICANT CHANGE UP (ref 10–45)
ANION GAP SERPL CALC-SCNC: 13 MMOL/L — SIGNIFICANT CHANGE UP (ref 5–17)
APTT BLD: 51.2 SEC — HIGH (ref 26.1–36.8)
AST SERPL-CCNC: 41 U/L — HIGH (ref 10–40)
BASE EXCESS BLDA CALC-SCNC: -1.7 MMOL/L — SIGNIFICANT CHANGE UP (ref -2–3)
BILIRUB SERPL-MCNC: 11.1 MG/DL — HIGH (ref 0.2–1.2)
BUN SERPL-MCNC: 12 MG/DL — SIGNIFICANT CHANGE UP (ref 7–23)
CALCIUM SERPL-MCNC: 9 MG/DL — SIGNIFICANT CHANGE UP (ref 8.4–10.5)
CHLORIDE SERPL-SCNC: 103 MMOL/L — SIGNIFICANT CHANGE UP (ref 96–108)
CO2 BLDA-SCNC: 23 MMOL/L — SIGNIFICANT CHANGE UP (ref 19–24)
CO2 SERPL-SCNC: 19 MMOL/L — LOW (ref 22–31)
CREAT SERPL-MCNC: 0.8 MG/DL — SIGNIFICANT CHANGE UP (ref 0.5–1.3)
DSDNA AB SER-ACNC: 10 IU/ML — HIGH
EGFR: 93 ML/MIN/1.73M2 — SIGNIFICANT CHANGE UP
EGFR: 93 ML/MIN/1.73M2 — SIGNIFICANT CHANGE UP
GAS PNL BLDA: SIGNIFICANT CHANGE UP
GLUCOSE SERPL-MCNC: 107 MG/DL — HIGH (ref 70–99)
HCO3 BLDA-SCNC: 22 MMOL/L — SIGNIFICANT CHANGE UP (ref 21–28)
HCT VFR BLD CALC: 23.2 % — LOW (ref 34.5–45)
HGB BLD-MCNC: 7.5 G/DL — LOW (ref 11.5–15.5)
HOROWITZ INDEX BLDA+IHG-RTO: 21 — SIGNIFICANT CHANGE UP
IMMATURE PLATELET FRACTION #: 1.2 K/UL — LOW (ref 4.7–11.1)
IMMATURE PLATELET FRACTION %: 2.1 % — SIGNIFICANT CHANGE UP (ref 1.6–4.9)
INR BLD: 3.28 RATIO — HIGH (ref 0.85–1.16)
MAGNESIUM SERPL-MCNC: 1.6 MG/DL — SIGNIFICANT CHANGE UP (ref 1.6–2.6)
MCHC RBC-ENTMCNC: 31.3 PG — SIGNIFICANT CHANGE UP (ref 27–34)
MCHC RBC-ENTMCNC: 32.3 G/DL — SIGNIFICANT CHANGE UP (ref 32–36)
MCV RBC AUTO: 96.7 FL — SIGNIFICANT CHANGE UP (ref 80–100)
NRBC # BLD AUTO: 0 K/UL — SIGNIFICANT CHANGE UP (ref 0–0)
NRBC # FLD: 0 K/UL — SIGNIFICANT CHANGE UP (ref 0–0)
NRBC BLD AUTO-RTO: 0 /100 WBCS — SIGNIFICANT CHANGE UP (ref 0–0)
PCO2 BLDA: 34 MMHG — SIGNIFICANT CHANGE UP (ref 32–45)
PH BLDA: 7.42 — SIGNIFICANT CHANGE UP (ref 7.35–7.45)
PHOSPHATE SERPL-MCNC: 2.6 MG/DL — SIGNIFICANT CHANGE UP (ref 2.5–4.5)
PLATELET # BLD AUTO: 58 K/UL — LOW (ref 150–400)
PMV BLD: 10.4 FL — SIGNIFICANT CHANGE UP (ref 7–13)
PO2 BLDA: 70 MMHG — LOW (ref 83–108)
POTASSIUM SERPL-MCNC: 4.7 MMOL/L — SIGNIFICANT CHANGE UP (ref 3.5–5.3)
POTASSIUM SERPL-SCNC: 4.7 MMOL/L — SIGNIFICANT CHANGE UP (ref 3.5–5.3)
PROT SERPL-MCNC: 5.7 G/DL — LOW (ref 6–8.3)
PROTHROM AB SERPL-ACNC: 37 SEC — HIGH (ref 9.9–13.4)
RBC # BLD: 2.4 M/UL — LOW (ref 3.8–5.2)
RBC # FLD: 24.2 % — HIGH (ref 10.3–14.5)
SAO2 % BLDA: 95.8 % — SIGNIFICANT CHANGE UP (ref 94–98)
SODIUM SERPL-SCNC: 135 MMOL/L — SIGNIFICANT CHANGE UP (ref 135–145)
WBC # BLD: 3.88 K/UL — SIGNIFICANT CHANGE UP (ref 3.8–10.5)
WBC # FLD AUTO: 3.88 K/UL — SIGNIFICANT CHANGE UP (ref 3.8–10.5)

## 2025-07-25 PROCEDURE — 99232 SBSQ HOSP IP/OBS MODERATE 35: CPT

## 2025-07-25 RX ORDER — MAGNESIUM SULFATE 500 MG/ML
2 SYRINGE (ML) INJECTION ONCE
Refills: 0 | Status: COMPLETED | OUTPATIENT
Start: 2025-07-25 | End: 2025-07-25

## 2025-07-25 RX ADMIN — URSODIOL 500 MILLIGRAM(S): 300 CAPSULE ORAL at 17:33

## 2025-07-25 RX ADMIN — Medication 40 MILLIGRAM(S): at 06:27

## 2025-07-25 RX ADMIN — Medication 800 MILLIGRAM(S): at 08:48

## 2025-07-25 RX ADMIN — Medication 1 TABLET(S): at 08:48

## 2025-07-25 RX ADMIN — Medication 25 GRAM(S): at 12:35

## 2025-07-25 RX ADMIN — LACTULOSE 20 GRAM(S): 10 SOLUTION ORAL at 06:27

## 2025-07-25 RX ADMIN — Medication 50 MILLIGRAM(S): at 06:27

## 2025-07-25 RX ADMIN — Medication 1 APPLICATION(S): at 17:34

## 2025-07-25 RX ADMIN — URSODIOL 500 MILLIGRAM(S): 300 CAPSULE ORAL at 06:27

## 2025-07-25 RX ADMIN — POLYETHYLENE GLYCOL 3350 17 GRAM(S): 17 POWDER, FOR SOLUTION ORAL at 17:34

## 2025-07-25 RX ADMIN — Medication 1 APPLICATION(S): at 06:27

## 2025-07-25 RX ADMIN — CEFTRIAXONE 100 MILLIGRAM(S): 500 INJECTION, POWDER, FOR SOLUTION INTRAMUSCULAR; INTRAVENOUS at 08:47

## 2025-07-25 RX ADMIN — Medication 800 MILLIGRAM(S): at 17:33

## 2025-07-25 RX ADMIN — Medication 5 MILLIGRAM(S): at 22:40

## 2025-07-25 RX ADMIN — Medication 1 APPLICATION(S): at 06:28

## 2025-07-25 RX ADMIN — FUROSEMIDE 40 MILLIGRAM(S): 10 INJECTION INTRAMUSCULAR; INTRAVENOUS at 06:27

## 2025-07-26 LAB
ALBUMIN SERPL ELPH-MCNC: 3.5 G/DL — SIGNIFICANT CHANGE UP (ref 3.3–5)
ALP SERPL-CCNC: 227 U/L — HIGH (ref 40–120)
ALT FLD-CCNC: 26 U/L — SIGNIFICANT CHANGE UP (ref 10–45)
ANION GAP SERPL CALC-SCNC: 11 MMOL/L — SIGNIFICANT CHANGE UP (ref 5–17)
APTT BLD: 45.8 SEC — HIGH (ref 26.1–36.8)
AST SERPL-CCNC: 46 U/L — HIGH (ref 10–40)
BILIRUB SERPL-MCNC: 13.6 MG/DL — HIGH (ref 0.2–1.2)
BUN SERPL-MCNC: 13 MG/DL — SIGNIFICANT CHANGE UP (ref 7–23)
CALCIUM SERPL-MCNC: 9.7 MG/DL — SIGNIFICANT CHANGE UP (ref 8.4–10.5)
CHLORIDE SERPL-SCNC: 104 MMOL/L — SIGNIFICANT CHANGE UP (ref 96–108)
CO2 SERPL-SCNC: 20 MMOL/L — LOW (ref 22–31)
CREAT SERPL-MCNC: 0.9 MG/DL — SIGNIFICANT CHANGE UP (ref 0.5–1.3)
CULTURE RESULTS: SIGNIFICANT CHANGE UP
CULTURE RESULTS: SIGNIFICANT CHANGE UP
EGFR: 80 ML/MIN/1.73M2 — SIGNIFICANT CHANGE UP
EGFR: 80 ML/MIN/1.73M2 — SIGNIFICANT CHANGE UP
GLUCOSE SERPL-MCNC: 93 MG/DL — SIGNIFICANT CHANGE UP (ref 70–99)
HCT VFR BLD CALC: 26.1 % — LOW (ref 34.5–45)
HGB BLD-MCNC: 8.3 G/DL — LOW (ref 11.5–15.5)
IMMATURE PLATELET FRACTION #: 2 K/UL — LOW (ref 4.7–11.1)
IMMATURE PLATELET FRACTION %: 3 % — SIGNIFICANT CHANGE UP (ref 1.6–4.9)
INR BLD: 2.92 RATIO — HIGH (ref 0.85–1.16)
MAGNESIUM SERPL-MCNC: 1.6 MG/DL — SIGNIFICANT CHANGE UP (ref 1.6–2.6)
MCHC RBC-ENTMCNC: 31.1 PG — SIGNIFICANT CHANGE UP (ref 27–34)
MCHC RBC-ENTMCNC: 31.8 G/DL — LOW (ref 32–36)
MCV RBC AUTO: 97.8 FL — SIGNIFICANT CHANGE UP (ref 80–100)
NRBC # BLD AUTO: 0 K/UL — SIGNIFICANT CHANGE UP (ref 0–0)
NRBC # FLD: 0 K/UL — SIGNIFICANT CHANGE UP (ref 0–0)
NRBC BLD AUTO-RTO: 0 /100 WBCS — SIGNIFICANT CHANGE UP (ref 0–0)
PHOSPHATE SERPL-MCNC: 3 MG/DL — SIGNIFICANT CHANGE UP (ref 2.5–4.5)
PLATELET # BLD AUTO: 68 K/UL — LOW (ref 150–400)
PMV BLD: 12.2 FL — SIGNIFICANT CHANGE UP (ref 7–13)
POTASSIUM SERPL-MCNC: 5.1 MMOL/L — SIGNIFICANT CHANGE UP (ref 3.5–5.3)
POTASSIUM SERPL-SCNC: 5.1 MMOL/L — SIGNIFICANT CHANGE UP (ref 3.5–5.3)
PROT SERPL-MCNC: 6.4 G/DL — SIGNIFICANT CHANGE UP (ref 6–8.3)
PROTHROM AB SERPL-ACNC: 33.2 SEC — HIGH (ref 9.9–13.4)
RBC # BLD: 2.67 M/UL — LOW (ref 3.8–5.2)
RBC # FLD: 24.2 % — HIGH (ref 10.3–14.5)
SODIUM SERPL-SCNC: 135 MMOL/L — SIGNIFICANT CHANGE UP (ref 135–145)
SPECIMEN SOURCE: SIGNIFICANT CHANGE UP
SPECIMEN SOURCE: SIGNIFICANT CHANGE UP
WBC # BLD: 4.37 K/UL — SIGNIFICANT CHANGE UP (ref 3.8–10.5)
WBC # FLD AUTO: 4.37 K/UL — SIGNIFICANT CHANGE UP (ref 3.8–10.5)

## 2025-07-26 RX ORDER — MAGNESIUM SULFATE 500 MG/ML
2 SYRINGE (ML) INJECTION ONCE
Refills: 0 | Status: COMPLETED | OUTPATIENT
Start: 2025-07-26 | End: 2025-07-26

## 2025-07-26 RX ORDER — MAGNESIUM SULFATE 500 MG/ML
2 SYRINGE (ML) INJECTION ONCE
Refills: 0 | Status: DISCONTINUED | OUTPATIENT
Start: 2025-07-26 | End: 2025-07-26

## 2025-07-26 RX ADMIN — TRAMADOL HYDROCHLORIDE 50 MILLIGRAM(S): 50 TABLET, FILM COATED ORAL at 23:45

## 2025-07-26 RX ADMIN — Medication 50 MILLIGRAM(S): at 05:02

## 2025-07-26 RX ADMIN — Medication 1 APPLICATION(S): at 05:02

## 2025-07-26 RX ADMIN — TRAMADOL HYDROCHLORIDE 50 MILLIGRAM(S): 50 TABLET, FILM COATED ORAL at 02:34

## 2025-07-26 RX ADMIN — Medication 800 MILLIGRAM(S): at 18:22

## 2025-07-26 RX ADMIN — FUROSEMIDE 40 MILLIGRAM(S): 10 INJECTION INTRAMUSCULAR; INTRAVENOUS at 05:02

## 2025-07-26 RX ADMIN — URSODIOL 500 MILLIGRAM(S): 300 CAPSULE ORAL at 05:03

## 2025-07-26 RX ADMIN — URSODIOL 500 MILLIGRAM(S): 300 CAPSULE ORAL at 18:22

## 2025-07-26 RX ADMIN — Medication 800 MILLIGRAM(S): at 10:17

## 2025-07-26 RX ADMIN — Medication 25 GRAM(S): at 10:42

## 2025-07-26 RX ADMIN — Medication 1 APPLICATION(S): at 05:03

## 2025-07-26 RX ADMIN — CEFTRIAXONE 100 MILLIGRAM(S): 500 INJECTION, POWDER, FOR SOLUTION INTRAMUSCULAR; INTRAVENOUS at 10:16

## 2025-07-26 RX ADMIN — Medication 5 MILLIGRAM(S): at 22:30

## 2025-07-26 RX ADMIN — Medication 40 MILLIGRAM(S): at 05:02

## 2025-07-26 RX ADMIN — Medication 1 APPLICATION(S): at 18:22

## 2025-07-26 RX ADMIN — TRAMADOL HYDROCHLORIDE 50 MILLIGRAM(S): 50 TABLET, FILM COATED ORAL at 03:15

## 2025-07-26 RX ADMIN — Medication 1 TABLET(S): at 10:18

## 2025-07-26 RX ADMIN — LACTULOSE 20 GRAM(S): 10 SOLUTION ORAL at 05:02

## 2025-07-27 LAB
ALBUMIN SERPL ELPH-MCNC: 3.4 G/DL — SIGNIFICANT CHANGE UP (ref 3.3–5)
ALP SERPL-CCNC: 212 U/L — HIGH (ref 40–120)
ALT FLD-CCNC: 25 U/L — SIGNIFICANT CHANGE UP (ref 10–45)
ANION GAP SERPL CALC-SCNC: 13 MMOL/L — SIGNIFICANT CHANGE UP (ref 5–17)
APTT BLD: 48.4 SEC — HIGH (ref 26.1–36.8)
AST SERPL-CCNC: 44 U/L — HIGH (ref 10–40)
BILIRUB SERPL-MCNC: 13.8 MG/DL — HIGH (ref 0.2–1.2)
BUN SERPL-MCNC: 15 MG/DL — SIGNIFICANT CHANGE UP (ref 7–23)
CALCIUM SERPL-MCNC: 9.6 MG/DL — SIGNIFICANT CHANGE UP (ref 8.4–10.5)
CHLORIDE SERPL-SCNC: 103 MMOL/L — SIGNIFICANT CHANGE UP (ref 96–108)
CO2 SERPL-SCNC: 20 MMOL/L — LOW (ref 22–31)
CREAT SERPL-MCNC: 0.98 MG/DL — SIGNIFICANT CHANGE UP (ref 0.5–1.3)
EGFR: 73 ML/MIN/1.73M2 — SIGNIFICANT CHANGE UP
EGFR: 73 ML/MIN/1.73M2 — SIGNIFICANT CHANGE UP
GLUCOSE SERPL-MCNC: 84 MG/DL — SIGNIFICANT CHANGE UP (ref 70–99)
HCT VFR BLD CALC: 23.7 % — LOW (ref 34.5–45)
HGB BLD-MCNC: 7.8 G/DL — LOW (ref 11.5–15.5)
IMMATURE PLATELET FRACTION #: 1.8 K/UL — LOW (ref 4.7–11.1)
IMMATURE PLATELET FRACTION %: 2.6 % — SIGNIFICANT CHANGE UP (ref 1.6–4.9)
INR BLD: 2.86 RATIO — HIGH (ref 0.85–1.16)
MAGNESIUM SERPL-MCNC: 1.7 MG/DL — SIGNIFICANT CHANGE UP (ref 1.6–2.6)
MCHC RBC-ENTMCNC: 31.8 PG — SIGNIFICANT CHANGE UP (ref 27–34)
MCHC RBC-ENTMCNC: 32.9 G/DL — SIGNIFICANT CHANGE UP (ref 32–36)
MCV RBC AUTO: 96.7 FL — SIGNIFICANT CHANGE UP (ref 80–100)
NRBC # BLD AUTO: 0 K/UL — SIGNIFICANT CHANGE UP (ref 0–0)
NRBC # FLD: 0 K/UL — SIGNIFICANT CHANGE UP (ref 0–0)
NRBC BLD AUTO-RTO: 0 /100 WBCS — SIGNIFICANT CHANGE UP (ref 0–0)
PHOSPHATE SERPL-MCNC: 3.2 MG/DL — SIGNIFICANT CHANGE UP (ref 2.5–4.5)
PLATELET # BLD AUTO: 68 K/UL — LOW (ref 150–400)
PMV BLD: 11 FL — SIGNIFICANT CHANGE UP (ref 7–13)
POTASSIUM SERPL-MCNC: 5.3 MMOL/L — SIGNIFICANT CHANGE UP (ref 3.5–5.3)
POTASSIUM SERPL-SCNC: 5.3 MMOL/L — SIGNIFICANT CHANGE UP (ref 3.5–5.3)
PROT SERPL-MCNC: 6.2 G/DL — SIGNIFICANT CHANGE UP (ref 6–8.3)
PROTHROM AB SERPL-ACNC: 32.3 SEC — HIGH (ref 9.9–13.4)
RBC # BLD: 2.45 M/UL — LOW (ref 3.8–5.2)
RBC # FLD: 23.9 % — HIGH (ref 10.3–14.5)
SODIUM SERPL-SCNC: 136 MMOL/L — SIGNIFICANT CHANGE UP (ref 135–145)
WBC # BLD: 4.12 K/UL — SIGNIFICANT CHANGE UP (ref 3.8–10.5)
WBC # FLD AUTO: 4.12 K/UL — SIGNIFICANT CHANGE UP (ref 3.8–10.5)

## 2025-07-27 RX ADMIN — Medication 1 APPLICATION(S): at 17:15

## 2025-07-27 RX ADMIN — Medication 1 APPLICATION(S): at 05:40

## 2025-07-27 RX ADMIN — Medication 40 MILLIGRAM(S): at 08:01

## 2025-07-27 RX ADMIN — URSODIOL 500 MILLIGRAM(S): 300 CAPSULE ORAL at 05:40

## 2025-07-27 RX ADMIN — Medication 50 MILLIGRAM(S): at 05:40

## 2025-07-27 RX ADMIN — Medication 800 MILLIGRAM(S): at 17:16

## 2025-07-27 RX ADMIN — Medication 5 MILLIGRAM(S): at 23:12

## 2025-07-27 RX ADMIN — LACTULOSE 20 GRAM(S): 10 SOLUTION ORAL at 05:40

## 2025-07-27 RX ADMIN — Medication 800 MILLIGRAM(S): at 08:02

## 2025-07-27 RX ADMIN — CEFTRIAXONE 100 MILLIGRAM(S): 500 INJECTION, POWDER, FOR SOLUTION INTRAMUSCULAR; INTRAVENOUS at 08:01

## 2025-07-27 RX ADMIN — FUROSEMIDE 40 MILLIGRAM(S): 10 INJECTION INTRAMUSCULAR; INTRAVENOUS at 05:40

## 2025-07-27 RX ADMIN — Medication 1 APPLICATION(S): at 05:41

## 2025-07-27 RX ADMIN — TRAMADOL HYDROCHLORIDE 50 MILLIGRAM(S): 50 TABLET, FILM COATED ORAL at 01:00

## 2025-07-28 LAB
ALBUMIN SERPL ELPH-MCNC: 3.2 G/DL — LOW (ref 3.3–5)
ALP SERPL-CCNC: 203 U/L — HIGH (ref 40–120)
ALT FLD-CCNC: 22 U/L — SIGNIFICANT CHANGE UP (ref 10–45)
ANION GAP SERPL CALC-SCNC: 11 MMOL/L — SIGNIFICANT CHANGE UP (ref 5–17)
APTT BLD: 48.9 SEC — HIGH (ref 26.1–36.8)
AST SERPL-CCNC: 41 U/L — HIGH (ref 10–40)
BILIRUB SERPL-MCNC: 13.2 MG/DL — HIGH (ref 0.2–1.2)
BLD GP AB SCN SERPL QL: NEGATIVE — SIGNIFICANT CHANGE UP
BUN SERPL-MCNC: 17 MG/DL — SIGNIFICANT CHANGE UP (ref 7–23)
CALCIUM SERPL-MCNC: 9.4 MG/DL — SIGNIFICANT CHANGE UP (ref 8.4–10.5)
CHLORIDE SERPL-SCNC: 102 MMOL/L — SIGNIFICANT CHANGE UP (ref 96–108)
CO2 SERPL-SCNC: 21 MMOL/L — LOW (ref 22–31)
CREAT SERPL-MCNC: 1 MG/DL — SIGNIFICANT CHANGE UP (ref 0.5–1.3)
EGFR: 71 ML/MIN/1.73M2 — SIGNIFICANT CHANGE UP
EGFR: 71 ML/MIN/1.73M2 — SIGNIFICANT CHANGE UP
GLUCOSE SERPL-MCNC: 83 MG/DL — SIGNIFICANT CHANGE UP (ref 70–99)
HBV SURFACE AG SER-ACNC: SIGNIFICANT CHANGE UP
HCG SERPL-ACNC: <2 MIU/ML — SIGNIFICANT CHANGE UP
HCT VFR BLD CALC: 23.5 % — LOW (ref 34.5–45)
HCV AB S/CO SERPL IA: 0.06 S/CO — SIGNIFICANT CHANGE UP
HCV AB SERPL-IMP: SIGNIFICANT CHANGE UP
HGB BLD-MCNC: 7.5 G/DL — LOW (ref 11.5–15.5)
IMMATURE PLATELET FRACTION #: 2 K/UL — LOW (ref 4.7–11.1)
IMMATURE PLATELET FRACTION %: 2.8 % — SIGNIFICANT CHANGE UP (ref 1.6–4.9)
INR BLD: 2.94 RATIO — HIGH (ref 0.85–1.16)
MAGNESIUM SERPL-MCNC: 1.5 MG/DL — LOW (ref 1.6–2.6)
MCHC RBC-ENTMCNC: 30.9 PG — SIGNIFICANT CHANGE UP (ref 27–34)
MCHC RBC-ENTMCNC: 31.9 G/DL — LOW (ref 32–36)
MCV RBC AUTO: 96.7 FL — SIGNIFICANT CHANGE UP (ref 80–100)
NRBC # BLD AUTO: 0 K/UL — SIGNIFICANT CHANGE UP (ref 0–0)
NRBC # FLD: 0 K/UL — SIGNIFICANT CHANGE UP (ref 0–0)
NRBC BLD AUTO-RTO: 0 /100 WBCS — SIGNIFICANT CHANGE UP (ref 0–0)
PHOSPHATE SERPL-MCNC: 2.8 MG/DL — SIGNIFICANT CHANGE UP (ref 2.5–4.5)
PLATELET # BLD AUTO: 71 K/UL — LOW (ref 150–400)
PMV BLD: 11.3 FL — SIGNIFICANT CHANGE UP (ref 7–13)
POTASSIUM SERPL-MCNC: 5.7 MMOL/L — HIGH (ref 3.5–5.3)
POTASSIUM SERPL-SCNC: 5.7 MMOL/L — HIGH (ref 3.5–5.3)
PROT SERPL-MCNC: 6 G/DL — SIGNIFICANT CHANGE UP (ref 6–8.3)
PROTHROM AB SERPL-ACNC: 33.4 SEC — HIGH (ref 9.9–13.4)
RBC # BLD: 2.43 M/UL — LOW (ref 3.8–5.2)
RBC # FLD: 23.9 % — HIGH (ref 10.3–14.5)
RH IG SCN BLD-IMP: POSITIVE — SIGNIFICANT CHANGE UP
SODIUM SERPL-SCNC: 134 MMOL/L — LOW (ref 135–145)
WBC # BLD: 4.6 K/UL — SIGNIFICANT CHANGE UP (ref 3.8–10.5)
WBC # FLD AUTO: 4.6 K/UL — SIGNIFICANT CHANGE UP (ref 3.8–10.5)

## 2025-07-28 PROCEDURE — 86880 COOMBS TEST DIRECT: CPT

## 2025-07-28 PROCEDURE — 86480 TB TEST CELL IMMUN MEASURE: CPT

## 2025-07-28 PROCEDURE — 87521 HEPATITIS C PROBE&RVRS TRNSC: CPT

## 2025-07-28 PROCEDURE — 81001 URINALYSIS AUTO W/SCOPE: CPT

## 2025-07-28 PROCEDURE — 87040 BLOOD CULTURE FOR BACTERIA: CPT

## 2025-07-28 PROCEDURE — 76641 ULTRASOUND BREAST COMPLETE: CPT

## 2025-07-28 PROCEDURE — 82728 ASSAY OF FERRITIN: CPT

## 2025-07-28 PROCEDURE — 80307 DRUG TEST PRSMV CHEM ANLYZR: CPT

## 2025-07-28 PROCEDURE — 87340 HEPATITIS B SURFACE AG IA: CPT

## 2025-07-28 PROCEDURE — 86644 CMV ANTIBODY: CPT

## 2025-07-28 PROCEDURE — 82784 ASSAY IGA/IGD/IGG/IGM EACH: CPT

## 2025-07-28 PROCEDURE — 97161 PT EVAL LOW COMPLEX 20 MIN: CPT

## 2025-07-28 PROCEDURE — 86695 HERPES SIMPLEX TYPE 1 TEST: CPT

## 2025-07-28 PROCEDURE — 87150 DNA/RNA AMPLIFIED PROBE: CPT

## 2025-07-28 PROCEDURE — 82378 CARCINOEMBRYONIC ANTIGEN: CPT

## 2025-07-28 PROCEDURE — 86481 TB AG RESPONSE T-CELL SUSP: CPT

## 2025-07-28 PROCEDURE — 82803 BLOOD GASES ANY COMBINATION: CPT

## 2025-07-28 PROCEDURE — 84145 PROCALCITONIN (PCT): CPT

## 2025-07-28 PROCEDURE — 94799 UNLISTED PULMONARY SVC/PX: CPT

## 2025-07-28 PROCEDURE — 97530 THERAPEUTIC ACTIVITIES: CPT

## 2025-07-28 PROCEDURE — 86900 BLOOD TYPING SEROLOGIC ABO: CPT

## 2025-07-28 PROCEDURE — 86704 HEP B CORE ANTIBODY TOTAL: CPT

## 2025-07-28 PROCEDURE — 87389 HIV-1 AG W/HIV-1&-2 AB AG IA: CPT

## 2025-07-28 PROCEDURE — 86765 RUBEOLA ANTIBODY: CPT

## 2025-07-28 PROCEDURE — 74177 CT ABD & PELVIS W/CONTRAST: CPT

## 2025-07-28 PROCEDURE — P9016: CPT

## 2025-07-28 PROCEDURE — 84133 ASSAY OF URINE POTASSIUM: CPT

## 2025-07-28 PROCEDURE — 86381 MITOCHONDRIAL ANTIBODY EACH: CPT

## 2025-07-28 PROCEDURE — 86901 BLOOD TYPING SEROLOGIC RH(D): CPT

## 2025-07-28 PROCEDURE — 85014 HEMATOCRIT: CPT

## 2025-07-28 PROCEDURE — 82435 ASSAY OF BLOOD CHLORIDE: CPT

## 2025-07-28 PROCEDURE — 93971 EXTREMITY STUDY: CPT | Mod: 26,LT

## 2025-07-28 PROCEDURE — 93970 EXTREMITY STUDY: CPT

## 2025-07-28 PROCEDURE — 83550 IRON BINDING TEST: CPT

## 2025-07-28 PROCEDURE — 87385 HISTOPLASMA CAPSUL AG IA: CPT

## 2025-07-28 PROCEDURE — 72197 MRI PELVIS W/O & W/DYE: CPT

## 2025-07-28 PROCEDURE — 82105 ALPHA-FETOPROTEIN SERUM: CPT

## 2025-07-28 PROCEDURE — 86850 RBC ANTIBODY SCREEN: CPT

## 2025-07-28 PROCEDURE — 86663 EPSTEIN-BARR ANTIBODY: CPT

## 2025-07-28 PROCEDURE — 87640 STAPH A DNA AMP PROBE: CPT

## 2025-07-28 PROCEDURE — 86305 HUMAN EPIDIDYMIS PROTEIN 4: CPT

## 2025-07-28 PROCEDURE — 71045 X-RAY EXAM CHEST 1 VIEW: CPT

## 2025-07-28 PROCEDURE — 76705 ECHO EXAM OF ABDOMEN: CPT

## 2025-07-28 PROCEDURE — 84443 ASSAY THYROID STIM HORMONE: CPT

## 2025-07-28 PROCEDURE — 86803 HEPATITIS C AB TEST: CPT

## 2025-07-28 PROCEDURE — 87205 SMEAR GRAM STAIN: CPT

## 2025-07-28 PROCEDURE — 74018 RADEX ABDOMEN 1 VIEW: CPT

## 2025-07-28 PROCEDURE — 85018 HEMOGLOBIN: CPT

## 2025-07-28 PROCEDURE — 86735 MUMPS ANTIBODY: CPT

## 2025-07-28 PROCEDURE — 93308 TTE F-UP OR LMTD: CPT

## 2025-07-28 PROCEDURE — 97112 NEUROMUSCULAR REEDUCATION: CPT

## 2025-07-28 PROCEDURE — 93306 TTE W/DOPPLER COMPLETE: CPT

## 2025-07-28 PROCEDURE — 86777 TOXOPLASMA ANTIBODY: CPT

## 2025-07-28 PROCEDURE — 86706 HEP B SURFACE ANTIBODY: CPT

## 2025-07-28 PROCEDURE — G0480: CPT

## 2025-07-28 PROCEDURE — 82570 ASSAY OF URINE CREATININE: CPT

## 2025-07-28 PROCEDURE — 85732 THROMBOPLASTIN TIME PARTIAL: CPT

## 2025-07-28 PROCEDURE — 86225 DNA ANTIBODY NATIVE: CPT

## 2025-07-28 PROCEDURE — P9073: CPT

## 2025-07-28 PROCEDURE — 82330 ASSAY OF CALCIUM: CPT

## 2025-07-28 PROCEDURE — 84481 FREE ASSAY (FT-3): CPT

## 2025-07-28 PROCEDURE — 86376 MICROSOMAL ANTIBODY EACH: CPT

## 2025-07-28 PROCEDURE — 86708 HEPATITIS A ANTIBODY: CPT

## 2025-07-28 PROCEDURE — 86665 EPSTEIN-BARR CAPSID VCA: CPT

## 2025-07-28 PROCEDURE — 87624 HPV HI-RISK TYP POOLED RSLT: CPT

## 2025-07-28 PROCEDURE — 93971 EXTREMITY STUDY: CPT

## 2025-07-28 PROCEDURE — 86301 IMMUNOASSAY TUMOR CA 19-9: CPT

## 2025-07-28 PROCEDURE — 86787 VARICELLA-ZOSTER ANTIBODY: CPT

## 2025-07-28 PROCEDURE — 86696 HERPES SIMPLEX TYPE 2 TEST: CPT

## 2025-07-28 PROCEDURE — 82140 ASSAY OF AMMONIA: CPT

## 2025-07-28 PROCEDURE — 84100 ASSAY OF PHOSPHORUS: CPT

## 2025-07-28 PROCEDURE — 85610 PROTHROMBIN TIME: CPT

## 2025-07-28 PROCEDURE — P9047: CPT

## 2025-07-28 PROCEDURE — 83605 ASSAY OF LACTIC ACID: CPT

## 2025-07-28 PROCEDURE — 86780 TREPONEMA PALLIDUM: CPT

## 2025-07-28 PROCEDURE — 83935 ASSAY OF URINE OSMOLALITY: CPT

## 2025-07-28 PROCEDURE — 85396 CLOTTING ASSAY WHOLE BLOOD: CPT

## 2025-07-28 PROCEDURE — P9045: CPT

## 2025-07-28 PROCEDURE — 82390 ASSAY OF CERULOPLASMIN: CPT

## 2025-07-28 PROCEDURE — 87799 DETECT AGENT NOS DNA QUANT: CPT

## 2025-07-28 PROCEDURE — 86304 IMMUNOASSAY TUMOR CA 125: CPT

## 2025-07-28 PROCEDURE — P9011: CPT

## 2025-07-28 PROCEDURE — 86682 HELMINTH ANTIBODY: CPT

## 2025-07-28 PROCEDURE — 87641 MR-STAPH DNA AMP PROBE: CPT

## 2025-07-28 PROCEDURE — 87077 CULTURE AEROBIC IDENTIFY: CPT

## 2025-07-28 PROCEDURE — 86147 CARDIOLIPIN ANTIBODY EA IG: CPT

## 2025-07-28 PROCEDURE — 92526 ORAL FUNCTION THERAPY: CPT

## 2025-07-28 PROCEDURE — 94003 VENT MGMT INPAT SUBQ DAY: CPT

## 2025-07-28 PROCEDURE — 87086 URINE CULTURE/COLONY COUNT: CPT

## 2025-07-28 PROCEDURE — 86762 RUBELLA ANTIBODY: CPT

## 2025-07-28 PROCEDURE — 97110 THERAPEUTIC EXERCISES: CPT

## 2025-07-28 PROCEDURE — 85027 COMPLETE CBC AUTOMATED: CPT

## 2025-07-28 PROCEDURE — 83735 ASSAY OF MAGNESIUM: CPT

## 2025-07-28 PROCEDURE — 83540 ASSAY OF IRON: CPT

## 2025-07-28 PROCEDURE — 86769 SARS-COV-2 COVID-19 ANTIBODY: CPT

## 2025-07-28 PROCEDURE — 84540 ASSAY OF URINE/UREA-N: CPT

## 2025-07-28 PROCEDURE — 82247 BILIRUBIN TOTAL: CPT

## 2025-07-28 PROCEDURE — P9059: CPT

## 2025-07-28 PROCEDURE — 86255 FLUORESCENT ANTIBODY SCREEN: CPT

## 2025-07-28 PROCEDURE — 86146 BETA-2 GLYCOPROTEIN ANTIBODY: CPT

## 2025-07-28 PROCEDURE — 86038 ANTINUCLEAR ANTIBODIES: CPT

## 2025-07-28 PROCEDURE — 76856 US EXAM PELVIC COMPLETE: CPT

## 2025-07-28 PROCEDURE — 84300 ASSAY OF URINE SODIUM: CPT

## 2025-07-28 PROCEDURE — A9585: CPT

## 2025-07-28 PROCEDURE — 83036 HEMOGLOBIN GLYCOSYLATED A1C: CPT

## 2025-07-28 PROCEDURE — 87070 CULTURE OTHR SPECIMN AEROBIC: CPT

## 2025-07-28 PROCEDURE — 86709 HEPATITIS A IGM ANTIBODY: CPT

## 2025-07-28 PROCEDURE — 99233 SBSQ HOSP IP/OBS HIGH 50: CPT

## 2025-07-28 PROCEDURE — 92612 ENDOSCOPY SWALLOW (FEES) VID: CPT

## 2025-07-28 PROCEDURE — 80061 LIPID PANEL: CPT

## 2025-07-28 PROCEDURE — 87637 SARSCOV2&INF A&B&RSV AMP PRB: CPT

## 2025-07-28 PROCEDURE — 84132 ASSAY OF SERUM POTASSIUM: CPT

## 2025-07-28 PROCEDURE — 84702 CHORIONIC GONADOTROPIN TEST: CPT

## 2025-07-28 PROCEDURE — 84295 ASSAY OF SERUM SODIUM: CPT

## 2025-07-28 PROCEDURE — 92610 EVALUATE SWALLOWING FUNCTION: CPT

## 2025-07-28 PROCEDURE — 80053 COMPREHEN METABOLIC PANEL: CPT

## 2025-07-28 PROCEDURE — 97535 SELF CARE MNGMENT TRAINING: CPT

## 2025-07-28 PROCEDURE — 81025 URINE PREGNANCY TEST: CPT

## 2025-07-28 PROCEDURE — 85613 RUSSELL VIPER VENOM DILUTED: CPT

## 2025-07-28 PROCEDURE — 36600 WITHDRAWAL OF ARTERIAL BLOOD: CPT

## 2025-07-28 PROCEDURE — 84156 ASSAY OF PROTEIN URINE: CPT

## 2025-07-28 PROCEDURE — 94002 VENT MGMT INPAT INIT DAY: CPT

## 2025-07-28 PROCEDURE — 86985 SPLIT BLOOD OR PRODUCTS: CPT

## 2025-07-28 PROCEDURE — 82947 ASSAY GLUCOSE BLOOD QUANT: CPT

## 2025-07-28 PROCEDURE — 86664 EPSTEIN-BARR NUCLEAR ANTIGEN: CPT

## 2025-07-28 PROCEDURE — 36415 COLL VENOUS BLD VENIPUNCTURE: CPT

## 2025-07-28 PROCEDURE — 97165 OT EVAL LOW COMPLEX 30 MIN: CPT

## 2025-07-28 PROCEDURE — 97116 GAIT TRAINING THERAPY: CPT

## 2025-07-28 PROCEDURE — 86923 COMPATIBILITY TEST ELECTRIC: CPT

## 2025-07-28 PROCEDURE — 82565 ASSAY OF CREATININE: CPT

## 2025-07-28 PROCEDURE — 85730 THROMBOPLASTIN TIME PARTIAL: CPT

## 2025-07-28 RX ORDER — VANCOMYCIN HCL IN 5 % DEXTROSE 1.5G/250ML
1000 PLASTIC BAG, INJECTION (ML) INTRAVENOUS EVERY 12 HOURS
Refills: 0 | Status: DISCONTINUED | OUTPATIENT
Start: 2025-07-29 | End: 2025-07-30

## 2025-07-28 RX ORDER — SODIUM ZIRCONIUM CYCLOSILICATE 5 G/5G
10 POWDER, FOR SUSPENSION ORAL ONCE
Refills: 0 | Status: COMPLETED | OUTPATIENT
Start: 2025-07-28 | End: 2025-07-28

## 2025-07-28 RX ORDER — ONDANSETRON HCL/PF 4 MG/2 ML
4 VIAL (ML) INJECTION ONCE
Refills: 0 | Status: COMPLETED | OUTPATIENT
Start: 2025-07-28 | End: 2025-07-28

## 2025-07-28 RX ORDER — MAGNESIUM SULFATE 500 MG/ML
1 SYRINGE (ML) INJECTION ONCE
Refills: 0 | Status: COMPLETED | OUTPATIENT
Start: 2025-07-28 | End: 2025-07-28

## 2025-07-28 RX ORDER — VANCOMYCIN HCL IN 5 % DEXTROSE 1.5G/250ML
PLASTIC BAG, INJECTION (ML) INTRAVENOUS
Refills: 0 | Status: DISCONTINUED | OUTPATIENT
Start: 2025-07-28 | End: 2025-07-30

## 2025-07-28 RX ORDER — VANCOMYCIN HCL IN 5 % DEXTROSE 1.5G/250ML
1000 PLASTIC BAG, INJECTION (ML) INTRAVENOUS ONCE
Refills: 0 | Status: COMPLETED | OUTPATIENT
Start: 2025-07-28 | End: 2025-07-28

## 2025-07-28 RX ADMIN — Medication 4 MILLIGRAM(S): at 08:03

## 2025-07-28 RX ADMIN — Medication 50 MILLIGRAM(S): at 05:22

## 2025-07-28 RX ADMIN — Medication 1 APPLICATION(S): at 16:59

## 2025-07-28 RX ADMIN — Medication 5 MILLIGRAM(S): at 23:12

## 2025-07-28 RX ADMIN — Medication 40 MILLIGRAM(S): at 05:22

## 2025-07-28 RX ADMIN — Medication 1 APPLICATION(S): at 05:22

## 2025-07-28 RX ADMIN — TRAMADOL HYDROCHLORIDE 50 MILLIGRAM(S): 50 TABLET, FILM COATED ORAL at 23:13

## 2025-07-28 RX ADMIN — SODIUM ZIRCONIUM CYCLOSILICATE 10 GRAM(S): 5 POWDER, FOR SUSPENSION ORAL at 11:38

## 2025-07-28 RX ADMIN — Medication 800 MILLIGRAM(S): at 16:58

## 2025-07-28 RX ADMIN — FUROSEMIDE 40 MILLIGRAM(S): 10 INJECTION INTRAMUSCULAR; INTRAVENOUS at 05:21

## 2025-07-28 RX ADMIN — Medication 100 GRAM(S): at 11:38

## 2025-07-28 RX ADMIN — Medication 800 MILLIGRAM(S): at 07:44

## 2025-07-28 RX ADMIN — LACTULOSE 20 GRAM(S): 10 SOLUTION ORAL at 05:21

## 2025-07-28 RX ADMIN — CEFTRIAXONE 100 MILLIGRAM(S): 500 INJECTION, POWDER, FOR SOLUTION INTRAMUSCULAR; INTRAVENOUS at 07:44

## 2025-07-28 RX ADMIN — Medication 250 MILLIGRAM(S): at 23:10

## 2025-07-29 LAB
ACANTHOCYTES BLD QL SMEAR: SLIGHT — SIGNIFICANT CHANGE UP
ADD ON TEST-SPECIMEN IN LAB: SIGNIFICANT CHANGE UP
ALBUMIN SERPL ELPH-MCNC: 3.4 G/DL — SIGNIFICANT CHANGE UP (ref 3.3–5)
ALP SERPL-CCNC: 211 U/L — HIGH (ref 40–120)
ALT FLD-CCNC: 22 U/L — SIGNIFICANT CHANGE UP (ref 10–45)
AMMONIA BLD-MCNC: 64 UMOL/L — HIGH (ref 11–55)
ANION GAP SERPL CALC-SCNC: 11 MMOL/L — SIGNIFICANT CHANGE UP (ref 5–17)
ANISOCYTOSIS BLD QL: ABNORMAL
APPEARANCE UR: CLEAR — SIGNIFICANT CHANGE UP
APTT BLD: 47.5 SEC — HIGH (ref 26.1–36.8)
AST SERPL-CCNC: 46 U/L — HIGH (ref 10–40)
BACTERIA # UR AUTO: NEGATIVE /HPF — SIGNIFICANT CHANGE UP
BASOPHILS # BLD AUTO: 0.02 K/UL — SIGNIFICANT CHANGE UP (ref 0–0.2)
BASOPHILS # BLD MANUAL: 0 K/UL — SIGNIFICANT CHANGE UP (ref 0–0.2)
BASOPHILS NFR BLD AUTO: 0.5 % — SIGNIFICANT CHANGE UP (ref 0–2)
BASOPHILS NFR BLD MANUAL: 0 % — SIGNIFICANT CHANGE UP (ref 0–2)
BILIRUB SERPL-MCNC: 13.5 MG/DL — HIGH (ref 0.2–1.2)
BILIRUB UR-MCNC: NEGATIVE — SIGNIFICANT CHANGE UP
BLD GP AB SCN SERPL QL: NEGATIVE — SIGNIFICANT CHANGE UP
BUN SERPL-MCNC: 18 MG/DL — SIGNIFICANT CHANGE UP (ref 7–23)
BURR CELLS BLD QL SMEAR: SLIGHT — SIGNIFICANT CHANGE UP
CALCIUM SERPL-MCNC: 9.7 MG/DL — SIGNIFICANT CHANGE UP (ref 8.4–10.5)
CAST: 0 /LPF — SIGNIFICANT CHANGE UP (ref 0–4)
CHLORIDE SERPL-SCNC: 102 MMOL/L — SIGNIFICANT CHANGE UP (ref 96–108)
CMV IGG FLD QL: >10 U/ML — HIGH
CMV IGG SERPL-IMP: POSITIVE
CO2 SERPL-SCNC: 20 MMOL/L — LOW (ref 22–31)
COLOR SPEC: SIGNIFICANT CHANGE UP
CREAT SERPL-MCNC: 1.1 MG/DL — SIGNIFICANT CHANGE UP (ref 0.5–1.3)
CRP SERPL-MCNC: 9 MG/L — HIGH (ref 0–4)
DACRYOCYTES BLD QL SMEAR: SLIGHT — SIGNIFICANT CHANGE UP
DIFF PNL FLD: ABNORMAL
EGFR: 63 ML/MIN/1.73M2 — SIGNIFICANT CHANGE UP
EGFR: 63 ML/MIN/1.73M2 — SIGNIFICANT CHANGE UP
EOSINOPHIL # BLD AUTO: 0.03 K/UL — SIGNIFICANT CHANGE UP (ref 0–0.5)
EOSINOPHIL # BLD MANUAL: 0.04 K/UL — SIGNIFICANT CHANGE UP (ref 0–0.5)
EOSINOPHIL NFR BLD AUTO: 0.7 % — SIGNIFICANT CHANGE UP (ref 0–6)
EOSINOPHIL NFR BLD MANUAL: 0.9 % — SIGNIFICANT CHANGE UP (ref 0–6)
FIBRINOGEN PPP-MCNC: 118 MG/DL — LOW (ref 200–445)
GGT SERPL-CCNC: 30 U/L — SIGNIFICANT CHANGE UP (ref 8–40)
GIANT PLATELETS BLD QL SMEAR: PRESENT
GLUCOSE SERPL-MCNC: 56 MG/DL — LOW (ref 70–99)
GLUCOSE UR QL: NEGATIVE MG/DL — SIGNIFICANT CHANGE UP
HBV CORE AB SER-ACNC: SIGNIFICANT CHANGE UP
HBV SURFACE AB SER-ACNC: 29.7 MIU/ML — SIGNIFICANT CHANGE UP
HCT VFR BLD CALC: 24 % — LOW (ref 34.5–45)
HCV RNA SERPL NAA DL=5-ACNC: SIGNIFICANT CHANGE UP IU/ML
HCV RNA SPEC NAA+PROBE-LOG IU: SIGNIFICANT CHANGE UP
HCV RNA SPEC NAA+PROBE-LOG IU: SIGNIFICANT CHANGE UP LOGIU/ML
HGB BLD-MCNC: 7.7 G/DL — LOW (ref 11.5–15.5)
HIV 1+2 AB+HIV1 P24 AG SERPL QL IA: SIGNIFICANT CHANGE UP
HYPOCHROMIA BLD QL: SLIGHT — SIGNIFICANT CHANGE UP
IMM GRANULOCYTES # BLD AUTO: 0.06 K/UL — SIGNIFICANT CHANGE UP (ref 0–0.07)
IMM GRANULOCYTES NFR BLD AUTO: 1.4 % — HIGH (ref 0–0.9)
IMMATURE PLATELET FRACTION #: 1.9 K/UL — LOW (ref 4.7–11.1)
IMMATURE PLATELET FRACTION %: 2.9 % — SIGNIFICANT CHANGE UP (ref 1.6–4.9)
INR BLD: 2.88 RATIO — HIGH (ref 0.85–1.16)
KETONES UR QL: NEGATIVE MG/DL — SIGNIFICANT CHANGE UP
LEUKOCYTE ESTERASE UR-ACNC: NEGATIVE — SIGNIFICANT CHANGE UP
LYMPHOCYTES # BLD AUTO: 0.94 K/UL — LOW (ref 1–3.3)
LYMPHOCYTES # BLD MANUAL: 0.58 K/UL — LOW (ref 1–3.3)
LYMPHOCYTES NFR BLD AUTO: 22.3 % — SIGNIFICANT CHANGE UP (ref 13–44)
LYMPHOCYTES NFR BLD MANUAL: 13.8 % — SIGNIFICANT CHANGE UP (ref 13–44)
MACROCYTES BLD QL: ABNORMAL
MAGNESIUM SERPL-MCNC: 1.5 MG/DL — LOW (ref 1.6–2.6)
MCHC RBC-ENTMCNC: 31.3 PG — SIGNIFICANT CHANGE UP (ref 27–34)
MCHC RBC-ENTMCNC: 32.1 G/DL — SIGNIFICANT CHANGE UP (ref 32–36)
MCV RBC AUTO: 97.6 FL — SIGNIFICANT CHANGE UP (ref 80–100)
MONOCYTES # BLD AUTO: 0.75 K/UL — SIGNIFICANT CHANGE UP (ref 0–0.9)
MONOCYTES # BLD MANUAL: 0.43 K/UL — SIGNIFICANT CHANGE UP (ref 0–0.9)
MONOCYTES NFR BLD AUTO: 17.8 % — HIGH (ref 2–14)
MONOCYTES NFR BLD MANUAL: 10.3 % — SIGNIFICANT CHANGE UP (ref 2–14)
NEUTROPHILS # BLD AUTO: 2.42 K/UL — SIGNIFICANT CHANGE UP (ref 1.8–7.4)
NEUTROPHILS # BLD MANUAL: 3.16 K/UL — SIGNIFICANT CHANGE UP (ref 1.8–7.4)
NEUTROPHILS NFR BLD AUTO: 57.3 % — SIGNIFICANT CHANGE UP (ref 43–77)
NEUTROPHILS NFR BLD MANUAL: 75 % — SIGNIFICANT CHANGE UP (ref 43–77)
NITRITE UR-MCNC: NEGATIVE — SIGNIFICANT CHANGE UP
NRBC # BLD AUTO: 0 K/UL — SIGNIFICANT CHANGE UP (ref 0–0)
NRBC # FLD: 0 K/UL — SIGNIFICANT CHANGE UP (ref 0–0)
NRBC BLD AUTO-RTO: 0 /100 WBCS — SIGNIFICANT CHANGE UP (ref 0–0)
OVALOCYTES BLD QL SMEAR: SLIGHT — SIGNIFICANT CHANGE UP
PH UR: 8.5 (ref 5–8)
PHOSPHATE SERPL-MCNC: 3.1 MG/DL — SIGNIFICANT CHANGE UP (ref 2.5–4.5)
PLAT MORPH BLD: ABNORMAL
PLATELET # BLD AUTO: 67 K/UL — LOW (ref 150–400)
PMV BLD: 12.1 FL — SIGNIFICANT CHANGE UP (ref 7–13)
POIKILOCYTOSIS BLD QL AUTO: SLIGHT — SIGNIFICANT CHANGE UP
POLYCHROMASIA BLD QL SMEAR: SLIGHT — SIGNIFICANT CHANGE UP
POTASSIUM SERPL-MCNC: 5.4 MMOL/L — HIGH (ref 3.5–5.3)
POTASSIUM SERPL-SCNC: 5.4 MMOL/L — HIGH (ref 3.5–5.3)
PROT SERPL-MCNC: 6.2 G/DL — SIGNIFICANT CHANGE UP (ref 6–8.3)
PROT UR-MCNC: NEGATIVE MG/DL — SIGNIFICANT CHANGE UP
PROTHROM AB SERPL-ACNC: 32.8 SEC — HIGH (ref 9.9–13.4)
RBC # BLD: 2.46 M/UL — LOW (ref 3.8–5.2)
RBC # FLD: 23.9 % — HIGH (ref 10.3–14.5)
RBC BLD AUTO: ABNORMAL
RBC CASTS # UR COMP ASSIST: 3 /HPF — SIGNIFICANT CHANGE UP (ref 0–4)
REVIEW: SIGNIFICANT CHANGE UP
RH IG SCN BLD-IMP: POSITIVE — SIGNIFICANT CHANGE UP
SMUDGE CELLS # BLD: PRESENT
SODIUM SERPL-SCNC: 133 MMOL/L — LOW (ref 135–145)
SP GR SPEC: 1.01 — SIGNIFICANT CHANGE UP (ref 1–1.03)
SQUAMOUS # UR AUTO: 0 /HPF — SIGNIFICANT CHANGE UP (ref 0–5)
URATE SERPL-MCNC: 7.8 MG/DL — HIGH (ref 2.5–7)
UROBILINOGEN FLD QL: 0.2 MG/DL — SIGNIFICANT CHANGE UP (ref 0.2–1)
WBC # BLD: 4.22 K/UL — SIGNIFICANT CHANGE UP (ref 3.8–10.5)
WBC # FLD AUTO: 4.22 K/UL — SIGNIFICANT CHANGE UP (ref 3.8–10.5)
WBC UR QL: 0 /HPF — SIGNIFICANT CHANGE UP (ref 0–5)

## 2025-07-29 PROCEDURE — 84443 ASSAY THYROID STIM HORMONE: CPT

## 2025-07-29 PROCEDURE — 86923 COMPATIBILITY TEST ELECTRIC: CPT

## 2025-07-29 PROCEDURE — 86146 BETA-2 GLYCOPROTEIN ANTIBODY: CPT

## 2025-07-29 PROCEDURE — 82803 BLOOD GASES ANY COMBINATION: CPT

## 2025-07-29 PROCEDURE — 82330 ASSAY OF CALCIUM: CPT

## 2025-07-29 PROCEDURE — 82565 ASSAY OF CREATININE: CPT

## 2025-07-29 PROCEDURE — 92526 ORAL FUNCTION THERAPY: CPT

## 2025-07-29 PROCEDURE — 97161 PT EVAL LOW COMPLEX 20 MIN: CPT

## 2025-07-29 PROCEDURE — 76377 3D RENDER W/INTRP POSTPROCES: CPT

## 2025-07-29 PROCEDURE — 86709 HEPATITIS A IGM ANTIBODY: CPT

## 2025-07-29 PROCEDURE — 85025 COMPLETE CBC W/AUTO DIFF WBC: CPT

## 2025-07-29 PROCEDURE — 86780 TREPONEMA PALLIDUM: CPT

## 2025-07-29 PROCEDURE — 87385 HISTOPLASMA CAPSUL AG IA: CPT

## 2025-07-29 PROCEDURE — 87624 HPV HI-RISK TYP POOLED RSLT: CPT

## 2025-07-29 PROCEDURE — 85730 THROMBOPLASTIN TIME PARTIAL: CPT

## 2025-07-29 PROCEDURE — 36600 WITHDRAWAL OF ARTERIAL BLOOD: CPT

## 2025-07-29 PROCEDURE — 97110 THERAPEUTIC EXERCISES: CPT

## 2025-07-29 PROCEDURE — 94002 VENT MGMT INPAT INIT DAY: CPT

## 2025-07-29 PROCEDURE — 83735 ASSAY OF MAGNESIUM: CPT

## 2025-07-29 PROCEDURE — 84550 ASSAY OF BLOOD/URIC ACID: CPT

## 2025-07-29 PROCEDURE — P9047: CPT

## 2025-07-29 PROCEDURE — 82947 ASSAY GLUCOSE BLOOD QUANT: CPT

## 2025-07-29 PROCEDURE — 82435 ASSAY OF BLOOD CHLORIDE: CPT

## 2025-07-29 PROCEDURE — 82977 ASSAY OF GGT: CPT

## 2025-07-29 PROCEDURE — 97165 OT EVAL LOW COMPLEX 30 MIN: CPT

## 2025-07-29 PROCEDURE — 72197 MRI PELVIS W/O & W/DYE: CPT

## 2025-07-29 PROCEDURE — 87040 BLOOD CULTURE FOR BACTERIA: CPT

## 2025-07-29 PROCEDURE — 76856 US EXAM PELVIC COMPLETE: CPT

## 2025-07-29 PROCEDURE — 97116 GAIT TRAINING THERAPY: CPT

## 2025-07-29 PROCEDURE — 84156 ASSAY OF PROTEIN URINE: CPT

## 2025-07-29 PROCEDURE — 71045 X-RAY EXAM CHEST 1 VIEW: CPT

## 2025-07-29 PROCEDURE — 87799 DETECT AGENT NOS DNA QUANT: CPT

## 2025-07-29 PROCEDURE — 86305 HUMAN EPIDIDYMIS PROTEIN 4: CPT

## 2025-07-29 PROCEDURE — 86147 CARDIOLIPIN ANTIBODY EA IG: CPT

## 2025-07-29 PROCEDURE — 80061 LIPID PANEL: CPT

## 2025-07-29 PROCEDURE — P9016: CPT

## 2025-07-29 PROCEDURE — 73200 CT UPPER EXTREMITY W/O DYE: CPT

## 2025-07-29 PROCEDURE — 93971 EXTREMITY STUDY: CPT

## 2025-07-29 PROCEDURE — 84702 CHORIONIC GONADOTROPIN TEST: CPT

## 2025-07-29 PROCEDURE — 87522 HEPATITIS C REVRS TRNSCRPJ: CPT

## 2025-07-29 PROCEDURE — 92612 ENDOSCOPY SWALLOW (FEES) VID: CPT

## 2025-07-29 PROCEDURE — 86481 TB AG RESPONSE T-CELL SUSP: CPT

## 2025-07-29 PROCEDURE — 86255 FLUORESCENT ANTIBODY SCREEN: CPT

## 2025-07-29 PROCEDURE — 84133 ASSAY OF URINE POTASSIUM: CPT

## 2025-07-29 PROCEDURE — 73080 X-RAY EXAM OF ELBOW: CPT | Mod: 26,LT

## 2025-07-29 PROCEDURE — 86706 HEP B SURFACE ANTIBODY: CPT

## 2025-07-29 PROCEDURE — 86704 HEP B CORE ANTIBODY TOTAL: CPT

## 2025-07-29 PROCEDURE — 86644 CMV ANTIBODY: CPT

## 2025-07-29 PROCEDURE — 86850 RBC ANTIBODY SCREEN: CPT

## 2025-07-29 PROCEDURE — P9045: CPT

## 2025-07-29 PROCEDURE — 81001 URINALYSIS AUTO W/SCOPE: CPT

## 2025-07-29 PROCEDURE — 97530 THERAPEUTIC ACTIVITIES: CPT

## 2025-07-29 PROCEDURE — 87640 STAPH A DNA AMP PROBE: CPT

## 2025-07-29 PROCEDURE — 87340 HEPATITIS B SURFACE AG IA: CPT

## 2025-07-29 PROCEDURE — 97535 SELF CARE MNGMENT TRAINING: CPT

## 2025-07-29 PROCEDURE — 86900 BLOOD TYPING SEROLOGIC ABO: CPT

## 2025-07-29 PROCEDURE — 74018 RADEX ABDOMEN 1 VIEW: CPT

## 2025-07-29 PROCEDURE — 87205 SMEAR GRAM STAIN: CPT

## 2025-07-29 PROCEDURE — 86665 EPSTEIN-BARR CAPSID VCA: CPT

## 2025-07-29 PROCEDURE — 82247 BILIRUBIN TOTAL: CPT

## 2025-07-29 PROCEDURE — 76705 ECHO EXAM OF ABDOMEN: CPT

## 2025-07-29 PROCEDURE — 86777 TOXOPLASMA ANTIBODY: CPT

## 2025-07-29 PROCEDURE — 83605 ASSAY OF LACTIC ACID: CPT

## 2025-07-29 PROCEDURE — 86765 RUBEOLA ANTIBODY: CPT

## 2025-07-29 PROCEDURE — 84481 FREE ASSAY (FT-3): CPT

## 2025-07-29 PROCEDURE — 86038 ANTINUCLEAR ANTIBODIES: CPT

## 2025-07-29 PROCEDURE — 86708 HEPATITIS A ANTIBODY: CPT

## 2025-07-29 PROCEDURE — 36415 COLL VENOUS BLD VENIPUNCTURE: CPT

## 2025-07-29 PROCEDURE — 86787 VARICELLA-ZOSTER ANTIBODY: CPT

## 2025-07-29 PROCEDURE — 84132 ASSAY OF SERUM POTASSIUM: CPT

## 2025-07-29 PROCEDURE — 81025 URINE PREGNANCY TEST: CPT

## 2025-07-29 PROCEDURE — 86376 MICROSOMAL ANTIBODY EACH: CPT

## 2025-07-29 PROCEDURE — 86301 IMMUNOASSAY TUMOR CA 19-9: CPT

## 2025-07-29 PROCEDURE — 87637 SARSCOV2&INF A&B&RSV AMP PRB: CPT

## 2025-07-29 PROCEDURE — G0480: CPT

## 2025-07-29 PROCEDURE — 86480 TB TEST CELL IMMUN MEASURE: CPT

## 2025-07-29 PROCEDURE — 82570 ASSAY OF URINE CREATININE: CPT

## 2025-07-29 PROCEDURE — 87077 CULTURE AEROBIC IDENTIFY: CPT

## 2025-07-29 PROCEDURE — 86985 SPLIT BLOOD OR PRODUCTS: CPT

## 2025-07-29 PROCEDURE — A9585: CPT

## 2025-07-29 PROCEDURE — 84300 ASSAY OF URINE SODIUM: CPT

## 2025-07-29 PROCEDURE — 93970 EXTREMITY STUDY: CPT

## 2025-07-29 PROCEDURE — 86695 HERPES SIMPLEX TYPE 1 TEST: CPT

## 2025-07-29 PROCEDURE — 85732 THROMBOPLASTIN TIME PARTIAL: CPT

## 2025-07-29 PROCEDURE — 85610 PROTHROMBIN TIME: CPT

## 2025-07-29 PROCEDURE — 84540 ASSAY OF URINE/UREA-N: CPT

## 2025-07-29 PROCEDURE — 92610 EVALUATE SWALLOWING FUNCTION: CPT

## 2025-07-29 PROCEDURE — 86140 C-REACTIVE PROTEIN: CPT

## 2025-07-29 PROCEDURE — 83540 ASSAY OF IRON: CPT

## 2025-07-29 PROCEDURE — 85018 HEMOGLOBIN: CPT

## 2025-07-29 PROCEDURE — 94799 UNLISTED PULMONARY SVC/PX: CPT

## 2025-07-29 PROCEDURE — 93308 TTE F-UP OR LMTD: CPT

## 2025-07-29 PROCEDURE — P9073: CPT

## 2025-07-29 PROCEDURE — 73080 X-RAY EXAM OF ELBOW: CPT

## 2025-07-29 PROCEDURE — 82105 ALPHA-FETOPROTEIN SERUM: CPT

## 2025-07-29 PROCEDURE — 83036 HEMOGLOBIN GLYCOSYLATED A1C: CPT

## 2025-07-29 PROCEDURE — 87150 DNA/RNA AMPLIFIED PROBE: CPT

## 2025-07-29 PROCEDURE — 87641 MR-STAPH DNA AMP PROBE: CPT

## 2025-07-29 PROCEDURE — 86381 MITOCHONDRIAL ANTIBODY EACH: CPT

## 2025-07-29 PROCEDURE — 86225 DNA ANTIBODY NATIVE: CPT

## 2025-07-29 PROCEDURE — 87521 HEPATITIS C PROBE&RVRS TRNSC: CPT

## 2025-07-29 PROCEDURE — 83550 IRON BINDING TEST: CPT

## 2025-07-29 PROCEDURE — 84100 ASSAY OF PHOSPHORUS: CPT

## 2025-07-29 PROCEDURE — 82728 ASSAY OF FERRITIN: CPT

## 2025-07-29 PROCEDURE — 86696 HERPES SIMPLEX TYPE 2 TEST: CPT

## 2025-07-29 PROCEDURE — 82390 ASSAY OF CERULOPLASMIN: CPT

## 2025-07-29 PROCEDURE — 85384 FIBRINOGEN ACTIVITY: CPT

## 2025-07-29 PROCEDURE — 82378 CARCINOEMBRYONIC ANTIGEN: CPT

## 2025-07-29 PROCEDURE — 82140 ASSAY OF AMMONIA: CPT

## 2025-07-29 PROCEDURE — 87070 CULTURE OTHR SPECIMN AEROBIC: CPT

## 2025-07-29 PROCEDURE — 85613 RUSSELL VIPER VENOM DILUTED: CPT

## 2025-07-29 PROCEDURE — 87389 HIV-1 AG W/HIV-1&-2 AB AG IA: CPT

## 2025-07-29 PROCEDURE — 84145 PROCALCITONIN (PCT): CPT

## 2025-07-29 PROCEDURE — 97112 NEUROMUSCULAR REEDUCATION: CPT

## 2025-07-29 PROCEDURE — 80307 DRUG TEST PRSMV CHEM ANLYZR: CPT

## 2025-07-29 PROCEDURE — 86762 RUBELLA ANTIBODY: CPT

## 2025-07-29 PROCEDURE — 86803 HEPATITIS C AB TEST: CPT

## 2025-07-29 PROCEDURE — 87086 URINE CULTURE/COLONY COUNT: CPT

## 2025-07-29 PROCEDURE — 73200 CT UPPER EXTREMITY W/O DYE: CPT | Mod: 26,LT

## 2025-07-29 PROCEDURE — P9011: CPT

## 2025-07-29 PROCEDURE — 86769 SARS-COV-2 COVID-19 ANTIBODY: CPT

## 2025-07-29 PROCEDURE — 93306 TTE W/DOPPLER COMPLETE: CPT

## 2025-07-29 PROCEDURE — 85014 HEMATOCRIT: CPT

## 2025-07-29 PROCEDURE — 82784 ASSAY IGA/IGD/IGG/IGM EACH: CPT

## 2025-07-29 PROCEDURE — 76641 ULTRASOUND BREAST COMPLETE: CPT

## 2025-07-29 PROCEDURE — 86682 HELMINTH ANTIBODY: CPT

## 2025-07-29 PROCEDURE — 84295 ASSAY OF SERUM SODIUM: CPT

## 2025-07-29 PROCEDURE — 85027 COMPLETE CBC AUTOMATED: CPT

## 2025-07-29 PROCEDURE — 76377 3D RENDER W/INTRP POSTPROCES: CPT | Mod: 26

## 2025-07-29 PROCEDURE — 86901 BLOOD TYPING SEROLOGIC RH(D): CPT

## 2025-07-29 PROCEDURE — P9059: CPT

## 2025-07-29 PROCEDURE — 83935 ASSAY OF URINE OSMOLALITY: CPT

## 2025-07-29 PROCEDURE — 94003 VENT MGMT INPAT SUBQ DAY: CPT

## 2025-07-29 PROCEDURE — 99233 SBSQ HOSP IP/OBS HIGH 50: CPT

## 2025-07-29 PROCEDURE — 71045 X-RAY EXAM CHEST 1 VIEW: CPT | Mod: 26

## 2025-07-29 PROCEDURE — 99232 SBSQ HOSP IP/OBS MODERATE 35: CPT

## 2025-07-29 PROCEDURE — 86880 COOMBS TEST DIRECT: CPT

## 2025-07-29 PROCEDURE — 85396 CLOTTING ASSAY WHOLE BLOOD: CPT

## 2025-07-29 PROCEDURE — 86735 MUMPS ANTIBODY: CPT

## 2025-07-29 PROCEDURE — 74177 CT ABD & PELVIS W/CONTRAST: CPT

## 2025-07-29 PROCEDURE — 86663 EPSTEIN-BARR ANTIBODY: CPT

## 2025-07-29 PROCEDURE — 86304 IMMUNOASSAY TUMOR CA 125: CPT

## 2025-07-29 PROCEDURE — 86664 EPSTEIN-BARR NUCLEAR ANTIGEN: CPT

## 2025-07-29 PROCEDURE — 80053 COMPREHEN METABOLIC PANEL: CPT

## 2025-07-29 DEVICE — LIGATING CLIPS WECK HORIZON LARGE (ORANGE) 24: Type: IMPLANTABLE DEVICE | Status: FUNCTIONAL

## 2025-07-29 DEVICE — LIGATING CLIPS WECK HORIZON MEDIUM (BLUE) 24: Type: IMPLANTABLE DEVICE | Status: FUNCTIONAL

## 2025-07-29 DEVICE — STAPLER ETHICON ECHELON FLEX 45MM X 340MM: Type: IMPLANTABLE DEVICE | Status: FUNCTIONAL

## 2025-07-29 RX ORDER — PIPERACILLIN-TAZO-DEXTROSE,ISO 3.375G/5
3.38 IV SOLUTION, PIGGYBACK PREMIX FROZEN(ML) INTRAVENOUS ONCE
Refills: 0 | Status: DISCONTINUED | OUTPATIENT
Start: 2025-07-29 | End: 2025-07-30

## 2025-07-29 RX ORDER — SODIUM ZIRCONIUM CYCLOSILICATE 5 G/5G
10 POWDER, FOR SUSPENSION ORAL ONCE
Refills: 0 | Status: COMPLETED | OUTPATIENT
Start: 2025-07-29 | End: 2025-07-29

## 2025-07-29 RX ORDER — ONDANSETRON HCL/PF 4 MG/2 ML
4 VIAL (ML) INJECTION ONCE
Refills: 0 | Status: DISCONTINUED | OUTPATIENT
Start: 2025-07-29 | End: 2025-07-30

## 2025-07-29 RX ORDER — METHYLPREDNISOLONE ACETATE 80 MG/ML
1000 INJECTION, SUSPENSION INTRA-ARTICULAR; INTRALESIONAL; INTRAMUSCULAR; SOFT TISSUE ONCE
Refills: 0 | Status: DISCONTINUED | OUTPATIENT
Start: 2025-07-29 | End: 2025-07-29

## 2025-07-29 RX ORDER — ONDANSETRON HCL/PF 4 MG/2 ML
4 VIAL (ML) INJECTION ONCE
Refills: 0 | Status: COMPLETED | OUTPATIENT
Start: 2025-07-29 | End: 2025-07-29

## 2025-07-29 RX ORDER — MAGNESIUM SULFATE 500 MG/ML
2 SYRINGE (ML) INJECTION ONCE
Refills: 0 | Status: COMPLETED | OUTPATIENT
Start: 2025-07-29 | End: 2025-07-29

## 2025-07-29 RX ORDER — BASILIXIMAB 20 MG/5ML
20 INJECTION, POWDER, FOR SOLUTION INTRAVENOUS ONCE
Refills: 0 | Status: DISCONTINUED | OUTPATIENT
Start: 2025-07-29 | End: 2025-07-29

## 2025-07-29 RX ORDER — VANCOMYCIN HCL IN 5 % DEXTROSE 1.5G/250ML
1000 PLASTIC BAG, INJECTION (ML) INTRAVENOUS ONCE
Refills: 0 | Status: DISCONTINUED | OUTPATIENT
Start: 2025-07-29 | End: 2025-07-30

## 2025-07-29 RX ORDER — VANCOMYCIN HCL IN 5 % DEXTROSE 1.5G/250ML
1000 PLASTIC BAG, INJECTION (ML) INTRAVENOUS ONCE
Refills: 0 | Status: COMPLETED | OUTPATIENT
Start: 2025-07-29 | End: 2025-07-29

## 2025-07-29 RX ADMIN — Medication 40 MILLIGRAM(S): at 05:21

## 2025-07-29 RX ADMIN — Medication 800 MILLIGRAM(S): at 17:14

## 2025-07-29 RX ADMIN — Medication 1 APPLICATION(S): at 17:14

## 2025-07-29 RX ADMIN — Medication 5 MILLIGRAM(S): at 21:18

## 2025-07-29 RX ADMIN — Medication 4 MILLIGRAM(S): at 01:59

## 2025-07-29 RX ADMIN — Medication 1 APPLICATION(S): at 05:21

## 2025-07-29 RX ADMIN — TRAMADOL HYDROCHLORIDE 50 MILLIGRAM(S): 50 TABLET, FILM COATED ORAL at 00:13

## 2025-07-29 RX ADMIN — TRAMADOL HYDROCHLORIDE 50 MILLIGRAM(S): 50 TABLET, FILM COATED ORAL at 22:42

## 2025-07-29 RX ADMIN — TRAMADOL HYDROCHLORIDE 50 MILLIGRAM(S): 50 TABLET, FILM COATED ORAL at 21:18

## 2025-07-29 RX ADMIN — Medication 250 MILLIGRAM(S): at 15:02

## 2025-07-29 RX ADMIN — Medication 25 GRAM(S): at 09:02

## 2025-07-29 RX ADMIN — Medication 1 APPLICATION(S): at 05:24

## 2025-07-29 RX ADMIN — SODIUM ZIRCONIUM CYCLOSILICATE 10 GRAM(S): 5 POWDER, FOR SUSPENSION ORAL at 21:18

## 2025-07-29 RX ADMIN — FUROSEMIDE 40 MILLIGRAM(S): 10 INJECTION INTRAMUSCULAR; INTRAVENOUS at 05:22

## 2025-07-30 ENCOUNTER — APPOINTMENT (OUTPATIENT)
Dept: TRANSPLANT | Facility: HOSPITAL | Age: 45
End: 2025-07-30

## 2025-07-30 ENCOUNTER — TRANSCRIPTION ENCOUNTER (OUTPATIENT)
Age: 45
End: 2025-07-30

## 2025-07-30 ENCOUNTER — RESULT REVIEW (OUTPATIENT)
Age: 45
End: 2025-07-30

## 2025-07-30 DIAGNOSIS — K70.10 ALCOHOLIC HEPATITIS WITHOUT ASCITES: ICD-10-CM

## 2025-07-30 LAB
ALBUMIN SERPL ELPH-MCNC: 3.2 G/DL — LOW (ref 3.3–5)
ALBUMIN SERPL ELPH-MCNC: 3.4 G/DL — SIGNIFICANT CHANGE UP (ref 3.3–5)
ALBUMIN SERPL ELPH-MCNC: 3.5 G/DL — SIGNIFICANT CHANGE UP (ref 3.3–5)
ALP SERPL-CCNC: 172 U/L — HIGH (ref 40–120)
ALP SERPL-CCNC: 51 U/L — SIGNIFICANT CHANGE UP (ref 40–120)
ALP SERPL-CCNC: 65 U/L — SIGNIFICANT CHANGE UP (ref 40–120)
ALT FLD-CCNC: 142 U/L — HIGH (ref 10–45)
ALT FLD-CCNC: 169 U/L — HIGH (ref 10–45)
ALT FLD-CCNC: 20 U/L — SIGNIFICANT CHANGE UP (ref 10–45)
ANION GAP SERPL CALC-SCNC: 12 MMOL/L — SIGNIFICANT CHANGE UP (ref 5–17)
ANION GAP SERPL CALC-SCNC: 14 MMOL/L — SIGNIFICANT CHANGE UP (ref 5–17)
ANION GAP SERPL CALC-SCNC: 16 MMOL/L — SIGNIFICANT CHANGE UP (ref 5–17)
APTT BLD: 26.8 SEC — SIGNIFICANT CHANGE UP (ref 26.1–36.8)
APTT BLD: 28.4 SEC — SIGNIFICANT CHANGE UP (ref 26.1–36.8)
APTT BLD: 47.8 SEC — HIGH (ref 26.1–36.8)
AST SERPL-CCNC: 43 U/L — HIGH (ref 10–40)
AST SERPL-CCNC: 430 U/L — HIGH (ref 10–40)
AST SERPL-CCNC: 497 U/L — HIGH (ref 10–40)
BILIRUB SERPL-MCNC: 13.9 MG/DL — HIGH (ref 0.2–1.2)
BILIRUB SERPL-MCNC: 5.4 MG/DL — HIGH (ref 0.2–1.2)
BILIRUB SERPL-MCNC: 6.5 MG/DL — HIGH (ref 0.2–1.2)
BUN SERPL-MCNC: 19 MG/DL — SIGNIFICANT CHANGE UP (ref 7–23)
BUN SERPL-MCNC: 20 MG/DL — SIGNIFICANT CHANGE UP (ref 7–23)
BUN SERPL-MCNC: 24 MG/DL — HIGH (ref 7–23)
C3 SERPL-MCNC: 60 MG/DL — LOW (ref 81–157)
CALCIUM SERPL-MCNC: 10.4 MG/DL — SIGNIFICANT CHANGE UP (ref 8.4–10.5)
CALCIUM SERPL-MCNC: 11.8 MG/DL — HIGH (ref 8.4–10.5)
CALCIUM SERPL-MCNC: 9.6 MG/DL — SIGNIFICANT CHANGE UP (ref 8.4–10.5)
CHLORIDE SERPL-SCNC: 103 MMOL/L — SIGNIFICANT CHANGE UP (ref 96–108)
CHLORIDE SERPL-SCNC: 104 MMOL/L — SIGNIFICANT CHANGE UP (ref 96–108)
CHLORIDE SERPL-SCNC: 105 MMOL/L — SIGNIFICANT CHANGE UP (ref 96–108)
CO2 SERPL-SCNC: 20 MMOL/L — LOW (ref 22–31)
CO2 SERPL-SCNC: 24 MMOL/L — SIGNIFICANT CHANGE UP (ref 22–31)
CO2 SERPL-SCNC: 28 MMOL/L — SIGNIFICANT CHANGE UP (ref 22–31)
CREAT SERPL-MCNC: 1.05 MG/DL — SIGNIFICANT CHANGE UP (ref 0.5–1.3)
CREAT SERPL-MCNC: 1.06 MG/DL — SIGNIFICANT CHANGE UP (ref 0.5–1.3)
CREAT SERPL-MCNC: 1.2 MG/DL — SIGNIFICANT CHANGE UP (ref 0.5–1.3)
DSDNA AB SER-ACNC: 12 IU/ML — HIGH
EBV DNA SERPL NAA+PROBE-ACNC: SIGNIFICANT CHANGE UP IU/ML
EBVPCR LOG: SIGNIFICANT CHANGE UP LOG10IU/ML
EGFR: 57 ML/MIN/1.73M2 — LOW
EGFR: 57 ML/MIN/1.73M2 — LOW
EGFR: 66 ML/MIN/1.73M2 — SIGNIFICANT CHANGE UP
ERYTHROCYTE [SEDIMENTATION RATE] IN BLOOD: 7 MM/HR — SIGNIFICANT CHANGE UP (ref 0–20)
GAS PNL BLDA: SIGNIFICANT CHANGE UP
GLUCOSE BLDC GLUCOMTR-MCNC: 167 MG/DL — HIGH (ref 70–99)
GLUCOSE BLDC GLUCOMTR-MCNC: 188 MG/DL — HIGH (ref 70–99)
GLUCOSE SERPL-MCNC: 139 MG/DL — HIGH (ref 70–99)
GLUCOSE SERPL-MCNC: 176 MG/DL — HIGH (ref 70–99)
GLUCOSE SERPL-MCNC: 72 MG/DL — SIGNIFICANT CHANGE UP (ref 70–99)
HCT VFR BLD CALC: 20 % — CRITICAL LOW (ref 34.5–45)
HCT VFR BLD CALC: 22.5 % — LOW (ref 34.5–45)
HCT VFR BLD CALC: 23.4 % — LOW (ref 34.5–45)
HCT VFR BLD CALC: 23.5 % — LOW (ref 34.5–45)
HEPARINASE TEG R TIME: 6.9 MIN — SIGNIFICANT CHANGE UP (ref 4.3–8.3)
HEPARINASE TEG R TIME: 7.6 MIN — SIGNIFICANT CHANGE UP (ref 4.3–8.3)
HEPARINASE TEG R TIME: 8.6 MIN — HIGH (ref 4.3–8.3)
HEPARINASE TEG R TIME: 8.7 MIN — HIGH (ref 4.3–8.3)
HEPARINASE TEG R TIME: 9.2 MIN — HIGH (ref 4.3–8.3)
HGB BLD-MCNC: 7.2 G/DL — LOW (ref 11.5–15.5)
HGB BLD-MCNC: 7.7 G/DL — LOW (ref 11.5–15.5)
HGB BLD-MCNC: 8 G/DL — LOW (ref 11.5–15.5)
HGB BLD-MCNC: 8.1 G/DL — LOW (ref 11.5–15.5)
IMMATURE PLATELET FRACTION #: 0.8 K/UL — LOW (ref 4.7–11.1)
IMMATURE PLATELET FRACTION #: 1.3 K/UL — LOW (ref 4.7–11.1)
IMMATURE PLATELET FRACTION #: 1.6 K/UL — LOW (ref 4.7–11.1)
IMMATURE PLATELET FRACTION #: 1.9 K/UL — LOW (ref 4.7–11.1)
IMMATURE PLATELET FRACTION %: 1.8 % — SIGNIFICANT CHANGE UP (ref 1.6–4.9)
IMMATURE PLATELET FRACTION %: 2 % — SIGNIFICANT CHANGE UP (ref 1.6–4.9)
IMMATURE PLATELET FRACTION %: 3.3 % — SIGNIFICANT CHANGE UP (ref 1.6–4.9)
IMMATURE PLATELET FRACTION %: 3.4 % — SIGNIFICANT CHANGE UP (ref 1.6–4.9)
INR BLD: 1.32 RATIO — HIGH (ref 0.85–1.16)
INR BLD: 1.51 RATIO — HIGH (ref 0.85–1.16)
INR BLD: 3.27 RATIO — HIGH (ref 0.85–1.16)
MAGNESIUM SERPL-MCNC: 1.5 MG/DL — LOW (ref 1.6–2.6)
MAGNESIUM SERPL-MCNC: 1.9 MG/DL — SIGNIFICANT CHANGE UP (ref 1.6–2.6)
MAGNESIUM SERPL-MCNC: 2 MG/DL — SIGNIFICANT CHANGE UP (ref 1.6–2.6)
MCHC RBC-ENTMCNC: 28.6 PG — SIGNIFICANT CHANGE UP (ref 27–34)
MCHC RBC-ENTMCNC: 29.1 PG — SIGNIFICANT CHANGE UP (ref 27–34)
MCHC RBC-ENTMCNC: 29.5 PG — SIGNIFICANT CHANGE UP (ref 27–34)
MCHC RBC-ENTMCNC: 32 PG — SIGNIFICANT CHANGE UP (ref 27–34)
MCHC RBC-ENTMCNC: 32.8 G/DL — SIGNIFICANT CHANGE UP (ref 32–36)
MCHC RBC-ENTMCNC: 34.6 G/DL — SIGNIFICANT CHANGE UP (ref 32–36)
MCHC RBC-ENTMCNC: 35.6 G/DL — SIGNIFICANT CHANGE UP (ref 32–36)
MCHC RBC-ENTMCNC: 36 G/DL — SIGNIFICANT CHANGE UP (ref 32–36)
MCV RBC AUTO: 81.8 FL — SIGNIFICANT CHANGE UP (ref 80–100)
MCV RBC AUTO: 82 FL — SIGNIFICANT CHANGE UP (ref 80–100)
MCV RBC AUTO: 82.7 FL — SIGNIFICANT CHANGE UP (ref 80–100)
MCV RBC AUTO: 97.5 FL — SIGNIFICANT CHANGE UP (ref 80–100)
NRBC # BLD AUTO: 0 K/UL — SIGNIFICANT CHANGE UP (ref 0–0)
NRBC # BLD AUTO: 0.03 K/UL — HIGH (ref 0–0)
NRBC # FLD: 0 K/UL — SIGNIFICANT CHANGE UP (ref 0–0)
NRBC # FLD: 0.03 K/UL — HIGH (ref 0–0)
NRBC BLD AUTO-RTO: 0 /100 WBCS — SIGNIFICANT CHANGE UP (ref 0–0)
PHOSPHATE SERPL-MCNC: 3.5 MG/DL — SIGNIFICANT CHANGE UP (ref 2.5–4.5)
PHOSPHATE SERPL-MCNC: 5.6 MG/DL — HIGH (ref 2.5–4.5)
PHOSPHATE SERPL-MCNC: 6.7 MG/DL — HIGH (ref 2.5–4.5)
PLATELET # BLD AUTO: 45 K/UL — LOW (ref 150–400)
PLATELET # BLD AUTO: 47 K/UL — LOW (ref 150–400)
PLATELET # BLD AUTO: 57 K/UL — LOW (ref 150–400)
PLATELET # BLD AUTO: 63 K/UL — LOW (ref 150–400)
PMV BLD: 10.3 FL — SIGNIFICANT CHANGE UP (ref 7–13)
PMV BLD: 10.4 FL — SIGNIFICANT CHANGE UP (ref 7–13)
PMV BLD: 10.6 FL — SIGNIFICANT CHANGE UP (ref 7–13)
PMV BLD: 10.7 FL — SIGNIFICANT CHANGE UP (ref 7–13)
POTASSIUM SERPL-MCNC: 4.2 MMOL/L — SIGNIFICANT CHANGE UP (ref 3.5–5.3)
POTASSIUM SERPL-MCNC: 4.6 MMOL/L — SIGNIFICANT CHANGE UP (ref 3.5–5.3)
POTASSIUM SERPL-MCNC: 4.7 MMOL/L — SIGNIFICANT CHANGE UP (ref 3.5–5.3)
POTASSIUM SERPL-SCNC: 4.2 MMOL/L — SIGNIFICANT CHANGE UP (ref 3.5–5.3)
POTASSIUM SERPL-SCNC: 4.6 MMOL/L — SIGNIFICANT CHANGE UP (ref 3.5–5.3)
POTASSIUM SERPL-SCNC: 4.7 MMOL/L — SIGNIFICANT CHANGE UP (ref 3.5–5.3)
PROT SERPL-MCNC: 5 G/DL — LOW (ref 6–8.3)
PROT SERPL-MCNC: 5.2 G/DL — LOW (ref 6–8.3)
PROT SERPL-MCNC: 6.1 G/DL — SIGNIFICANT CHANGE UP (ref 6–8.3)
PROTHROM AB SERPL-ACNC: 15 SEC — HIGH (ref 9.9–13.4)
PROTHROM AB SERPL-ACNC: 17.1 SEC — HIGH (ref 9.9–13.4)
PROTHROM AB SERPL-ACNC: 37.2 SEC — HIGH (ref 9.9–13.4)
RAPIDTEG MAXIMUM AMPLITUDE: 42.7 MM — LOW (ref 52–70)
RAPIDTEG MAXIMUM AMPLITUDE: 45.9 MM — LOW (ref 52–70)
RAPIDTEG MAXIMUM AMPLITUDE: 47.8 MM — LOW (ref 52–70)
RAPIDTEG MAXIMUM AMPLITUDE: 52.8 MM — SIGNIFICANT CHANGE UP (ref 52–70)
RAPIDTEG MAXIMUM AMPLITUDE: 53.3 MM — SIGNIFICANT CHANGE UP (ref 52–70)
RAPIDTEG MAXIMUM AMPLITUDE: 53.4 MM — SIGNIFICANT CHANGE UP (ref 52–70)
RBC # BLD: 2.41 M/UL — LOW (ref 3.8–5.2)
RBC # BLD: 2.44 M/UL — LOW (ref 3.8–5.2)
RBC # BLD: 2.75 M/UL — LOW (ref 3.8–5.2)
RBC # BLD: 2.83 M/UL — LOW (ref 3.8–5.2)
RBC # FLD: 13.7 % — SIGNIFICANT CHANGE UP (ref 10.3–14.5)
RBC # FLD: 13.7 % — SIGNIFICANT CHANGE UP (ref 10.3–14.5)
RBC # FLD: 14.3 % — SIGNIFICANT CHANGE UP (ref 10.3–14.5)
RBC # FLD: SIGNIFICANT CHANGE UP % (ref 10.3–14.5)
SODIUM SERPL-SCNC: 136 MMOL/L — SIGNIFICANT CHANGE UP (ref 135–145)
SODIUM SERPL-SCNC: 145 MMOL/L — SIGNIFICANT CHANGE UP (ref 135–145)
SODIUM SERPL-SCNC: 145 MMOL/L — SIGNIFICANT CHANGE UP (ref 135–145)
TEG FUNCTIONAL FIBRINOGEN: 14.5 MM — LOW (ref 15–32)
TEG FUNCTIONAL FIBRINOGEN: 16 MM — SIGNIFICANT CHANGE UP (ref 15–32)
TEG FUNCTIONAL FIBRINOGEN: 17.9 MM — SIGNIFICANT CHANGE UP (ref 15–32)
TEG FUNCTIONAL FIBRINOGEN: 18.2 MM — SIGNIFICANT CHANGE UP (ref 15–32)
TEG FUNCTIONAL FIBRINOGEN: 19.1 MM — SIGNIFICANT CHANGE UP (ref 15–32)
TEG FUNCTIONAL FIBRINOGEN: 9.7 MM — LOW (ref 15–32)
TEG LY30 (LYSIS): 0 % — SIGNIFICANT CHANGE UP (ref 0–2.6)
TEG MAXIMUM AMPLITUDE: 41.6 MM — LOW (ref 52–69)
TEG MAXIMUM AMPLITUDE: 48.3 MM — LOW (ref 52–69)
TEG MAXIMUM AMPLITUDE: 48.7 MM — LOW (ref 52–69)
TEG MAXIMUM AMPLITUDE: 49.9 MM — LOW (ref 52–69)
TEG MAXIMUM AMPLITUDE: 51.6 MM — LOW (ref 52–69)
TEG REACTION TIME: 10.9 MIN — HIGH (ref 4.6–9.1)
TEG REACTION TIME: 12.7 MIN — HIGH (ref 4.6–9.1)
TEG REACTION TIME: 7.7 MIN — SIGNIFICANT CHANGE UP (ref 4.6–9.1)
TEG REACTION TIME: >17 MIN — HIGH (ref 4.6–9.1)
WBC # BLD: 3.62 K/UL — LOW (ref 3.8–10.5)
WBC # BLD: 4.04 K/UL — SIGNIFICANT CHANGE UP (ref 3.8–10.5)
WBC # BLD: 7.24 K/UL — SIGNIFICANT CHANGE UP (ref 3.8–10.5)
WBC # BLD: 7.55 K/UL — SIGNIFICANT CHANGE UP (ref 3.8–10.5)
WBC # FLD AUTO: 3.62 K/UL — LOW (ref 3.8–10.5)
WBC # FLD AUTO: 4.04 K/UL — SIGNIFICANT CHANGE UP (ref 3.8–10.5)
WBC # FLD AUTO: 7.24 K/UL — SIGNIFICANT CHANGE UP (ref 3.8–10.5)
WBC # FLD AUTO: 7.55 K/UL — SIGNIFICANT CHANGE UP (ref 3.8–10.5)

## 2025-07-30 PROCEDURE — 93308 TTE F-UP OR LMTD: CPT

## 2025-07-30 PROCEDURE — 86695 HERPES SIMPLEX TYPE 1 TEST: CPT

## 2025-07-30 PROCEDURE — 87040 BLOOD CULTURE FOR BACTERIA: CPT

## 2025-07-30 PROCEDURE — 82728 ASSAY OF FERRITIN: CPT

## 2025-07-30 PROCEDURE — 82962 GLUCOSE BLOOD TEST: CPT

## 2025-07-30 PROCEDURE — A9585: CPT

## 2025-07-30 PROCEDURE — 86140 C-REACTIVE PROTEIN: CPT

## 2025-07-30 PROCEDURE — 80053 COMPREHEN METABOLIC PANEL: CPT

## 2025-07-30 PROCEDURE — 82330 ASSAY OF CALCIUM: CPT

## 2025-07-30 PROCEDURE — 85613 RUSSELL VIPER VENOM DILUTED: CPT

## 2025-07-30 PROCEDURE — 84540 ASSAY OF URINE/UREA-N: CPT

## 2025-07-30 PROCEDURE — 86664 EPSTEIN-BARR NUCLEAR ANTIGEN: CPT

## 2025-07-30 PROCEDURE — 82565 ASSAY OF CREATININE: CPT

## 2025-07-30 PROCEDURE — 86765 RUBEOLA ANTIBODY: CPT

## 2025-07-30 PROCEDURE — 85384 FIBRINOGEN ACTIVITY: CPT

## 2025-07-30 PROCEDURE — 97116 GAIT TRAINING THERAPY: CPT

## 2025-07-30 PROCEDURE — 86480 TB TEST CELL IMMUN MEASURE: CPT

## 2025-07-30 PROCEDURE — 93306 TTE W/DOPPLER COMPLETE: CPT

## 2025-07-30 PROCEDURE — 74018 RADEX ABDOMEN 1 VIEW: CPT

## 2025-07-30 PROCEDURE — 76705 ECHO EXAM OF ABDOMEN: CPT | Mod: 26,59

## 2025-07-30 PROCEDURE — 86708 HEPATITIS A ANTIBODY: CPT

## 2025-07-30 PROCEDURE — 88304 TISSUE EXAM BY PATHOLOGIST: CPT | Mod: 26

## 2025-07-30 PROCEDURE — 85732 THROMBOPLASTIN TIME PARTIAL: CPT

## 2025-07-30 PROCEDURE — 82390 ASSAY OF CERULOPLASMIN: CPT

## 2025-07-30 PROCEDURE — 36600 WITHDRAWAL OF ARTERIAL BLOOD: CPT

## 2025-07-30 PROCEDURE — 86038 ANTINUCLEAR ANTIBODIES: CPT

## 2025-07-30 PROCEDURE — 80061 LIPID PANEL: CPT

## 2025-07-30 PROCEDURE — 86777 TOXOPLASMA ANTIBODY: CPT

## 2025-07-30 PROCEDURE — 85610 PROTHROMBIN TIME: CPT

## 2025-07-30 PROCEDURE — 86160 COMPLEMENT ANTIGEN: CPT

## 2025-07-30 PROCEDURE — 87070 CULTURE OTHR SPECIMN AEROBIC: CPT

## 2025-07-30 PROCEDURE — 87624 HPV HI-RISK TYP POOLED RSLT: CPT

## 2025-07-30 PROCEDURE — P9011: CPT

## 2025-07-30 PROCEDURE — 86301 IMMUNOASSAY TUMOR CA 19-9: CPT

## 2025-07-30 PROCEDURE — 71045 X-RAY EXAM CHEST 1 VIEW: CPT

## 2025-07-30 PROCEDURE — 93975 VASCULAR STUDY: CPT | Mod: 26,77

## 2025-07-30 PROCEDURE — 86923 COMPATIBILITY TEST ELECTRIC: CPT

## 2025-07-30 PROCEDURE — 97165 OT EVAL LOW COMPLEX 30 MIN: CPT

## 2025-07-30 PROCEDURE — 86376 MICROSOMAL ANTIBODY EACH: CPT

## 2025-07-30 PROCEDURE — P9047: CPT

## 2025-07-30 PROCEDURE — 86644 CMV ANTIBODY: CPT

## 2025-07-30 PROCEDURE — 83540 ASSAY OF IRON: CPT

## 2025-07-30 PROCEDURE — 86305 HUMAN EPIDIDYMIS PROTEIN 4: CPT

## 2025-07-30 PROCEDURE — 87150 DNA/RNA AMPLIFIED PROBE: CPT

## 2025-07-30 PROCEDURE — 84550 ASSAY OF BLOOD/URIC ACID: CPT

## 2025-07-30 PROCEDURE — 80307 DRUG TEST PRSMV CHEM ANLYZR: CPT

## 2025-07-30 PROCEDURE — 86769 SARS-COV-2 COVID-19 ANTIBODY: CPT

## 2025-07-30 PROCEDURE — 86381 MITOCHONDRIAL ANTIBODY EACH: CPT

## 2025-07-30 PROCEDURE — 87086 URINE CULTURE/COLONY COUNT: CPT

## 2025-07-30 PROCEDURE — P9073: CPT

## 2025-07-30 PROCEDURE — P9059: CPT

## 2025-07-30 PROCEDURE — P9016: CPT

## 2025-07-30 PROCEDURE — 86255 FLUORESCENT ANTIBODY SCREEN: CPT

## 2025-07-30 PROCEDURE — 86663 EPSTEIN-BARR ANTIBODY: CPT

## 2025-07-30 PROCEDURE — 86709 HEPATITIS A IGM ANTIBODY: CPT

## 2025-07-30 PROCEDURE — 86787 VARICELLA-ZOSTER ANTIBODY: CPT

## 2025-07-30 PROCEDURE — 82435 ASSAY OF BLOOD CHLORIDE: CPT

## 2025-07-30 PROCEDURE — 86735 MUMPS ANTIBODY: CPT

## 2025-07-30 PROCEDURE — 84145 PROCALCITONIN (PCT): CPT

## 2025-07-30 PROCEDURE — 82378 CARCINOEMBRYONIC ANTIGEN: CPT

## 2025-07-30 PROCEDURE — 83735 ASSAY OF MAGNESIUM: CPT

## 2025-07-30 PROCEDURE — 85396 CLOTTING ASSAY WHOLE BLOOD: CPT

## 2025-07-30 PROCEDURE — 84295 ASSAY OF SERUM SODIUM: CPT

## 2025-07-30 PROCEDURE — G0480: CPT

## 2025-07-30 PROCEDURE — 82977 ASSAY OF GGT: CPT

## 2025-07-30 PROCEDURE — 73080 X-RAY EXAM OF ELBOW: CPT

## 2025-07-30 PROCEDURE — 86803 HEPATITIS C AB TEST: CPT

## 2025-07-30 PROCEDURE — 92526 ORAL FUNCTION THERAPY: CPT

## 2025-07-30 PROCEDURE — 81025 URINE PREGNANCY TEST: CPT

## 2025-07-30 PROCEDURE — 84702 CHORIONIC GONADOTROPIN TEST: CPT

## 2025-07-30 PROCEDURE — 85027 COMPLETE CBC AUTOMATED: CPT

## 2025-07-30 PROCEDURE — 82947 ASSAY GLUCOSE BLOOD QUANT: CPT

## 2025-07-30 PROCEDURE — 94002 VENT MGMT INPAT INIT DAY: CPT

## 2025-07-30 PROCEDURE — 88307 TISSUE EXAM BY PATHOLOGIST: CPT | Mod: 26

## 2025-07-30 PROCEDURE — 86146 BETA-2 GLYCOPROTEIN ANTIBODY: CPT

## 2025-07-30 PROCEDURE — 83550 IRON BINDING TEST: CPT

## 2025-07-30 PROCEDURE — 87389 HIV-1 AG W/HIV-1&-2 AB AG IA: CPT

## 2025-07-30 PROCEDURE — 85730 THROMBOPLASTIN TIME PARTIAL: CPT

## 2025-07-30 PROCEDURE — 86225 DNA ANTIBODY NATIVE: CPT

## 2025-07-30 PROCEDURE — 74177 CT ABD & PELVIS W/CONTRAST: CPT

## 2025-07-30 PROCEDURE — 87077 CULTURE AEROBIC IDENTIFY: CPT

## 2025-07-30 PROCEDURE — 86304 IMMUNOASSAY TUMOR CA 125: CPT

## 2025-07-30 PROCEDURE — 94003 VENT MGMT INPAT SUBQ DAY: CPT

## 2025-07-30 PROCEDURE — 93975 VASCULAR STUDY: CPT

## 2025-07-30 PROCEDURE — 81001 URINALYSIS AUTO W/SCOPE: CPT

## 2025-07-30 PROCEDURE — 84100 ASSAY OF PHOSPHORUS: CPT

## 2025-07-30 PROCEDURE — 84132 ASSAY OF SERUM POTASSIUM: CPT

## 2025-07-30 PROCEDURE — 72197 MRI PELVIS W/O & W/DYE: CPT

## 2025-07-30 PROCEDURE — 86762 RUBELLA ANTIBODY: CPT

## 2025-07-30 PROCEDURE — 84156 ASSAY OF PROTEIN URINE: CPT

## 2025-07-30 PROCEDURE — 86481 TB AG RESPONSE T-CELL SUSP: CPT

## 2025-07-30 PROCEDURE — 93975 VASCULAR STUDY: CPT | Mod: 26

## 2025-07-30 PROCEDURE — 76705 ECHO EXAM OF ABDOMEN: CPT

## 2025-07-30 PROCEDURE — 86850 RBC ANTIBODY SCREEN: CPT

## 2025-07-30 PROCEDURE — 83935 ASSAY OF URINE OSMOLALITY: CPT

## 2025-07-30 PROCEDURE — 88309 TISSUE EXAM BY PATHOLOGIST: CPT | Mod: 26

## 2025-07-30 PROCEDURE — 87385 HISTOPLASMA CAPSUL AG IA: CPT

## 2025-07-30 PROCEDURE — 76856 US EXAM PELVIC COMPLETE: CPT

## 2025-07-30 PROCEDURE — 73200 CT UPPER EXTREMITY W/O DYE: CPT

## 2025-07-30 PROCEDURE — 82247 BILIRUBIN TOTAL: CPT

## 2025-07-30 PROCEDURE — 86880 COOMBS TEST DIRECT: CPT

## 2025-07-30 PROCEDURE — 92610 EVALUATE SWALLOWING FUNCTION: CPT

## 2025-07-30 PROCEDURE — 76377 3D RENDER W/INTRP POSTPROCES: CPT

## 2025-07-30 PROCEDURE — 85018 HEMOGLOBIN: CPT

## 2025-07-30 PROCEDURE — 86900 BLOOD TYPING SEROLOGIC ABO: CPT

## 2025-07-30 PROCEDURE — 87522 HEPATITIS C REVRS TRNSCRPJ: CPT

## 2025-07-30 PROCEDURE — 86147 CARDIOLIPIN ANTIBODY EA IG: CPT

## 2025-07-30 PROCEDURE — 93971 EXTREMITY STUDY: CPT

## 2025-07-30 PROCEDURE — 86706 HEP B SURFACE ANTIBODY: CPT

## 2025-07-30 PROCEDURE — 88312 SPECIAL STAINS GROUP 1: CPT | Mod: 26

## 2025-07-30 PROCEDURE — 87799 DETECT AGENT NOS DNA QUANT: CPT

## 2025-07-30 PROCEDURE — 84481 FREE ASSAY (FT-3): CPT

## 2025-07-30 PROCEDURE — 83605 ASSAY OF LACTIC ACID: CPT

## 2025-07-30 PROCEDURE — 97110 THERAPEUTIC EXERCISES: CPT

## 2025-07-30 PROCEDURE — 83036 HEMOGLOBIN GLYCOSYLATED A1C: CPT

## 2025-07-30 PROCEDURE — P9045: CPT

## 2025-07-30 PROCEDURE — 87521 HEPATITIS C PROBE&RVRS TRNSC: CPT

## 2025-07-30 PROCEDURE — 87637 SARSCOV2&INF A&B&RSV AMP PRB: CPT

## 2025-07-30 PROCEDURE — 86665 EPSTEIN-BARR CAPSID VCA: CPT

## 2025-07-30 PROCEDURE — 97530 THERAPEUTIC ACTIVITIES: CPT

## 2025-07-30 PROCEDURE — 94799 UNLISTED PULMONARY SVC/PX: CPT

## 2025-07-30 PROCEDURE — 82140 ASSAY OF AMMONIA: CPT

## 2025-07-30 PROCEDURE — 86704 HEP B CORE ANTIBODY TOTAL: CPT

## 2025-07-30 PROCEDURE — 47135 TRANSPLANTATION OF LIVER: CPT | Mod: GC

## 2025-07-30 PROCEDURE — 86901 BLOOD TYPING SEROLOGIC RH(D): CPT

## 2025-07-30 PROCEDURE — 97161 PT EVAL LOW COMPLEX 20 MIN: CPT

## 2025-07-30 PROCEDURE — 86985 SPLIT BLOOD OR PRODUCTS: CPT

## 2025-07-30 PROCEDURE — 87205 SMEAR GRAM STAIN: CPT

## 2025-07-30 PROCEDURE — 93970 EXTREMITY STUDY: CPT

## 2025-07-30 PROCEDURE — 87641 MR-STAPH DNA AMP PROBE: CPT

## 2025-07-30 PROCEDURE — 87640 STAPH A DNA AMP PROBE: CPT

## 2025-07-30 PROCEDURE — 92612 ENDOSCOPY SWALLOW (FEES) VID: CPT

## 2025-07-30 PROCEDURE — 86682 HELMINTH ANTIBODY: CPT

## 2025-07-30 PROCEDURE — 85652 RBC SED RATE AUTOMATED: CPT

## 2025-07-30 PROCEDURE — 82105 ALPHA-FETOPROTEIN SERUM: CPT

## 2025-07-30 PROCEDURE — 86780 TREPONEMA PALLIDUM: CPT

## 2025-07-30 PROCEDURE — 84443 ASSAY THYROID STIM HORMONE: CPT

## 2025-07-30 PROCEDURE — 82803 BLOOD GASES ANY COMBINATION: CPT

## 2025-07-30 PROCEDURE — 85025 COMPLETE CBC W/AUTO DIFF WBC: CPT

## 2025-07-30 PROCEDURE — 82784 ASSAY IGA/IGD/IGG/IGM EACH: CPT

## 2025-07-30 PROCEDURE — 99291 CRITICAL CARE FIRST HOUR: CPT

## 2025-07-30 PROCEDURE — 84300 ASSAY OF URINE SODIUM: CPT

## 2025-07-30 PROCEDURE — 76641 ULTRASOUND BREAST COMPLETE: CPT

## 2025-07-30 PROCEDURE — 71045 X-RAY EXAM CHEST 1 VIEW: CPT | Mod: 26

## 2025-07-30 PROCEDURE — 84133 ASSAY OF URINE POTASSIUM: CPT

## 2025-07-30 PROCEDURE — 85014 HEMATOCRIT: CPT

## 2025-07-30 PROCEDURE — 86696 HERPES SIMPLEX TYPE 2 TEST: CPT

## 2025-07-30 PROCEDURE — 97112 NEUROMUSCULAR REEDUCATION: CPT

## 2025-07-30 PROCEDURE — 87340 HEPATITIS B SURFACE AG IA: CPT

## 2025-07-30 PROCEDURE — 88313 SPECIAL STAINS GROUP 2: CPT | Mod: 26

## 2025-07-30 PROCEDURE — 82570 ASSAY OF URINE CREATININE: CPT

## 2025-07-30 PROCEDURE — 36415 COLL VENOUS BLD VENIPUNCTURE: CPT

## 2025-07-30 PROCEDURE — 97535 SELF CARE MNGMENT TRAINING: CPT

## 2025-07-30 DEVICE — STAPLER ETHICON GST ECHELON 45MM WHITE RELOAD: Type: IMPLANTABLE DEVICE | Status: FUNCTIONAL

## 2025-07-30 DEVICE — KIT A-LINE 1LUM 20G X 12CM SAFE KIT: Type: IMPLANTABLE DEVICE | Status: FUNCTIONAL

## 2025-07-30 DEVICE — KIT CVC 2LUM MAC 9FR CHG: Type: IMPLANTABLE DEVICE | Status: FUNCTIONAL

## 2025-07-30 DEVICE — SURGCEL 4 X 8": Type: IMPLANTABLE DEVICE | Status: FUNCTIONAL

## 2025-07-30 RX ORDER — ALBUMIN (HUMAN) 12.5 G/50ML
250 INJECTION, SOLUTION INTRAVENOUS ONCE
Refills: 0 | Status: COMPLETED | OUTPATIENT
Start: 2025-07-30 | End: 2025-07-30

## 2025-07-30 RX ORDER — METHYLPREDNISOLONE ACETATE 80 MG/ML
125 INJECTION, SUSPENSION INTRA-ARTICULAR; INTRALESIONAL; INTRAMUSCULAR; SOFT TISSUE EVERY 24 HOURS
Refills: 0 | Status: COMPLETED | OUTPATIENT
Start: 2025-08-02 | End: 2025-08-02

## 2025-07-30 RX ORDER — PIPERACILLIN-TAZO-DEXTROSE,ISO 3.375G/5
3.38 IV SOLUTION, PIGGYBACK PREMIX FROZEN(ML) INTRAVENOUS EVERY 8 HOURS
Refills: 0 | Status: COMPLETED | OUTPATIENT
Start: 2025-07-30 | End: 2025-08-01

## 2025-07-30 RX ORDER — SENNA 187 MG
2 TABLET ORAL
Refills: 0 | Status: DISCONTINUED | OUTPATIENT
Start: 2025-07-30 | End: 2025-07-30

## 2025-07-30 RX ORDER — PREDNISONE 20 MG/1
20 TABLET ORAL DAILY
Refills: 0 | Status: DISCONTINUED | OUTPATIENT
Start: 2025-07-30 | End: 2025-07-31

## 2025-07-30 RX ORDER — MAGNESIUM SULFATE 500 MG/ML
2 SYRINGE (ML) INJECTION ONCE
Refills: 0 | Status: COMPLETED | OUTPATIENT
Start: 2025-07-30 | End: 2025-07-30

## 2025-07-30 RX ORDER — METHYLPREDNISOLONE ACETATE 80 MG/ML
INJECTION, SUSPENSION INTRA-ARTICULAR; INTRALESIONAL; INTRAMUSCULAR; SOFT TISSUE
Refills: 0 | Status: COMPLETED | OUTPATIENT
Start: 2025-08-02 | End: 2025-08-05

## 2025-07-30 RX ORDER — VALGANCICLOVIR 450 MG/1
450 TABLET, FILM COATED ORAL DAILY
Refills: 0 | Status: DISCONTINUED | OUTPATIENT
Start: 2025-07-30 | End: 2025-08-01

## 2025-07-30 RX ORDER — METHYLPREDNISOLONE ACETATE 80 MG/ML
500 INJECTION, SUSPENSION INTRA-ARTICULAR; INTRALESIONAL; INTRAMUSCULAR; SOFT TISSUE EVERY 24 HOURS
Refills: 0 | Status: COMPLETED | OUTPATIENT
Start: 2025-07-31 | End: 2025-07-31

## 2025-07-30 RX ORDER — FLUCONAZOLE 150 MG
400 TABLET ORAL DAILY
Refills: 0 | Status: DISCONTINUED | OUTPATIENT
Start: 2025-07-30 | End: 2025-08-01

## 2025-07-30 RX ORDER — BASILIXIMAB 20 MG/5ML
20 INJECTION, POWDER, FOR SOLUTION INTRAVENOUS ONCE
Refills: 0 | Status: COMPLETED | OUTPATIENT
Start: 2025-08-03 | End: 2025-08-03

## 2025-07-30 RX ORDER — DEXMEDETOMIDINE HYDROCHLORIDE IN SODIUM CHLORIDE 4 UG/ML
0.5 INJECTION INTRAVENOUS
Qty: 200 | Refills: 0 | Status: DISCONTINUED | OUTPATIENT
Start: 2025-07-30 | End: 2025-07-31

## 2025-07-30 RX ORDER — CALCIUM CARBONATE/VITAMIN D3 500MG-5MCG
1 TABLET ORAL
Refills: 0 | Status: DISCONTINUED | OUTPATIENT
Start: 2025-07-30 | End: 2025-08-13

## 2025-07-30 RX ORDER — MYCOPHENOLATE MOFETIL 500 MG/1
1000 TABLET, FILM COATED ORAL
Refills: 0 | Status: DISCONTINUED | OUTPATIENT
Start: 2025-07-30 | End: 2025-07-30

## 2025-07-30 RX ORDER — SULFAMETHOXAZOLE/TRIMETHOPRIM 800-160 MG
10 TABLET ORAL DAILY
Refills: 0 | Status: DISCONTINUED | OUTPATIENT
Start: 2025-07-30 | End: 2025-08-01

## 2025-07-30 RX ORDER — SODIUM CHLORIDE 9 G/1000ML
1000 INJECTION, SOLUTION INTRAVENOUS
Refills: 0 | Status: DISCONTINUED | OUTPATIENT
Start: 2025-07-30 | End: 2025-08-01

## 2025-07-30 RX ORDER — INSULIN LISPRO 100 U/ML
INJECTION, SOLUTION INTRAVENOUS; SUBCUTANEOUS EVERY 4 HOURS
Refills: 0 | Status: DISCONTINUED | OUTPATIENT
Start: 2025-07-30 | End: 2025-07-31

## 2025-07-30 RX ORDER — HYDROMORPHONE/SOD CHLOR,ISO/PF 2 MG/10 ML
0.5 SYRINGE (ML) INJECTION EVERY 4 HOURS
Refills: 0 | Status: DISCONTINUED | OUTPATIENT
Start: 2025-07-30 | End: 2025-07-31

## 2025-07-30 RX ORDER — SULFAMETHOXAZOLE/TRIMETHOPRIM 800-160 MG
10 TABLET ORAL DAILY
Refills: 0 | Status: DISCONTINUED | OUTPATIENT
Start: 2025-07-30 | End: 2025-07-30

## 2025-07-30 RX ORDER — METHYLPREDNISOLONE ACETATE 80 MG/ML
INJECTION, SUSPENSION INTRA-ARTICULAR; INTRALESIONAL; INTRAMUSCULAR; SOFT TISSUE
Refills: 0 | Status: COMPLETED | OUTPATIENT
Start: 2025-07-31 | End: 2025-08-01

## 2025-07-30 RX ORDER — MYCOPHENOLATE MOFETIL 500 MG/1
1000 TABLET, FILM COATED ORAL
Refills: 0 | Status: DISCONTINUED | OUTPATIENT
Start: 2025-07-30 | End: 2025-08-01

## 2025-07-30 RX ORDER — SENNA 187 MG
2 TABLET ORAL
Refills: 0 | Status: DISCONTINUED | OUTPATIENT
Start: 2025-07-30 | End: 2025-08-01

## 2025-07-30 RX ADMIN — Medication 2 TABLET(S): at 17:28

## 2025-07-30 RX ADMIN — ALBUMIN (HUMAN) 500 MILLILITER(S): 12.5 INJECTION, SOLUTION INTRAVENOUS at 21:18

## 2025-07-30 RX ADMIN — Medication 25 GRAM(S): at 05:50

## 2025-07-30 RX ADMIN — DEXMEDETOMIDINE HYDROCHLORIDE IN SODIUM CHLORIDE 7.99 MICROGRAM(S)/KG/HR: 4 INJECTION INTRAVENOUS at 19:16

## 2025-07-30 RX ADMIN — ALBUMIN (HUMAN) 500 MILLILITER(S): 12.5 INJECTION, SOLUTION INTRAVENOUS at 21:38

## 2025-07-30 RX ADMIN — Medication 1 APPLICATION(S): at 05:51

## 2025-07-30 RX ADMIN — MYCOPHENOLATE MOFETIL 1000 MILLIGRAM(S): 500 TABLET, FILM COATED ORAL at 20:14

## 2025-07-30 RX ADMIN — Medication 25 GRAM(S): at 17:27

## 2025-07-30 RX ADMIN — Medication 15 MILLILITER(S): at 17:28

## 2025-07-30 RX ADMIN — Medication 0.5 MILLIGRAM(S): at 21:24

## 2025-07-30 RX ADMIN — INSULIN LISPRO 2: 100 INJECTION, SOLUTION INTRAVENOUS; SUBCUTANEOUS at 21:07

## 2025-07-30 RX ADMIN — SODIUM CHLORIDE 126 MILLILITER(S): 9 INJECTION, SOLUTION INTRAVENOUS at 19:15

## 2025-07-30 RX ADMIN — ALBUMIN (HUMAN) 1000 MILLILITER(S): 12.5 INJECTION, SOLUTION INTRAVENOUS at 22:06

## 2025-07-30 RX ADMIN — DEXMEDETOMIDINE HYDROCHLORIDE IN SODIUM CHLORIDE 7.99 MICROGRAM(S)/KG/HR: 4 INJECTION INTRAVENOUS at 17:06

## 2025-07-30 RX ADMIN — SODIUM CHLORIDE 126 MILLILITER(S): 9 INJECTION, SOLUTION INTRAVENOUS at 17:06

## 2025-07-30 RX ADMIN — Medication 50 GRAM(S): at 22:05

## 2025-07-30 RX ADMIN — Medication 0.5 MILLIGRAM(S): at 21:54

## 2025-07-30 RX ADMIN — INSULIN LISPRO 2: 100 INJECTION, SOLUTION INTRAVENOUS; SUBCUTANEOUS at 17:27

## 2025-07-30 RX ADMIN — ALBUMIN (HUMAN) 500 MILLILITER(S): 12.5 INJECTION, SOLUTION INTRAVENOUS at 19:35

## 2025-07-30 RX ADMIN — ALBUMIN (HUMAN) 500 MILLILITER(S): 12.5 INJECTION, SOLUTION INTRAVENOUS at 20:18

## 2025-07-30 RX ADMIN — FUROSEMIDE 40 MILLIGRAM(S): 10 INJECTION INTRAMUSCULAR; INTRAVENOUS at 05:50

## 2025-07-31 ENCOUNTER — TRANSCRIPTION ENCOUNTER (OUTPATIENT)
Age: 45
End: 2025-07-31

## 2025-07-31 DIAGNOSIS — R58 HEMORRHAGE, NOT ELSEWHERE CLASSIFIED: ICD-10-CM

## 2025-07-31 LAB
ALBUMIN SERPL ELPH-MCNC: 3.6 G/DL — SIGNIFICANT CHANGE UP (ref 3.3–5)
ALBUMIN SERPL ELPH-MCNC: 3.7 G/DL — SIGNIFICANT CHANGE UP (ref 3.3–5)
ALBUMIN SERPL ELPH-MCNC: 3.7 G/DL — SIGNIFICANT CHANGE UP (ref 3.3–5)
ALBUMIN SERPL ELPH-MCNC: 3.9 G/DL — SIGNIFICANT CHANGE UP (ref 3.3–5)
ALBUMIN SERPL ELPH-MCNC: 4 G/DL — SIGNIFICANT CHANGE UP (ref 3.3–5)
ALP SERPL-CCNC: 40 U/L — SIGNIFICANT CHANGE UP (ref 40–120)
ALP SERPL-CCNC: 50 U/L — SIGNIFICANT CHANGE UP (ref 40–120)
ALP SERPL-CCNC: 55 U/L — SIGNIFICANT CHANGE UP (ref 40–120)
ALP SERPL-CCNC: 60 U/L — SIGNIFICANT CHANGE UP (ref 40–120)
ALP SERPL-CCNC: 71 U/L — SIGNIFICANT CHANGE UP (ref 40–120)
ALT FLD-CCNC: 122 U/L — HIGH (ref 10–45)
ALT FLD-CCNC: 128 U/L — HIGH (ref 10–45)
ALT FLD-CCNC: 169 U/L — HIGH (ref 10–45)
ALT FLD-CCNC: 180 U/L — HIGH (ref 10–45)
ALT FLD-CCNC: 199 U/L — HIGH (ref 10–45)
ANION GAP SERPL CALC-SCNC: 14 MMOL/L — SIGNIFICANT CHANGE UP (ref 5–17)
ANION GAP SERPL CALC-SCNC: 14 MMOL/L — SIGNIFICANT CHANGE UP (ref 5–17)
ANION GAP SERPL CALC-SCNC: 15 MMOL/L — SIGNIFICANT CHANGE UP (ref 5–17)
ANION GAP SERPL CALC-SCNC: 16 MMOL/L — SIGNIFICANT CHANGE UP (ref 5–17)
ANION GAP SERPL CALC-SCNC: 16 MMOL/L — SIGNIFICANT CHANGE UP (ref 5–17)
APTT BLD: 25.1 SEC — LOW (ref 26.1–36.8)
APTT BLD: 25.6 SEC — LOW (ref 26.1–36.8)
APTT BLD: 25.9 SEC — LOW (ref 26.1–36.8)
APTT BLD: 27.9 SEC — SIGNIFICANT CHANGE UP (ref 26.1–36.8)
APTT BLD: 31.5 SEC — SIGNIFICANT CHANGE UP (ref 26.1–36.8)
AST SERPL-CCNC: 367 U/L — HIGH (ref 10–40)
AST SERPL-CCNC: 387 U/L — HIGH (ref 10–40)
AST SERPL-CCNC: 417 U/L — HIGH (ref 10–40)
AST SERPL-CCNC: 432 U/L — HIGH (ref 10–40)
AST SERPL-CCNC: 461 U/L — HIGH (ref 10–40)
BILIRUB SERPL-MCNC: 4.2 MG/DL — HIGH (ref 0.2–1.2)
BILIRUB SERPL-MCNC: 4.4 MG/DL — HIGH (ref 0.2–1.2)
BILIRUB SERPL-MCNC: 4.8 MG/DL — HIGH (ref 0.2–1.2)
BILIRUB SERPL-MCNC: 5 MG/DL — HIGH (ref 0.2–1.2)
BILIRUB SERPL-MCNC: 5.6 MG/DL — HIGH (ref 0.2–1.2)
BUN SERPL-MCNC: 25 MG/DL — HIGH (ref 7–23)
BUN SERPL-MCNC: 26 MG/DL — HIGH (ref 7–23)
BUN SERPL-MCNC: 29 MG/DL — HIGH (ref 7–23)
BUN SERPL-MCNC: 30 MG/DL — HIGH (ref 7–23)
BUN SERPL-MCNC: 33 MG/DL — HIGH (ref 7–23)
CALCIUM SERPL-MCNC: 10.2 MG/DL — SIGNIFICANT CHANGE UP (ref 8.4–10.5)
CALCIUM SERPL-MCNC: 10.2 MG/DL — SIGNIFICANT CHANGE UP (ref 8.4–10.5)
CALCIUM SERPL-MCNC: 9.3 MG/DL — SIGNIFICANT CHANGE UP (ref 8.4–10.5)
CALCIUM SERPL-MCNC: 9.9 MG/DL — SIGNIFICANT CHANGE UP (ref 8.4–10.5)
CALCIUM SERPL-MCNC: 9.9 MG/DL — SIGNIFICANT CHANGE UP (ref 8.4–10.5)
CHLORIDE SERPL-SCNC: 106 MMOL/L — SIGNIFICANT CHANGE UP (ref 96–108)
CHLORIDE SERPL-SCNC: 107 MMOL/L — SIGNIFICANT CHANGE UP (ref 96–108)
CHLORIDE SERPL-SCNC: 108 MMOL/L — SIGNIFICANT CHANGE UP (ref 96–108)
CO2 SERPL-SCNC: 22 MMOL/L — SIGNIFICANT CHANGE UP (ref 22–31)
CO2 SERPL-SCNC: 23 MMOL/L — SIGNIFICANT CHANGE UP (ref 22–31)
CO2 SERPL-SCNC: 24 MMOL/L — SIGNIFICANT CHANGE UP (ref 22–31)
CREAT SERPL-MCNC: 1.2 MG/DL — SIGNIFICANT CHANGE UP (ref 0.5–1.3)
CREAT SERPL-MCNC: 1.21 MG/DL — SIGNIFICANT CHANGE UP (ref 0.5–1.3)
CREAT SERPL-MCNC: 1.25 MG/DL — SIGNIFICANT CHANGE UP (ref 0.5–1.3)
CREAT SERPL-MCNC: 1.25 MG/DL — SIGNIFICANT CHANGE UP (ref 0.5–1.3)
CREAT SERPL-MCNC: 1.26 MG/DL — SIGNIFICANT CHANGE UP (ref 0.5–1.3)
EGFR: 53 ML/MIN/1.73M2 — LOW
EGFR: 53 ML/MIN/1.73M2 — LOW
EGFR: 54 ML/MIN/1.73M2 — LOW
EGFR: 56 ML/MIN/1.73M2 — LOW
EGFR: 56 ML/MIN/1.73M2 — LOW
EGFR: 57 ML/MIN/1.73M2 — LOW
EGFR: 57 ML/MIN/1.73M2 — LOW
GAS PNL BLDA: SIGNIFICANT CHANGE UP
GLUCOSE BLDC GLUCOMTR-MCNC: 126 MG/DL — HIGH (ref 70–99)
GLUCOSE BLDC GLUCOMTR-MCNC: 132 MG/DL — HIGH (ref 70–99)
GLUCOSE BLDC GLUCOMTR-MCNC: 137 MG/DL — HIGH (ref 70–99)
GLUCOSE BLDC GLUCOMTR-MCNC: 139 MG/DL — HIGH (ref 70–99)
GLUCOSE BLDC GLUCOMTR-MCNC: 141 MG/DL — HIGH (ref 70–99)
GLUCOSE BLDC GLUCOMTR-MCNC: 142 MG/DL — HIGH (ref 70–99)
GLUCOSE BLDC GLUCOMTR-MCNC: 143 MG/DL — HIGH (ref 70–99)
GLUCOSE BLDC GLUCOMTR-MCNC: 155 MG/DL — HIGH (ref 70–99)
GLUCOSE BLDC GLUCOMTR-MCNC: 169 MG/DL — HIGH (ref 70–99)
GLUCOSE BLDC GLUCOMTR-MCNC: 199 MG/DL — HIGH (ref 70–99)
GLUCOSE SERPL-MCNC: 125 MG/DL — HIGH (ref 70–99)
GLUCOSE SERPL-MCNC: 133 MG/DL — HIGH (ref 70–99)
GLUCOSE SERPL-MCNC: 140 MG/DL — HIGH (ref 70–99)
GLUCOSE SERPL-MCNC: 170 MG/DL — HIGH (ref 70–99)
GLUCOSE SERPL-MCNC: 190 MG/DL — HIGH (ref 70–99)
HCT VFR BLD CALC: 17 % — CRITICAL LOW (ref 34.5–45)
HCT VFR BLD CALC: 22.3 % — LOW (ref 34.5–45)
HCT VFR BLD CALC: 25.9 % — LOW (ref 34.5–45)
HCT VFR BLD CALC: 26 % — LOW (ref 34.5–45)
HCT VFR BLD CALC: 26.2 % — LOW (ref 34.5–45)
HCT VFR BLD CALC: 26.9 % — LOW (ref 34.5–45)
HEPARINASE TEG R TIME: 8.1 MIN — SIGNIFICANT CHANGE UP (ref 4.3–8.3)
HEPARINASE TEG R TIME: 9.2 MIN — HIGH (ref 4.3–8.3)
HGB BLD-MCNC: 6 G/DL — CRITICAL LOW (ref 11.5–15.5)
HGB BLD-MCNC: 7.8 G/DL — LOW (ref 11.5–15.5)
HGB BLD-MCNC: 9.3 G/DL — LOW (ref 11.5–15.5)
HGB BLD-MCNC: 9.5 G/DL — LOW (ref 11.5–15.5)
HGB BLD-MCNC: 9.5 G/DL — LOW (ref 11.5–15.5)
HGB BLD-MCNC: 9.8 G/DL — LOW (ref 11.5–15.5)
IMMATURE PLATELET FRACTION #: 1.3 K/UL — LOW (ref 4.7–11.1)
IMMATURE PLATELET FRACTION #: 1.3 K/UL — LOW (ref 4.7–11.1)
IMMATURE PLATELET FRACTION #: 1.5 K/UL — LOW (ref 4.7–11.1)
IMMATURE PLATELET FRACTION #: 1.5 K/UL — LOW (ref 4.7–11.1)
IMMATURE PLATELET FRACTION #: 2 K/UL — LOW (ref 4.7–11.1)
IMMATURE PLATELET FRACTION #: 2.2 K/UL — LOW (ref 4.7–11.1)
IMMATURE PLATELET FRACTION %: 2.5 % — SIGNIFICANT CHANGE UP (ref 1.6–4.9)
IMMATURE PLATELET FRACTION %: 2.6 % — SIGNIFICANT CHANGE UP (ref 1.6–4.9)
IMMATURE PLATELET FRACTION %: 2.6 % — SIGNIFICANT CHANGE UP (ref 1.6–4.9)
IMMATURE PLATELET FRACTION %: 2.8 % — SIGNIFICANT CHANGE UP (ref 1.6–4.9)
IMMATURE PLATELET FRACTION %: 2.8 % — SIGNIFICANT CHANGE UP (ref 1.6–4.9)
IMMATURE PLATELET FRACTION %: 2.9 % — SIGNIFICANT CHANGE UP (ref 1.6–4.9)
INR BLD: 1.21 RATIO — HIGH (ref 0.85–1.16)
INR BLD: 1.26 RATIO — HIGH (ref 0.85–1.16)
INR BLD: 1.29 RATIO — HIGH (ref 0.85–1.16)
INR BLD: 1.4 RATIO — HIGH (ref 0.85–1.16)
INR BLD: 1.59 RATIO — HIGH (ref 0.85–1.16)
MAGNESIUM SERPL-MCNC: 2.2 MG/DL — SIGNIFICANT CHANGE UP (ref 1.6–2.6)
MAGNESIUM SERPL-MCNC: 2.4 MG/DL — SIGNIFICANT CHANGE UP (ref 1.6–2.6)
MCHC RBC-ENTMCNC: 29 PG — SIGNIFICANT CHANGE UP (ref 27–34)
MCHC RBC-ENTMCNC: 29.1 PG — SIGNIFICANT CHANGE UP (ref 27–34)
MCHC RBC-ENTMCNC: 29.8 PG — SIGNIFICANT CHANGE UP (ref 27–34)
MCHC RBC-ENTMCNC: 29.9 PG — SIGNIFICANT CHANGE UP (ref 27–34)
MCHC RBC-ENTMCNC: 29.9 PG — SIGNIFICANT CHANGE UP (ref 27–34)
MCHC RBC-ENTMCNC: 30 PG — SIGNIFICANT CHANGE UP (ref 27–34)
MCHC RBC-ENTMCNC: 35 G/DL — SIGNIFICANT CHANGE UP (ref 32–36)
MCHC RBC-ENTMCNC: 35.3 G/DL — SIGNIFICANT CHANGE UP (ref 32–36)
MCHC RBC-ENTMCNC: 35.5 G/DL — SIGNIFICANT CHANGE UP (ref 32–36)
MCHC RBC-ENTMCNC: 36.4 G/DL — HIGH (ref 32–36)
MCHC RBC-ENTMCNC: 36.5 G/DL — HIGH (ref 32–36)
MCHC RBC-ENTMCNC: 36.7 G/DL — HIGH (ref 32–36)
MCV RBC AUTO: 81.4 FL — SIGNIFICANT CHANGE UP (ref 80–100)
MCV RBC AUTO: 81.5 FL — SIGNIFICANT CHANGE UP (ref 80–100)
MCV RBC AUTO: 82 FL — SIGNIFICANT CHANGE UP (ref 80–100)
MCV RBC AUTO: 82.5 FL — SIGNIFICANT CHANGE UP (ref 80–100)
MCV RBC AUTO: 82.9 FL — SIGNIFICANT CHANGE UP (ref 80–100)
MCV RBC AUTO: 84.5 FL — SIGNIFICANT CHANGE UP (ref 80–100)
NRBC # BLD AUTO: 0 K/UL — SIGNIFICANT CHANGE UP (ref 0–0)
NRBC # BLD AUTO: 0.02 K/UL — HIGH (ref 0–0)
NRBC # BLD AUTO: 0.02 K/UL — HIGH (ref 0–0)
NRBC # FLD: 0 K/UL — SIGNIFICANT CHANGE UP (ref 0–0)
NRBC # FLD: 0.02 K/UL — HIGH (ref 0–0)
NRBC # FLD: 0.02 K/UL — HIGH (ref 0–0)
NRBC BLD AUTO-RTO: 0 /100 WBCS — SIGNIFICANT CHANGE UP (ref 0–0)
PHOSPHATE SERPL-MCNC: 4.9 MG/DL — HIGH (ref 2.5–4.5)
PHOSPHATE SERPL-MCNC: 5.4 MG/DL — HIGH (ref 2.5–4.5)
PHOSPHATE SERPL-MCNC: 5.9 MG/DL — HIGH (ref 2.5–4.5)
PHOSPHATE SERPL-MCNC: 6.6 MG/DL — HIGH (ref 2.5–4.5)
PHOSPHATE SERPL-MCNC: 6.8 MG/DL — HIGH (ref 2.5–4.5)
PLATELET # BLD AUTO: 47 K/UL — LOW (ref 150–400)
PLATELET # BLD AUTO: 50 K/UL — LOW (ref 150–400)
PLATELET # BLD AUTO: 52 K/UL — LOW (ref 150–400)
PLATELET # BLD AUTO: 58 K/UL — LOW (ref 150–400)
PLATELET # BLD AUTO: 73 K/UL — LOW (ref 150–400)
PLATELET # BLD AUTO: 83 K/UL — LOW (ref 150–400)
PMV BLD: 10.2 FL — SIGNIFICANT CHANGE UP (ref 7–13)
PMV BLD: 10.3 FL — SIGNIFICANT CHANGE UP (ref 7–13)
PMV BLD: 10.7 FL — SIGNIFICANT CHANGE UP (ref 7–13)
PMV BLD: 10.8 FL — SIGNIFICANT CHANGE UP (ref 7–13)
PMV BLD: 11 FL — SIGNIFICANT CHANGE UP (ref 7–13)
PMV BLD: 11.2 FL — SIGNIFICANT CHANGE UP (ref 7–13)
POTASSIUM SERPL-MCNC: 4 MMOL/L — SIGNIFICANT CHANGE UP (ref 3.5–5.3)
POTASSIUM SERPL-MCNC: 4 MMOL/L — SIGNIFICANT CHANGE UP (ref 3.5–5.3)
POTASSIUM SERPL-MCNC: 4.3 MMOL/L — SIGNIFICANT CHANGE UP (ref 3.5–5.3)
POTASSIUM SERPL-MCNC: 4.6 MMOL/L — SIGNIFICANT CHANGE UP (ref 3.5–5.3)
POTASSIUM SERPL-MCNC: 4.6 MMOL/L — SIGNIFICANT CHANGE UP (ref 3.5–5.3)
POTASSIUM SERPL-SCNC: 4 MMOL/L — SIGNIFICANT CHANGE UP (ref 3.5–5.3)
POTASSIUM SERPL-SCNC: 4 MMOL/L — SIGNIFICANT CHANGE UP (ref 3.5–5.3)
POTASSIUM SERPL-SCNC: 4.3 MMOL/L — SIGNIFICANT CHANGE UP (ref 3.5–5.3)
POTASSIUM SERPL-SCNC: 4.6 MMOL/L — SIGNIFICANT CHANGE UP (ref 3.5–5.3)
POTASSIUM SERPL-SCNC: 4.6 MMOL/L — SIGNIFICANT CHANGE UP (ref 3.5–5.3)
PROT SERPL-MCNC: 4.9 G/DL — LOW (ref 6–8.3)
PROT SERPL-MCNC: 5.4 G/DL — LOW (ref 6–8.3)
PROT SERPL-MCNC: 5.5 G/DL — LOW (ref 6–8.3)
PROT SERPL-MCNC: 5.6 G/DL — LOW (ref 6–8.3)
PROT SERPL-MCNC: 5.7 G/DL — LOW (ref 6–8.3)
PROTHROM AB SERPL-ACNC: 13.9 SEC — HIGH (ref 9.9–13.4)
PROTHROM AB SERPL-ACNC: 14.5 SEC — HIGH (ref 9.9–13.4)
PROTHROM AB SERPL-ACNC: 14.8 SEC — HIGH (ref 9.9–13.4)
PROTHROM AB SERPL-ACNC: 15.9 SEC — HIGH (ref 9.9–13.4)
PROTHROM AB SERPL-ACNC: 18 SEC — HIGH (ref 9.9–13.4)
RAPIDTEG MAXIMUM AMPLITUDE: 45.4 MM — LOW (ref 52–70)
RAPIDTEG MAXIMUM AMPLITUDE: 46 MM — LOW (ref 52–70)
RAPIDTEG MAXIMUM AMPLITUDE: 51.9 MM — LOW (ref 52–70)
RBC # BLD: 2.06 M/UL — LOW (ref 3.8–5.2)
RBC # BLD: 2.69 M/UL — LOW (ref 3.8–5.2)
RBC # BLD: 3.1 M/UL — LOW (ref 3.8–5.2)
RBC # BLD: 3.18 M/UL — LOW (ref 3.8–5.2)
RBC # BLD: 3.19 M/UL — LOW (ref 3.8–5.2)
RBC # BLD: 3.28 M/UL — LOW (ref 3.8–5.2)
RBC # FLD: 13.2 % — SIGNIFICANT CHANGE UP (ref 10.3–14.5)
RBC # FLD: 13.3 % — SIGNIFICANT CHANGE UP (ref 10.3–14.5)
RBC # FLD: 13.4 % — SIGNIFICANT CHANGE UP (ref 10.3–14.5)
RBC # FLD: 13.5 % — SIGNIFICANT CHANGE UP (ref 10.3–14.5)
RBC # FLD: 13.6 % — SIGNIFICANT CHANGE UP (ref 10.3–14.5)
RBC # FLD: 13.6 % — SIGNIFICANT CHANGE UP (ref 10.3–14.5)
SODIUM SERPL-SCNC: 145 MMOL/L — SIGNIFICANT CHANGE UP (ref 135–145)
SODIUM SERPL-SCNC: 146 MMOL/L — HIGH (ref 135–145)
SODIUM SERPL-SCNC: 146 MMOL/L — HIGH (ref 135–145)
TEG FUNCTIONAL FIBRINOGEN: 11.3 MM — LOW (ref 15–32)
TEG FUNCTIONAL FIBRINOGEN: 15.6 MM — SIGNIFICANT CHANGE UP (ref 15–32)
TEG FUNCTIONAL FIBRINOGEN: 9.7 MM — LOW (ref 15–32)
TEG LY30 (LYSIS): 0 % — SIGNIFICANT CHANGE UP (ref 0–2.6)
TEG MAXIMUM AMPLITUDE: 47.3 MM — LOW (ref 52–69)
TEG MAXIMUM AMPLITUDE: 54.5 MM — SIGNIFICANT CHANGE UP (ref 52–69)
TEG REACTION TIME: 7.4 MIN — SIGNIFICANT CHANGE UP (ref 4.6–9.1)
TEG REACTION TIME: 8.2 MIN — SIGNIFICANT CHANGE UP (ref 4.6–9.1)
TEG REACTION TIME: 9 MIN — SIGNIFICANT CHANGE UP (ref 4.6–9.1)
WBC # BLD: 4.54 K/UL — SIGNIFICANT CHANGE UP (ref 3.8–10.5)
WBC # BLD: 5.03 K/UL — SIGNIFICANT CHANGE UP (ref 3.8–10.5)
WBC # BLD: 5.76 K/UL — SIGNIFICANT CHANGE UP (ref 3.8–10.5)
WBC # BLD: 5.82 K/UL — SIGNIFICANT CHANGE UP (ref 3.8–10.5)
WBC # BLD: 6.25 K/UL — SIGNIFICANT CHANGE UP (ref 3.8–10.5)
WBC # BLD: 6.87 K/UL — SIGNIFICANT CHANGE UP (ref 3.8–10.5)
WBC # FLD AUTO: 4.54 K/UL — SIGNIFICANT CHANGE UP (ref 3.8–10.5)
WBC # FLD AUTO: 5.03 K/UL — SIGNIFICANT CHANGE UP (ref 3.8–10.5)
WBC # FLD AUTO: 5.76 K/UL — SIGNIFICANT CHANGE UP (ref 3.8–10.5)
WBC # FLD AUTO: 5.82 K/UL — SIGNIFICANT CHANGE UP (ref 3.8–10.5)
WBC # FLD AUTO: 6.25 K/UL — SIGNIFICANT CHANGE UP (ref 3.8–10.5)
WBC # FLD AUTO: 6.87 K/UL — SIGNIFICANT CHANGE UP (ref 3.8–10.5)

## 2025-07-31 PROCEDURE — 82105 ALPHA-FETOPROTEIN SERUM: CPT

## 2025-07-31 PROCEDURE — 86765 RUBEOLA ANTIBODY: CPT

## 2025-07-31 PROCEDURE — 97535 SELF CARE MNGMENT TRAINING: CPT

## 2025-07-31 PROCEDURE — 73200 CT UPPER EXTREMITY W/O DYE: CPT

## 2025-07-31 PROCEDURE — 87799 DETECT AGENT NOS DNA QUANT: CPT

## 2025-07-31 PROCEDURE — 82784 ASSAY IGA/IGD/IGG/IGM EACH: CPT

## 2025-07-31 PROCEDURE — 80053 COMPREHEN METABOLIC PANEL: CPT

## 2025-07-31 PROCEDURE — 93970 EXTREMITY STUDY: CPT

## 2025-07-31 PROCEDURE — 82977 ASSAY OF GGT: CPT

## 2025-07-31 PROCEDURE — 85027 COMPLETE CBC AUTOMATED: CPT

## 2025-07-31 PROCEDURE — 99291 CRITICAL CARE FIRST HOUR: CPT

## 2025-07-31 PROCEDURE — 97165 OT EVAL LOW COMPLEX 30 MIN: CPT

## 2025-07-31 PROCEDURE — 86923 COMPATIBILITY TEST ELECTRIC: CPT

## 2025-07-31 PROCEDURE — 87070 CULTURE OTHR SPECIMN AEROBIC: CPT

## 2025-07-31 PROCEDURE — 92526 ORAL FUNCTION THERAPY: CPT

## 2025-07-31 PROCEDURE — 84443 ASSAY THYROID STIM HORMONE: CPT

## 2025-07-31 PROCEDURE — 87086 URINE CULTURE/COLONY COUNT: CPT

## 2025-07-31 PROCEDURE — 92610 EVALUATE SWALLOWING FUNCTION: CPT

## 2025-07-31 PROCEDURE — 81001 URINALYSIS AUTO W/SCOPE: CPT

## 2025-07-31 PROCEDURE — 86376 MICROSOMAL ANTIBODY EACH: CPT

## 2025-07-31 PROCEDURE — 74177 CT ABD & PELVIS W/CONTRAST: CPT

## 2025-07-31 PROCEDURE — 86480 TB TEST CELL IMMUN MEASURE: CPT

## 2025-07-31 PROCEDURE — 86160 COMPLEMENT ANTIGEN: CPT

## 2025-07-31 PROCEDURE — 86381 MITOCHONDRIAL ANTIBODY EACH: CPT

## 2025-07-31 PROCEDURE — 93975 VASCULAR STUDY: CPT

## 2025-07-31 PROCEDURE — 93306 TTE W/DOPPLER COMPLETE: CPT

## 2025-07-31 PROCEDURE — G0480: CPT

## 2025-07-31 PROCEDURE — 87641 MR-STAPH DNA AMP PROBE: CPT

## 2025-07-31 PROCEDURE — 93325 DOPPLER ECHO COLOR FLOW MAPG: CPT

## 2025-07-31 PROCEDURE — P9073: CPT

## 2025-07-31 PROCEDURE — 76377 3D RENDER W/INTRP POSTPROCES: CPT

## 2025-07-31 PROCEDURE — 87340 HEPATITIS B SURFACE AG IA: CPT

## 2025-07-31 PROCEDURE — 87150 DNA/RNA AMPLIFIED PROBE: CPT

## 2025-07-31 PROCEDURE — 84132 ASSAY OF SERUM POTASSIUM: CPT

## 2025-07-31 PROCEDURE — 86900 BLOOD TYPING SEROLOGIC ABO: CPT

## 2025-07-31 PROCEDURE — 82330 ASSAY OF CALCIUM: CPT

## 2025-07-31 PROCEDURE — 85732 THROMBOPLASTIN TIME PARTIAL: CPT

## 2025-07-31 PROCEDURE — 87077 CULTURE AEROBIC IDENTIFY: CPT

## 2025-07-31 PROCEDURE — 76705 ECHO EXAM OF ABDOMEN: CPT

## 2025-07-31 PROCEDURE — 93975 VASCULAR STUDY: CPT | Mod: 26

## 2025-07-31 PROCEDURE — 86682 HELMINTH ANTIBODY: CPT

## 2025-07-31 PROCEDURE — 76705 ECHO EXAM OF ABDOMEN: CPT | Mod: 26,59

## 2025-07-31 PROCEDURE — 85396 CLOTTING ASSAY WHOLE BLOOD: CPT

## 2025-07-31 PROCEDURE — 86147 CARDIOLIPIN ANTIBODY EA IG: CPT

## 2025-07-31 PROCEDURE — 86709 HEPATITIS A IGM ANTIBODY: CPT

## 2025-07-31 PROCEDURE — 76856 US EXAM PELVIC COMPLETE: CPT

## 2025-07-31 PROCEDURE — 94799 UNLISTED PULMONARY SVC/PX: CPT

## 2025-07-31 PROCEDURE — 86663 EPSTEIN-BARR ANTIBODY: CPT

## 2025-07-31 PROCEDURE — 85384 FIBRINOGEN ACTIVITY: CPT

## 2025-07-31 PROCEDURE — 86305 HUMAN EPIDIDYMIS PROTEIN 4: CPT

## 2025-07-31 PROCEDURE — 93308 TTE F-UP OR LMTD: CPT

## 2025-07-31 PROCEDURE — 73080 X-RAY EXAM OF ELBOW: CPT

## 2025-07-31 PROCEDURE — 82140 ASSAY OF AMMONIA: CPT

## 2025-07-31 PROCEDURE — 86695 HERPES SIMPLEX TYPE 1 TEST: CPT

## 2025-07-31 PROCEDURE — 71045 X-RAY EXAM CHEST 1 VIEW: CPT

## 2025-07-31 PROCEDURE — 87522 HEPATITIS C REVRS TRNSCRPJ: CPT

## 2025-07-31 PROCEDURE — 94003 VENT MGMT INPAT SUBQ DAY: CPT

## 2025-07-31 PROCEDURE — 87640 STAPH A DNA AMP PROBE: CPT

## 2025-07-31 PROCEDURE — 99221 1ST HOSP IP/OBS SF/LOW 40: CPT

## 2025-07-31 PROCEDURE — 86481 TB AG RESPONSE T-CELL SUSP: CPT

## 2025-07-31 PROCEDURE — 80061 LIPID PANEL: CPT

## 2025-07-31 PROCEDURE — 36600 WITHDRAWAL OF ARTERIAL BLOOD: CPT

## 2025-07-31 PROCEDURE — 86762 RUBELLA ANTIBODY: CPT

## 2025-07-31 PROCEDURE — 94002 VENT MGMT INPAT INIT DAY: CPT

## 2025-07-31 PROCEDURE — 74018 RADEX ABDOMEN 1 VIEW: CPT

## 2025-07-31 PROCEDURE — 85025 COMPLETE CBC W/AUTO DIFF WBC: CPT

## 2025-07-31 PROCEDURE — 35840 EXPLORE ABDOMINAL VESSELS: CPT | Mod: 78,GC

## 2025-07-31 PROCEDURE — 82247 BILIRUBIN TOTAL: CPT

## 2025-07-31 PROCEDURE — 93320 DOPPLER ECHO COMPLETE: CPT

## 2025-07-31 PROCEDURE — 93971 EXTREMITY STUDY: CPT

## 2025-07-31 PROCEDURE — 87205 SMEAR GRAM STAIN: CPT

## 2025-07-31 PROCEDURE — 82962 GLUCOSE BLOOD TEST: CPT

## 2025-07-31 PROCEDURE — 97112 NEUROMUSCULAR REEDUCATION: CPT

## 2025-07-31 PROCEDURE — 86301 IMMUNOASSAY TUMOR CA 19-9: CPT

## 2025-07-31 PROCEDURE — 86780 TREPONEMA PALLIDUM: CPT

## 2025-07-31 PROCEDURE — 71045 X-RAY EXAM CHEST 1 VIEW: CPT | Mod: 26

## 2025-07-31 PROCEDURE — 87637 SARSCOV2&INF A&B&RSV AMP PRB: CPT

## 2025-07-31 PROCEDURE — 86704 HEP B CORE ANTIBODY TOTAL: CPT

## 2025-07-31 PROCEDURE — 86140 C-REACTIVE PROTEIN: CPT

## 2025-07-31 PROCEDURE — 86146 BETA-2 GLYCOPROTEIN ANTIBODY: CPT

## 2025-07-31 PROCEDURE — 86777 TOXOPLASMA ANTIBODY: CPT

## 2025-07-31 PROCEDURE — 86803 HEPATITIS C AB TEST: CPT

## 2025-07-31 PROCEDURE — 86225 DNA ANTIBODY NATIVE: CPT

## 2025-07-31 PROCEDURE — 86696 HERPES SIMPLEX TYPE 2 TEST: CPT

## 2025-07-31 PROCEDURE — 84156 ASSAY OF PROTEIN URINE: CPT

## 2025-07-31 PROCEDURE — 82565 ASSAY OF CREATININE: CPT

## 2025-07-31 PROCEDURE — 87385 HISTOPLASMA CAPSUL AG IA: CPT

## 2025-07-31 PROCEDURE — 82435 ASSAY OF BLOOD CHLORIDE: CPT

## 2025-07-31 PROCEDURE — P9016: CPT

## 2025-07-31 PROCEDURE — 80307 DRUG TEST PRSMV CHEM ANLYZR: CPT

## 2025-07-31 PROCEDURE — 86255 FLUORESCENT ANTIBODY SCREEN: CPT

## 2025-07-31 PROCEDURE — 85652 RBC SED RATE AUTOMATED: CPT

## 2025-07-31 PROCEDURE — 81025 URINE PREGNANCY TEST: CPT

## 2025-07-31 PROCEDURE — 83605 ASSAY OF LACTIC ACID: CPT

## 2025-07-31 PROCEDURE — P9011: CPT

## 2025-07-31 PROCEDURE — 82947 ASSAY GLUCOSE BLOOD QUANT: CPT

## 2025-07-31 PROCEDURE — 36415 COLL VENOUS BLD VENIPUNCTURE: CPT

## 2025-07-31 PROCEDURE — 82803 BLOOD GASES ANY COMBINATION: CPT

## 2025-07-31 PROCEDURE — 86708 HEPATITIS A ANTIBODY: CPT

## 2025-07-31 PROCEDURE — 82728 ASSAY OF FERRITIN: CPT

## 2025-07-31 PROCEDURE — 82378 CARCINOEMBRYONIC ANTIGEN: CPT

## 2025-07-31 PROCEDURE — P9047: CPT

## 2025-07-31 PROCEDURE — 87389 HIV-1 AG W/HIV-1&-2 AB AG IA: CPT

## 2025-07-31 PROCEDURE — 86850 RBC ANTIBODY SCREEN: CPT

## 2025-07-31 PROCEDURE — 86880 COOMBS TEST DIRECT: CPT

## 2025-07-31 PROCEDURE — 86787 VARICELLA-ZOSTER ANTIBODY: CPT

## 2025-07-31 PROCEDURE — 85730 THROMBOPLASTIN TIME PARTIAL: CPT

## 2025-07-31 PROCEDURE — 84295 ASSAY OF SERUM SODIUM: CPT

## 2025-07-31 PROCEDURE — 86664 EPSTEIN-BARR NUCLEAR ANTIGEN: CPT

## 2025-07-31 PROCEDURE — 97161 PT EVAL LOW COMPLEX 20 MIN: CPT

## 2025-07-31 PROCEDURE — 87624 HPV HI-RISK TYP POOLED RSLT: CPT

## 2025-07-31 PROCEDURE — 86769 SARS-COV-2 COVID-19 ANTIBODY: CPT

## 2025-07-31 PROCEDURE — 85018 HEMOGLOBIN: CPT

## 2025-07-31 PROCEDURE — 85613 RUSSELL VIPER VENOM DILUTED: CPT

## 2025-07-31 PROCEDURE — 86706 HEP B SURFACE ANTIBODY: CPT

## 2025-07-31 PROCEDURE — 84133 ASSAY OF URINE POTASSIUM: CPT

## 2025-07-31 PROCEDURE — 87040 BLOOD CULTURE FOR BACTERIA: CPT

## 2025-07-31 PROCEDURE — 86665 EPSTEIN-BARR CAPSID VCA: CPT

## 2025-07-31 PROCEDURE — 84300 ASSAY OF URINE SODIUM: CPT

## 2025-07-31 PROCEDURE — A9585: CPT

## 2025-07-31 PROCEDURE — 92612 ENDOSCOPY SWALLOW (FEES) VID: CPT

## 2025-07-31 PROCEDURE — 97116 GAIT TRAINING THERAPY: CPT

## 2025-07-31 PROCEDURE — 83935 ASSAY OF URINE OSMOLALITY: CPT

## 2025-07-31 PROCEDURE — 83036 HEMOGLOBIN GLYCOSYLATED A1C: CPT

## 2025-07-31 PROCEDURE — 86038 ANTINUCLEAR ANTIBODIES: CPT

## 2025-07-31 PROCEDURE — 97530 THERAPEUTIC ACTIVITIES: CPT

## 2025-07-31 PROCEDURE — 83550 IRON BINDING TEST: CPT

## 2025-07-31 PROCEDURE — 84145 PROCALCITONIN (PCT): CPT

## 2025-07-31 PROCEDURE — 86735 MUMPS ANTIBODY: CPT

## 2025-07-31 PROCEDURE — 82570 ASSAY OF URINE CREATININE: CPT

## 2025-07-31 PROCEDURE — 87521 HEPATITIS C PROBE&RVRS TRNSC: CPT

## 2025-07-31 PROCEDURE — 84100 ASSAY OF PHOSPHORUS: CPT

## 2025-07-31 PROCEDURE — 84540 ASSAY OF URINE/UREA-N: CPT

## 2025-07-31 PROCEDURE — 82390 ASSAY OF CERULOPLASMIN: CPT

## 2025-07-31 PROCEDURE — 83735 ASSAY OF MAGNESIUM: CPT

## 2025-07-31 PROCEDURE — 85610 PROTHROMBIN TIME: CPT

## 2025-07-31 PROCEDURE — 76641 ULTRASOUND BREAST COMPLETE: CPT

## 2025-07-31 PROCEDURE — P9059: CPT

## 2025-07-31 PROCEDURE — 86304 IMMUNOASSAY TUMOR CA 125: CPT

## 2025-07-31 PROCEDURE — 86901 BLOOD TYPING SEROLOGIC RH(D): CPT

## 2025-07-31 PROCEDURE — 85014 HEMATOCRIT: CPT

## 2025-07-31 PROCEDURE — P9045: CPT

## 2025-07-31 PROCEDURE — 84481 FREE ASSAY (FT-3): CPT

## 2025-07-31 PROCEDURE — 86985 SPLIT BLOOD OR PRODUCTS: CPT

## 2025-07-31 PROCEDURE — 84702 CHORIONIC GONADOTROPIN TEST: CPT

## 2025-07-31 PROCEDURE — 86644 CMV ANTIBODY: CPT

## 2025-07-31 PROCEDURE — 83540 ASSAY OF IRON: CPT

## 2025-07-31 PROCEDURE — 97110 THERAPEUTIC EXERCISES: CPT

## 2025-07-31 PROCEDURE — 72197 MRI PELVIS W/O & W/DYE: CPT

## 2025-07-31 PROCEDURE — 84550 ASSAY OF BLOOD/URIC ACID: CPT

## 2025-07-31 DEVICE — SURGICEL NU-KNIT 6 X 9": Type: IMPLANTABLE DEVICE | Status: FUNCTIONAL

## 2025-07-31 DEVICE — SURGICEL 2 X 14": Type: IMPLANTABLE DEVICE | Status: FUNCTIONAL

## 2025-07-31 RX ORDER — ALBUMIN (HUMAN) 12.5 G/50ML
250 INJECTION, SOLUTION INTRAVENOUS ONCE
Refills: 0 | Status: COMPLETED | OUTPATIENT
Start: 2025-07-31 | End: 2025-07-31

## 2025-07-31 RX ORDER — ONDANSETRON HCL/PF 4 MG/2 ML
4 VIAL (ML) INJECTION ONCE
Refills: 0 | Status: COMPLETED | OUTPATIENT
Start: 2025-07-31 | End: 2025-07-31

## 2025-07-31 RX ORDER — CARVEDILOL 3.12 MG/1
3.12 TABLET, FILM COATED ORAL EVERY 12 HOURS
Refills: 0 | Status: DISCONTINUED | OUTPATIENT
Start: 2025-07-31 | End: 2025-08-01

## 2025-07-31 RX ORDER — INSULIN LISPRO 100 U/ML
INJECTION, SOLUTION INTRAVENOUS; SUBCUTANEOUS EVERY 4 HOURS
Refills: 0 | Status: DISCONTINUED | OUTPATIENT
Start: 2025-07-31 | End: 2025-08-01

## 2025-07-31 RX ORDER — VANCOMYCIN HCL IN 5 % DEXTROSE 1.5G/250ML
1000 PLASTIC BAG, INJECTION (ML) INTRAVENOUS ONCE
Refills: 0 | Status: COMPLETED | OUTPATIENT
Start: 2025-07-31 | End: 2025-07-31

## 2025-07-31 RX ORDER — HYDROMORPHONE/SOD CHLOR,ISO/PF 2 MG/10 ML
0.5 SYRINGE (ML) INJECTION
Refills: 0 | Status: DISCONTINUED | OUTPATIENT
Start: 2025-07-31 | End: 2025-08-01

## 2025-07-31 RX ORDER — TACROLIMUS 0.5 MG/1
0.5 CAPSULE ORAL ONCE
Refills: 0 | Status: COMPLETED | OUTPATIENT
Start: 2025-07-31 | End: 2025-07-31

## 2025-07-31 RX ORDER — TACROLIMUS 0.5 MG/1
0.5 CAPSULE ORAL
Refills: 0 | Status: DISCONTINUED | OUTPATIENT
Start: 2025-07-31 | End: 2025-08-01

## 2025-07-31 RX ORDER — NIFEDIPINE 30 MG
30 TABLET, EXTENDED RELEASE 24 HR ORAL DAILY
Refills: 0 | Status: DISCONTINUED | OUTPATIENT
Start: 2025-07-31 | End: 2025-07-31

## 2025-07-31 RX ORDER — METHYLPREDNISOLONE ACETATE 80 MG/ML
250 INJECTION, SUSPENSION INTRA-ARTICULAR; INTRALESIONAL; INTRAMUSCULAR; SOFT TISSUE EVERY 24 HOURS
Refills: 0 | Status: COMPLETED | OUTPATIENT
Start: 2025-08-01 | End: 2025-08-01

## 2025-07-31 RX ADMIN — SODIUM CHLORIDE 125 MILLILITER(S): 9 INJECTION, SOLUTION INTRAVENOUS at 07:26

## 2025-07-31 RX ADMIN — METHYLPREDNISOLONE ACETATE 100 MILLIGRAM(S): 80 INJECTION, SUSPENSION INTRA-ARTICULAR; INTRALESIONAL; INTRAMUSCULAR; SOFT TISSUE at 06:07

## 2025-07-31 RX ADMIN — Medication 2 UNIT(S)/HR: at 07:26

## 2025-07-31 RX ADMIN — Medication 4 MILLIGRAM(S): at 18:30

## 2025-07-31 RX ADMIN — Medication 25 GRAM(S): at 17:56

## 2025-07-31 RX ADMIN — ALBUMIN (HUMAN) 1000 MILLILITER(S): 12.5 INJECTION, SOLUTION INTRAVENOUS at 01:04

## 2025-07-31 RX ADMIN — Medication 0.5 MILLIGRAM(S): at 11:00

## 2025-07-31 RX ADMIN — Medication 1 TABLET(S): at 06:17

## 2025-07-31 RX ADMIN — MYCOPHENOLATE MOFETIL 1000 MILLIGRAM(S): 500 TABLET, FILM COATED ORAL at 07:37

## 2025-07-31 RX ADMIN — DEXMEDETOMIDINE HYDROCHLORIDE IN SODIUM CHLORIDE 7.99 MICROGRAM(S)/KG/HR: 4 INJECTION INTRAVENOUS at 07:26

## 2025-07-31 RX ADMIN — SODIUM CHLORIDE 75 MILLILITER(S): 9 INJECTION, SOLUTION INTRAVENOUS at 12:24

## 2025-07-31 RX ADMIN — TACROLIMUS 0.5 MILLIGRAM(S): 0.5 CAPSULE ORAL at 20:16

## 2025-07-31 RX ADMIN — Medication 2 TABLET(S): at 17:56

## 2025-07-31 RX ADMIN — Medication 0.5 MILLIGRAM(S): at 08:37

## 2025-07-31 RX ADMIN — Medication 0.5 MILLIGRAM(S): at 14:48

## 2025-07-31 RX ADMIN — VALGANCICLOVIR 450 MILLIGRAM(S): 450 TABLET, FILM COATED ORAL at 12:25

## 2025-07-31 RX ADMIN — Medication 30 MILLIGRAM(S): at 17:59

## 2025-07-31 RX ADMIN — Medication 1 TABLET(S): at 17:56

## 2025-07-31 RX ADMIN — Medication 25 GRAM(S): at 09:18

## 2025-07-31 RX ADMIN — Medication 40 MILLIGRAM(S): at 12:25

## 2025-07-31 RX ADMIN — CARVEDILOL 3.12 MILLIGRAM(S): 3.12 TABLET, FILM COATED ORAL at 20:25

## 2025-07-31 RX ADMIN — Medication 100 MILLIGRAM(S): at 12:25

## 2025-07-31 RX ADMIN — INSULIN LISPRO 2: 100 INJECTION, SOLUTION INTRAVENOUS; SUBCUTANEOUS at 01:15

## 2025-07-31 RX ADMIN — TACROLIMUS 0.5 MILLIGRAM(S): 0.5 CAPSULE ORAL at 12:56

## 2025-07-31 RX ADMIN — Medication 0.5 MILLIGRAM(S): at 07:37

## 2025-07-31 RX ADMIN — Medication 0.5 MILLIGRAM(S): at 21:16

## 2025-07-31 RX ADMIN — Medication 2 TABLET(S): at 06:17

## 2025-07-31 RX ADMIN — Medication 0.5 MILLIGRAM(S): at 20:16

## 2025-07-31 RX ADMIN — Medication 10 MILLILITER(S): at 12:24

## 2025-07-31 RX ADMIN — Medication 1 APPLICATION(S): at 12:25

## 2025-07-31 RX ADMIN — SODIUM CHLORIDE 125 MILLILITER(S): 9 INJECTION, SOLUTION INTRAVENOUS at 00:01

## 2025-07-31 RX ADMIN — Medication 2 UNIT(S)/HR: at 06:07

## 2025-07-31 RX ADMIN — SODIUM CHLORIDE 75 MILLILITER(S): 9 INJECTION, SOLUTION INTRAVENOUS at 22:24

## 2025-07-31 RX ADMIN — Medication 25 GRAM(S): at 00:01

## 2025-07-31 RX ADMIN — Medication 4 UNIT(S): at 22:53

## 2025-07-31 RX ADMIN — Medication 0.5 MILLIGRAM(S): at 15:48

## 2025-07-31 RX ADMIN — Medication 8 UNIT(S): at 09:50

## 2025-07-31 RX ADMIN — MYCOPHENOLATE MOFETIL 1000 MILLIGRAM(S): 500 TABLET, FILM COATED ORAL at 20:16

## 2025-07-31 RX ADMIN — Medication 15 MILLILITER(S): at 06:17

## 2025-07-31 RX ADMIN — ALBUMIN (HUMAN) 1000 MILLILITER(S): 12.5 INJECTION, SOLUTION INTRAVENOUS at 01:11

## 2025-07-31 RX ADMIN — PREDNISONE 20 MILLIGRAM(S): 20 TABLET ORAL at 06:17

## 2025-07-31 RX ADMIN — Medication 0.5 MILLIGRAM(S): at 12:00

## 2025-07-31 RX ADMIN — Medication 250 MILLIGRAM(S): at 22:23

## 2025-08-01 LAB
ALBUMIN SERPL ELPH-MCNC: 3.7 G/DL — SIGNIFICANT CHANGE UP (ref 3.3–5)
ALBUMIN SERPL ELPH-MCNC: 3.7 G/DL — SIGNIFICANT CHANGE UP (ref 3.3–5)
ALBUMIN SERPL ELPH-MCNC: 3.9 G/DL — SIGNIFICANT CHANGE UP (ref 3.3–5)
ALP SERPL-CCNC: 107 U/L — SIGNIFICANT CHANGE UP (ref 40–120)
ALP SERPL-CCNC: 152 U/L — HIGH (ref 40–120)
ALP SERPL-CCNC: 80 U/L — SIGNIFICANT CHANGE UP (ref 40–120)
ALT FLD-CCNC: 182 U/L — HIGH (ref 10–45)
ALT FLD-CCNC: 206 U/L — HIGH (ref 10–45)
ALT FLD-CCNC: 220 U/L — HIGH (ref 10–45)
ANION GAP SERPL CALC-SCNC: 15 MMOL/L — SIGNIFICANT CHANGE UP (ref 5–17)
ANION GAP SERPL CALC-SCNC: 15 MMOL/L — SIGNIFICANT CHANGE UP (ref 5–17)
ANION GAP SERPL CALC-SCNC: 18 MMOL/L — HIGH (ref 5–17)
APTT BLD: 22.8 SEC — LOW (ref 26.1–36.8)
APTT BLD: 24.1 SEC — LOW (ref 26.1–36.8)
APTT BLD: 24.2 SEC — LOW (ref 26.1–36.8)
AST SERPL-CCNC: 189 U/L — HIGH (ref 10–40)
AST SERPL-CCNC: 337 U/L — HIGH (ref 10–40)
AST SERPL-CCNC: 374 U/L — HIGH (ref 10–40)
BILIRUB SERPL-MCNC: 2.9 MG/DL — HIGH (ref 0.2–1.2)
BILIRUB SERPL-MCNC: 3.6 MG/DL — HIGH (ref 0.2–1.2)
BILIRUB SERPL-MCNC: 3.8 MG/DL — HIGH (ref 0.2–1.2)
BLD GP AB SCN SERPL QL: NEGATIVE — SIGNIFICANT CHANGE UP
BUN SERPL-MCNC: 34 MG/DL — HIGH (ref 7–23)
BUN SERPL-MCNC: 35 MG/DL — HIGH (ref 7–23)
BUN SERPL-MCNC: 40 MG/DL — HIGH (ref 7–23)
CALCIUM SERPL-MCNC: 9.4 MG/DL — SIGNIFICANT CHANGE UP (ref 8.4–10.5)
CALCIUM SERPL-MCNC: 9.6 MG/DL — SIGNIFICANT CHANGE UP (ref 8.4–10.5)
CALCIUM SERPL-MCNC: 9.8 MG/DL — SIGNIFICANT CHANGE UP (ref 8.4–10.5)
CHLORIDE SERPL-SCNC: 104 MMOL/L — SIGNIFICANT CHANGE UP (ref 96–108)
CHLORIDE SERPL-SCNC: 105 MMOL/L — SIGNIFICANT CHANGE UP (ref 96–108)
CHLORIDE SERPL-SCNC: 107 MMOL/L — SIGNIFICANT CHANGE UP (ref 96–108)
CO2 SERPL-SCNC: 20 MMOL/L — LOW (ref 22–31)
CO2 SERPL-SCNC: 21 MMOL/L — LOW (ref 22–31)
CO2 SERPL-SCNC: 23 MMOL/L — SIGNIFICANT CHANGE UP (ref 22–31)
CREAT SERPL-MCNC: 1.17 MG/DL — SIGNIFICANT CHANGE UP (ref 0.5–1.3)
CREAT SERPL-MCNC: 1.2 MG/DL — SIGNIFICANT CHANGE UP (ref 0.5–1.3)
CREAT SERPL-MCNC: 1.23 MG/DL — SIGNIFICANT CHANGE UP (ref 0.5–1.3)
EGFR: 55 ML/MIN/1.73M2 — LOW
EGFR: 55 ML/MIN/1.73M2 — LOW
EGFR: 57 ML/MIN/1.73M2 — LOW
EGFR: 57 ML/MIN/1.73M2 — LOW
EGFR: 58 ML/MIN/1.73M2 — LOW
EGFR: 58 ML/MIN/1.73M2 — LOW
GAS PNL BLDA: SIGNIFICANT CHANGE UP
GLUCOSE BLDC GLUCOMTR-MCNC: 126 MG/DL — HIGH (ref 70–99)
GLUCOSE BLDC GLUCOMTR-MCNC: 127 MG/DL — HIGH (ref 70–99)
GLUCOSE BLDC GLUCOMTR-MCNC: 132 MG/DL — HIGH (ref 70–99)
GLUCOSE BLDC GLUCOMTR-MCNC: 144 MG/DL — HIGH (ref 70–99)
GLUCOSE BLDC GLUCOMTR-MCNC: 156 MG/DL — HIGH (ref 70–99)
GLUCOSE SERPL-MCNC: 123 MG/DL — HIGH (ref 70–99)
GLUCOSE SERPL-MCNC: 128 MG/DL — HIGH (ref 70–99)
GLUCOSE SERPL-MCNC: 151 MG/DL — HIGH (ref 70–99)
HCT VFR BLD CALC: 25.9 % — LOW (ref 34.5–45)
HCT VFR BLD CALC: 25.9 % — LOW (ref 34.5–45)
HCT VFR BLD CALC: 27.8 % — LOW (ref 34.5–45)
HGB BLD-MCNC: 9.2 G/DL — LOW (ref 11.5–15.5)
HGB BLD-MCNC: 9.4 G/DL — LOW (ref 11.5–15.5)
HGB BLD-MCNC: 9.9 G/DL — LOW (ref 11.5–15.5)
IMMATURE PLATELET FRACTION #: 1 K/UL — LOW (ref 4.7–11.1)
IMMATURE PLATELET FRACTION #: 1.1 K/UL — LOW (ref 4.7–11.1)
IMMATURE PLATELET FRACTION #: 1.2 K/UL — LOW (ref 4.7–11.1)
IMMATURE PLATELET FRACTION %: 1.9 % — SIGNIFICANT CHANGE UP (ref 1.6–4.9)
IMMATURE PLATELET FRACTION %: 2.2 % — SIGNIFICANT CHANGE UP (ref 1.6–4.9)
IMMATURE PLATELET FRACTION %: 2.7 % — SIGNIFICANT CHANGE UP (ref 1.6–4.9)
INR BLD: 1.03 RATIO — SIGNIFICANT CHANGE UP (ref 0.85–1.16)
INR BLD: 1.1 RATIO — SIGNIFICANT CHANGE UP (ref 0.85–1.16)
INR BLD: 1.18 RATIO — HIGH (ref 0.85–1.16)
MAGNESIUM SERPL-MCNC: 2 MG/DL — SIGNIFICANT CHANGE UP (ref 1.6–2.6)
MAGNESIUM SERPL-MCNC: 2.1 MG/DL — SIGNIFICANT CHANGE UP (ref 1.6–2.6)
MAGNESIUM SERPL-MCNC: 2.2 MG/DL — SIGNIFICANT CHANGE UP (ref 1.6–2.6)
MCHC RBC-ENTMCNC: 29.7 PG — SIGNIFICANT CHANGE UP (ref 27–34)
MCHC RBC-ENTMCNC: 29.7 PG — SIGNIFICANT CHANGE UP (ref 27–34)
MCHC RBC-ENTMCNC: 30 PG — SIGNIFICANT CHANGE UP (ref 27–34)
MCHC RBC-ENTMCNC: 35.5 G/DL — SIGNIFICANT CHANGE UP (ref 32–36)
MCHC RBC-ENTMCNC: 35.6 G/DL — SIGNIFICANT CHANGE UP (ref 32–36)
MCHC RBC-ENTMCNC: 36.3 G/DL — HIGH (ref 32–36)
MCV RBC AUTO: 82 FL — SIGNIFICANT CHANGE UP (ref 80–100)
MCV RBC AUTO: 83.5 FL — SIGNIFICANT CHANGE UP (ref 80–100)
MCV RBC AUTO: 84.4 FL — SIGNIFICANT CHANGE UP (ref 80–100)
NRBC # BLD AUTO: 0 K/UL — SIGNIFICANT CHANGE UP (ref 0–0)
NRBC # BLD AUTO: 0.02 K/UL — HIGH (ref 0–0)
NRBC # BLD AUTO: 0.02 K/UL — HIGH (ref 0–0)
NRBC # FLD: 0 K/UL — SIGNIFICANT CHANGE UP (ref 0–0)
NRBC # FLD: 0.02 K/UL — HIGH (ref 0–0)
NRBC # FLD: 0.02 K/UL — HIGH (ref 0–0)
NRBC BLD AUTO-RTO: 0 /100 WBCS — SIGNIFICANT CHANGE UP (ref 0–0)
PHOSPHATE SERPL-MCNC: 3.7 MG/DL — SIGNIFICANT CHANGE UP (ref 2.5–4.5)
PHOSPHATE SERPL-MCNC: 4.1 MG/DL — SIGNIFICANT CHANGE UP (ref 2.5–4.5)
PHOSPHATE SERPL-MCNC: 4.3 MG/DL — SIGNIFICANT CHANGE UP (ref 2.5–4.5)
PLATELET # BLD AUTO: 44 K/UL — LOW (ref 150–400)
PLATELET # BLD AUTO: 48 K/UL — LOW (ref 150–400)
PLATELET # BLD AUTO: 51 K/UL — LOW (ref 150–400)
PMV BLD: 10.7 FL — SIGNIFICANT CHANGE UP (ref 7–13)
PMV BLD: 10.9 FL — SIGNIFICANT CHANGE UP (ref 7–13)
PMV BLD: 11.3 FL — SIGNIFICANT CHANGE UP (ref 7–13)
POTASSIUM SERPL-MCNC: 3.5 MMOL/L — SIGNIFICANT CHANGE UP (ref 3.5–5.3)
POTASSIUM SERPL-MCNC: 3.8 MMOL/L — SIGNIFICANT CHANGE UP (ref 3.5–5.3)
POTASSIUM SERPL-MCNC: 3.8 MMOL/L — SIGNIFICANT CHANGE UP (ref 3.5–5.3)
POTASSIUM SERPL-SCNC: 3.5 MMOL/L — SIGNIFICANT CHANGE UP (ref 3.5–5.3)
POTASSIUM SERPL-SCNC: 3.8 MMOL/L — SIGNIFICANT CHANGE UP (ref 3.5–5.3)
POTASSIUM SERPL-SCNC: 3.8 MMOL/L — SIGNIFICANT CHANGE UP (ref 3.5–5.3)
PROT SERPL-MCNC: 5.4 G/DL — LOW (ref 6–8.3)
PROT SERPL-MCNC: 5.7 G/DL — LOW (ref 6–8.3)
PROT SERPL-MCNC: 5.9 G/DL — LOW (ref 6–8.3)
PROTHROM AB SERPL-ACNC: 11.8 SEC — SIGNIFICANT CHANGE UP (ref 9.9–13.4)
PROTHROM AB SERPL-ACNC: 12.6 SEC — SIGNIFICANT CHANGE UP (ref 9.9–13.4)
PROTHROM AB SERPL-ACNC: 13.5 SEC — HIGH (ref 9.9–13.4)
RBC # BLD: 3.07 M/UL — LOW (ref 3.8–5.2)
RBC # BLD: 3.16 M/UL — LOW (ref 3.8–5.2)
RBC # BLD: 3.33 M/UL — LOW (ref 3.8–5.2)
RBC # FLD: 13.8 % — SIGNIFICANT CHANGE UP (ref 10.3–14.5)
RBC # FLD: 14.7 % — HIGH (ref 10.3–14.5)
RBC # FLD: 15.6 % — HIGH (ref 10.3–14.5)
RH IG SCN BLD-IMP: POSITIVE — SIGNIFICANT CHANGE UP
SODIUM SERPL-SCNC: 140 MMOL/L — SIGNIFICANT CHANGE UP (ref 135–145)
SODIUM SERPL-SCNC: 143 MMOL/L — SIGNIFICANT CHANGE UP (ref 135–145)
SODIUM SERPL-SCNC: 145 MMOL/L — SIGNIFICANT CHANGE UP (ref 135–145)
TACROLIMUS SERPL-MCNC: 0.8 NG/ML — SIGNIFICANT CHANGE UP
VANCOMYCIN FLD-MCNC: 16.3 UG/ML — SIGNIFICANT CHANGE UP
WBC # BLD: 5.86 K/UL — SIGNIFICANT CHANGE UP (ref 3.8–10.5)
WBC # BLD: 7.71 K/UL — SIGNIFICANT CHANGE UP (ref 3.8–10.5)
WBC # BLD: 7.79 K/UL — SIGNIFICANT CHANGE UP (ref 3.8–10.5)
WBC # FLD AUTO: 5.86 K/UL — SIGNIFICANT CHANGE UP (ref 3.8–10.5)
WBC # FLD AUTO: 7.71 K/UL — SIGNIFICANT CHANGE UP (ref 3.8–10.5)
WBC # FLD AUTO: 7.79 K/UL — SIGNIFICANT CHANGE UP (ref 3.8–10.5)

## 2025-08-01 PROCEDURE — 99233 SBSQ HOSP IP/OBS HIGH 50: CPT

## 2025-08-01 PROCEDURE — 76705 ECHO EXAM OF ABDOMEN: CPT | Mod: 26,59

## 2025-08-01 PROCEDURE — 93975 VASCULAR STUDY: CPT | Mod: 26

## 2025-08-01 RX ORDER — FLUCONAZOLE 150 MG
400 TABLET ORAL DAILY
Refills: 0 | Status: DISCONTINUED | OUTPATIENT
Start: 2025-08-01 | End: 2025-08-13

## 2025-08-01 RX ORDER — SULFAMETHOXAZOLE/TRIMETHOPRIM 800-160 MG
1 TABLET ORAL DAILY
Refills: 0 | Status: DISCONTINUED | OUTPATIENT
Start: 2025-08-01 | End: 2025-08-11

## 2025-08-01 RX ORDER — POLYETHYLENE GLYCOL 3350 17 G/17G
17 POWDER, FOR SOLUTION ORAL DAILY
Refills: 0 | Status: DISCONTINUED | OUTPATIENT
Start: 2025-08-01 | End: 2025-08-13

## 2025-08-01 RX ORDER — OXYCODONE HYDROCHLORIDE 30 MG/1
5 TABLET ORAL EVERY 4 HOURS
Refills: 0 | Status: DISCONTINUED | OUTPATIENT
Start: 2025-08-01 | End: 2025-08-02

## 2025-08-01 RX ORDER — INSULIN LISPRO 100 U/ML
INJECTION, SOLUTION INTRAVENOUS; SUBCUTANEOUS
Refills: 0 | Status: DISCONTINUED | OUTPATIENT
Start: 2025-08-01 | End: 2025-08-01

## 2025-08-01 RX ORDER — SENNA 187 MG
2 TABLET ORAL AT BEDTIME
Refills: 0 | Status: DISCONTINUED | OUTPATIENT
Start: 2025-08-01 | End: 2025-08-13

## 2025-08-01 RX ORDER — INSULIN LISPRO 100 U/ML
INJECTION, SOLUTION INTRAVENOUS; SUBCUTANEOUS AT BEDTIME
Refills: 0 | Status: DISCONTINUED | OUTPATIENT
Start: 2025-08-01 | End: 2025-08-02

## 2025-08-01 RX ORDER — TACROLIMUS 0.5 MG/1
1 CAPSULE ORAL
Refills: 0 | Status: DISCONTINUED | OUTPATIENT
Start: 2025-08-01 | End: 2025-08-03

## 2025-08-01 RX ORDER — VALGANCICLOVIR 450 MG/1
450 TABLET, FILM COATED ORAL DAILY
Refills: 0 | Status: DISCONTINUED | OUTPATIENT
Start: 2025-08-01 | End: 2025-08-13

## 2025-08-01 RX ORDER — CARVEDILOL 3.12 MG/1
3.12 TABLET, FILM COATED ORAL EVERY 12 HOURS
Refills: 0 | Status: DISCONTINUED | OUTPATIENT
Start: 2025-08-01 | End: 2025-08-02

## 2025-08-01 RX ORDER — TACROLIMUS 0.5 MG/1
0.5 CAPSULE ORAL ONCE
Refills: 0 | Status: COMPLETED | OUTPATIENT
Start: 2025-08-01 | End: 2025-08-01

## 2025-08-01 RX ORDER — MYCOPHENOLATE MOFETIL 500 MG/1
1000 TABLET, FILM COATED ORAL
Refills: 0 | Status: DISCONTINUED | OUTPATIENT
Start: 2025-08-01 | End: 2025-08-13

## 2025-08-01 RX ORDER — INSULIN LISPRO 100 U/ML
INJECTION, SOLUTION INTRAVENOUS; SUBCUTANEOUS
Refills: 0 | Status: DISCONTINUED | OUTPATIENT
Start: 2025-08-01 | End: 2025-08-02

## 2025-08-01 RX ORDER — OXYCODONE HYDROCHLORIDE 30 MG/1
2.5 TABLET ORAL EVERY 4 HOURS
Refills: 0 | Status: DISCONTINUED | OUTPATIENT
Start: 2025-08-01 | End: 2025-08-02

## 2025-08-01 RX ADMIN — Medication 25 GRAM(S): at 09:43

## 2025-08-01 RX ADMIN — OXYCODONE HYDROCHLORIDE 2.5 MILLIGRAM(S): 30 TABLET ORAL at 12:08

## 2025-08-01 RX ADMIN — Medication 1 APPLICATION(S): at 12:14

## 2025-08-01 RX ADMIN — MYCOPHENOLATE MOFETIL 1000 MILLIGRAM(S): 500 TABLET, FILM COATED ORAL at 07:48

## 2025-08-01 RX ADMIN — TACROLIMUS 0.5 MILLIGRAM(S): 0.5 CAPSULE ORAL at 07:49

## 2025-08-01 RX ADMIN — OXYCODONE HYDROCHLORIDE 2.5 MILLIGRAM(S): 30 TABLET ORAL at 11:08

## 2025-08-01 RX ADMIN — Medication 2 TABLET(S): at 21:04

## 2025-08-01 RX ADMIN — OXYCODONE HYDROCHLORIDE 5 MILLIGRAM(S): 30 TABLET ORAL at 13:54

## 2025-08-01 RX ADMIN — CARVEDILOL 3.12 MILLIGRAM(S): 3.12 TABLET, FILM COATED ORAL at 18:18

## 2025-08-01 RX ADMIN — SODIUM CHLORIDE 50 MILLILITER(S): 9 INJECTION, SOLUTION INTRAVENOUS at 07:49

## 2025-08-01 RX ADMIN — OXYCODONE HYDROCHLORIDE 5 MILLIGRAM(S): 30 TABLET ORAL at 23:52

## 2025-08-01 RX ADMIN — MYCOPHENOLATE MOFETIL 1000 MILLIGRAM(S): 500 TABLET, FILM COATED ORAL at 19:15

## 2025-08-01 RX ADMIN — Medication 0.5 MILLIGRAM(S): at 02:30

## 2025-08-01 RX ADMIN — Medication 25 GRAM(S): at 02:07

## 2025-08-01 RX ADMIN — Medication 2 TABLET(S): at 06:12

## 2025-08-01 RX ADMIN — TACROLIMUS 1 MILLIGRAM(S): 0.5 CAPSULE ORAL at 19:15

## 2025-08-01 RX ADMIN — TACROLIMUS 0.5 MILLIGRAM(S): 0.5 CAPSULE ORAL at 15:27

## 2025-08-01 RX ADMIN — Medication 400 MILLIGRAM(S): at 12:13

## 2025-08-01 RX ADMIN — Medication 4 UNIT(S): at 06:19

## 2025-08-01 RX ADMIN — Medication 0.5 MILLIGRAM(S): at 02:00

## 2025-08-01 RX ADMIN — VALGANCICLOVIR 450 MILLIGRAM(S): 450 TABLET, FILM COATED ORAL at 12:13

## 2025-08-01 RX ADMIN — OXYCODONE HYDROCHLORIDE 5 MILLIGRAM(S): 30 TABLET ORAL at 14:54

## 2025-08-01 RX ADMIN — Medication 100 MILLIEQUIVALENT(S): at 08:25

## 2025-08-01 RX ADMIN — POLYETHYLENE GLYCOL 3350 17 GRAM(S): 17 POWDER, FOR SOLUTION ORAL at 12:14

## 2025-08-01 RX ADMIN — Medication 1 TABLET(S): at 06:13

## 2025-08-01 RX ADMIN — CARVEDILOL 3.12 MILLIGRAM(S): 3.12 TABLET, FILM COATED ORAL at 06:12

## 2025-08-01 RX ADMIN — Medication 0.5 MILLIGRAM(S): at 05:30

## 2025-08-01 RX ADMIN — OXYCODONE HYDROCHLORIDE 5 MILLIGRAM(S): 30 TABLET ORAL at 23:22

## 2025-08-01 RX ADMIN — Medication 1 TABLET(S): at 12:13

## 2025-08-01 RX ADMIN — METHYLPREDNISOLONE ACETATE 100 MILLIGRAM(S): 80 INJECTION, SUSPENSION INTRA-ARTICULAR; INTRALESIONAL; INTRAMUSCULAR; SOFT TISSUE at 06:12

## 2025-08-01 RX ADMIN — OXYCODONE HYDROCHLORIDE 5 MILLIGRAM(S): 30 TABLET ORAL at 10:54

## 2025-08-01 RX ADMIN — OXYCODONE HYDROCHLORIDE 5 MILLIGRAM(S): 30 TABLET ORAL at 09:54

## 2025-08-01 RX ADMIN — Medication 1 TABLET(S): at 18:18

## 2025-08-01 RX ADMIN — Medication 0.5 MILLIGRAM(S): at 05:00

## 2025-08-02 LAB
ALBUMIN SERPL ELPH-MCNC: 3.6 G/DL — SIGNIFICANT CHANGE UP (ref 3.3–5)
ALBUMIN SERPL ELPH-MCNC: 3.7 G/DL — SIGNIFICANT CHANGE UP (ref 3.3–5)
ALP SERPL-CCNC: 174 U/L — HIGH (ref 40–120)
ALP SERPL-CCNC: 186 U/L — HIGH (ref 40–120)
ALP SERPL-CCNC: 194 U/L — HIGH (ref 40–120)
ALP SERPL-CCNC: 196 U/L — HIGH (ref 40–120)
ALT FLD-CCNC: 145 U/L — HIGH (ref 10–45)
ALT FLD-CCNC: 151 U/L — HIGH (ref 10–45)
ALT FLD-CCNC: 160 U/L — HIGH (ref 10–45)
ALT FLD-CCNC: 168 U/L — HIGH (ref 10–45)
ANION GAP SERPL CALC-SCNC: 13 MMOL/L — SIGNIFICANT CHANGE UP (ref 5–17)
ANION GAP SERPL CALC-SCNC: 16 MMOL/L — SIGNIFICANT CHANGE UP (ref 5–17)
ANION GAP SERPL CALC-SCNC: 17 MMOL/L — SIGNIFICANT CHANGE UP (ref 5–17)
ANION GAP SERPL CALC-SCNC: 17 MMOL/L — SIGNIFICANT CHANGE UP (ref 5–17)
APTT BLD: 22.2 SEC — LOW (ref 26.1–36.8)
APTT BLD: 22.5 SEC — LOW (ref 26.1–36.8)
APTT BLD: 22.5 SEC — LOW (ref 26.1–36.8)
APTT BLD: 22.6 SEC — LOW (ref 26.1–36.8)
AST SERPL-CCNC: 123 U/L — HIGH (ref 10–40)
AST SERPL-CCNC: 141 U/L — HIGH (ref 10–40)
AST SERPL-CCNC: 80 U/L — HIGH (ref 10–40)
AST SERPL-CCNC: 89 U/L — HIGH (ref 10–40)
BILIRUB SERPL-MCNC: 2.8 MG/DL — HIGH (ref 0.2–1.2)
BILIRUB SERPL-MCNC: 2.8 MG/DL — HIGH (ref 0.2–1.2)
BILIRUB SERPL-MCNC: 2.9 MG/DL — HIGH (ref 0.2–1.2)
BILIRUB SERPL-MCNC: 3 MG/DL — HIGH (ref 0.2–1.2)
BUN SERPL-MCNC: 42 MG/DL — HIGH (ref 7–23)
BUN SERPL-MCNC: 45 MG/DL — HIGH (ref 7–23)
BUN SERPL-MCNC: 49 MG/DL — HIGH (ref 7–23)
BUN SERPL-MCNC: 50 MG/DL — HIGH (ref 7–23)
CALCIUM SERPL-MCNC: 9.2 MG/DL — SIGNIFICANT CHANGE UP (ref 8.4–10.5)
CALCIUM SERPL-MCNC: 9.4 MG/DL — SIGNIFICANT CHANGE UP (ref 8.4–10.5)
CALCIUM SERPL-MCNC: 9.5 MG/DL — SIGNIFICANT CHANGE UP (ref 8.4–10.5)
CALCIUM SERPL-MCNC: 9.6 MG/DL — SIGNIFICANT CHANGE UP (ref 8.4–10.5)
CHLORIDE SERPL-SCNC: 103 MMOL/L — SIGNIFICANT CHANGE UP (ref 96–108)
CHLORIDE SERPL-SCNC: 103 MMOL/L — SIGNIFICANT CHANGE UP (ref 96–108)
CHLORIDE SERPL-SCNC: 98 MMOL/L — SIGNIFICANT CHANGE UP (ref 96–108)
CHLORIDE SERPL-SCNC: 98 MMOL/L — SIGNIFICANT CHANGE UP (ref 96–108)
CK MB CFR SERPL CALC: 1.1 NG/ML — SIGNIFICANT CHANGE UP (ref 0–3.8)
CO2 SERPL-SCNC: 20 MMOL/L — LOW (ref 22–31)
CO2 SERPL-SCNC: 20 MMOL/L — LOW (ref 22–31)
CO2 SERPL-SCNC: 21 MMOL/L — LOW (ref 22–31)
CO2 SERPL-SCNC: 21 MMOL/L — LOW (ref 22–31)
CREAT SERPL-MCNC: 1.34 MG/DL — HIGH (ref 0.5–1.3)
CREAT SERPL-MCNC: 1.4 MG/DL — HIGH (ref 0.5–1.3)
CREAT SERPL-MCNC: 1.42 MG/DL — HIGH (ref 0.5–1.3)
CREAT SERPL-MCNC: 1.42 MG/DL — HIGH (ref 0.5–1.3)
EGFR: 46 ML/MIN/1.73M2 — LOW
EGFR: 47 ML/MIN/1.73M2 — LOW
EGFR: 47 ML/MIN/1.73M2 — LOW
EGFR: 50 ML/MIN/1.73M2 — LOW
EGFR: 50 ML/MIN/1.73M2 — LOW
GAS PNL BLDV: SIGNIFICANT CHANGE UP
GLUCOSE BLDC GLUCOMTR-MCNC: 117 MG/DL — HIGH (ref 70–99)
GLUCOSE SERPL-MCNC: 112 MG/DL — HIGH (ref 70–99)
GLUCOSE SERPL-MCNC: 117 MG/DL — HIGH (ref 70–99)
GLUCOSE SERPL-MCNC: 144 MG/DL — HIGH (ref 70–99)
GLUCOSE SERPL-MCNC: 171 MG/DL — HIGH (ref 70–99)
HCT VFR BLD CALC: 27.8 % — LOW (ref 34.5–45)
HCT VFR BLD CALC: 27.9 % — LOW (ref 34.5–45)
HCT VFR BLD CALC: 30.2 % — LOW (ref 34.5–45)
HCT VFR BLD CALC: 31 % — LOW (ref 34.5–45)
HGB BLD-MCNC: 10.5 G/DL — LOW (ref 11.5–15.5)
HGB BLD-MCNC: 10.6 G/DL — LOW (ref 11.5–15.5)
HGB BLD-MCNC: 9.3 G/DL — LOW (ref 11.5–15.5)
HGB BLD-MCNC: 9.8 G/DL — LOW (ref 11.5–15.5)
IMMATURE PLATELET FRACTION #: 1.1 K/UL — LOW (ref 4.7–11.1)
IMMATURE PLATELET FRACTION #: 1.3 K/UL — LOW (ref 4.7–11.1)
IMMATURE PLATELET FRACTION #: 1.6 K/UL — LOW (ref 4.7–11.1)
IMMATURE PLATELET FRACTION #: 1.8 K/UL — LOW (ref 4.7–11.1)
IMMATURE PLATELET FRACTION %: 2.6 % — SIGNIFICANT CHANGE UP (ref 1.6–4.9)
IMMATURE PLATELET FRACTION %: 3 % — SIGNIFICANT CHANGE UP (ref 1.6–4.9)
IMMATURE PLATELET FRACTION %: 3.6 % — SIGNIFICANT CHANGE UP (ref 1.6–4.9)
IMMATURE PLATELET FRACTION %: 4 % — SIGNIFICANT CHANGE UP (ref 1.6–4.9)
INR BLD: 1.04 RATIO — SIGNIFICANT CHANGE UP (ref 0.85–1.16)
INR BLD: 1.04 RATIO — SIGNIFICANT CHANGE UP (ref 0.85–1.16)
INR BLD: 1.08 RATIO — SIGNIFICANT CHANGE UP (ref 0.85–1.16)
INR BLD: 1.08 RATIO — SIGNIFICANT CHANGE UP (ref 0.85–1.16)
MAGNESIUM SERPL-MCNC: 2 MG/DL — SIGNIFICANT CHANGE UP (ref 1.6–2.6)
MAGNESIUM SERPL-MCNC: 2.1 MG/DL — SIGNIFICANT CHANGE UP (ref 1.6–2.6)
MAGNESIUM SERPL-MCNC: 2.1 MG/DL — SIGNIFICANT CHANGE UP (ref 1.6–2.6)
MAGNESIUM SERPL-MCNC: 2.2 MG/DL — SIGNIFICANT CHANGE UP (ref 1.6–2.6)
MCHC RBC-ENTMCNC: 28.8 PG — SIGNIFICANT CHANGE UP (ref 27–34)
MCHC RBC-ENTMCNC: 29.5 PG — SIGNIFICANT CHANGE UP (ref 27–34)
MCHC RBC-ENTMCNC: 30.1 PG — SIGNIFICANT CHANGE UP (ref 27–34)
MCHC RBC-ENTMCNC: 30.4 PG — SIGNIFICANT CHANGE UP (ref 27–34)
MCHC RBC-ENTMCNC: 33.3 G/DL — SIGNIFICANT CHANGE UP (ref 32–36)
MCHC RBC-ENTMCNC: 33.9 G/DL — SIGNIFICANT CHANGE UP (ref 32–36)
MCHC RBC-ENTMCNC: 35.1 G/DL — SIGNIFICANT CHANGE UP (ref 32–36)
MCHC RBC-ENTMCNC: 35.3 G/DL — SIGNIFICANT CHANGE UP (ref 32–36)
MCV RBC AUTO: 85.3 FL — SIGNIFICANT CHANGE UP (ref 80–100)
MCV RBC AUTO: 86.4 FL — SIGNIFICANT CHANGE UP (ref 80–100)
MCV RBC AUTO: 86.5 FL — SIGNIFICANT CHANGE UP (ref 80–100)
MCV RBC AUTO: 87.1 FL — SIGNIFICANT CHANGE UP (ref 80–100)
NRBC # BLD AUTO: 0 K/UL — SIGNIFICANT CHANGE UP (ref 0–0)
NRBC # BLD AUTO: 0.02 K/UL — HIGH (ref 0–0)
NRBC # FLD: 0 K/UL — SIGNIFICANT CHANGE UP (ref 0–0)
NRBC # FLD: 0.02 K/UL — HIGH (ref 0–0)
NRBC BLD AUTO-RTO: 0 /100 WBCS — SIGNIFICANT CHANGE UP (ref 0–0)
PHOSPHATE SERPL-MCNC: 3.5 MG/DL — SIGNIFICANT CHANGE UP (ref 2.5–4.5)
PHOSPHATE SERPL-MCNC: 3.5 MG/DL — SIGNIFICANT CHANGE UP (ref 2.5–4.5)
PHOSPHATE SERPL-MCNC: 3.6 MG/DL — SIGNIFICANT CHANGE UP (ref 2.5–4.5)
PHOSPHATE SERPL-MCNC: 4.1 MG/DL — SIGNIFICANT CHANGE UP (ref 2.5–4.5)
PLATELET # BLD AUTO: 40 K/UL — LOW (ref 150–400)
PLATELET # BLD AUTO: 43 K/UL — LOW (ref 150–400)
PLATELET # BLD AUTO: 45 K/UL — LOW (ref 150–400)
PLATELET # BLD AUTO: 51 K/UL — LOW (ref 150–400)
PMV BLD: 11.1 FL — SIGNIFICANT CHANGE UP (ref 7–13)
PMV BLD: 11.3 FL — SIGNIFICANT CHANGE UP (ref 7–13)
PMV BLD: 11.4 FL — SIGNIFICANT CHANGE UP (ref 7–13)
PMV BLD: 11.5 FL — SIGNIFICANT CHANGE UP (ref 7–13)
POTASSIUM SERPL-MCNC: 3.5 MMOL/L — SIGNIFICANT CHANGE UP (ref 3.5–5.3)
POTASSIUM SERPL-MCNC: 3.6 MMOL/L — SIGNIFICANT CHANGE UP (ref 3.5–5.3)
POTASSIUM SERPL-MCNC: 3.7 MMOL/L — SIGNIFICANT CHANGE UP (ref 3.5–5.3)
POTASSIUM SERPL-MCNC: 4 MMOL/L — SIGNIFICANT CHANGE UP (ref 3.5–5.3)
POTASSIUM SERPL-SCNC: 3.5 MMOL/L — SIGNIFICANT CHANGE UP (ref 3.5–5.3)
POTASSIUM SERPL-SCNC: 3.6 MMOL/L — SIGNIFICANT CHANGE UP (ref 3.5–5.3)
POTASSIUM SERPL-SCNC: 3.7 MMOL/L — SIGNIFICANT CHANGE UP (ref 3.5–5.3)
POTASSIUM SERPL-SCNC: 4 MMOL/L — SIGNIFICANT CHANGE UP (ref 3.5–5.3)
PROT SERPL-MCNC: 5.7 G/DL — LOW (ref 6–8.3)
PROT SERPL-MCNC: 5.8 G/DL — LOW (ref 6–8.3)
PROT SERPL-MCNC: 5.9 G/DL — LOW (ref 6–8.3)
PROT SERPL-MCNC: 5.9 G/DL — LOW (ref 6–8.3)
PROTHROM AB SERPL-ACNC: 12 SEC — SIGNIFICANT CHANGE UP (ref 9.9–13.4)
PROTHROM AB SERPL-ACNC: 12 SEC — SIGNIFICANT CHANGE UP (ref 9.9–13.4)
PROTHROM AB SERPL-ACNC: 12.3 SEC — SIGNIFICANT CHANGE UP (ref 9.9–13.4)
PROTHROM AB SERPL-ACNC: 12.3 SEC — SIGNIFICANT CHANGE UP (ref 9.9–13.4)
RBC # BLD: 3.23 M/UL — LOW (ref 3.8–5.2)
RBC # BLD: 3.26 M/UL — LOW (ref 3.8–5.2)
RBC # BLD: 3.49 M/UL — LOW (ref 3.8–5.2)
RBC # BLD: 3.56 M/UL — LOW (ref 3.8–5.2)
RBC # FLD: 16.1 % — HIGH (ref 10.3–14.5)
RBC # FLD: 16.2 % — HIGH (ref 10.3–14.5)
RBC # FLD: 16.5 % — HIGH (ref 10.3–14.5)
RBC # FLD: 16.8 % — HIGH (ref 10.3–14.5)
SODIUM SERPL-SCNC: 135 MMOL/L — SIGNIFICANT CHANGE UP (ref 135–145)
SODIUM SERPL-SCNC: 135 MMOL/L — SIGNIFICANT CHANGE UP (ref 135–145)
SODIUM SERPL-SCNC: 137 MMOL/L — SIGNIFICANT CHANGE UP (ref 135–145)
SODIUM SERPL-SCNC: 140 MMOL/L — SIGNIFICANT CHANGE UP (ref 135–145)
TACROLIMUS SERPL-MCNC: 2.8 NG/ML — SIGNIFICANT CHANGE UP
TROPONIN T, HIGH SENSITIVITY RESULT: 37 NG/L — SIGNIFICANT CHANGE UP (ref 0–51)
VANCOMYCIN FLD-MCNC: 9.1 UG/ML — SIGNIFICANT CHANGE UP
WBC # BLD: 4.68 K/UL — SIGNIFICANT CHANGE UP (ref 3.8–10.5)
WBC # BLD: 4.68 K/UL — SIGNIFICANT CHANGE UP (ref 3.8–10.5)
WBC # BLD: 4.81 K/UL — SIGNIFICANT CHANGE UP (ref 3.8–10.5)
WBC # BLD: 5.26 K/UL — SIGNIFICANT CHANGE UP (ref 3.8–10.5)
WBC # FLD AUTO: 4.68 K/UL — SIGNIFICANT CHANGE UP (ref 3.8–10.5)
WBC # FLD AUTO: 4.68 K/UL — SIGNIFICANT CHANGE UP (ref 3.8–10.5)
WBC # FLD AUTO: 4.81 K/UL — SIGNIFICANT CHANGE UP (ref 3.8–10.5)
WBC # FLD AUTO: 5.26 K/UL — SIGNIFICANT CHANGE UP (ref 3.8–10.5)

## 2025-08-02 PROCEDURE — 74018 RADEX ABDOMEN 1 VIEW: CPT | Mod: 26

## 2025-08-02 PROCEDURE — 71045 X-RAY EXAM CHEST 1 VIEW: CPT | Mod: 26

## 2025-08-02 PROCEDURE — 99233 SBSQ HOSP IP/OBS HIGH 50: CPT

## 2025-08-02 RX ORDER — NIFEDIPINE 30 MG
60 TABLET, EXTENDED RELEASE 24 HR ORAL EVERY 24 HOURS
Refills: 0 | Status: DISCONTINUED | OUTPATIENT
Start: 2025-08-03 | End: 2025-08-04

## 2025-08-02 RX ORDER — METHYLPREDNISOLONE ACETATE 80 MG/ML
20 INJECTION, SUSPENSION INTRA-ARTICULAR; INTRALESIONAL; INTRAMUSCULAR; SOFT TISSUE EVERY 24 HOURS
Refills: 0 | Status: COMPLETED | OUTPATIENT
Start: 2025-08-05 | End: 2025-08-05

## 2025-08-02 RX ORDER — METHYLPREDNISOLONE ACETATE 80 MG/ML
60 INJECTION, SUSPENSION INTRA-ARTICULAR; INTRALESIONAL; INTRAMUSCULAR; SOFT TISSUE EVERY 24 HOURS
Refills: 0 | Status: COMPLETED | OUTPATIENT
Start: 2025-08-03 | End: 2025-08-03

## 2025-08-02 RX ORDER — HYDROMORPHONE/SOD CHLOR,ISO/PF 2 MG/10 ML
0.5 SYRINGE (ML) INJECTION ONCE
Refills: 0 | Status: DISCONTINUED | OUTPATIENT
Start: 2025-08-02 | End: 2025-08-02

## 2025-08-02 RX ORDER — NIFEDIPINE 30 MG
60 TABLET, EXTENDED RELEASE 24 HR ORAL EVERY 24 HOURS
Refills: 0 | Status: DISCONTINUED | OUTPATIENT
Start: 2025-08-02 | End: 2025-08-02

## 2025-08-02 RX ORDER — METHYLPREDNISOLONE ACETATE 80 MG/ML
40 INJECTION, SUSPENSION INTRA-ARTICULAR; INTRALESIONAL; INTRAMUSCULAR; SOFT TISSUE EVERY 24 HOURS
Refills: 0 | Status: COMPLETED | OUTPATIENT
Start: 2025-08-04 | End: 2025-08-04

## 2025-08-02 RX ORDER — OXYCODONE HYDROCHLORIDE 30 MG/1
10 TABLET ORAL EVERY 4 HOURS
Refills: 0 | Status: DISCONTINUED | OUTPATIENT
Start: 2025-08-02 | End: 2025-08-07

## 2025-08-02 RX ORDER — NIFEDIPINE 30 MG
60 TABLET, EXTENDED RELEASE 24 HR ORAL ONCE
Refills: 0 | Status: COMPLETED | OUTPATIENT
Start: 2025-08-02 | End: 2025-08-02

## 2025-08-02 RX ORDER — VANCOMYCIN HCL IN 5 % DEXTROSE 1.5G/250ML
1000 PLASTIC BAG, INJECTION (ML) INTRAVENOUS ONCE
Refills: 0 | Status: COMPLETED | OUTPATIENT
Start: 2025-08-02 | End: 2025-08-02

## 2025-08-02 RX ORDER — OXYCODONE HYDROCHLORIDE 30 MG/1
5 TABLET ORAL EVERY 4 HOURS
Refills: 0 | Status: DISCONTINUED | OUTPATIENT
Start: 2025-08-02 | End: 2025-08-07

## 2025-08-02 RX ORDER — SIMETHICONE 80 MG
80 TABLET,CHEWABLE ORAL ONCE
Refills: 0 | Status: COMPLETED | OUTPATIENT
Start: 2025-08-02 | End: 2025-08-02

## 2025-08-02 RX ADMIN — Medication 60 MILLIGRAM(S): at 09:32

## 2025-08-02 RX ADMIN — Medication 100 MILLIEQUIVALENT(S): at 03:50

## 2025-08-02 RX ADMIN — OXYCODONE HYDROCHLORIDE 10 MILLIGRAM(S): 30 TABLET ORAL at 21:27

## 2025-08-02 RX ADMIN — Medication 100 MILLIEQUIVALENT(S): at 18:20

## 2025-08-02 RX ADMIN — MYCOPHENOLATE MOFETIL 1000 MILLIGRAM(S): 500 TABLET, FILM COATED ORAL at 07:36

## 2025-08-02 RX ADMIN — OXYCODONE HYDROCHLORIDE 10 MILLIGRAM(S): 30 TABLET ORAL at 20:57

## 2025-08-02 RX ADMIN — OXYCODONE HYDROCHLORIDE 10 MILLIGRAM(S): 30 TABLET ORAL at 09:29

## 2025-08-02 RX ADMIN — MYCOPHENOLATE MOFETIL 1000 MILLIGRAM(S): 500 TABLET, FILM COATED ORAL at 19:12

## 2025-08-02 RX ADMIN — Medication 80 MILLIGRAM(S): at 01:24

## 2025-08-02 RX ADMIN — Medication 1 TABLET(S): at 05:03

## 2025-08-02 RX ADMIN — Medication 100 MILLIEQUIVALENT(S): at 16:26

## 2025-08-02 RX ADMIN — OXYCODONE HYDROCHLORIDE 5 MILLIGRAM(S): 30 TABLET ORAL at 19:42

## 2025-08-02 RX ADMIN — Medication 100 MILLIEQUIVALENT(S): at 17:30

## 2025-08-02 RX ADMIN — Medication 250 MILLIGRAM(S): at 09:24

## 2025-08-02 RX ADMIN — Medication 0.5 MILLIGRAM(S): at 08:06

## 2025-08-02 RX ADMIN — TACROLIMUS 1 MILLIGRAM(S): 0.5 CAPSULE ORAL at 07:36

## 2025-08-02 RX ADMIN — Medication 2 TABLET(S): at 21:02

## 2025-08-02 RX ADMIN — Medication 100 MILLIEQUIVALENT(S): at 02:40

## 2025-08-02 RX ADMIN — OXYCODONE HYDROCHLORIDE 5 MILLIGRAM(S): 30 TABLET ORAL at 19:12

## 2025-08-02 RX ADMIN — Medication 1 TABLET(S): at 17:30

## 2025-08-02 RX ADMIN — OXYCODONE HYDROCHLORIDE 5 MILLIGRAM(S): 30 TABLET ORAL at 23:40

## 2025-08-02 RX ADMIN — OXYCODONE HYDROCHLORIDE 10 MILLIGRAM(S): 30 TABLET ORAL at 10:29

## 2025-08-02 RX ADMIN — METHYLPREDNISOLONE ACETATE 125 MILLIGRAM(S): 80 INJECTION, SUSPENSION INTRA-ARTICULAR; INTRALESIONAL; INTRAMUSCULAR; SOFT TISSUE at 01:04

## 2025-08-02 RX ADMIN — OXYCODONE HYDROCHLORIDE 10 MILLIGRAM(S): 30 TABLET ORAL at 15:57

## 2025-08-02 RX ADMIN — Medication 400 MILLIGRAM(S): at 12:11

## 2025-08-02 RX ADMIN — Medication 0.5 MILLIGRAM(S): at 02:38

## 2025-08-02 RX ADMIN — Medication 10 MILLIGRAM(S): at 10:35

## 2025-08-02 RX ADMIN — Medication 100 MILLIEQUIVALENT(S): at 04:55

## 2025-08-02 RX ADMIN — Medication 1 TABLET(S): at 12:11

## 2025-08-02 RX ADMIN — CARVEDILOL 3.12 MILLIGRAM(S): 3.12 TABLET, FILM COATED ORAL at 06:07

## 2025-08-02 RX ADMIN — Medication 40 MILLIGRAM(S): at 05:03

## 2025-08-02 RX ADMIN — Medication 1 APPLICATION(S): at 12:11

## 2025-08-02 RX ADMIN — VALGANCICLOVIR 450 MILLIGRAM(S): 450 TABLET, FILM COATED ORAL at 12:11

## 2025-08-02 RX ADMIN — POLYETHYLENE GLYCOL 3350 17 GRAM(S): 17 POWDER, FOR SOLUTION ORAL at 12:11

## 2025-08-02 RX ADMIN — Medication 0.5 MILLIGRAM(S): at 03:08

## 2025-08-02 RX ADMIN — OXYCODONE HYDROCHLORIDE 10 MILLIGRAM(S): 30 TABLET ORAL at 14:57

## 2025-08-02 RX ADMIN — TACROLIMUS 1 MILLIGRAM(S): 0.5 CAPSULE ORAL at 19:12

## 2025-08-02 RX ADMIN — Medication 0.5 MILLIGRAM(S): at 07:36

## 2025-08-03 LAB
ALBUMIN SERPL ELPH-MCNC: 3.4 G/DL — SIGNIFICANT CHANGE UP (ref 3.3–5)
ALBUMIN SERPL ELPH-MCNC: 3.4 G/DL — SIGNIFICANT CHANGE UP (ref 3.3–5)
ALP SERPL-CCNC: 216 U/L — HIGH (ref 40–120)
ALP SERPL-CCNC: 244 U/L — HIGH (ref 40–120)
ALT FLD-CCNC: 116 U/L — HIGH (ref 10–45)
ALT FLD-CCNC: 124 U/L — HIGH (ref 10–45)
ANION GAP SERPL CALC-SCNC: 13 MMOL/L — SIGNIFICANT CHANGE UP (ref 5–17)
ANION GAP SERPL CALC-SCNC: 15 MMOL/L — SIGNIFICANT CHANGE UP (ref 5–17)
APTT BLD: 21.9 SEC — LOW (ref 26.1–36.8)
APTT BLD: 21.9 SEC — LOW (ref 26.1–36.8)
AST SERPL-CCNC: 53 U/L — HIGH (ref 10–40)
AST SERPL-CCNC: 60 U/L — HIGH (ref 10–40)
BILIRUB SERPL-MCNC: 2.7 MG/DL — HIGH (ref 0.2–1.2)
BILIRUB SERPL-MCNC: 3.2 MG/DL — HIGH (ref 0.2–1.2)
BUN SERPL-MCNC: 40 MG/DL — HIGH (ref 7–23)
BUN SERPL-MCNC: 46 MG/DL — HIGH (ref 7–23)
CALCIUM SERPL-MCNC: 9.1 MG/DL — SIGNIFICANT CHANGE UP (ref 8.4–10.5)
CALCIUM SERPL-MCNC: 9.2 MG/DL — SIGNIFICANT CHANGE UP (ref 8.4–10.5)
CHLORIDE SERPL-SCNC: 101 MMOL/L — SIGNIFICANT CHANGE UP (ref 96–108)
CHLORIDE SERPL-SCNC: 99 MMOL/L — SIGNIFICANT CHANGE UP (ref 96–108)
CK SERPL-CCNC: 92 U/L — SIGNIFICANT CHANGE UP (ref 25–170)
CO2 SERPL-SCNC: 18 MMOL/L — LOW (ref 22–31)
CO2 SERPL-SCNC: 19 MMOL/L — LOW (ref 22–31)
CREAT SERPL-MCNC: 1 MG/DL — SIGNIFICANT CHANGE UP (ref 0.5–1.3)
CREAT SERPL-MCNC: 1.17 MG/DL — SIGNIFICANT CHANGE UP (ref 0.5–1.3)
CULTURE RESULTS: SIGNIFICANT CHANGE UP
EGFR: 58 ML/MIN/1.73M2 — LOW
EGFR: 58 ML/MIN/1.73M2 — LOW
EGFR: 70 ML/MIN/1.73M2 — SIGNIFICANT CHANGE UP
EGFR: 70 ML/MIN/1.73M2 — SIGNIFICANT CHANGE UP
GLUCOSE SERPL-MCNC: 128 MG/DL — HIGH (ref 70–99)
GLUCOSE SERPL-MCNC: 146 MG/DL — HIGH (ref 70–99)
HCT VFR BLD CALC: 31 % — LOW (ref 34.5–45)
HCT VFR BLD CALC: 31.1 % — LOW (ref 34.5–45)
HGB BLD-MCNC: 10.5 G/DL — LOW (ref 11.5–15.5)
HGB BLD-MCNC: 10.7 G/DL — LOW (ref 11.5–15.5)
IMMATURE PLATELET FRACTION #: 1.1 K/UL — LOW (ref 4.7–11.1)
IMMATURE PLATELET FRACTION #: 1.3 K/UL — LOW (ref 4.7–11.1)
IMMATURE PLATELET FRACTION %: 1.9 % — SIGNIFICANT CHANGE UP (ref 1.6–4.9)
IMMATURE PLATELET FRACTION %: 2.5 % — SIGNIFICANT CHANGE UP (ref 1.6–4.9)
INR BLD: 1.07 RATIO — SIGNIFICANT CHANGE UP (ref 0.85–1.16)
INR BLD: 1.1 RATIO — SIGNIFICANT CHANGE UP (ref 0.85–1.16)
MAGNESIUM SERPL-MCNC: 1.9 MG/DL — SIGNIFICANT CHANGE UP (ref 1.6–2.6)
MAGNESIUM SERPL-MCNC: 2 MG/DL — SIGNIFICANT CHANGE UP (ref 1.6–2.6)
MCHC RBC-ENTMCNC: 29.9 PG — SIGNIFICANT CHANGE UP (ref 27–34)
MCHC RBC-ENTMCNC: 30.1 PG — SIGNIFICANT CHANGE UP (ref 27–34)
MCHC RBC-ENTMCNC: 33.9 G/DL — SIGNIFICANT CHANGE UP (ref 32–36)
MCHC RBC-ENTMCNC: 34.4 G/DL — SIGNIFICANT CHANGE UP (ref 32–36)
MCV RBC AUTO: 87.6 FL — SIGNIFICANT CHANGE UP (ref 80–100)
MCV RBC AUTO: 88.3 FL — SIGNIFICANT CHANGE UP (ref 80–100)
MRSA PCR RESULT.: SIGNIFICANT CHANGE UP
NRBC # BLD AUTO: 0 K/UL — SIGNIFICANT CHANGE UP (ref 0–0)
NRBC # BLD AUTO: 0 K/UL — SIGNIFICANT CHANGE UP (ref 0–0)
NRBC # FLD: 0 K/UL — SIGNIFICANT CHANGE UP (ref 0–0)
NRBC # FLD: 0 K/UL — SIGNIFICANT CHANGE UP (ref 0–0)
NRBC BLD AUTO-RTO: 0 /100 WBCS — SIGNIFICANT CHANGE UP (ref 0–0)
NRBC BLD AUTO-RTO: 0 /100 WBCS — SIGNIFICANT CHANGE UP (ref 0–0)
PHOSPHATE SERPL-MCNC: 2.9 MG/DL — SIGNIFICANT CHANGE UP (ref 2.5–4.5)
PHOSPHATE SERPL-MCNC: 3.6 MG/DL — SIGNIFICANT CHANGE UP (ref 2.5–4.5)
PLATELET # BLD AUTO: 52 K/UL — LOW (ref 150–400)
PLATELET # BLD AUTO: 59 K/UL — LOW (ref 150–400)
PMV BLD: 10 FL — SIGNIFICANT CHANGE UP (ref 7–13)
PMV BLD: 11.2 FL — SIGNIFICANT CHANGE UP (ref 7–13)
POTASSIUM SERPL-MCNC: 3.7 MMOL/L — SIGNIFICANT CHANGE UP (ref 3.5–5.3)
POTASSIUM SERPL-MCNC: 3.8 MMOL/L — SIGNIFICANT CHANGE UP (ref 3.5–5.3)
POTASSIUM SERPL-SCNC: 3.7 MMOL/L — SIGNIFICANT CHANGE UP (ref 3.5–5.3)
POTASSIUM SERPL-SCNC: 3.8 MMOL/L — SIGNIFICANT CHANGE UP (ref 3.5–5.3)
PROT SERPL-MCNC: 5.4 G/DL — LOW (ref 6–8.3)
PROT SERPL-MCNC: 5.6 G/DL — LOW (ref 6–8.3)
PROTHROM AB SERPL-ACNC: 12.2 SEC — SIGNIFICANT CHANGE UP (ref 9.9–13.4)
PROTHROM AB SERPL-ACNC: 12.6 SEC — SIGNIFICANT CHANGE UP (ref 9.9–13.4)
RBC # BLD: 3.51 M/UL — LOW (ref 3.8–5.2)
RBC # BLD: 3.55 M/UL — LOW (ref 3.8–5.2)
RBC # FLD: 16.7 % — HIGH (ref 10.3–14.5)
RBC # FLD: 17.2 % — HIGH (ref 10.3–14.5)
S AUREUS DNA NOSE QL NAA+PROBE: SIGNIFICANT CHANGE UP
SODIUM SERPL-SCNC: 131 MMOL/L — LOW (ref 135–145)
SODIUM SERPL-SCNC: 134 MMOL/L — LOW (ref 135–145)
SPECIMEN SOURCE: SIGNIFICANT CHANGE UP
TACROLIMUS SERPL-MCNC: 4 NG/ML — SIGNIFICANT CHANGE UP
VANCOMYCIN FLD-MCNC: 11 UG/ML — SIGNIFICANT CHANGE UP
WBC # BLD: 5 K/UL — SIGNIFICANT CHANGE UP (ref 3.8–10.5)
WBC # BLD: 6.08 K/UL — SIGNIFICANT CHANGE UP (ref 3.8–10.5)
WBC # FLD AUTO: 5 K/UL — SIGNIFICANT CHANGE UP (ref 3.8–10.5)
WBC # FLD AUTO: 6.08 K/UL — SIGNIFICANT CHANGE UP (ref 3.8–10.5)

## 2025-08-03 PROCEDURE — 99232 SBSQ HOSP IP/OBS MODERATE 35: CPT

## 2025-08-03 PROCEDURE — 71045 X-RAY EXAM CHEST 1 VIEW: CPT | Mod: 26

## 2025-08-03 PROCEDURE — 70450 CT HEAD/BRAIN W/O DYE: CPT | Mod: 26

## 2025-08-03 RX ORDER — HEPARIN SODIUM 1000 [USP'U]/ML
5000 INJECTION INTRAVENOUS; SUBCUTANEOUS EVERY 12 HOURS
Refills: 0 | Status: DISCONTINUED | OUTPATIENT
Start: 2025-08-03 | End: 2025-08-13

## 2025-08-03 RX ORDER — POTASSIUM PHOSPHATE, MONOBASIC POTASSIUM PHOSPHATE, DIBASIC INJECTION, 236; 224 MG/ML; MG/ML
15 SOLUTION, CONCENTRATE INTRAVENOUS ONCE
Refills: 0 | Status: COMPLETED | OUTPATIENT
Start: 2025-08-03 | End: 2025-08-04

## 2025-08-03 RX ORDER — MAGNESIUM SULFATE 500 MG/ML
2 SYRINGE (ML) INJECTION ONCE
Refills: 0 | Status: COMPLETED | OUTPATIENT
Start: 2025-08-03 | End: 2025-08-03

## 2025-08-03 RX ORDER — LEVETIRACETAM 10 MG/ML
1000 INJECTION, SOLUTION INTRAVENOUS ONCE
Refills: 0 | Status: COMPLETED | OUTPATIENT
Start: 2025-08-03 | End: 2025-08-03

## 2025-08-03 RX ORDER — URSODIOL 300 MG/1
300 CAPSULE ORAL EVERY 12 HOURS
Refills: 0 | Status: DISCONTINUED | OUTPATIENT
Start: 2025-08-03 | End: 2025-08-09

## 2025-08-03 RX ORDER — LORAZEPAM 4 MG/ML
2 VIAL (ML) INJECTION ONCE
Refills: 0 | Status: DISCONTINUED | OUTPATIENT
Start: 2025-08-03 | End: 2025-08-03

## 2025-08-03 RX ORDER — VANCOMYCIN HCL IN 5 % DEXTROSE 1.5G/250ML
1000 PLASTIC BAG, INJECTION (ML) INTRAVENOUS ONCE
Refills: 0 | Status: COMPLETED | OUTPATIENT
Start: 2025-08-03 | End: 2025-08-03

## 2025-08-03 RX ORDER — LEVETIRACETAM 10 MG/ML
500 INJECTION, SOLUTION INTRAVENOUS EVERY 12 HOURS
Refills: 0 | Status: DISCONTINUED | OUTPATIENT
Start: 2025-08-04 | End: 2025-08-07

## 2025-08-03 RX ORDER — LEVETIRACETAM 10 MG/ML
1000 INJECTION, SOLUTION INTRAVENOUS ONCE
Refills: 0 | Status: DISCONTINUED | OUTPATIENT
Start: 2025-08-04 | End: 2025-08-04

## 2025-08-03 RX ORDER — VANCOMYCIN HCL IN 5 % DEXTROSE 1.5G/250ML
750 PLASTIC BAG, INJECTION (ML) INTRAVENOUS EVERY 12 HOURS
Refills: 0 | Status: COMPLETED | OUTPATIENT
Start: 2025-08-03 | End: 2025-08-07

## 2025-08-03 RX ADMIN — VALGANCICLOVIR 450 MILLIGRAM(S): 450 TABLET, FILM COATED ORAL at 12:33

## 2025-08-03 RX ADMIN — Medication 250 MILLIGRAM(S): at 21:17

## 2025-08-03 RX ADMIN — Medication 400 MILLIGRAM(S): at 12:33

## 2025-08-03 RX ADMIN — URSODIOL 300 MILLIGRAM(S): 300 CAPSULE ORAL at 17:32

## 2025-08-03 RX ADMIN — Medication 1 TABLET(S): at 05:06

## 2025-08-03 RX ADMIN — METHYLPREDNISOLONE ACETATE 60 MILLIGRAM(S): 80 INJECTION, SUSPENSION INTRA-ARTICULAR; INTRALESIONAL; INTRAMUSCULAR; SOFT TISSUE at 00:58

## 2025-08-03 RX ADMIN — OXYCODONE HYDROCHLORIDE 10 MILLIGRAM(S): 30 TABLET ORAL at 01:00

## 2025-08-03 RX ADMIN — Medication 2 TABLET(S): at 21:14

## 2025-08-03 RX ADMIN — MYCOPHENOLATE MOFETIL 1000 MILLIGRAM(S): 500 TABLET, FILM COATED ORAL at 08:01

## 2025-08-03 RX ADMIN — Medication 250 MILLIGRAM(S): at 10:30

## 2025-08-03 RX ADMIN — OXYCODONE HYDROCHLORIDE 5 MILLIGRAM(S): 30 TABLET ORAL at 00:10

## 2025-08-03 RX ADMIN — MYCOPHENOLATE MOFETIL 1000 MILLIGRAM(S): 500 TABLET, FILM COATED ORAL at 21:15

## 2025-08-03 RX ADMIN — OXYCODONE HYDROCHLORIDE 10 MILLIGRAM(S): 30 TABLET ORAL at 18:04

## 2025-08-03 RX ADMIN — OXYCODONE HYDROCHLORIDE 10 MILLIGRAM(S): 30 TABLET ORAL at 18:34

## 2025-08-03 RX ADMIN — Medication 2 MILLIGRAM(S): at 19:18

## 2025-08-03 RX ADMIN — Medication 1 APPLICATION(S): at 12:59

## 2025-08-03 RX ADMIN — Medication 1 TABLET(S): at 12:33

## 2025-08-03 RX ADMIN — OXYCODONE HYDROCHLORIDE 10 MILLIGRAM(S): 30 TABLET ORAL at 01:30

## 2025-08-03 RX ADMIN — HEPARIN SODIUM 5000 UNIT(S): 1000 INJECTION INTRAVENOUS; SUBCUTANEOUS at 21:18

## 2025-08-03 RX ADMIN — TACROLIMUS 1 MILLIGRAM(S): 0.5 CAPSULE ORAL at 08:01

## 2025-08-03 RX ADMIN — OXYCODONE HYDROCHLORIDE 10 MILLIGRAM(S): 30 TABLET ORAL at 08:01

## 2025-08-03 RX ADMIN — Medication 25 GRAM(S): at 21:18

## 2025-08-03 RX ADMIN — BASILIXIMAB 100 MILLIGRAM(S): 20 INJECTION, POWDER, FOR SOLUTION INTRAVENOUS at 09:06

## 2025-08-03 RX ADMIN — Medication 40 MILLIGRAM(S): at 05:06

## 2025-08-03 RX ADMIN — OXYCODONE HYDROCHLORIDE 10 MILLIGRAM(S): 30 TABLET ORAL at 08:31

## 2025-08-03 RX ADMIN — Medication 60 MILLIGRAM(S): at 10:29

## 2025-08-03 RX ADMIN — LEVETIRACETAM 400 MILLIGRAM(S): 10 INJECTION, SOLUTION INTRAVENOUS at 19:17

## 2025-08-03 RX ADMIN — Medication 1 TABLET(S): at 17:32

## 2025-08-03 RX ADMIN — POLYETHYLENE GLYCOL 3350 17 GRAM(S): 17 POWDER, FOR SOLUTION ORAL at 12:55

## 2025-08-04 LAB
ALBUMIN SERPL ELPH-MCNC: 3.5 G/DL — SIGNIFICANT CHANGE UP (ref 3.3–5)
ALP SERPL-CCNC: 308 U/L — HIGH (ref 40–120)
ALT FLD-CCNC: 130 U/L — HIGH (ref 10–45)
ANION GAP SERPL CALC-SCNC: 13 MMOL/L — SIGNIFICANT CHANGE UP (ref 5–17)
APTT BLD: 21.7 SEC — LOW (ref 26.1–36.8)
AST SERPL-CCNC: 68 U/L — HIGH (ref 10–40)
BILIRUB SERPL-MCNC: 3.4 MG/DL — HIGH (ref 0.2–1.2)
BUN SERPL-MCNC: 34 MG/DL — HIGH (ref 7–23)
CALCIUM SERPL-MCNC: 9.5 MG/DL — SIGNIFICANT CHANGE UP (ref 8.4–10.5)
CHLORIDE SERPL-SCNC: 102 MMOL/L — SIGNIFICANT CHANGE UP (ref 96–108)
CO2 SERPL-SCNC: 21 MMOL/L — LOW (ref 22–31)
CREAT SERPL-MCNC: 0.95 MG/DL — SIGNIFICANT CHANGE UP (ref 0.5–1.3)
CULTURE RESULTS: SIGNIFICANT CHANGE UP
EGFR: 75 ML/MIN/1.73M2 — SIGNIFICANT CHANGE UP
EGFR: 75 ML/MIN/1.73M2 — SIGNIFICANT CHANGE UP
GLUCOSE SERPL-MCNC: 92 MG/DL — SIGNIFICANT CHANGE UP (ref 70–99)
HCT VFR BLD CALC: 32.6 % — LOW (ref 34.5–45)
HGB BLD-MCNC: 11.1 G/DL — LOW (ref 11.5–15.5)
IMMATURE PLATELET FRACTION #: 1.6 K/UL — LOW (ref 4.7–11.1)
IMMATURE PLATELET FRACTION %: 3 % — SIGNIFICANT CHANGE UP (ref 1.6–4.9)
INR BLD: 1.04 RATIO — SIGNIFICANT CHANGE UP (ref 0.85–1.16)
MAGNESIUM SERPL-MCNC: 2.2 MG/DL — SIGNIFICANT CHANGE UP (ref 1.6–2.6)
MCHC RBC-ENTMCNC: 30.4 PG — SIGNIFICANT CHANGE UP (ref 27–34)
MCHC RBC-ENTMCNC: 34 G/DL — SIGNIFICANT CHANGE UP (ref 32–36)
MCV RBC AUTO: 89.3 FL — SIGNIFICANT CHANGE UP (ref 80–100)
NRBC # BLD AUTO: 0 K/UL — SIGNIFICANT CHANGE UP (ref 0–0)
NRBC # FLD: 0 K/UL — SIGNIFICANT CHANGE UP (ref 0–0)
NRBC BLD AUTO-RTO: 0 /100 WBCS — SIGNIFICANT CHANGE UP (ref 0–0)
PHOSPHATE SERPL-MCNC: 4.1 MG/DL — SIGNIFICANT CHANGE UP (ref 2.5–4.5)
PLATELET # BLD AUTO: 52 K/UL — LOW (ref 150–400)
PMV BLD: 9.9 FL — SIGNIFICANT CHANGE UP (ref 7–13)
POTASSIUM SERPL-MCNC: 4.2 MMOL/L — SIGNIFICANT CHANGE UP (ref 3.5–5.3)
POTASSIUM SERPL-SCNC: 4.2 MMOL/L — SIGNIFICANT CHANGE UP (ref 3.5–5.3)
PROT SERPL-MCNC: 5.5 G/DL — LOW (ref 6–8.3)
PROTHROM AB SERPL-ACNC: 11.9 SEC — SIGNIFICANT CHANGE UP (ref 9.9–13.4)
RBC # BLD: 3.65 M/UL — LOW (ref 3.8–5.2)
RBC # FLD: 17.1 % — HIGH (ref 10.3–14.5)
SODIUM SERPL-SCNC: 136 MMOL/L — SIGNIFICANT CHANGE UP (ref 135–145)
SPECIMEN SOURCE: SIGNIFICANT CHANGE UP
TACROLIMUS SERPL-MCNC: 3 NG/ML — SIGNIFICANT CHANGE UP
VANCOMYCIN TROUGH SERPL-MCNC: 16.6 UG/ML — SIGNIFICANT CHANGE UP (ref 10–20)
WBC # BLD: 6.06 K/UL — SIGNIFICANT CHANGE UP (ref 3.8–10.5)
WBC # FLD AUTO: 6.06 K/UL — SIGNIFICANT CHANGE UP (ref 3.8–10.5)

## 2025-08-04 PROCEDURE — 93308 TTE F-UP OR LMTD: CPT

## 2025-08-04 PROCEDURE — 73200 CT UPPER EXTREMITY W/O DYE: CPT

## 2025-08-04 PROCEDURE — 82140 ASSAY OF AMMONIA: CPT

## 2025-08-04 PROCEDURE — 83550 IRON BINDING TEST: CPT

## 2025-08-04 PROCEDURE — 86769 SARS-COV-2 COVID-19 ANTIBODY: CPT

## 2025-08-04 PROCEDURE — 86146 BETA-2 GLYCOPROTEIN ANTIBODY: CPT

## 2025-08-04 PROCEDURE — 87640 STAPH A DNA AMP PROBE: CPT

## 2025-08-04 PROCEDURE — 84145 PROCALCITONIN (PCT): CPT

## 2025-08-04 PROCEDURE — 74018 RADEX ABDOMEN 1 VIEW: CPT

## 2025-08-04 PROCEDURE — 86709 HEPATITIS A IGM ANTIBODY: CPT

## 2025-08-04 PROCEDURE — 83935 ASSAY OF URINE OSMOLALITY: CPT

## 2025-08-04 PROCEDURE — 85730 THROMBOPLASTIN TIME PARTIAL: CPT

## 2025-08-04 PROCEDURE — 36415 COLL VENOUS BLD VENIPUNCTURE: CPT

## 2025-08-04 PROCEDURE — P9059: CPT

## 2025-08-04 PROCEDURE — 86481 TB AG RESPONSE T-CELL SUSP: CPT

## 2025-08-04 PROCEDURE — P9016: CPT

## 2025-08-04 PROCEDURE — 86147 CARDIOLIPIN ANTIBODY EA IG: CPT

## 2025-08-04 PROCEDURE — 97116 GAIT TRAINING THERAPY: CPT

## 2025-08-04 PROCEDURE — 86225 DNA ANTIBODY NATIVE: CPT

## 2025-08-04 PROCEDURE — 86664 EPSTEIN-BARR NUCLEAR ANTIGEN: CPT

## 2025-08-04 PROCEDURE — 87522 HEPATITIS C REVRS TRNSCRPJ: CPT

## 2025-08-04 PROCEDURE — 86255 FLUORESCENT ANTIBODY SCREEN: CPT

## 2025-08-04 PROCEDURE — 85732 THROMBOPLASTIN TIME PARTIAL: CPT

## 2025-08-04 PROCEDURE — 87150 DNA/RNA AMPLIFIED PROBE: CPT

## 2025-08-04 PROCEDURE — 85014 HEMATOCRIT: CPT

## 2025-08-04 PROCEDURE — 86704 HEP B CORE ANTIBODY TOTAL: CPT

## 2025-08-04 PROCEDURE — 80202 ASSAY OF VANCOMYCIN: CPT

## 2025-08-04 PROCEDURE — 86305 HUMAN EPIDIDYMIS PROTEIN 4: CPT

## 2025-08-04 PROCEDURE — 92612 ENDOSCOPY SWALLOW (FEES) VID: CPT

## 2025-08-04 PROCEDURE — 83605 ASSAY OF LACTIC ACID: CPT

## 2025-08-04 PROCEDURE — 87637 SARSCOV2&INF A&B&RSV AMP PRB: CPT

## 2025-08-04 PROCEDURE — 86644 CMV ANTIBODY: CPT

## 2025-08-04 PROCEDURE — 84132 ASSAY OF SERUM POTASSIUM: CPT

## 2025-08-04 PROCEDURE — 87040 BLOOD CULTURE FOR BACTERIA: CPT

## 2025-08-04 PROCEDURE — 86708 HEPATITIS A ANTIBODY: CPT

## 2025-08-04 PROCEDURE — 92526 ORAL FUNCTION THERAPY: CPT

## 2025-08-04 PROCEDURE — 93320 DOPPLER ECHO COMPLETE: CPT

## 2025-08-04 PROCEDURE — 86663 EPSTEIN-BARR ANTIBODY: CPT

## 2025-08-04 PROCEDURE — 80307 DRUG TEST PRSMV CHEM ANLYZR: CPT

## 2025-08-04 PROCEDURE — 76856 US EXAM PELVIC COMPLETE: CPT

## 2025-08-04 PROCEDURE — 97530 THERAPEUTIC ACTIVITIES: CPT

## 2025-08-04 PROCEDURE — 86765 RUBEOLA ANTIBODY: CPT

## 2025-08-04 PROCEDURE — 80197 ASSAY OF TACROLIMUS: CPT

## 2025-08-04 PROCEDURE — 80053 COMPREHEN METABOLIC PANEL: CPT

## 2025-08-04 PROCEDURE — 93975 VASCULAR STUDY: CPT | Mod: 26

## 2025-08-04 PROCEDURE — 84156 ASSAY OF PROTEIN URINE: CPT

## 2025-08-04 PROCEDURE — 99232 SBSQ HOSP IP/OBS MODERATE 35: CPT

## 2025-08-04 PROCEDURE — 86381 MITOCHONDRIAL ANTIBODY EACH: CPT

## 2025-08-04 PROCEDURE — 86803 HEPATITIS C AB TEST: CPT

## 2025-08-04 PROCEDURE — 74177 CT ABD & PELVIS W/CONTRAST: CPT

## 2025-08-04 PROCEDURE — 84443 ASSAY THYROID STIM HORMONE: CPT

## 2025-08-04 PROCEDURE — 71045 X-RAY EXAM CHEST 1 VIEW: CPT | Mod: 26

## 2025-08-04 PROCEDURE — 73080 X-RAY EXAM OF ELBOW: CPT

## 2025-08-04 PROCEDURE — 86850 RBC ANTIBODY SCREEN: CPT

## 2025-08-04 PROCEDURE — 84295 ASSAY OF SERUM SODIUM: CPT

## 2025-08-04 PROCEDURE — 87624 HPV HI-RISK TYP POOLED RSLT: CPT

## 2025-08-04 PROCEDURE — 93306 TTE W/DOPPLER COMPLETE: CPT

## 2025-08-04 PROCEDURE — 86706 HEP B SURFACE ANTIBODY: CPT

## 2025-08-04 PROCEDURE — 94799 UNLISTED PULMONARY SVC/PX: CPT

## 2025-08-04 PROCEDURE — A9585: CPT

## 2025-08-04 PROCEDURE — 85396 CLOTTING ASSAY WHOLE BLOOD: CPT

## 2025-08-04 PROCEDURE — 71045 X-RAY EXAM CHEST 1 VIEW: CPT

## 2025-08-04 PROCEDURE — 84550 ASSAY OF BLOOD/URIC ACID: CPT

## 2025-08-04 PROCEDURE — 84481 FREE ASSAY (FT-3): CPT

## 2025-08-04 PROCEDURE — 85652 RBC SED RATE AUTOMATED: CPT

## 2025-08-04 PROCEDURE — 86777 TOXOPLASMA ANTIBODY: CPT

## 2025-08-04 PROCEDURE — 86038 ANTINUCLEAR ANTIBODIES: CPT

## 2025-08-04 PROCEDURE — 83735 ASSAY OF MAGNESIUM: CPT

## 2025-08-04 PROCEDURE — 94002 VENT MGMT INPAT INIT DAY: CPT

## 2025-08-04 PROCEDURE — P9040: CPT

## 2025-08-04 PROCEDURE — 93975 VASCULAR STUDY: CPT

## 2025-08-04 PROCEDURE — 82803 BLOOD GASES ANY COMBINATION: CPT

## 2025-08-04 PROCEDURE — P9012: CPT

## 2025-08-04 PROCEDURE — 99232 SBSQ HOSP IP/OBS MODERATE 35: CPT | Mod: GC,24

## 2025-08-04 PROCEDURE — 36430 TRANSFUSION BLD/BLD COMPNT: CPT

## 2025-08-04 PROCEDURE — 87205 SMEAR GRAM STAIN: CPT

## 2025-08-04 PROCEDURE — 82247 BILIRUBIN TOTAL: CPT

## 2025-08-04 PROCEDURE — 82570 ASSAY OF URINE CREATININE: CPT

## 2025-08-04 PROCEDURE — P9011: CPT

## 2025-08-04 PROCEDURE — P9037: CPT

## 2025-08-04 PROCEDURE — P9045: CPT

## 2025-08-04 PROCEDURE — 86480 TB TEST CELL IMMUN MEASURE: CPT

## 2025-08-04 PROCEDURE — 86682 HELMINTH ANTIBODY: CPT

## 2025-08-04 PROCEDURE — P9100: CPT

## 2025-08-04 PROCEDURE — 76377 3D RENDER W/INTRP POSTPROCES: CPT

## 2025-08-04 PROCEDURE — 87521 HEPATITIS C PROBE&RVRS TRNSC: CPT

## 2025-08-04 PROCEDURE — 86696 HERPES SIMPLEX TYPE 2 TEST: CPT

## 2025-08-04 PROCEDURE — 97165 OT EVAL LOW COMPLEX 30 MIN: CPT

## 2025-08-04 PROCEDURE — 92610 EVALUATE SWALLOWING FUNCTION: CPT

## 2025-08-04 PROCEDURE — 93970 EXTREMITY STUDY: CPT | Mod: 26

## 2025-08-04 PROCEDURE — 85027 COMPLETE CBC AUTOMATED: CPT

## 2025-08-04 PROCEDURE — 82553 CREATINE MB FRACTION: CPT

## 2025-08-04 PROCEDURE — 93971 EXTREMITY STUDY: CPT

## 2025-08-04 PROCEDURE — 36600 WITHDRAWAL OF ARTERIAL BLOOD: CPT

## 2025-08-04 PROCEDURE — 85018 HEMOGLOBIN: CPT

## 2025-08-04 PROCEDURE — 97112 NEUROMUSCULAR REEDUCATION: CPT

## 2025-08-04 PROCEDURE — 84484 ASSAY OF TROPONIN QUANT: CPT

## 2025-08-04 PROCEDURE — 81025 URINE PREGNANCY TEST: CPT

## 2025-08-04 PROCEDURE — 82962 GLUCOSE BLOOD TEST: CPT

## 2025-08-04 PROCEDURE — 81001 URINALYSIS AUTO W/SCOPE: CPT

## 2025-08-04 PROCEDURE — 86965 POOLING BLOOD PLATELETS: CPT

## 2025-08-04 PROCEDURE — 84100 ASSAY OF PHOSPHORUS: CPT

## 2025-08-04 PROCEDURE — 82550 ASSAY OF CK (CPK): CPT

## 2025-08-04 PROCEDURE — 93325 DOPPLER ECHO COLOR FLOW MAPG: CPT

## 2025-08-04 PROCEDURE — 85613 RUSSELL VIPER VENOM DILUTED: CPT

## 2025-08-04 PROCEDURE — 87086 URINE CULTURE/COLONY COUNT: CPT

## 2025-08-04 PROCEDURE — 85610 PROTHROMBIN TIME: CPT

## 2025-08-04 PROCEDURE — 82105 ALPHA-FETOPROTEIN SERUM: CPT

## 2025-08-04 PROCEDURE — 72197 MRI PELVIS W/O & W/DYE: CPT

## 2025-08-04 PROCEDURE — 84300 ASSAY OF URINE SODIUM: CPT

## 2025-08-04 PROCEDURE — 86762 RUBELLA ANTIBODY: CPT

## 2025-08-04 PROCEDURE — 87641 MR-STAPH DNA AMP PROBE: CPT

## 2025-08-04 PROCEDURE — 86301 IMMUNOASSAY TUMOR CA 19-9: CPT

## 2025-08-04 PROCEDURE — 99291 CRITICAL CARE FIRST HOUR: CPT

## 2025-08-04 PROCEDURE — 82565 ASSAY OF CREATININE: CPT

## 2025-08-04 PROCEDURE — 85384 FIBRINOGEN ACTIVITY: CPT

## 2025-08-04 PROCEDURE — 87340 HEPATITIS B SURFACE AG IA: CPT

## 2025-08-04 PROCEDURE — 87070 CULTURE OTHR SPECIMN AEROBIC: CPT

## 2025-08-04 PROCEDURE — 82728 ASSAY OF FERRITIN: CPT

## 2025-08-04 PROCEDURE — 76641 ULTRASOUND BREAST COMPLETE: CPT

## 2025-08-04 PROCEDURE — 86901 BLOOD TYPING SEROLOGIC RH(D): CPT

## 2025-08-04 PROCEDURE — 82784 ASSAY IGA/IGD/IGG/IGM EACH: CPT

## 2025-08-04 PROCEDURE — 87385 HISTOPLASMA CAPSUL AG IA: CPT

## 2025-08-04 PROCEDURE — 86140 C-REACTIVE PROTEIN: CPT

## 2025-08-04 PROCEDURE — 87389 HIV-1 AG W/HIV-1&-2 AB AG IA: CPT

## 2025-08-04 PROCEDURE — P9073: CPT

## 2025-08-04 PROCEDURE — 82977 ASSAY OF GGT: CPT

## 2025-08-04 PROCEDURE — 86780 TREPONEMA PALLIDUM: CPT

## 2025-08-04 PROCEDURE — 86665 EPSTEIN-BARR CAPSID VCA: CPT

## 2025-08-04 PROCEDURE — 86880 COOMBS TEST DIRECT: CPT

## 2025-08-04 PROCEDURE — 86985 SPLIT BLOOD OR PRODUCTS: CPT

## 2025-08-04 PROCEDURE — 86735 MUMPS ANTIBODY: CPT

## 2025-08-04 PROCEDURE — 93970 EXTREMITY STUDY: CPT

## 2025-08-04 PROCEDURE — 86787 VARICELLA-ZOSTER ANTIBODY: CPT

## 2025-08-04 PROCEDURE — 97161 PT EVAL LOW COMPLEX 20 MIN: CPT

## 2025-08-04 PROCEDURE — P9047: CPT

## 2025-08-04 PROCEDURE — 86923 COMPATIBILITY TEST ELECTRIC: CPT

## 2025-08-04 PROCEDURE — 80061 LIPID PANEL: CPT

## 2025-08-04 PROCEDURE — 87077 CULTURE AEROBIC IDENTIFY: CPT

## 2025-08-04 PROCEDURE — 97164 PT RE-EVAL EST PLAN CARE: CPT

## 2025-08-04 PROCEDURE — 86304 IMMUNOASSAY TUMOR CA 125: CPT

## 2025-08-04 PROCEDURE — 84702 CHORIONIC GONADOTROPIN TEST: CPT

## 2025-08-04 PROCEDURE — 82947 ASSAY GLUCOSE BLOOD QUANT: CPT

## 2025-08-04 PROCEDURE — 84133 ASSAY OF URINE POTASSIUM: CPT

## 2025-08-04 PROCEDURE — 82390 ASSAY OF CERULOPLASMIN: CPT

## 2025-08-04 PROCEDURE — 82435 ASSAY OF BLOOD CHLORIDE: CPT

## 2025-08-04 PROCEDURE — 94003 VENT MGMT INPAT SUBQ DAY: CPT

## 2025-08-04 PROCEDURE — 97535 SELF CARE MNGMENT TRAINING: CPT

## 2025-08-04 PROCEDURE — G0480: CPT

## 2025-08-04 PROCEDURE — 82378 CARCINOEMBRYONIC ANTIGEN: CPT

## 2025-08-04 PROCEDURE — 86160 COMPLEMENT ANTIGEN: CPT

## 2025-08-04 PROCEDURE — 86376 MICROSOMAL ANTIBODY EACH: CPT

## 2025-08-04 PROCEDURE — 76705 ECHO EXAM OF ABDOMEN: CPT

## 2025-08-04 PROCEDURE — 97110 THERAPEUTIC EXERCISES: CPT

## 2025-08-04 PROCEDURE — 85025 COMPLETE CBC W/AUTO DIFF WBC: CPT

## 2025-08-04 PROCEDURE — 83036 HEMOGLOBIN GLYCOSYLATED A1C: CPT

## 2025-08-04 PROCEDURE — 70450 CT HEAD/BRAIN W/O DYE: CPT

## 2025-08-04 PROCEDURE — 95720 EEG PHY/QHP EA INCR W/VEEG: CPT

## 2025-08-04 PROCEDURE — 86900 BLOOD TYPING SEROLOGIC ABO: CPT

## 2025-08-04 PROCEDURE — 86695 HERPES SIMPLEX TYPE 1 TEST: CPT

## 2025-08-04 PROCEDURE — 84540 ASSAY OF URINE/UREA-N: CPT

## 2025-08-04 PROCEDURE — 82330 ASSAY OF CALCIUM: CPT

## 2025-08-04 PROCEDURE — 83540 ASSAY OF IRON: CPT

## 2025-08-04 PROCEDURE — 87799 DETECT AGENT NOS DNA QUANT: CPT

## 2025-08-04 RX ORDER — MELATONIN 5 MG
5 TABLET ORAL AT BEDTIME
Refills: 0 | Status: DISCONTINUED | OUTPATIENT
Start: 2025-08-04 | End: 2025-08-13

## 2025-08-04 RX ORDER — HYDROMORPHONE/SOD CHLOR,ISO/PF 2 MG/10 ML
0.25 SYRINGE (ML) INJECTION ONCE
Refills: 0 | Status: DISCONTINUED | OUTPATIENT
Start: 2025-08-04 | End: 2025-08-04

## 2025-08-04 RX ORDER — ASPIRIN 325 MG
81 TABLET ORAL DAILY
Refills: 0 | Status: DISCONTINUED | OUTPATIENT
Start: 2025-08-04 | End: 2025-08-13

## 2025-08-04 RX ORDER — NIFEDIPINE 30 MG
90 TABLET, EXTENDED RELEASE 24 HR ORAL EVERY 24 HOURS
Refills: 0 | Status: DISCONTINUED | OUTPATIENT
Start: 2025-08-04 | End: 2025-08-13

## 2025-08-04 RX ORDER — PREDNISONE 20 MG/1
20 TABLET ORAL DAILY
Refills: 0 | Status: DISCONTINUED | OUTPATIENT
Start: 2025-08-06 | End: 2025-08-13

## 2025-08-04 RX ADMIN — Medication 1 TABLET(S): at 05:19

## 2025-08-04 RX ADMIN — MYCOPHENOLATE MOFETIL 1000 MILLIGRAM(S): 500 TABLET, FILM COATED ORAL at 21:11

## 2025-08-04 RX ADMIN — OXYCODONE HYDROCHLORIDE 10 MILLIGRAM(S): 30 TABLET ORAL at 16:25

## 2025-08-04 RX ADMIN — POLYETHYLENE GLYCOL 3350 17 GRAM(S): 17 POWDER, FOR SOLUTION ORAL at 11:32

## 2025-08-04 RX ADMIN — LEVETIRACETAM 400 MILLIGRAM(S): 10 INJECTION, SOLUTION INTRAVENOUS at 05:19

## 2025-08-04 RX ADMIN — URSODIOL 300 MILLIGRAM(S): 300 CAPSULE ORAL at 17:07

## 2025-08-04 RX ADMIN — Medication 1 APPLICATION(S): at 11:32

## 2025-08-04 RX ADMIN — Medication 250 MILLIGRAM(S): at 10:27

## 2025-08-04 RX ADMIN — VALGANCICLOVIR 450 MILLIGRAM(S): 450 TABLET, FILM COATED ORAL at 11:33

## 2025-08-04 RX ADMIN — Medication 40 MILLIGRAM(S): at 05:19

## 2025-08-04 RX ADMIN — OXYCODONE HYDROCHLORIDE 10 MILLIGRAM(S): 30 TABLET ORAL at 15:25

## 2025-08-04 RX ADMIN — Medication 81 MILLIGRAM(S): at 11:32

## 2025-08-04 RX ADMIN — Medication 0.25 MILLIGRAM(S): at 10:27

## 2025-08-04 RX ADMIN — HEPARIN SODIUM 5000 UNIT(S): 1000 INJECTION INTRAVENOUS; SUBCUTANEOUS at 17:06

## 2025-08-04 RX ADMIN — URSODIOL 300 MILLIGRAM(S): 300 CAPSULE ORAL at 05:19

## 2025-08-04 RX ADMIN — Medication 90 MILLIGRAM(S): at 05:21

## 2025-08-04 RX ADMIN — Medication 1 TABLET(S): at 17:06

## 2025-08-04 RX ADMIN — METHYLPREDNISOLONE ACETATE 40 MILLIGRAM(S): 80 INJECTION, SUSPENSION INTRA-ARTICULAR; INTRALESIONAL; INTRAMUSCULAR; SOFT TISSUE at 00:15

## 2025-08-04 RX ADMIN — Medication 400 MILLIGRAM(S): at 11:33

## 2025-08-04 RX ADMIN — Medication 0.25 MILLIGRAM(S): at 11:00

## 2025-08-04 RX ADMIN — Medication 1 TABLET(S): at 11:33

## 2025-08-04 RX ADMIN — MYCOPHENOLATE MOFETIL 1000 MILLIGRAM(S): 500 TABLET, FILM COATED ORAL at 07:29

## 2025-08-04 RX ADMIN — HEPARIN SODIUM 5000 UNIT(S): 1000 INJECTION INTRAVENOUS; SUBCUTANEOUS at 05:20

## 2025-08-04 RX ADMIN — Medication 250 MILLIGRAM(S): at 21:11

## 2025-08-04 RX ADMIN — LEVETIRACETAM 400 MILLIGRAM(S): 10 INJECTION, SOLUTION INTRAVENOUS at 17:06

## 2025-08-04 RX ADMIN — POTASSIUM PHOSPHATE, MONOBASIC POTASSIUM PHOSPHATE, DIBASIC INJECTION, 62.5 MILLIMOLE(S): 236; 224 SOLUTION, CONCENTRATE INTRAVENOUS at 00:15

## 2025-08-04 RX ADMIN — Medication 10 MILLIGRAM(S): at 07:29

## 2025-08-04 RX ADMIN — Medication 5 MILLIGRAM(S): at 21:30

## 2025-08-05 LAB
ALBUMIN SERPL ELPH-MCNC: 3.2 G/DL — LOW (ref 3.3–5)
ALP SERPL-CCNC: 274 U/L — HIGH (ref 40–120)
ALT FLD-CCNC: 108 U/L — HIGH (ref 10–45)
AMMONIA BLD-MCNC: 12 UMOL/L — SIGNIFICANT CHANGE UP (ref 11–55)
ANION GAP SERPL CALC-SCNC: 12 MMOL/L — SIGNIFICANT CHANGE UP (ref 5–17)
APTT BLD: 22.5 SEC — LOW (ref 26.1–36.8)
AST SERPL-CCNC: 44 U/L — HIGH (ref 10–40)
BILIRUB SERPL-MCNC: 2.6 MG/DL — HIGH (ref 0.2–1.2)
BLD GP AB SCN SERPL QL: NEGATIVE — SIGNIFICANT CHANGE UP
BUN SERPL-MCNC: 26 MG/DL — HIGH (ref 7–23)
CALCIUM SERPL-MCNC: 9.2 MG/DL — SIGNIFICANT CHANGE UP (ref 8.4–10.5)
CHLORIDE SERPL-SCNC: 104 MMOL/L — SIGNIFICANT CHANGE UP (ref 96–108)
CO2 SERPL-SCNC: 18 MMOL/L — LOW (ref 22–31)
CREAT SERPL-MCNC: 0.8 MG/DL — SIGNIFICANT CHANGE UP (ref 0.5–1.3)
EGFR: 92 ML/MIN/1.73M2 — SIGNIFICANT CHANGE UP
EGFR: 92 ML/MIN/1.73M2 — SIGNIFICANT CHANGE UP
GLUCOSE SERPL-MCNC: 150 MG/DL — HIGH (ref 70–99)
HCT VFR BLD CALC: 33.7 % — LOW (ref 34.5–45)
HGB BLD-MCNC: 11.3 G/DL — LOW (ref 11.5–15.5)
IMMATURE PLATELET FRACTION #: 2.9 K/UL — LOW (ref 4.7–11.1)
IMMATURE PLATELET FRACTION %: 3.8 % — SIGNIFICANT CHANGE UP (ref 1.6–4.9)
INR BLD: 0.93 RATIO — SIGNIFICANT CHANGE UP (ref 0.85–1.16)
MAGNESIUM SERPL-MCNC: 1.7 MG/DL — SIGNIFICANT CHANGE UP (ref 1.6–2.6)
MCHC RBC-ENTMCNC: 30.4 PG — SIGNIFICANT CHANGE UP (ref 27–34)
MCHC RBC-ENTMCNC: 33.5 G/DL — SIGNIFICANT CHANGE UP (ref 32–36)
MCV RBC AUTO: 90.6 FL — SIGNIFICANT CHANGE UP (ref 80–100)
NRBC # BLD AUTO: 0 K/UL — SIGNIFICANT CHANGE UP (ref 0–0)
NRBC # FLD: 0 K/UL — SIGNIFICANT CHANGE UP (ref 0–0)
NRBC BLD AUTO-RTO: 0 /100 WBCS — SIGNIFICANT CHANGE UP (ref 0–0)
PHOSPHATE SERPL-MCNC: 3 MG/DL — SIGNIFICANT CHANGE UP (ref 2.5–4.5)
PLATELET # BLD AUTO: 77 K/UL — LOW (ref 150–400)
PMV BLD: 10.9 FL — SIGNIFICANT CHANGE UP (ref 7–13)
POTASSIUM SERPL-MCNC: 4.9 MMOL/L — SIGNIFICANT CHANGE UP (ref 3.5–5.3)
POTASSIUM SERPL-SCNC: 4.9 MMOL/L — SIGNIFICANT CHANGE UP (ref 3.5–5.3)
PROT SERPL-MCNC: 5.5 G/DL — LOW (ref 6–8.3)
PROTHROM AB SERPL-ACNC: 10.6 SEC — SIGNIFICANT CHANGE UP (ref 9.9–13.4)
RBC # BLD: 3.72 M/UL — LOW (ref 3.8–5.2)
RBC # FLD: 17.4 % — HIGH (ref 10.3–14.5)
RH IG SCN BLD-IMP: POSITIVE — SIGNIFICANT CHANGE UP
SODIUM SERPL-SCNC: 134 MMOL/L — LOW (ref 135–145)
TACROLIMUS SERPL-MCNC: 1.9 NG/ML — SIGNIFICANT CHANGE UP
VANCOMYCIN TROUGH SERPL-MCNC: 16.9 UG/ML — SIGNIFICANT CHANGE UP (ref 10–20)
WBC # BLD: 7.98 K/UL — SIGNIFICANT CHANGE UP (ref 3.8–10.5)
WBC # FLD AUTO: 7.98 K/UL — SIGNIFICANT CHANGE UP (ref 3.8–10.5)

## 2025-08-05 PROCEDURE — 99291 CRITICAL CARE FIRST HOUR: CPT

## 2025-08-05 PROCEDURE — 99233 SBSQ HOSP IP/OBS HIGH 50: CPT | Mod: GC

## 2025-08-05 PROCEDURE — 99232 SBSQ HOSP IP/OBS MODERATE 35: CPT | Mod: GC,24

## 2025-08-05 RX ORDER — CYCLOSPORINE 100 MG/1
50 CAPSULE, LIQUID FILLED ORAL
Refills: 0 | Status: DISCONTINUED | OUTPATIENT
Start: 2025-08-05 | End: 2025-08-07

## 2025-08-05 RX ORDER — CYCLOSPORINE 100 MG/1
50 CAPSULE, LIQUID FILLED ORAL ONCE
Refills: 0 | Status: COMPLETED | OUTPATIENT
Start: 2025-08-05 | End: 2025-08-05

## 2025-08-05 RX ORDER — ONDANSETRON HCL/PF 4 MG/2 ML
4 VIAL (ML) INJECTION ONCE
Refills: 0 | Status: COMPLETED | OUTPATIENT
Start: 2025-08-05 | End: 2025-08-05

## 2025-08-05 RX ORDER — MAGNESIUM SULFATE 500 MG/ML
2 SYRINGE (ML) INJECTION ONCE
Refills: 0 | Status: COMPLETED | OUTPATIENT
Start: 2025-08-05 | End: 2025-08-05

## 2025-08-05 RX ADMIN — OXYCODONE HYDROCHLORIDE 10 MILLIGRAM(S): 30 TABLET ORAL at 21:56

## 2025-08-05 RX ADMIN — CYCLOSPORINE 50 MILLIGRAM(S): 100 CAPSULE, LIQUID FILLED ORAL at 19:39

## 2025-08-05 RX ADMIN — MYCOPHENOLATE MOFETIL 1000 MILLIGRAM(S): 500 TABLET, FILM COATED ORAL at 19:39

## 2025-08-05 RX ADMIN — Medication 5 MILLIGRAM(S): at 21:58

## 2025-08-05 RX ADMIN — VALGANCICLOVIR 450 MILLIGRAM(S): 450 TABLET, FILM COATED ORAL at 11:11

## 2025-08-05 RX ADMIN — Medication 90 MILLIGRAM(S): at 06:50

## 2025-08-05 RX ADMIN — LEVETIRACETAM 400 MILLIGRAM(S): 10 INJECTION, SOLUTION INTRAVENOUS at 06:45

## 2025-08-05 RX ADMIN — Medication 1 TABLET(S): at 11:11

## 2025-08-05 RX ADMIN — OXYCODONE HYDROCHLORIDE 10 MILLIGRAM(S): 30 TABLET ORAL at 15:24

## 2025-08-05 RX ADMIN — Medication 1 TABLET(S): at 17:18

## 2025-08-05 RX ADMIN — Medication 4 MILLIGRAM(S): at 18:07

## 2025-08-05 RX ADMIN — URSODIOL 300 MILLIGRAM(S): 300 CAPSULE ORAL at 17:19

## 2025-08-05 RX ADMIN — OXYCODONE HYDROCHLORIDE 10 MILLIGRAM(S): 30 TABLET ORAL at 21:26

## 2025-08-05 RX ADMIN — LEVETIRACETAM 400 MILLIGRAM(S): 10 INJECTION, SOLUTION INTRAVENOUS at 17:18

## 2025-08-05 RX ADMIN — OXYCODONE HYDROCHLORIDE 10 MILLIGRAM(S): 30 TABLET ORAL at 11:16

## 2025-08-05 RX ADMIN — OXYCODONE HYDROCHLORIDE 10 MILLIGRAM(S): 30 TABLET ORAL at 00:08

## 2025-08-05 RX ADMIN — OXYCODONE HYDROCHLORIDE 10 MILLIGRAM(S): 30 TABLET ORAL at 00:38

## 2025-08-05 RX ADMIN — HEPARIN SODIUM 5000 UNIT(S): 1000 INJECTION INTRAVENOUS; SUBCUTANEOUS at 17:19

## 2025-08-05 RX ADMIN — OXYCODONE HYDROCHLORIDE 10 MILLIGRAM(S): 30 TABLET ORAL at 12:16

## 2025-08-05 RX ADMIN — HEPARIN SODIUM 5000 UNIT(S): 1000 INJECTION INTRAVENOUS; SUBCUTANEOUS at 06:45

## 2025-08-05 RX ADMIN — URSODIOL 300 MILLIGRAM(S): 300 CAPSULE ORAL at 06:46

## 2025-08-05 RX ADMIN — Medication 1 TABLET(S): at 06:46

## 2025-08-05 RX ADMIN — CYCLOSPORINE 50 MILLIGRAM(S): 100 CAPSULE, LIQUID FILLED ORAL at 08:20

## 2025-08-05 RX ADMIN — Medication 250 MILLIGRAM(S): at 10:27

## 2025-08-05 RX ADMIN — MYCOPHENOLATE MOFETIL 1000 MILLIGRAM(S): 500 TABLET, FILM COATED ORAL at 08:00

## 2025-08-05 RX ADMIN — Medication 81 MILLIGRAM(S): at 11:11

## 2025-08-05 RX ADMIN — Medication 25 GRAM(S): at 11:24

## 2025-08-05 RX ADMIN — Medication 1 APPLICATION(S): at 11:11

## 2025-08-05 RX ADMIN — Medication 40 MILLIGRAM(S): at 06:46

## 2025-08-05 RX ADMIN — METHYLPREDNISOLONE ACETATE 20 MILLIGRAM(S): 80 INJECTION, SUSPENSION INTRA-ARTICULAR; INTRALESIONAL; INTRAMUSCULAR; SOFT TISSUE at 01:07

## 2025-08-05 RX ADMIN — OXYCODONE HYDROCHLORIDE 10 MILLIGRAM(S): 30 TABLET ORAL at 16:24

## 2025-08-05 RX ADMIN — Medication 400 MILLIGRAM(S): at 11:12

## 2025-08-05 RX ADMIN — Medication 250 MILLIGRAM(S): at 21:58

## 2025-08-06 LAB
ALBUMIN SERPL ELPH-MCNC: 3.3 G/DL — SIGNIFICANT CHANGE UP (ref 3.3–5)
ALP SERPL-CCNC: 266 U/L — HIGH (ref 40–120)
ALT FLD-CCNC: 99 U/L — HIGH (ref 10–45)
ANION GAP SERPL CALC-SCNC: 11 MMOL/L — SIGNIFICANT CHANGE UP (ref 5–17)
APTT BLD: 23.2 SEC — LOW (ref 26.1–36.8)
AST SERPL-CCNC: 45 U/L — HIGH (ref 10–40)
BILIRUB SERPL-MCNC: 2.4 MG/DL — HIGH (ref 0.2–1.2)
BUN SERPL-MCNC: 18 MG/DL — SIGNIFICANT CHANGE UP (ref 7–23)
CALCIUM SERPL-MCNC: 9 MG/DL — SIGNIFICANT CHANGE UP (ref 8.4–10.5)
CHLORIDE SERPL-SCNC: 104 MMOL/L — SIGNIFICANT CHANGE UP (ref 96–108)
CO2 SERPL-SCNC: 20 MMOL/L — LOW (ref 22–31)
CREAT SERPL-MCNC: 0.75 MG/DL — SIGNIFICANT CHANGE UP (ref 0.5–1.3)
CYCLOSPORINE SER-MCNC: 96 NG/ML — LOW (ref 150–400)
EGFR: 99 ML/MIN/1.73M2 — SIGNIFICANT CHANGE UP
EGFR: 99 ML/MIN/1.73M2 — SIGNIFICANT CHANGE UP
GLUCOSE SERPL-MCNC: 94 MG/DL — SIGNIFICANT CHANGE UP (ref 70–99)
HCT VFR BLD CALC: 34.9 % — SIGNIFICANT CHANGE UP (ref 34.5–45)
HGB BLD-MCNC: 11.5 G/DL — SIGNIFICANT CHANGE UP (ref 11.5–15.5)
INR BLD: 0.89 RATIO — SIGNIFICANT CHANGE UP (ref 0.85–1.16)
MAGNESIUM SERPL-MCNC: 1.6 MG/DL — SIGNIFICANT CHANGE UP (ref 1.6–2.6)
MCHC RBC-ENTMCNC: 30.1 PG — SIGNIFICANT CHANGE UP (ref 27–34)
MCHC RBC-ENTMCNC: 33 G/DL — SIGNIFICANT CHANGE UP (ref 32–36)
MCV RBC AUTO: 91.4 FL — SIGNIFICANT CHANGE UP (ref 80–100)
NRBC # BLD AUTO: 0 K/UL — SIGNIFICANT CHANGE UP (ref 0–0)
NRBC # FLD: 0 K/UL — SIGNIFICANT CHANGE UP (ref 0–0)
NRBC BLD AUTO-RTO: 0 /100 WBCS — SIGNIFICANT CHANGE UP (ref 0–0)
PHOSPHATE SERPL-MCNC: 3.3 MG/DL — SIGNIFICANT CHANGE UP (ref 2.5–4.5)
PLATELET # BLD AUTO: 116 K/UL — LOW (ref 150–400)
PMV BLD: 9.9 FL — SIGNIFICANT CHANGE UP (ref 7–13)
POTASSIUM SERPL-MCNC: 4.8 MMOL/L — SIGNIFICANT CHANGE UP (ref 3.5–5.3)
POTASSIUM SERPL-SCNC: 4.8 MMOL/L — SIGNIFICANT CHANGE UP (ref 3.5–5.3)
PROT SERPL-MCNC: 5.4 G/DL — LOW (ref 6–8.3)
PROTHROM AB SERPL-ACNC: 10.3 SEC — SIGNIFICANT CHANGE UP (ref 9.9–13.4)
RBC # BLD: 3.82 M/UL — SIGNIFICANT CHANGE UP (ref 3.8–5.2)
RBC # FLD: 17.9 % — HIGH (ref 10.3–14.5)
SODIUM SERPL-SCNC: 135 MMOL/L — SIGNIFICANT CHANGE UP (ref 135–145)
TRIGL FLD-MCNC: 620 MG/DL — SIGNIFICANT CHANGE UP
TRIGL SERPL-MCNC: 475 MG/DL — HIGH
WBC # BLD: 11.42 K/UL — HIGH (ref 3.8–10.5)
WBC # FLD AUTO: 11.42 K/UL — HIGH (ref 3.8–10.5)

## 2025-08-06 PROCEDURE — 99232 SBSQ HOSP IP/OBS MODERATE 35: CPT

## 2025-08-06 PROCEDURE — 93975 VASCULAR STUDY: CPT | Mod: 26

## 2025-08-06 PROCEDURE — 99232 SBSQ HOSP IP/OBS MODERATE 35: CPT | Mod: GC,24

## 2025-08-06 PROCEDURE — 76705 ECHO EXAM OF ABDOMEN: CPT | Mod: 26,59

## 2025-08-06 RX ORDER — MAGNESIUM SULFATE 500 MG/ML
2 SYRINGE (ML) INJECTION ONCE
Refills: 0 | Status: COMPLETED | OUTPATIENT
Start: 2025-08-06 | End: 2025-08-06

## 2025-08-06 RX ORDER — ONDANSETRON HCL/PF 4 MG/2 ML
4 VIAL (ML) INJECTION ONCE
Refills: 0 | Status: COMPLETED | OUTPATIENT
Start: 2025-08-06 | End: 2025-08-06

## 2025-08-06 RX ORDER — NUTRITIONAL TX FOR PKU NO.8 8.33 G-69
30 POWDER IN PACKET (EA) ORAL THREE TIMES A DAY
Refills: 0 | Status: DISCONTINUED | OUTPATIENT
Start: 2025-08-06 | End: 2025-08-06

## 2025-08-06 RX ADMIN — Medication 5 MILLIGRAM(S): at 21:04

## 2025-08-06 RX ADMIN — Medication 1 TABLET(S): at 17:37

## 2025-08-06 RX ADMIN — POLYETHYLENE GLYCOL 3350 17 GRAM(S): 17 POWDER, FOR SOLUTION ORAL at 11:04

## 2025-08-06 RX ADMIN — OXYCODONE HYDROCHLORIDE 10 MILLIGRAM(S): 30 TABLET ORAL at 23:41

## 2025-08-06 RX ADMIN — Medication 250 MILLIGRAM(S): at 21:05

## 2025-08-06 RX ADMIN — OXYCODONE HYDROCHLORIDE 10 MILLIGRAM(S): 30 TABLET ORAL at 05:31

## 2025-08-06 RX ADMIN — OXYCODONE HYDROCHLORIDE 5 MILLIGRAM(S): 30 TABLET ORAL at 18:07

## 2025-08-06 RX ADMIN — OXYCODONE HYDROCHLORIDE 5 MILLIGRAM(S): 30 TABLET ORAL at 17:37

## 2025-08-06 RX ADMIN — CYCLOSPORINE 50 MILLIGRAM(S): 100 CAPSULE, LIQUID FILLED ORAL at 07:39

## 2025-08-06 RX ADMIN — Medication 81 MILLIGRAM(S): at 11:03

## 2025-08-06 RX ADMIN — Medication 4 MILLIGRAM(S): at 18:04

## 2025-08-06 RX ADMIN — OXYCODONE HYDROCHLORIDE 10 MILLIGRAM(S): 30 TABLET ORAL at 10:49

## 2025-08-06 RX ADMIN — PREDNISONE 20 MILLIGRAM(S): 20 TABLET ORAL at 05:31

## 2025-08-06 RX ADMIN — CYCLOSPORINE 50 MILLIGRAM(S): 100 CAPSULE, LIQUID FILLED ORAL at 19:27

## 2025-08-06 RX ADMIN — OXYCODONE HYDROCHLORIDE 10 MILLIGRAM(S): 30 TABLET ORAL at 11:49

## 2025-08-06 RX ADMIN — LEVETIRACETAM 400 MILLIGRAM(S): 10 INJECTION, SOLUTION INTRAVENOUS at 05:32

## 2025-08-06 RX ADMIN — Medication 25 GRAM(S): at 11:10

## 2025-08-06 RX ADMIN — Medication 90 MILLIGRAM(S): at 05:30

## 2025-08-06 RX ADMIN — Medication 1 APPLICATION(S): at 11:04

## 2025-08-06 RX ADMIN — MYCOPHENOLATE MOFETIL 1000 MILLIGRAM(S): 500 TABLET, FILM COATED ORAL at 19:26

## 2025-08-06 RX ADMIN — Medication 2 TABLET(S): at 21:04

## 2025-08-06 RX ADMIN — Medication 40 MILLIGRAM(S): at 05:31

## 2025-08-06 RX ADMIN — Medication 1 TABLET(S): at 05:31

## 2025-08-06 RX ADMIN — Medication 1 TABLET(S): at 11:04

## 2025-08-06 RX ADMIN — LEVETIRACETAM 400 MILLIGRAM(S): 10 INJECTION, SOLUTION INTRAVENOUS at 17:38

## 2025-08-06 RX ADMIN — URSODIOL 300 MILLIGRAM(S): 300 CAPSULE ORAL at 05:31

## 2025-08-06 RX ADMIN — OXYCODONE HYDROCHLORIDE 10 MILLIGRAM(S): 30 TABLET ORAL at 06:00

## 2025-08-06 RX ADMIN — MYCOPHENOLATE MOFETIL 1000 MILLIGRAM(S): 500 TABLET, FILM COATED ORAL at 07:39

## 2025-08-06 RX ADMIN — OXYCODONE HYDROCHLORIDE 10 MILLIGRAM(S): 30 TABLET ORAL at 15:53

## 2025-08-06 RX ADMIN — Medication 25 GRAM(S): at 07:11

## 2025-08-06 RX ADMIN — URSODIOL 300 MILLIGRAM(S): 300 CAPSULE ORAL at 17:37

## 2025-08-06 RX ADMIN — HEPARIN SODIUM 5000 UNIT(S): 1000 INJECTION INTRAVENOUS; SUBCUTANEOUS at 05:31

## 2025-08-06 RX ADMIN — VALGANCICLOVIR 450 MILLIGRAM(S): 450 TABLET, FILM COATED ORAL at 11:04

## 2025-08-06 RX ADMIN — Medication 250 MILLIGRAM(S): at 09:52

## 2025-08-06 RX ADMIN — OXYCODONE HYDROCHLORIDE 10 MILLIGRAM(S): 30 TABLET ORAL at 15:23

## 2025-08-06 RX ADMIN — Medication 400 MILLIGRAM(S): at 11:04

## 2025-08-06 RX ADMIN — HEPARIN SODIUM 5000 UNIT(S): 1000 INJECTION INTRAVENOUS; SUBCUTANEOUS at 17:38

## 2025-08-07 LAB
ALBUMIN SERPL ELPH-MCNC: 3 G/DL — LOW (ref 3.3–5)
ALP SERPL-CCNC: 236 U/L — HIGH (ref 40–120)
ALT FLD-CCNC: 84 U/L — HIGH (ref 10–45)
ANION GAP SERPL CALC-SCNC: 12 MMOL/L — SIGNIFICANT CHANGE UP (ref 5–17)
APTT BLD: 24.2 SEC — LOW (ref 26.1–36.8)
AST SERPL-CCNC: 34 U/L — SIGNIFICANT CHANGE UP (ref 10–40)
BILIRUB SERPL-MCNC: 2.5 MG/DL — HIGH (ref 0.2–1.2)
BUN SERPL-MCNC: 15 MG/DL — SIGNIFICANT CHANGE UP (ref 7–23)
CALCIUM SERPL-MCNC: 8.9 MG/DL — SIGNIFICANT CHANGE UP (ref 8.4–10.5)
CHLORIDE SERPL-SCNC: 104 MMOL/L — SIGNIFICANT CHANGE UP (ref 96–108)
CO2 SERPL-SCNC: 20 MMOL/L — LOW (ref 22–31)
CREAT SERPL-MCNC: 0.85 MG/DL — SIGNIFICANT CHANGE UP (ref 0.5–1.3)
CYCLOSPORINE SER-MCNC: 92 NG/ML — LOW (ref 150–400)
EGFR: 86 ML/MIN/1.73M2 — SIGNIFICANT CHANGE UP
EGFR: 86 ML/MIN/1.73M2 — SIGNIFICANT CHANGE UP
GLUCOSE SERPL-MCNC: 95 MG/DL — SIGNIFICANT CHANGE UP (ref 70–99)
HCT VFR BLD CALC: 31.8 % — LOW (ref 34.5–45)
HGB BLD-MCNC: 10.5 G/DL — LOW (ref 11.5–15.5)
INR BLD: 0.92 RATIO — SIGNIFICANT CHANGE UP (ref 0.85–1.16)
MAGNESIUM SERPL-MCNC: 1.8 MG/DL — SIGNIFICANT CHANGE UP (ref 1.6–2.6)
MCHC RBC-ENTMCNC: 30.3 PG — SIGNIFICANT CHANGE UP (ref 27–34)
MCHC RBC-ENTMCNC: 33 G/DL — SIGNIFICANT CHANGE UP (ref 32–36)
MCV RBC AUTO: 91.6 FL — SIGNIFICANT CHANGE UP (ref 80–100)
NRBC # BLD AUTO: 0 K/UL — SIGNIFICANT CHANGE UP (ref 0–0)
NRBC # FLD: 0 K/UL — SIGNIFICANT CHANGE UP (ref 0–0)
NRBC BLD AUTO-RTO: 0 /100 WBCS — SIGNIFICANT CHANGE UP (ref 0–0)
PHOSPHATE SERPL-MCNC: 3.9 MG/DL — SIGNIFICANT CHANGE UP (ref 2.5–4.5)
PLATELET # BLD AUTO: 139 K/UL — LOW (ref 150–400)
PMV BLD: 10.4 FL — SIGNIFICANT CHANGE UP (ref 7–13)
POTASSIUM SERPL-MCNC: 5.1 MMOL/L — SIGNIFICANT CHANGE UP (ref 3.5–5.3)
POTASSIUM SERPL-SCNC: 5.1 MMOL/L — SIGNIFICANT CHANGE UP (ref 3.5–5.3)
PROT SERPL-MCNC: 5.1 G/DL — LOW (ref 6–8.3)
PROTHROM AB SERPL-ACNC: 10.5 SEC — SIGNIFICANT CHANGE UP (ref 9.9–13.4)
RBC # BLD: 3.47 M/UL — LOW (ref 3.8–5.2)
RBC # FLD: 18 % — HIGH (ref 10.3–14.5)
SODIUM SERPL-SCNC: 136 MMOL/L — SIGNIFICANT CHANGE UP (ref 135–145)
TRIGL SERPL-MCNC: 288 MG/DL — HIGH
VANCOMYCIN TROUGH SERPL-MCNC: 18.4 UG/ML — SIGNIFICANT CHANGE UP (ref 10–20)
WBC # BLD: 10.57 K/UL — HIGH (ref 3.8–10.5)
WBC # FLD AUTO: 10.57 K/UL — HIGH (ref 3.8–10.5)

## 2025-08-07 PROCEDURE — 86777 TOXOPLASMA ANTIBODY: CPT

## 2025-08-07 PROCEDURE — 97110 THERAPEUTIC EXERCISES: CPT

## 2025-08-07 PROCEDURE — 86160 COMPLEMENT ANTIGEN: CPT

## 2025-08-07 PROCEDURE — 73080 X-RAY EXAM OF ELBOW: CPT

## 2025-08-07 PROCEDURE — 76641 ULTRASOUND BREAST COMPLETE: CPT

## 2025-08-07 PROCEDURE — C1751: CPT

## 2025-08-07 PROCEDURE — 87205 SMEAR GRAM STAIN: CPT

## 2025-08-07 PROCEDURE — 82390 ASSAY OF CERULOPLASMIN: CPT

## 2025-08-07 PROCEDURE — 84540 ASSAY OF URINE/UREA-N: CPT

## 2025-08-07 PROCEDURE — P9073: CPT

## 2025-08-07 PROCEDURE — P9047: CPT

## 2025-08-07 PROCEDURE — 86140 C-REACTIVE PROTEIN: CPT

## 2025-08-07 PROCEDURE — 86709 HEPATITIS A IGM ANTIBODY: CPT

## 2025-08-07 PROCEDURE — 71045 X-RAY EXAM CHEST 1 VIEW: CPT

## 2025-08-07 PROCEDURE — 80061 LIPID PANEL: CPT

## 2025-08-07 PROCEDURE — 86664 EPSTEIN-BARR NUCLEAR ANTIGEN: CPT

## 2025-08-07 PROCEDURE — 70553 MRI BRAIN STEM W/O & W/DYE: CPT | Mod: 26

## 2025-08-07 PROCEDURE — 86803 HEPATITIS C AB TEST: CPT

## 2025-08-07 PROCEDURE — 82565 ASSAY OF CREATININE: CPT

## 2025-08-07 PROCEDURE — 83605 ASSAY OF LACTIC ACID: CPT

## 2025-08-07 PROCEDURE — P9059: CPT

## 2025-08-07 PROCEDURE — 83935 ASSAY OF URINE OSMOLALITY: CPT

## 2025-08-07 PROCEDURE — 82570 ASSAY OF URINE CREATININE: CPT

## 2025-08-07 PROCEDURE — 86381 MITOCHONDRIAL ANTIBODY EACH: CPT

## 2025-08-07 PROCEDURE — 93306 TTE W/DOPPLER COMPLETE: CPT

## 2025-08-07 PROCEDURE — 72197 MRI PELVIS W/O & W/DYE: CPT

## 2025-08-07 PROCEDURE — 93320 DOPPLER ECHO COMPLETE: CPT

## 2025-08-07 PROCEDURE — 87389 HIV-1 AG W/HIV-1&-2 AB AG IA: CPT

## 2025-08-07 PROCEDURE — 84478 ASSAY OF TRIGLYCERIDES: CPT

## 2025-08-07 PROCEDURE — 87070 CULTURE OTHR SPECIMN AEROBIC: CPT

## 2025-08-07 PROCEDURE — 84145 PROCALCITONIN (PCT): CPT

## 2025-08-07 PROCEDURE — 97116 GAIT TRAINING THERAPY: CPT

## 2025-08-07 PROCEDURE — 85014 HEMATOCRIT: CPT

## 2025-08-07 PROCEDURE — 81001 URINALYSIS AUTO W/SCOPE: CPT

## 2025-08-07 PROCEDURE — 70450 CT HEAD/BRAIN W/O DYE: CPT

## 2025-08-07 PROCEDURE — 94002 VENT MGMT INPAT INIT DAY: CPT

## 2025-08-07 PROCEDURE — 92612 ENDOSCOPY SWALLOW (FEES) VID: CPT

## 2025-08-07 PROCEDURE — 85610 PROTHROMBIN TIME: CPT

## 2025-08-07 PROCEDURE — 80053 COMPREHEN METABOLIC PANEL: CPT

## 2025-08-07 PROCEDURE — 80202 ASSAY OF VANCOMYCIN: CPT

## 2025-08-07 PROCEDURE — 87040 BLOOD CULTURE FOR BACTERIA: CPT

## 2025-08-07 PROCEDURE — 87385 HISTOPLASMA CAPSUL AG IA: CPT

## 2025-08-07 PROCEDURE — 86965 POOLING BLOOD PLATELETS: CPT

## 2025-08-07 PROCEDURE — 87150 DNA/RNA AMPLIFIED PROBE: CPT

## 2025-08-07 PROCEDURE — 84481 FREE ASSAY (FT-3): CPT

## 2025-08-07 PROCEDURE — A9585: CPT

## 2025-08-07 PROCEDURE — 86780 TREPONEMA PALLIDUM: CPT

## 2025-08-07 PROCEDURE — 86644 CMV ANTIBODY: CPT

## 2025-08-07 PROCEDURE — 86665 EPSTEIN-BARR CAPSID VCA: CPT

## 2025-08-07 PROCEDURE — 86682 HELMINTH ANTIBODY: CPT

## 2025-08-07 PROCEDURE — 93325 DOPPLER ECHO COLOR FLOW MAPG: CPT

## 2025-08-07 PROCEDURE — 86147 CARDIOLIPIN ANTIBODY EA IG: CPT

## 2025-08-07 PROCEDURE — 86305 HUMAN EPIDIDYMIS PROTEIN 4: CPT

## 2025-08-07 PROCEDURE — 73200 CT UPPER EXTREMITY W/O DYE: CPT

## 2025-08-07 PROCEDURE — 86708 HEPATITIS A ANTIBODY: CPT

## 2025-08-07 PROCEDURE — P9016: CPT

## 2025-08-07 PROCEDURE — 86695 HERPES SIMPLEX TYPE 1 TEST: CPT

## 2025-08-07 PROCEDURE — 86901 BLOOD TYPING SEROLOGIC RH(D): CPT

## 2025-08-07 PROCEDURE — 97164 PT RE-EVAL EST PLAN CARE: CPT

## 2025-08-07 PROCEDURE — 36415 COLL VENOUS BLD VENIPUNCTURE: CPT

## 2025-08-07 PROCEDURE — 94799 UNLISTED PULMONARY SVC/PX: CPT

## 2025-08-07 PROCEDURE — 83735 ASSAY OF MAGNESIUM: CPT

## 2025-08-07 PROCEDURE — 82330 ASSAY OF CALCIUM: CPT

## 2025-08-07 PROCEDURE — 36600 WITHDRAWAL OF ARTERIAL BLOOD: CPT

## 2025-08-07 PROCEDURE — 97530 THERAPEUTIC ACTIVITIES: CPT

## 2025-08-07 PROCEDURE — 87521 HEPATITIS C PROBE&RVRS TRNSC: CPT

## 2025-08-07 PROCEDURE — 86985 SPLIT BLOOD OR PRODUCTS: CPT

## 2025-08-07 PROCEDURE — P9037: CPT

## 2025-08-07 PROCEDURE — 87637 SARSCOV2&INF A&B&RSV AMP PRB: CPT

## 2025-08-07 PROCEDURE — 83550 IRON BINDING TEST: CPT

## 2025-08-07 PROCEDURE — 86225 DNA ANTIBODY NATIVE: CPT

## 2025-08-07 PROCEDURE — 82784 ASSAY IGA/IGD/IGG/IGM EACH: CPT

## 2025-08-07 PROCEDURE — 87624 HPV HI-RISK TYP POOLED RSLT: CPT

## 2025-08-07 PROCEDURE — 82140 ASSAY OF AMMONIA: CPT

## 2025-08-07 PROCEDURE — 85027 COMPLETE CBC AUTOMATED: CPT

## 2025-08-07 PROCEDURE — 84100 ASSAY OF PHOSPHORUS: CPT

## 2025-08-07 PROCEDURE — 76856 US EXAM PELVIC COMPLETE: CPT

## 2025-08-07 PROCEDURE — P9040: CPT

## 2025-08-07 PROCEDURE — 86787 VARICELLA-ZOSTER ANTIBODY: CPT

## 2025-08-07 PROCEDURE — 82977 ASSAY OF GGT: CPT

## 2025-08-07 PROCEDURE — 74177 CT ABD & PELVIS W/CONTRAST: CPT

## 2025-08-07 PROCEDURE — 82105 ALPHA-FETOPROTEIN SERUM: CPT

## 2025-08-07 PROCEDURE — 93971 EXTREMITY STUDY: CPT

## 2025-08-07 PROCEDURE — 86706 HEP B SURFACE ANTIBODY: CPT

## 2025-08-07 PROCEDURE — 82962 GLUCOSE BLOOD TEST: CPT

## 2025-08-07 PROCEDURE — 82550 ASSAY OF CK (CPK): CPT

## 2025-08-07 PROCEDURE — 87522 HEPATITIS C REVRS TRNSCRPJ: CPT

## 2025-08-07 PROCEDURE — C1889: CPT

## 2025-08-07 PROCEDURE — 97535 SELF CARE MNGMENT TRAINING: CPT

## 2025-08-07 PROCEDURE — 76377 3D RENDER W/INTRP POSTPROCES: CPT

## 2025-08-07 PROCEDURE — 86304 IMMUNOASSAY TUMOR CA 125: CPT

## 2025-08-07 PROCEDURE — 84702 CHORIONIC GONADOTROPIN TEST: CPT

## 2025-08-07 PROCEDURE — 86696 HERPES SIMPLEX TYPE 2 TEST: CPT

## 2025-08-07 PROCEDURE — 82378 CARCINOEMBRYONIC ANTIGEN: CPT

## 2025-08-07 PROCEDURE — 85025 COMPLETE CBC W/AUTO DIFF WBC: CPT

## 2025-08-07 PROCEDURE — 87799 DETECT AGENT NOS DNA QUANT: CPT

## 2025-08-07 PROCEDURE — 93970 EXTREMITY STUDY: CPT

## 2025-08-07 PROCEDURE — 86038 ANTINUCLEAR ANTIBODIES: CPT

## 2025-08-07 PROCEDURE — 97112 NEUROMUSCULAR REEDUCATION: CPT

## 2025-08-07 PROCEDURE — 97165 OT EVAL LOW COMPLEX 30 MIN: CPT

## 2025-08-07 PROCEDURE — 86255 FLUORESCENT ANTIBODY SCREEN: CPT

## 2025-08-07 PROCEDURE — 86880 COOMBS TEST DIRECT: CPT

## 2025-08-07 PROCEDURE — 76705 ECHO EXAM OF ABDOMEN: CPT

## 2025-08-07 PROCEDURE — 87641 MR-STAPH DNA AMP PROBE: CPT

## 2025-08-07 PROCEDURE — 86891 AUTOLOGOUS BLOOD OP SALVAGE: CPT

## 2025-08-07 PROCEDURE — 85730 THROMBOPLASTIN TIME PARTIAL: CPT

## 2025-08-07 PROCEDURE — 84295 ASSAY OF SERUM SODIUM: CPT

## 2025-08-07 PROCEDURE — 86762 RUBELLA ANTIBODY: CPT

## 2025-08-07 PROCEDURE — 85732 THROMBOPLASTIN TIME PARTIAL: CPT

## 2025-08-07 PROCEDURE — 87086 URINE CULTURE/COLONY COUNT: CPT

## 2025-08-07 PROCEDURE — 84133 ASSAY OF URINE POTASSIUM: CPT

## 2025-08-07 PROCEDURE — 85018 HEMOGLOBIN: CPT

## 2025-08-07 PROCEDURE — 86481 TB AG RESPONSE T-CELL SUSP: CPT

## 2025-08-07 PROCEDURE — C1769: CPT

## 2025-08-07 PROCEDURE — 87340 HEPATITIS B SURFACE AG IA: CPT

## 2025-08-07 PROCEDURE — 86704 HEP B CORE ANTIBODY TOTAL: CPT

## 2025-08-07 PROCEDURE — 86769 SARS-COV-2 COVID-19 ANTIBODY: CPT

## 2025-08-07 PROCEDURE — 36430 TRANSFUSION BLD/BLD COMPNT: CPT

## 2025-08-07 PROCEDURE — 85613 RUSSELL VIPER VENOM DILUTED: CPT

## 2025-08-07 PROCEDURE — 93308 TTE F-UP OR LMTD: CPT

## 2025-08-07 PROCEDURE — 84550 ASSAY OF BLOOD/URIC ACID: CPT

## 2025-08-07 PROCEDURE — 92526 ORAL FUNCTION THERAPY: CPT

## 2025-08-07 PROCEDURE — 74018 RADEX ABDOMEN 1 VIEW: CPT

## 2025-08-07 PROCEDURE — 85384 FIBRINOGEN ACTIVITY: CPT

## 2025-08-07 PROCEDURE — P9011: CPT

## 2025-08-07 PROCEDURE — 84484 ASSAY OF TROPONIN QUANT: CPT

## 2025-08-07 PROCEDURE — 86923 COMPATIBILITY TEST ELECTRIC: CPT

## 2025-08-07 PROCEDURE — 83036 HEMOGLOBIN GLYCOSYLATED A1C: CPT

## 2025-08-07 PROCEDURE — 86735 MUMPS ANTIBODY: CPT

## 2025-08-07 PROCEDURE — 86850 RBC ANTIBODY SCREEN: CPT

## 2025-08-07 PROCEDURE — P9045: CPT

## 2025-08-07 PROCEDURE — 84443 ASSAY THYROID STIM HORMONE: CPT

## 2025-08-07 PROCEDURE — 85396 CLOTTING ASSAY WHOLE BLOOD: CPT

## 2025-08-07 PROCEDURE — 82553 CREATINE MB FRACTION: CPT

## 2025-08-07 PROCEDURE — 80158 DRUG ASSAY CYCLOSPORINE: CPT

## 2025-08-07 PROCEDURE — 86765 RUBEOLA ANTIBODY: CPT

## 2025-08-07 PROCEDURE — 84156 ASSAY OF PROTEIN URINE: CPT

## 2025-08-07 PROCEDURE — 82803 BLOOD GASES ANY COMBINATION: CPT

## 2025-08-07 PROCEDURE — 86900 BLOOD TYPING SEROLOGIC ABO: CPT

## 2025-08-07 PROCEDURE — 82247 BILIRUBIN TOTAL: CPT

## 2025-08-07 PROCEDURE — 85652 RBC SED RATE AUTOMATED: CPT

## 2025-08-07 PROCEDURE — 82947 ASSAY GLUCOSE BLOOD QUANT: CPT

## 2025-08-07 PROCEDURE — 94003 VENT MGMT INPAT SUBQ DAY: CPT

## 2025-08-07 PROCEDURE — 86146 BETA-2 GLYCOPROTEIN ANTIBODY: CPT

## 2025-08-07 PROCEDURE — P9012: CPT

## 2025-08-07 PROCEDURE — 82728 ASSAY OF FERRITIN: CPT

## 2025-08-07 PROCEDURE — 86663 EPSTEIN-BARR ANTIBODY: CPT

## 2025-08-07 PROCEDURE — G0480: CPT

## 2025-08-07 PROCEDURE — 86301 IMMUNOASSAY TUMOR CA 19-9: CPT

## 2025-08-07 PROCEDURE — 92610 EVALUATE SWALLOWING FUNCTION: CPT

## 2025-08-07 PROCEDURE — 82435 ASSAY OF BLOOD CHLORIDE: CPT

## 2025-08-07 PROCEDURE — 86480 TB TEST CELL IMMUN MEASURE: CPT

## 2025-08-07 PROCEDURE — 81025 URINE PREGNANCY TEST: CPT

## 2025-08-07 PROCEDURE — 84300 ASSAY OF URINE SODIUM: CPT

## 2025-08-07 PROCEDURE — 84132 ASSAY OF SERUM POTASSIUM: CPT

## 2025-08-07 PROCEDURE — 97161 PT EVAL LOW COMPLEX 20 MIN: CPT

## 2025-08-07 PROCEDURE — 86376 MICROSOMAL ANTIBODY EACH: CPT

## 2025-08-07 PROCEDURE — 99232 SBSQ HOSP IP/OBS MODERATE 35: CPT | Mod: GC,24

## 2025-08-07 PROCEDURE — 80307 DRUG TEST PRSMV CHEM ANLYZR: CPT

## 2025-08-07 PROCEDURE — 70553 MRI BRAIN STEM W/O & W/DYE: CPT

## 2025-08-07 PROCEDURE — 93975 VASCULAR STUDY: CPT

## 2025-08-07 PROCEDURE — 80197 ASSAY OF TACROLIMUS: CPT

## 2025-08-07 PROCEDURE — P9100: CPT

## 2025-08-07 PROCEDURE — 87077 CULTURE AEROBIC IDENTIFY: CPT

## 2025-08-07 PROCEDURE — 87640 STAPH A DNA AMP PROBE: CPT

## 2025-08-07 PROCEDURE — 83540 ASSAY OF IRON: CPT

## 2025-08-07 PROCEDURE — 76377 3D RENDER W/INTRP POSTPROCES: CPT | Mod: 26

## 2025-08-07 RX ORDER — FLUCONAZOLE 200 MG/1
200 TABLET ORAL DAILY
Qty: 14 | Refills: 0 | Status: ACTIVE | COMMUNITY
Start: 2025-08-07 | End: 1900-01-01

## 2025-08-07 RX ORDER — LEVETIRACETAM 10 MG/ML
500 INJECTION, SOLUTION INTRAVENOUS EVERY 12 HOURS
Refills: 0 | Status: DISCONTINUED | OUTPATIENT
Start: 2025-08-08 | End: 2025-08-13

## 2025-08-07 RX ORDER — OXYCODONE HYDROCHLORIDE 30 MG/1
2.5 TABLET ORAL EVERY 4 HOURS
Refills: 0 | Status: DISCONTINUED | OUTPATIENT
Start: 2025-08-07 | End: 2025-08-07

## 2025-08-07 RX ORDER — CYCLOSPORINE 100 MG/1
50 CAPSULE, LIQUID FILLED ORAL
Refills: 0 | Status: DISCONTINUED | OUTPATIENT
Start: 2025-08-07 | End: 2025-08-08

## 2025-08-07 RX ORDER — ASPIRIN 81 MG/1
81 TABLET, DELAYED RELEASE ORAL
Qty: 30 | Refills: 5 | Status: ACTIVE | COMMUNITY
Start: 2025-08-07 | End: 1900-01-01

## 2025-08-07 RX ORDER — CYCLOSPORINE 100 MG/1
75 CAPSULE, LIQUID FILLED ORAL
Refills: 0 | Status: DISCONTINUED | OUTPATIENT
Start: 2025-08-08 | End: 2025-08-08

## 2025-08-07 RX ORDER — CYCLOSPORINE 100 MG/1
25 CAPSULE, LIQUID FILLED ORAL ONCE
Refills: 0 | Status: COMPLETED | OUTPATIENT
Start: 2025-08-07 | End: 2025-08-07

## 2025-08-07 RX ORDER — URSODIOL 300 MG/1
300 CAPSULE ORAL
Qty: 60 | Refills: 3 | Status: ACTIVE | COMMUNITY
Start: 2025-08-07 | End: 1900-01-01

## 2025-08-07 RX ORDER — NIFEDIPINE 90 MG/1
90 TABLET, EXTENDED RELEASE ORAL DAILY
Qty: 30 | Refills: 2 | Status: ACTIVE | COMMUNITY
Start: 2025-08-07 | End: 1900-01-01

## 2025-08-07 RX ORDER — OXYCODONE HYDROCHLORIDE 30 MG/1
5 TABLET ORAL EVERY 4 HOURS
Refills: 0 | Status: DISCONTINUED | OUTPATIENT
Start: 2025-08-07 | End: 2025-08-08

## 2025-08-07 RX ORDER — SENNOSIDES 8.6 MG/1
8.6 TABLET ORAL DAILY
Qty: 30 | Refills: 0 | Status: ACTIVE | COMMUNITY
Start: 2025-08-07 | End: 1900-01-01

## 2025-08-07 RX ORDER — MAGNESIUM SULFATE 500 MG/ML
2 SYRINGE (ML) INJECTION ONCE
Refills: 0 | Status: COMPLETED | OUTPATIENT
Start: 2025-08-07 | End: 2025-08-07

## 2025-08-07 RX ORDER — OXYCODONE HYDROCHLORIDE 30 MG/1
2.5 TABLET ORAL EVERY 4 HOURS
Refills: 0 | Status: DISCONTINUED | OUTPATIENT
Start: 2025-08-07 | End: 2025-08-08

## 2025-08-07 RX ORDER — B-COMPLEX WITH VITAMIN C
500-5 TABLET ORAL
Qty: 60 | Refills: 4 | Status: ACTIVE | COMMUNITY
Start: 2025-08-07 | End: 1900-01-01

## 2025-08-07 RX ORDER — PREDNISONE 10 MG/1
10 TABLET ORAL DAILY
Qty: 60 | Refills: 2 | Status: ACTIVE | COMMUNITY
Start: 2025-08-07 | End: 1900-01-01

## 2025-08-07 RX ORDER — LEVETIRACETAM 500 MG/1
500 TABLET, FILM COATED ORAL TWICE DAILY
Qty: 60 | Refills: 3 | Status: ACTIVE | COMMUNITY
Start: 2025-08-07 | End: 1900-01-01

## 2025-08-07 RX ORDER — OMEGA-3/DHA/EPA/FISH OIL 300-1000MG
400 CAPSULE ORAL
Qty: 60 | Refills: 4 | Status: ACTIVE | COMMUNITY
Start: 2025-08-07 | End: 1900-01-01

## 2025-08-07 RX ORDER — ACETAMINOPHEN 325 MG/1
325 TABLET ORAL
Qty: 30 | Refills: 1 | Status: ACTIVE | COMMUNITY
Start: 2025-08-07 | End: 1900-01-01

## 2025-08-07 RX ORDER — PANTOPRAZOLE 40 MG/1
40 TABLET, DELAYED RELEASE ORAL
Qty: 30 | Refills: 5 | Status: ACTIVE | COMMUNITY
Start: 2025-08-07 | End: 1900-01-01

## 2025-08-07 RX ORDER — OXYCODONE HYDROCHLORIDE 30 MG/1
5 TABLET ORAL EVERY 4 HOURS
Refills: 0 | Status: DISCONTINUED | OUTPATIENT
Start: 2025-08-07 | End: 2025-08-07

## 2025-08-07 RX ADMIN — Medication 1 APPLICATION(S): at 11:16

## 2025-08-07 RX ADMIN — Medication 25 GRAM(S): at 08:26

## 2025-08-07 RX ADMIN — OXYCODONE HYDROCHLORIDE 10 MILLIGRAM(S): 30 TABLET ORAL at 00:41

## 2025-08-07 RX ADMIN — OXYCODONE HYDROCHLORIDE 10 MILLIGRAM(S): 30 TABLET ORAL at 07:17

## 2025-08-07 RX ADMIN — URSODIOL 300 MILLIGRAM(S): 300 CAPSULE ORAL at 05:36

## 2025-08-07 RX ADMIN — LEVETIRACETAM 400 MILLIGRAM(S): 10 INJECTION, SOLUTION INTRAVENOUS at 06:46

## 2025-08-07 RX ADMIN — OXYCODONE HYDROCHLORIDE 5 MILLIGRAM(S): 30 TABLET ORAL at 17:20

## 2025-08-07 RX ADMIN — Medication 250 MILLIGRAM(S): at 21:44

## 2025-08-07 RX ADMIN — OXYCODONE HYDROCHLORIDE 2.5 MILLIGRAM(S): 30 TABLET ORAL at 21:43

## 2025-08-07 RX ADMIN — LEVETIRACETAM 400 MILLIGRAM(S): 10 INJECTION, SOLUTION INTRAVENOUS at 17:25

## 2025-08-07 RX ADMIN — OXYCODONE HYDROCHLORIDE 5 MILLIGRAM(S): 30 TABLET ORAL at 17:50

## 2025-08-07 RX ADMIN — URSODIOL 300 MILLIGRAM(S): 300 CAPSULE ORAL at 17:24

## 2025-08-07 RX ADMIN — OXYCODONE HYDROCHLORIDE 2.5 MILLIGRAM(S): 30 TABLET ORAL at 22:43

## 2025-08-07 RX ADMIN — OXYCODONE HYDROCHLORIDE 5 MILLIGRAM(S): 30 TABLET ORAL at 12:30

## 2025-08-07 RX ADMIN — MYCOPHENOLATE MOFETIL 1000 MILLIGRAM(S): 500 TABLET, FILM COATED ORAL at 20:07

## 2025-08-07 RX ADMIN — CYCLOSPORINE 50 MILLIGRAM(S): 100 CAPSULE, LIQUID FILLED ORAL at 08:21

## 2025-08-07 RX ADMIN — OXYCODONE HYDROCHLORIDE 5 MILLIGRAM(S): 30 TABLET ORAL at 12:00

## 2025-08-07 RX ADMIN — VALGANCICLOVIR 450 MILLIGRAM(S): 450 TABLET, FILM COATED ORAL at 11:17

## 2025-08-07 RX ADMIN — POLYETHYLENE GLYCOL 3350 17 GRAM(S): 17 POWDER, FOR SOLUTION ORAL at 11:16

## 2025-08-07 RX ADMIN — HEPARIN SODIUM 5000 UNIT(S): 1000 INJECTION INTRAVENOUS; SUBCUTANEOUS at 17:25

## 2025-08-07 RX ADMIN — OXYCODONE HYDROCHLORIDE 10 MILLIGRAM(S): 30 TABLET ORAL at 06:47

## 2025-08-07 RX ADMIN — MYCOPHENOLATE MOFETIL 1000 MILLIGRAM(S): 500 TABLET, FILM COATED ORAL at 08:21

## 2025-08-07 RX ADMIN — Medication 90 MILLIGRAM(S): at 05:36

## 2025-08-07 RX ADMIN — CYCLOSPORINE 50 MILLIGRAM(S): 100 CAPSULE, LIQUID FILLED ORAL at 20:07

## 2025-08-07 RX ADMIN — Medication 40 MILLIGRAM(S): at 05:36

## 2025-08-07 RX ADMIN — Medication 81 MILLIGRAM(S): at 11:17

## 2025-08-07 RX ADMIN — PREDNISONE 20 MILLIGRAM(S): 20 TABLET ORAL at 05:36

## 2025-08-07 RX ADMIN — Medication 1 TABLET(S): at 17:24

## 2025-08-07 RX ADMIN — Medication 5 MILLIGRAM(S): at 21:43

## 2025-08-07 RX ADMIN — Medication 250 MILLIGRAM(S): at 11:16

## 2025-08-07 RX ADMIN — Medication 2 TABLET(S): at 21:43

## 2025-08-07 RX ADMIN — Medication 1 TABLET(S): at 05:35

## 2025-08-07 RX ADMIN — HEPARIN SODIUM 5000 UNIT(S): 1000 INJECTION INTRAVENOUS; SUBCUTANEOUS at 05:37

## 2025-08-07 RX ADMIN — Medication 400 MILLIGRAM(S): at 11:16

## 2025-08-07 RX ADMIN — CYCLOSPORINE 25 MILLIGRAM(S): 100 CAPSULE, LIQUID FILLED ORAL at 20:07

## 2025-08-07 RX ADMIN — Medication 1 TABLET(S): at 11:17

## 2025-08-08 LAB
ALBUMIN SERPL ELPH-MCNC: 3.2 G/DL — LOW (ref 3.3–5)
ALP SERPL-CCNC: 288 U/L — HIGH (ref 40–120)
ALT FLD-CCNC: 93 U/L — HIGH (ref 10–45)
ANION GAP SERPL CALC-SCNC: 13 MMOL/L — SIGNIFICANT CHANGE UP (ref 5–17)
APTT BLD: 25.7 SEC — LOW (ref 26.1–36.8)
AST SERPL-CCNC: 33 U/L — SIGNIFICANT CHANGE UP (ref 10–40)
BILIRUB SERPL-MCNC: 2.5 MG/DL — HIGH (ref 0.2–1.2)
BLD GP AB SCN SERPL QL: NEGATIVE — SIGNIFICANT CHANGE UP
BUN SERPL-MCNC: 13 MG/DL — SIGNIFICANT CHANGE UP (ref 7–23)
CALCIUM SERPL-MCNC: 9.5 MG/DL — SIGNIFICANT CHANGE UP (ref 8.4–10.5)
CHLORIDE SERPL-SCNC: 103 MMOL/L — SIGNIFICANT CHANGE UP (ref 96–108)
CO2 SERPL-SCNC: 20 MMOL/L — LOW (ref 22–31)
CREAT SERPL-MCNC: 1 MG/DL — SIGNIFICANT CHANGE UP (ref 0.5–1.3)
CYCLOSPORINE SER-MCNC: 118 NG/ML — LOW (ref 150–400)
EGFR: 70 ML/MIN/1.73M2 — SIGNIFICANT CHANGE UP
EGFR: 70 ML/MIN/1.73M2 — SIGNIFICANT CHANGE UP
GLUCOSE SERPL-MCNC: 79 MG/DL — SIGNIFICANT CHANGE UP (ref 70–99)
HCT VFR BLD CALC: 34.3 % — LOW (ref 34.5–45)
HGB BLD-MCNC: 11.1 G/DL — LOW (ref 11.5–15.5)
INR BLD: 0.89 RATIO — SIGNIFICANT CHANGE UP (ref 0.85–1.16)
MAGNESIUM SERPL-MCNC: 1.8 MG/DL — SIGNIFICANT CHANGE UP (ref 1.6–2.6)
MCHC RBC-ENTMCNC: 30.2 PG — SIGNIFICANT CHANGE UP (ref 27–34)
MCHC RBC-ENTMCNC: 32.4 G/DL — SIGNIFICANT CHANGE UP (ref 32–36)
MCV RBC AUTO: 93.2 FL — SIGNIFICANT CHANGE UP (ref 80–100)
NRBC # BLD AUTO: 0.02 K/UL — HIGH (ref 0–0)
NRBC # FLD: 0.02 K/UL — HIGH (ref 0–0)
NRBC BLD AUTO-RTO: 0 /100 WBCS — SIGNIFICANT CHANGE UP (ref 0–0)
PHOSPHATE SERPL-MCNC: 4.2 MG/DL — SIGNIFICANT CHANGE UP (ref 2.5–4.5)
PLATELET # BLD AUTO: 161 K/UL — SIGNIFICANT CHANGE UP (ref 150–400)
PMV BLD: 10 FL — SIGNIFICANT CHANGE UP (ref 7–13)
POTASSIUM SERPL-MCNC: 5.2 MMOL/L — SIGNIFICANT CHANGE UP (ref 3.5–5.3)
POTASSIUM SERPL-SCNC: 5.2 MMOL/L — SIGNIFICANT CHANGE UP (ref 3.5–5.3)
PROT SERPL-MCNC: 5.7 G/DL — LOW (ref 6–8.3)
PROTHROM AB SERPL-ACNC: 10.3 SEC — SIGNIFICANT CHANGE UP (ref 9.9–13.4)
RBC # BLD: 3.68 M/UL — LOW (ref 3.8–5.2)
RBC # FLD: 18.4 % — HIGH (ref 10.3–14.5)
RH IG SCN BLD-IMP: POSITIVE — SIGNIFICANT CHANGE UP
SODIUM SERPL-SCNC: 136 MMOL/L — SIGNIFICANT CHANGE UP (ref 135–145)
TRIGL SERPL-MCNC: 228 MG/DL — HIGH
WBC # BLD: 8.88 K/UL — SIGNIFICANT CHANGE UP (ref 3.8–10.5)
WBC # FLD AUTO: 8.88 K/UL — SIGNIFICANT CHANGE UP (ref 3.8–10.5)

## 2025-08-08 PROCEDURE — C1889: CPT

## 2025-08-08 PROCEDURE — 86481 TB AG RESPONSE T-CELL SUSP: CPT

## 2025-08-08 PROCEDURE — 84133 ASSAY OF URINE POTASSIUM: CPT

## 2025-08-08 PROCEDURE — 87389 HIV-1 AG W/HIV-1&-2 AB AG IA: CPT

## 2025-08-08 PROCEDURE — 86803 HEPATITIS C AB TEST: CPT

## 2025-08-08 PROCEDURE — 86147 CARDIOLIPIN ANTIBODY EA IG: CPT

## 2025-08-08 PROCEDURE — 87641 MR-STAPH DNA AMP PROBE: CPT

## 2025-08-08 PROCEDURE — 80158 DRUG ASSAY CYCLOSPORINE: CPT

## 2025-08-08 PROCEDURE — 86696 HERPES SIMPLEX TYPE 2 TEST: CPT

## 2025-08-08 PROCEDURE — 76641 ULTRASOUND BREAST COMPLETE: CPT

## 2025-08-08 PROCEDURE — 99233 SBSQ HOSP IP/OBS HIGH 50: CPT

## 2025-08-08 PROCEDURE — 84132 ASSAY OF SERUM POTASSIUM: CPT

## 2025-08-08 PROCEDURE — 86706 HEP B SURFACE ANTIBODY: CPT

## 2025-08-08 PROCEDURE — 92610 EVALUATE SWALLOWING FUNCTION: CPT

## 2025-08-08 PROCEDURE — 86709 HEPATITIS A IGM ANTIBODY: CPT

## 2025-08-08 PROCEDURE — 82728 ASSAY OF FERRITIN: CPT

## 2025-08-08 PROCEDURE — 94799 UNLISTED PULMONARY SVC/PX: CPT

## 2025-08-08 PROCEDURE — 92526 ORAL FUNCTION THERAPY: CPT

## 2025-08-08 PROCEDURE — G0480: CPT

## 2025-08-08 PROCEDURE — 80202 ASSAY OF VANCOMYCIN: CPT

## 2025-08-08 PROCEDURE — 82435 ASSAY OF BLOOD CHLORIDE: CPT

## 2025-08-08 PROCEDURE — 73080 X-RAY EXAM OF ELBOW: CPT

## 2025-08-08 PROCEDURE — 70553 MRI BRAIN STEM W/O & W/DYE: CPT

## 2025-08-08 PROCEDURE — 97535 SELF CARE MNGMENT TRAINING: CPT

## 2025-08-08 PROCEDURE — 86965 POOLING BLOOD PLATELETS: CPT

## 2025-08-08 PROCEDURE — 82962 GLUCOSE BLOOD TEST: CPT

## 2025-08-08 PROCEDURE — 97112 NEUROMUSCULAR REEDUCATION: CPT

## 2025-08-08 PROCEDURE — 85732 THROMBOPLASTIN TIME PARTIAL: CPT

## 2025-08-08 PROCEDURE — 74177 CT ABD & PELVIS W/CONTRAST: CPT

## 2025-08-08 PROCEDURE — 86682 HELMINTH ANTIBODY: CPT

## 2025-08-08 PROCEDURE — P9011: CPT

## 2025-08-08 PROCEDURE — 93306 TTE W/DOPPLER COMPLETE: CPT

## 2025-08-08 PROCEDURE — 86735 MUMPS ANTIBODY: CPT

## 2025-08-08 PROCEDURE — 87640 STAPH A DNA AMP PROBE: CPT

## 2025-08-08 PROCEDURE — 86644 CMV ANTIBODY: CPT

## 2025-08-08 PROCEDURE — 93320 DOPPLER ECHO COMPLETE: CPT

## 2025-08-08 PROCEDURE — 97161 PT EVAL LOW COMPLEX 20 MIN: CPT

## 2025-08-08 PROCEDURE — 87070 CULTURE OTHR SPECIMN AEROBIC: CPT

## 2025-08-08 PROCEDURE — 86140 C-REACTIVE PROTEIN: CPT

## 2025-08-08 PROCEDURE — 82803 BLOOD GASES ANY COMBINATION: CPT

## 2025-08-08 PROCEDURE — 84300 ASSAY OF URINE SODIUM: CPT

## 2025-08-08 PROCEDURE — 82247 BILIRUBIN TOTAL: CPT

## 2025-08-08 PROCEDURE — 80197 ASSAY OF TACROLIMUS: CPT

## 2025-08-08 PROCEDURE — 73200 CT UPPER EXTREMITY W/O DYE: CPT

## 2025-08-08 PROCEDURE — 82105 ALPHA-FETOPROTEIN SERUM: CPT

## 2025-08-08 PROCEDURE — 87522 HEPATITIS C REVRS TRNSCRPJ: CPT

## 2025-08-08 PROCEDURE — 97116 GAIT TRAINING THERAPY: CPT

## 2025-08-08 PROCEDURE — 72197 MRI PELVIS W/O & W/DYE: CPT

## 2025-08-08 PROCEDURE — 86765 RUBEOLA ANTIBODY: CPT

## 2025-08-08 PROCEDURE — 83735 ASSAY OF MAGNESIUM: CPT

## 2025-08-08 PROCEDURE — 71045 X-RAY EXAM CHEST 1 VIEW: CPT

## 2025-08-08 PROCEDURE — 85652 RBC SED RATE AUTOMATED: CPT

## 2025-08-08 PROCEDURE — 87077 CULTURE AEROBIC IDENTIFY: CPT

## 2025-08-08 PROCEDURE — 82390 ASSAY OF CERULOPLASMIN: CPT

## 2025-08-08 PROCEDURE — 93975 VASCULAR STUDY: CPT

## 2025-08-08 PROCEDURE — 84481 FREE ASSAY (FT-3): CPT

## 2025-08-08 PROCEDURE — 81025 URINE PREGNANCY TEST: CPT

## 2025-08-08 PROCEDURE — 87205 SMEAR GRAM STAIN: CPT

## 2025-08-08 PROCEDURE — 86777 TOXOPLASMA ANTIBODY: CPT

## 2025-08-08 PROCEDURE — 86708 HEPATITIS A ANTIBODY: CPT

## 2025-08-08 PROCEDURE — 85396 CLOTTING ASSAY WHOLE BLOOD: CPT

## 2025-08-08 PROCEDURE — 85384 FIBRINOGEN ACTIVITY: CPT

## 2025-08-08 PROCEDURE — 83540 ASSAY OF IRON: CPT

## 2025-08-08 PROCEDURE — P9040: CPT

## 2025-08-08 PROCEDURE — 83605 ASSAY OF LACTIC ACID: CPT

## 2025-08-08 PROCEDURE — 93010 ELECTROCARDIOGRAM REPORT: CPT

## 2025-08-08 PROCEDURE — 94002 VENT MGMT INPAT INIT DAY: CPT

## 2025-08-08 PROCEDURE — 36600 WITHDRAWAL OF ARTERIAL BLOOD: CPT

## 2025-08-08 PROCEDURE — 97110 THERAPEUTIC EXERCISES: CPT

## 2025-08-08 PROCEDURE — 82553 CREATINE MB FRACTION: CPT

## 2025-08-08 PROCEDURE — 86664 EPSTEIN-BARR NUCLEAR ANTIGEN: CPT

## 2025-08-08 PROCEDURE — 85613 RUSSELL VIPER VENOM DILUTED: CPT

## 2025-08-08 PROCEDURE — 80307 DRUG TEST PRSMV CHEM ANLYZR: CPT

## 2025-08-08 PROCEDURE — 86381 MITOCHONDRIAL ANTIBODY EACH: CPT

## 2025-08-08 PROCEDURE — P9037: CPT

## 2025-08-08 PROCEDURE — 86769 SARS-COV-2 COVID-19 ANTIBODY: CPT

## 2025-08-08 PROCEDURE — 83550 IRON BINDING TEST: CPT

## 2025-08-08 PROCEDURE — 86787 VARICELLA-ZOSTER ANTIBODY: CPT

## 2025-08-08 PROCEDURE — C1769: CPT

## 2025-08-08 PROCEDURE — 86663 EPSTEIN-BARR ANTIBODY: CPT

## 2025-08-08 PROCEDURE — 86900 BLOOD TYPING SEROLOGIC ABO: CPT

## 2025-08-08 PROCEDURE — 87624 HPV HI-RISK TYP POOLED RSLT: CPT

## 2025-08-08 PROCEDURE — 87150 DNA/RNA AMPLIFIED PROBE: CPT

## 2025-08-08 PROCEDURE — 93971 EXTREMITY STUDY: CPT

## 2025-08-08 PROCEDURE — 76856 US EXAM PELVIC COMPLETE: CPT

## 2025-08-08 PROCEDURE — 86146 BETA-2 GLYCOPROTEIN ANTIBODY: CPT

## 2025-08-08 PROCEDURE — 86376 MICROSOMAL ANTIBODY EACH: CPT

## 2025-08-08 PROCEDURE — 82977 ASSAY OF GGT: CPT

## 2025-08-08 PROCEDURE — 80061 LIPID PANEL: CPT

## 2025-08-08 PROCEDURE — 84540 ASSAY OF URINE/UREA-N: CPT

## 2025-08-08 PROCEDURE — C1751: CPT

## 2025-08-08 PROCEDURE — 86923 COMPATIBILITY TEST ELECTRIC: CPT

## 2025-08-08 PROCEDURE — 83935 ASSAY OF URINE OSMOLALITY: CPT

## 2025-08-08 PROCEDURE — 82378 CARCINOEMBRYONIC ANTIGEN: CPT

## 2025-08-08 PROCEDURE — P9016: CPT

## 2025-08-08 PROCEDURE — 87637 SARSCOV2&INF A&B&RSV AMP PRB: CPT

## 2025-08-08 PROCEDURE — 81001 URINALYSIS AUTO W/SCOPE: CPT

## 2025-08-08 PROCEDURE — 85018 HEMOGLOBIN: CPT

## 2025-08-08 PROCEDURE — 84478 ASSAY OF TRIGLYCERIDES: CPT

## 2025-08-08 PROCEDURE — P9059: CPT

## 2025-08-08 PROCEDURE — 84100 ASSAY OF PHOSPHORUS: CPT

## 2025-08-08 PROCEDURE — 86762 RUBELLA ANTIBODY: CPT

## 2025-08-08 PROCEDURE — 82550 ASSAY OF CK (CPK): CPT

## 2025-08-08 PROCEDURE — 87086 URINE CULTURE/COLONY COUNT: CPT

## 2025-08-08 PROCEDURE — 86304 IMMUNOASSAY TUMOR CA 125: CPT

## 2025-08-08 PROCEDURE — 97164 PT RE-EVAL EST PLAN CARE: CPT

## 2025-08-08 PROCEDURE — 86704 HEP B CORE ANTIBODY TOTAL: CPT

## 2025-08-08 PROCEDURE — 86880 COOMBS TEST DIRECT: CPT

## 2025-08-08 PROCEDURE — 84145 PROCALCITONIN (PCT): CPT

## 2025-08-08 PROCEDURE — P9047: CPT

## 2025-08-08 PROCEDURE — 70450 CT HEAD/BRAIN W/O DYE: CPT

## 2025-08-08 PROCEDURE — 86301 IMMUNOASSAY TUMOR CA 19-9: CPT

## 2025-08-08 PROCEDURE — 87799 DETECT AGENT NOS DNA QUANT: CPT

## 2025-08-08 PROCEDURE — 84156 ASSAY OF PROTEIN URINE: CPT

## 2025-08-08 PROCEDURE — 86695 HERPES SIMPLEX TYPE 1 TEST: CPT

## 2025-08-08 PROCEDURE — 84702 CHORIONIC GONADOTROPIN TEST: CPT

## 2025-08-08 PROCEDURE — 83036 HEMOGLOBIN GLYCOSYLATED A1C: CPT

## 2025-08-08 PROCEDURE — 76377 3D RENDER W/INTRP POSTPROCES: CPT

## 2025-08-08 PROCEDURE — 82570 ASSAY OF URINE CREATININE: CPT

## 2025-08-08 PROCEDURE — 82947 ASSAY GLUCOSE BLOOD QUANT: CPT

## 2025-08-08 PROCEDURE — 86780 TREPONEMA PALLIDUM: CPT

## 2025-08-08 PROCEDURE — 85014 HEMATOCRIT: CPT

## 2025-08-08 PROCEDURE — 86665 EPSTEIN-BARR CAPSID VCA: CPT

## 2025-08-08 PROCEDURE — 86038 ANTINUCLEAR ANTIBODIES: CPT

## 2025-08-08 PROCEDURE — 87521 HEPATITIS C PROBE&RVRS TRNSC: CPT

## 2025-08-08 PROCEDURE — 86255 FLUORESCENT ANTIBODY SCREEN: CPT

## 2025-08-08 PROCEDURE — 74018 RADEX ABDOMEN 1 VIEW: CPT

## 2025-08-08 PROCEDURE — 84550 ASSAY OF BLOOD/URIC ACID: CPT

## 2025-08-08 PROCEDURE — 85025 COMPLETE CBC W/AUTO DIFF WBC: CPT

## 2025-08-08 PROCEDURE — 82140 ASSAY OF AMMONIA: CPT

## 2025-08-08 PROCEDURE — 93970 EXTREMITY STUDY: CPT

## 2025-08-08 PROCEDURE — P9073: CPT

## 2025-08-08 PROCEDURE — 86225 DNA ANTIBODY NATIVE: CPT

## 2025-08-08 PROCEDURE — 97165 OT EVAL LOW COMPLEX 30 MIN: CPT

## 2025-08-08 PROCEDURE — 93325 DOPPLER ECHO COLOR FLOW MAPG: CPT

## 2025-08-08 PROCEDURE — 85730 THROMBOPLASTIN TIME PARTIAL: CPT

## 2025-08-08 PROCEDURE — 86891 AUTOLOGOUS BLOOD OP SALVAGE: CPT

## 2025-08-08 PROCEDURE — 93308 TTE F-UP OR LMTD: CPT

## 2025-08-08 PROCEDURE — 87040 BLOOD CULTURE FOR BACTERIA: CPT

## 2025-08-08 PROCEDURE — 92612 ENDOSCOPY SWALLOW (FEES) VID: CPT

## 2025-08-08 PROCEDURE — 82330 ASSAY OF CALCIUM: CPT

## 2025-08-08 PROCEDURE — P9100: CPT

## 2025-08-08 PROCEDURE — 84443 ASSAY THYROID STIM HORMONE: CPT

## 2025-08-08 PROCEDURE — A9585: CPT

## 2025-08-08 PROCEDURE — 97530 THERAPEUTIC ACTIVITIES: CPT

## 2025-08-08 PROCEDURE — 86850 RBC ANTIBODY SCREEN: CPT

## 2025-08-08 PROCEDURE — 80053 COMPREHEN METABOLIC PANEL: CPT

## 2025-08-08 PROCEDURE — 94003 VENT MGMT INPAT SUBQ DAY: CPT

## 2025-08-08 PROCEDURE — 86305 HUMAN EPIDIDYMIS PROTEIN 4: CPT

## 2025-08-08 PROCEDURE — 86480 TB TEST CELL IMMUN MEASURE: CPT

## 2025-08-08 PROCEDURE — 82784 ASSAY IGA/IGD/IGG/IGM EACH: CPT

## 2025-08-08 PROCEDURE — P9012: CPT

## 2025-08-08 PROCEDURE — 99232 SBSQ HOSP IP/OBS MODERATE 35: CPT | Mod: GC,24

## 2025-08-08 PROCEDURE — 84484 ASSAY OF TROPONIN QUANT: CPT

## 2025-08-08 PROCEDURE — 36415 COLL VENOUS BLD VENIPUNCTURE: CPT

## 2025-08-08 PROCEDURE — 85610 PROTHROMBIN TIME: CPT

## 2025-08-08 PROCEDURE — 87340 HEPATITIS B SURFACE AG IA: CPT

## 2025-08-08 PROCEDURE — 82565 ASSAY OF CREATININE: CPT

## 2025-08-08 PROCEDURE — 84295 ASSAY OF SERUM SODIUM: CPT

## 2025-08-08 PROCEDURE — 76705 ECHO EXAM OF ABDOMEN: CPT

## 2025-08-08 PROCEDURE — 86901 BLOOD TYPING SEROLOGIC RH(D): CPT

## 2025-08-08 PROCEDURE — 85027 COMPLETE CBC AUTOMATED: CPT

## 2025-08-08 PROCEDURE — P9045: CPT

## 2025-08-08 PROCEDURE — 87385 HISTOPLASMA CAPSUL AG IA: CPT

## 2025-08-08 PROCEDURE — 86985 SPLIT BLOOD OR PRODUCTS: CPT

## 2025-08-08 PROCEDURE — 36430 TRANSFUSION BLD/BLD COMPNT: CPT

## 2025-08-08 PROCEDURE — 86160 COMPLEMENT ANTIGEN: CPT

## 2025-08-08 RX ORDER — GABAPENTIN 400 MG/1
100 CAPSULE ORAL THREE TIMES A DAY
Refills: 0 | Status: DISCONTINUED | OUTPATIENT
Start: 2025-08-08 | End: 2025-08-13

## 2025-08-08 RX ORDER — MIRTAZAPINE 30 MG/1
7.5 TABLET, FILM COATED ORAL AT BEDTIME
Refills: 0 | Status: DISCONTINUED | OUTPATIENT
Start: 2025-08-08 | End: 2025-08-13

## 2025-08-08 RX ORDER — VANCOMYCIN HCL IN 5 % DEXTROSE 1.5G/250ML
750 PLASTIC BAG, INJECTION (ML) INTRAVENOUS ONCE
Refills: 0 | Status: COMPLETED | OUTPATIENT
Start: 2025-08-08 | End: 2025-08-08

## 2025-08-08 RX ORDER — OXYCODONE HYDROCHLORIDE 30 MG/1
10 TABLET ORAL EVERY 6 HOURS
Refills: 0 | Status: DISCONTINUED | OUTPATIENT
Start: 2025-08-08 | End: 2025-08-13

## 2025-08-08 RX ORDER — TRAMADOL HYDROCHLORIDE 50 MG/1
50 TABLET, FILM COATED ORAL EVERY 6 HOURS
Refills: 0 | Status: DISCONTINUED | OUTPATIENT
Start: 2025-08-08 | End: 2025-08-13

## 2025-08-08 RX ORDER — OXYCODONE HYDROCHLORIDE 30 MG/1
5 TABLET ORAL EVERY 6 HOURS
Refills: 0 | Status: DISCONTINUED | OUTPATIENT
Start: 2025-08-08 | End: 2025-08-13

## 2025-08-08 RX ORDER — CYCLOSPORINE 100 MG/1
75 CAPSULE, LIQUID FILLED ORAL
Refills: 0 | Status: DISCONTINUED | OUTPATIENT
Start: 2025-08-08 | End: 2025-08-10

## 2025-08-08 RX ORDER — HYDROXYZINE HYDROCHLORIDE 25 MG/1
25 TABLET, FILM COATED ORAL
Refills: 0 | Status: DISCONTINUED | OUTPATIENT
Start: 2025-08-08 | End: 2025-08-13

## 2025-08-08 RX ADMIN — HEPARIN SODIUM 5000 UNIT(S): 1000 INJECTION INTRAVENOUS; SUBCUTANEOUS at 17:38

## 2025-08-08 RX ADMIN — Medication 40 MILLIGRAM(S): at 05:43

## 2025-08-08 RX ADMIN — OXYCODONE HYDROCHLORIDE 5 MILLIGRAM(S): 30 TABLET ORAL at 01:57

## 2025-08-08 RX ADMIN — POLYETHYLENE GLYCOL 3350 17 GRAM(S): 17 POWDER, FOR SOLUTION ORAL at 12:28

## 2025-08-08 RX ADMIN — LEVETIRACETAM 500 MILLIGRAM(S): 10 INJECTION, SOLUTION INTRAVENOUS at 17:39

## 2025-08-08 RX ADMIN — GABAPENTIN 100 MILLIGRAM(S): 400 CAPSULE ORAL at 21:08

## 2025-08-08 RX ADMIN — Medication 1 APPLICATION(S): at 12:32

## 2025-08-08 RX ADMIN — VALGANCICLOVIR 450 MILLIGRAM(S): 450 TABLET, FILM COATED ORAL at 12:27

## 2025-08-08 RX ADMIN — Medication 1 TABLET(S): at 12:27

## 2025-08-08 RX ADMIN — Medication 90 MILLIGRAM(S): at 08:35

## 2025-08-08 RX ADMIN — Medication 1 TABLET(S): at 17:39

## 2025-08-08 RX ADMIN — HEPARIN SODIUM 5000 UNIT(S): 1000 INJECTION INTRAVENOUS; SUBCUTANEOUS at 05:44

## 2025-08-08 RX ADMIN — MYCOPHENOLATE MOFETIL 1000 MILLIGRAM(S): 500 TABLET, FILM COATED ORAL at 21:07

## 2025-08-08 RX ADMIN — TRAMADOL HYDROCHLORIDE 50 MILLIGRAM(S): 50 TABLET, FILM COATED ORAL at 13:26

## 2025-08-08 RX ADMIN — OXYCODONE HYDROCHLORIDE 10 MILLIGRAM(S): 30 TABLET ORAL at 15:19

## 2025-08-08 RX ADMIN — LEVETIRACETAM 500 MILLIGRAM(S): 10 INJECTION, SOLUTION INTRAVENOUS at 05:43

## 2025-08-08 RX ADMIN — PREDNISONE 20 MILLIGRAM(S): 20 TABLET ORAL at 05:43

## 2025-08-08 RX ADMIN — URSODIOL 300 MILLIGRAM(S): 300 CAPSULE ORAL at 05:43

## 2025-08-08 RX ADMIN — Medication 250 MILLIGRAM(S): at 21:08

## 2025-08-08 RX ADMIN — OXYCODONE HYDROCHLORIDE 5 MILLIGRAM(S): 30 TABLET ORAL at 04:33

## 2025-08-08 RX ADMIN — Medication 1 TABLET(S): at 05:43

## 2025-08-08 RX ADMIN — URSODIOL 300 MILLIGRAM(S): 300 CAPSULE ORAL at 17:39

## 2025-08-08 RX ADMIN — MIRTAZAPINE 7.5 MILLIGRAM(S): 30 TABLET, FILM COATED ORAL at 22:44

## 2025-08-08 RX ADMIN — Medication 81 MILLIGRAM(S): at 12:27

## 2025-08-08 RX ADMIN — OXYCODONE HYDROCHLORIDE 5 MILLIGRAM(S): 30 TABLET ORAL at 05:30

## 2025-08-08 RX ADMIN — Medication 400 MILLIGRAM(S): at 12:27

## 2025-08-08 RX ADMIN — OXYCODONE HYDROCHLORIDE 5 MILLIGRAM(S): 30 TABLET ORAL at 00:57

## 2025-08-08 RX ADMIN — OXYCODONE HYDROCHLORIDE 10 MILLIGRAM(S): 30 TABLET ORAL at 16:19

## 2025-08-08 RX ADMIN — MYCOPHENOLATE MOFETIL 1000 MILLIGRAM(S): 500 TABLET, FILM COATED ORAL at 08:36

## 2025-08-08 RX ADMIN — HYDROXYZINE HYDROCHLORIDE 25 MILLIGRAM(S): 25 TABLET, FILM COATED ORAL at 17:38

## 2025-08-08 RX ADMIN — TRAMADOL HYDROCHLORIDE 50 MILLIGRAM(S): 50 TABLET, FILM COATED ORAL at 12:26

## 2025-08-08 RX ADMIN — CYCLOSPORINE 75 MILLIGRAM(S): 100 CAPSULE, LIQUID FILLED ORAL at 21:07

## 2025-08-08 RX ADMIN — CYCLOSPORINE 75 MILLIGRAM(S): 100 CAPSULE, LIQUID FILLED ORAL at 08:35

## 2025-08-09 LAB
ALBUMIN SERPL ELPH-MCNC: 3.2 G/DL — LOW (ref 3.3–5)
ALP SERPL-CCNC: 219 U/L — HIGH (ref 40–120)
ALT FLD-CCNC: 68 U/L — HIGH (ref 10–45)
ANION GAP SERPL CALC-SCNC: 15 MMOL/L — SIGNIFICANT CHANGE UP (ref 5–17)
APTT BLD: 25 SEC — LOW (ref 26.1–36.8)
AST SERPL-CCNC: 23 U/L — SIGNIFICANT CHANGE UP (ref 10–40)
BILIRUB SERPL-MCNC: 2 MG/DL — HIGH (ref 0.2–1.2)
BUN SERPL-MCNC: 13 MG/DL — SIGNIFICANT CHANGE UP (ref 7–23)
CALCIUM SERPL-MCNC: 9.3 MG/DL — SIGNIFICANT CHANGE UP (ref 8.4–10.5)
CHLORIDE SERPL-SCNC: 104 MMOL/L — SIGNIFICANT CHANGE UP (ref 96–108)
CO2 SERPL-SCNC: 19 MMOL/L — LOW (ref 22–31)
CREAT SERPL-MCNC: 1.04 MG/DL — SIGNIFICANT CHANGE UP (ref 0.5–1.3)
CYCLOSPORINE SER-MCNC: 161 NG/ML — SIGNIFICANT CHANGE UP (ref 150–400)
EGFR: 67 ML/MIN/1.73M2 — SIGNIFICANT CHANGE UP
EGFR: 67 ML/MIN/1.73M2 — SIGNIFICANT CHANGE UP
GLUCOSE BLDC GLUCOMTR-MCNC: 116 MG/DL — HIGH (ref 70–99)
GLUCOSE BLDC GLUCOMTR-MCNC: 131 MG/DL — HIGH (ref 70–99)
GLUCOSE SERPL-MCNC: 105 MG/DL — HIGH (ref 70–99)
HCT VFR BLD CALC: 31.3 % — LOW (ref 34.5–45)
HGB BLD-MCNC: 10.3 G/DL — LOW (ref 11.5–15.5)
INR BLD: 0.93 RATIO — SIGNIFICANT CHANGE UP (ref 0.85–1.16)
MAGNESIUM SERPL-MCNC: 1.4 MG/DL — LOW (ref 1.6–2.6)
MCHC RBC-ENTMCNC: 30.7 PG — SIGNIFICANT CHANGE UP (ref 27–34)
MCHC RBC-ENTMCNC: 32.9 G/DL — SIGNIFICANT CHANGE UP (ref 32–36)
MCV RBC AUTO: 93.2 FL — SIGNIFICANT CHANGE UP (ref 80–100)
NRBC # BLD AUTO: 0 K/UL — SIGNIFICANT CHANGE UP (ref 0–0)
NRBC # FLD: 0 K/UL — SIGNIFICANT CHANGE UP (ref 0–0)
NRBC BLD AUTO-RTO: 0 /100 WBCS — SIGNIFICANT CHANGE UP (ref 0–0)
PHOSPHATE SERPL-MCNC: 3.5 MG/DL — SIGNIFICANT CHANGE UP (ref 2.5–4.5)
PLATELET # BLD AUTO: 178 K/UL — SIGNIFICANT CHANGE UP (ref 150–400)
PMV BLD: 10.1 FL — SIGNIFICANT CHANGE UP (ref 7–13)
POTASSIUM SERPL-MCNC: 5.1 MMOL/L — SIGNIFICANT CHANGE UP (ref 3.5–5.3)
POTASSIUM SERPL-SCNC: 5.1 MMOL/L — SIGNIFICANT CHANGE UP (ref 3.5–5.3)
PROT SERPL-MCNC: 5.3 G/DL — LOW (ref 6–8.3)
PROTHROM AB SERPL-ACNC: 10.6 SEC — SIGNIFICANT CHANGE UP (ref 9.9–13.4)
RBC # BLD: 3.36 M/UL — LOW (ref 3.8–5.2)
RBC # FLD: 18.3 % — HIGH (ref 10.3–14.5)
SODIUM SERPL-SCNC: 138 MMOL/L — SIGNIFICANT CHANGE UP (ref 135–145)
TRIGL SERPL-MCNC: 241 MG/DL — HIGH
WBC # BLD: 7.59 K/UL — SIGNIFICANT CHANGE UP (ref 3.8–10.5)
WBC # FLD AUTO: 7.59 K/UL — SIGNIFICANT CHANGE UP (ref 3.8–10.5)

## 2025-08-09 RX ORDER — MAGNESIUM SULFATE 500 MG/ML
2 SYRINGE (ML) INJECTION ONCE
Refills: 0 | Status: COMPLETED | OUTPATIENT
Start: 2025-08-09 | End: 2025-08-09

## 2025-08-09 RX ORDER — MYCOPHENOLATE MOFETIL 500 MG/1
500 TABLET, FILM COATED ORAL
Refills: 0 | Status: DISCONTINUED | OUTPATIENT
Start: 2025-08-09 | End: 2025-08-09

## 2025-08-09 RX ORDER — URSODIOL 300 MG/1
300 CAPSULE ORAL EVERY 12 HOURS
Refills: 0 | Status: DISCONTINUED | OUTPATIENT
Start: 2025-08-09 | End: 2025-08-13

## 2025-08-09 RX ORDER — MAGNESIUM OXIDE 400 MG
400 TABLET ORAL
Refills: 0 | Status: DISCONTINUED | OUTPATIENT
Start: 2025-08-09 | End: 2025-08-12

## 2025-08-09 RX ADMIN — GABAPENTIN 100 MILLIGRAM(S): 400 CAPSULE ORAL at 05:27

## 2025-08-09 RX ADMIN — OXYCODONE HYDROCHLORIDE 5 MILLIGRAM(S): 30 TABLET ORAL at 14:20

## 2025-08-09 RX ADMIN — MYCOPHENOLATE MOFETIL 1000 MILLIGRAM(S): 500 TABLET, FILM COATED ORAL at 08:43

## 2025-08-09 RX ADMIN — OXYCODONE HYDROCHLORIDE 5 MILLIGRAM(S): 30 TABLET ORAL at 13:20

## 2025-08-09 RX ADMIN — Medication 400 MILLIGRAM(S): at 13:14

## 2025-08-09 RX ADMIN — Medication 2 TABLET(S): at 21:59

## 2025-08-09 RX ADMIN — Medication 1 APPLICATION(S): at 19:05

## 2025-08-09 RX ADMIN — POLYETHYLENE GLYCOL 3350 17 GRAM(S): 17 POWDER, FOR SOLUTION ORAL at 13:16

## 2025-08-09 RX ADMIN — HEPARIN SODIUM 5000 UNIT(S): 1000 INJECTION INTRAVENOUS; SUBCUTANEOUS at 19:06

## 2025-08-09 RX ADMIN — Medication 90 MILLIGRAM(S): at 06:27

## 2025-08-09 RX ADMIN — CYCLOSPORINE 75 MILLIGRAM(S): 100 CAPSULE, LIQUID FILLED ORAL at 19:33

## 2025-08-09 RX ADMIN — URSODIOL 300 MILLIGRAM(S): 300 CAPSULE ORAL at 19:06

## 2025-08-09 RX ADMIN — GABAPENTIN 100 MILLIGRAM(S): 400 CAPSULE ORAL at 21:59

## 2025-08-09 RX ADMIN — VALGANCICLOVIR 450 MILLIGRAM(S): 450 TABLET, FILM COATED ORAL at 13:15

## 2025-08-09 RX ADMIN — CYCLOSPORINE 75 MILLIGRAM(S): 100 CAPSULE, LIQUID FILLED ORAL at 08:43

## 2025-08-09 RX ADMIN — PREDNISONE 20 MILLIGRAM(S): 20 TABLET ORAL at 05:27

## 2025-08-09 RX ADMIN — MYCOPHENOLATE MOFETIL 1000 MILLIGRAM(S): 500 TABLET, FILM COATED ORAL at 19:33

## 2025-08-09 RX ADMIN — HEPARIN SODIUM 5000 UNIT(S): 1000 INJECTION INTRAVENOUS; SUBCUTANEOUS at 05:29

## 2025-08-09 RX ADMIN — Medication 1 TABLET(S): at 13:15

## 2025-08-09 RX ADMIN — URSODIOL 300 MILLIGRAM(S): 300 CAPSULE ORAL at 05:28

## 2025-08-09 RX ADMIN — Medication 1 TABLET(S): at 19:05

## 2025-08-09 RX ADMIN — LEVETIRACETAM 500 MILLIGRAM(S): 10 INJECTION, SOLUTION INTRAVENOUS at 05:28

## 2025-08-09 RX ADMIN — Medication 81 MILLIGRAM(S): at 13:15

## 2025-08-09 RX ADMIN — LEVETIRACETAM 500 MILLIGRAM(S): 10 INJECTION, SOLUTION INTRAVENOUS at 19:06

## 2025-08-09 RX ADMIN — GABAPENTIN 100 MILLIGRAM(S): 400 CAPSULE ORAL at 13:16

## 2025-08-09 RX ADMIN — OXYCODONE HYDROCHLORIDE 5 MILLIGRAM(S): 30 TABLET ORAL at 19:56

## 2025-08-09 RX ADMIN — Medication 1 TABLET(S): at 05:28

## 2025-08-09 RX ADMIN — Medication 25 GRAM(S): at 13:14

## 2025-08-09 RX ADMIN — Medication 400 MILLIGRAM(S): at 19:06

## 2025-08-09 RX ADMIN — OXYCODONE HYDROCHLORIDE 5 MILLIGRAM(S): 30 TABLET ORAL at 20:56

## 2025-08-09 RX ADMIN — MIRTAZAPINE 7.5 MILLIGRAM(S): 30 TABLET, FILM COATED ORAL at 22:52

## 2025-08-09 RX ADMIN — Medication 40 MILLIGRAM(S): at 05:28

## 2025-08-10 DIAGNOSIS — D84.9 IMMUNODEFICIENCY, UNSPECIFIED: ICD-10-CM

## 2025-08-10 DIAGNOSIS — F10.10 ALCOHOL ABUSE, UNCOMPLICATED: ICD-10-CM

## 2025-08-10 LAB
ALBUMIN SERPL ELPH-MCNC: 3.3 G/DL — SIGNIFICANT CHANGE UP (ref 3.3–5)
ALP SERPL-CCNC: 221 U/L — HIGH (ref 40–120)
ALT FLD-CCNC: 62 U/L — HIGH (ref 10–45)
ANION GAP SERPL CALC-SCNC: 13 MMOL/L — SIGNIFICANT CHANGE UP (ref 5–17)
APTT BLD: 24.5 SEC — LOW (ref 26.1–36.8)
AST SERPL-CCNC: 28 U/L — SIGNIFICANT CHANGE UP (ref 10–40)
BILIRUB SERPL-MCNC: 2 MG/DL — HIGH (ref 0.2–1.2)
BUN SERPL-MCNC: 13 MG/DL — SIGNIFICANT CHANGE UP (ref 7–23)
CALCIUM SERPL-MCNC: 9.2 MG/DL — SIGNIFICANT CHANGE UP (ref 8.4–10.5)
CHLORIDE SERPL-SCNC: 104 MMOL/L — SIGNIFICANT CHANGE UP (ref 96–108)
CO2 SERPL-SCNC: 21 MMOL/L — LOW (ref 22–31)
CREAT SERPL-MCNC: 1.11 MG/DL — SIGNIFICANT CHANGE UP (ref 0.5–1.3)
CYCLOSPORINE SER-MCNC: 120 NG/ML — LOW (ref 150–400)
EGFR: 62 ML/MIN/1.73M2 — SIGNIFICANT CHANGE UP
EGFR: 62 ML/MIN/1.73M2 — SIGNIFICANT CHANGE UP
GLUCOSE BLDC GLUCOMTR-MCNC: 129 MG/DL — HIGH (ref 70–99)
GLUCOSE BLDC GLUCOMTR-MCNC: 99 MG/DL — SIGNIFICANT CHANGE UP (ref 70–99)
GLUCOSE SERPL-MCNC: 97 MG/DL — SIGNIFICANT CHANGE UP (ref 70–99)
HCT VFR BLD CALC: 32.4 % — LOW (ref 34.5–45)
HGB BLD-MCNC: 10.5 G/DL — LOW (ref 11.5–15.5)
INR BLD: 0.89 RATIO — SIGNIFICANT CHANGE UP (ref 0.85–1.16)
MAGNESIUM SERPL-MCNC: 1.6 MG/DL — SIGNIFICANT CHANGE UP (ref 1.6–2.6)
MCHC RBC-ENTMCNC: 30.8 PG — SIGNIFICANT CHANGE UP (ref 27–34)
MCHC RBC-ENTMCNC: 32.4 G/DL — SIGNIFICANT CHANGE UP (ref 32–36)
MCV RBC AUTO: 95 FL — SIGNIFICANT CHANGE UP (ref 80–100)
NRBC # BLD AUTO: 0 K/UL — SIGNIFICANT CHANGE UP (ref 0–0)
NRBC # FLD: 0 K/UL — SIGNIFICANT CHANGE UP (ref 0–0)
NRBC BLD AUTO-RTO: 0 /100 WBCS — SIGNIFICANT CHANGE UP (ref 0–0)
PHOSPHATE SERPL-MCNC: 3.9 MG/DL — SIGNIFICANT CHANGE UP (ref 2.5–4.5)
PLATELET # BLD AUTO: 190 K/UL — SIGNIFICANT CHANGE UP (ref 150–400)
PMV BLD: 9.5 FL — SIGNIFICANT CHANGE UP (ref 7–13)
POTASSIUM SERPL-MCNC: 4.9 MMOL/L — SIGNIFICANT CHANGE UP (ref 3.5–5.3)
POTASSIUM SERPL-SCNC: 4.9 MMOL/L — SIGNIFICANT CHANGE UP (ref 3.5–5.3)
PROT SERPL-MCNC: 5.6 G/DL — LOW (ref 6–8.3)
PROTHROM AB SERPL-ACNC: 10.3 SEC — SIGNIFICANT CHANGE UP (ref 9.9–13.4)
RBC # BLD: 3.41 M/UL — LOW (ref 3.8–5.2)
RBC # FLD: 18.2 % — HIGH (ref 10.3–14.5)
SODIUM SERPL-SCNC: 138 MMOL/L — SIGNIFICANT CHANGE UP (ref 135–145)
TRIGL SERPL-MCNC: 258 MG/DL — HIGH
WBC # BLD: 7.28 K/UL — SIGNIFICANT CHANGE UP (ref 3.8–10.5)
WBC # FLD AUTO: 7.28 K/UL — SIGNIFICANT CHANGE UP (ref 3.8–10.5)

## 2025-08-10 RX ORDER — NIFEDIPINE 30 MG
1 TABLET, EXTENDED RELEASE 24 HR ORAL
Qty: 0 | Refills: 0 | DISCHARGE
Start: 2025-08-10

## 2025-08-10 RX ORDER — URSODIOL 300 MG/1
1 CAPSULE ORAL
Qty: 0 | Refills: 0 | DISCHARGE
Start: 2025-08-10

## 2025-08-10 RX ORDER — MELATONIN 5 MG
1 TABLET ORAL
Qty: 0 | Refills: 0 | DISCHARGE
Start: 2025-08-10

## 2025-08-10 RX ORDER — PREDNISONE 20 MG/1
1 TABLET ORAL
Qty: 0 | Refills: 0 | DISCHARGE
Start: 2025-08-10

## 2025-08-10 RX ORDER — MYCOPHENOLATE MOFETIL 500 MG/1
1000 TABLET, FILM COATED ORAL
Qty: 0 | Refills: 0 | DISCHARGE
Start: 2025-08-10

## 2025-08-10 RX ORDER — GABAPENTIN 400 MG/1
1 CAPSULE ORAL
Qty: 0 | Refills: 0 | DISCHARGE
Start: 2025-08-10

## 2025-08-10 RX ORDER — MAGNESIUM SULFATE 500 MG/ML
1 SYRINGE (ML) INJECTION ONCE
Refills: 0 | Status: COMPLETED | OUTPATIENT
Start: 2025-08-10 | End: 2025-08-10

## 2025-08-10 RX ORDER — MIRTAZAPINE 30 MG/1
1 TABLET, FILM COATED ORAL
Qty: 0 | Refills: 0 | DISCHARGE
Start: 2025-08-10

## 2025-08-10 RX ORDER — POLYETHYLENE GLYCOL 3350 17 G/17G
17 POWDER, FOR SOLUTION ORAL
Qty: 0 | Refills: 0 | DISCHARGE
Start: 2025-08-10

## 2025-08-10 RX ORDER — SULFAMETHOXAZOLE/TRIMETHOPRIM 800-160 MG
1 TABLET ORAL
Qty: 0 | Refills: 0 | DISCHARGE
Start: 2025-08-10

## 2025-08-10 RX ORDER — CYCLOSPORINE 100 MG/1
75 CAPSULE, LIQUID FILLED ORAL
Refills: 0 | Status: DISCONTINUED | OUTPATIENT
Start: 2025-08-10 | End: 2025-08-11

## 2025-08-10 RX ORDER — FLUCONAZOLE 150 MG
2 TABLET ORAL
Qty: 0 | Refills: 0 | DISCHARGE
Start: 2025-08-10

## 2025-08-10 RX ORDER — CALCIUM CARBONATE/VITAMIN D3 500MG-5MCG
1 TABLET ORAL
Qty: 0 | Refills: 0 | DISCHARGE
Start: 2025-08-10

## 2025-08-10 RX ORDER — LEVETIRACETAM 10 MG/ML
1 INJECTION, SOLUTION INTRAVENOUS
Qty: 0 | Refills: 0 | DISCHARGE
Start: 2025-08-10

## 2025-08-10 RX ORDER — ASPIRIN 325 MG
1 TABLET ORAL
Qty: 0 | Refills: 0 | DISCHARGE
Start: 2025-08-10

## 2025-08-10 RX ORDER — SENNA 187 MG
2 TABLET ORAL
Qty: 0 | Refills: 0 | DISCHARGE
Start: 2025-08-10

## 2025-08-10 RX ORDER — CYCLOSPORINE 100 MG/1
25 CAPSULE, LIQUID FILLED ORAL ONCE
Refills: 0 | Status: COMPLETED | OUTPATIENT
Start: 2025-08-10 | End: 2025-08-10

## 2025-08-10 RX ORDER — VALGANCICLOVIR 450 MG/1
1 TABLET, FILM COATED ORAL
Qty: 0 | Refills: 0 | DISCHARGE
Start: 2025-08-10

## 2025-08-10 RX ORDER — CYCLOSPORINE 100 MG/1
100 CAPSULE, LIQUID FILLED ORAL
Refills: 0 | Status: DISCONTINUED | OUTPATIENT
Start: 2025-08-11 | End: 2025-08-11

## 2025-08-10 RX ORDER — MAGNESIUM OXIDE 400 MG
1 TABLET ORAL
Qty: 0 | Refills: 0 | DISCHARGE
Start: 2025-08-10

## 2025-08-10 RX ADMIN — GABAPENTIN 100 MILLIGRAM(S): 400 CAPSULE ORAL at 12:02

## 2025-08-10 RX ADMIN — Medication 1 APPLICATION(S): at 18:10

## 2025-08-10 RX ADMIN — GABAPENTIN 100 MILLIGRAM(S): 400 CAPSULE ORAL at 21:43

## 2025-08-10 RX ADMIN — CYCLOSPORINE 25 MILLIGRAM(S): 100 CAPSULE, LIQUID FILLED ORAL at 17:47

## 2025-08-10 RX ADMIN — OXYCODONE HYDROCHLORIDE 10 MILLIGRAM(S): 30 TABLET ORAL at 13:01

## 2025-08-10 RX ADMIN — Medication 2 TABLET(S): at 21:44

## 2025-08-10 RX ADMIN — Medication 400 MILLIGRAM(S): at 12:00

## 2025-08-10 RX ADMIN — MYCOPHENOLATE MOFETIL 1000 MILLIGRAM(S): 500 TABLET, FILM COATED ORAL at 09:28

## 2025-08-10 RX ADMIN — Medication 400 MILLIGRAM(S): at 09:28

## 2025-08-10 RX ADMIN — CYCLOSPORINE 75 MILLIGRAM(S): 100 CAPSULE, LIQUID FILLED ORAL at 09:28

## 2025-08-10 RX ADMIN — OXYCODONE HYDROCHLORIDE 10 MILLIGRAM(S): 30 TABLET ORAL at 12:01

## 2025-08-10 RX ADMIN — PREDNISONE 20 MILLIGRAM(S): 20 TABLET ORAL at 06:13

## 2025-08-10 RX ADMIN — OXYCODONE HYDROCHLORIDE 10 MILLIGRAM(S): 30 TABLET ORAL at 02:38

## 2025-08-10 RX ADMIN — Medication 1 TABLET(S): at 06:13

## 2025-08-10 RX ADMIN — OXYCODONE HYDROCHLORIDE 10 MILLIGRAM(S): 30 TABLET ORAL at 20:23

## 2025-08-10 RX ADMIN — VALGANCICLOVIR 450 MILLIGRAM(S): 450 TABLET, FILM COATED ORAL at 12:02

## 2025-08-10 RX ADMIN — POLYETHYLENE GLYCOL 3350 17 GRAM(S): 17 POWDER, FOR SOLUTION ORAL at 12:02

## 2025-08-10 RX ADMIN — HEPARIN SODIUM 5000 UNIT(S): 1000 INJECTION INTRAVENOUS; SUBCUTANEOUS at 17:48

## 2025-08-10 RX ADMIN — URSODIOL 300 MILLIGRAM(S): 300 CAPSULE ORAL at 06:13

## 2025-08-10 RX ADMIN — OXYCODONE HYDROCHLORIDE 10 MILLIGRAM(S): 30 TABLET ORAL at 03:38

## 2025-08-10 RX ADMIN — MYCOPHENOLATE MOFETIL 1000 MILLIGRAM(S): 500 TABLET, FILM COATED ORAL at 20:09

## 2025-08-10 RX ADMIN — URSODIOL 300 MILLIGRAM(S): 300 CAPSULE ORAL at 17:47

## 2025-08-10 RX ADMIN — Medication 400 MILLIGRAM(S): at 17:47

## 2025-08-10 RX ADMIN — LEVETIRACETAM 500 MILLIGRAM(S): 10 INJECTION, SOLUTION INTRAVENOUS at 17:46

## 2025-08-10 RX ADMIN — GABAPENTIN 100 MILLIGRAM(S): 400 CAPSULE ORAL at 06:13

## 2025-08-10 RX ADMIN — Medication 100 GRAM(S): at 09:27

## 2025-08-10 RX ADMIN — HEPARIN SODIUM 5000 UNIT(S): 1000 INJECTION INTRAVENOUS; SUBCUTANEOUS at 06:14

## 2025-08-10 RX ADMIN — Medication 1 TABLET(S): at 17:47

## 2025-08-10 RX ADMIN — Medication 40 MILLIGRAM(S): at 06:13

## 2025-08-10 RX ADMIN — Medication 81 MILLIGRAM(S): at 12:01

## 2025-08-10 RX ADMIN — CYCLOSPORINE 75 MILLIGRAM(S): 100 CAPSULE, LIQUID FILLED ORAL at 20:10

## 2025-08-10 RX ADMIN — OXYCODONE HYDROCHLORIDE 10 MILLIGRAM(S): 30 TABLET ORAL at 21:23

## 2025-08-10 RX ADMIN — LEVETIRACETAM 500 MILLIGRAM(S): 10 INJECTION, SOLUTION INTRAVENOUS at 06:13

## 2025-08-10 RX ADMIN — Medication 1 TABLET(S): at 12:02

## 2025-08-11 ENCOUNTER — TRANSCRIPTION ENCOUNTER (OUTPATIENT)
Age: 45
End: 2025-08-11

## 2025-08-11 LAB
ALBUMIN SERPL ELPH-MCNC: 3.1 G/DL — LOW (ref 3.3–5)
ALBUMIN SERPL ELPH-MCNC: 4.3 G/DL — SIGNIFICANT CHANGE UP (ref 3.3–5)
ALP SERPL-CCNC: 215 U/L — HIGH (ref 40–120)
ALP SERPL-CCNC: 235 U/L — HIGH (ref 40–120)
ALT FLD-CCNC: 46 U/L — HIGH (ref 10–45)
ALT FLD-CCNC: 55 U/L — HIGH (ref 10–45)
ANION GAP SERPL CALC-SCNC: 13 MMOL/L — SIGNIFICANT CHANGE UP (ref 5–17)
ANION GAP SERPL CALC-SCNC: 15 MMOL/L — SIGNIFICANT CHANGE UP (ref 5–17)
APTT BLD: 25.1 SEC — LOW (ref 26.1–36.8)
AST SERPL-CCNC: 19 U/L — SIGNIFICANT CHANGE UP (ref 10–40)
AST SERPL-CCNC: 23 U/L — SIGNIFICANT CHANGE UP (ref 10–40)
BILIRUB SERPL-MCNC: 1.6 MG/DL — HIGH (ref 0.2–1.2)
BILIRUB SERPL-MCNC: 1.6 MG/DL — HIGH (ref 0.2–1.2)
BLD GP AB SCN SERPL QL: NEGATIVE — SIGNIFICANT CHANGE UP
BUN SERPL-MCNC: 16 MG/DL — SIGNIFICANT CHANGE UP (ref 7–23)
BUN SERPL-MCNC: 20 MG/DL — SIGNIFICANT CHANGE UP (ref 7–23)
CALCIUM SERPL-MCNC: 9.3 MG/DL — SIGNIFICANT CHANGE UP (ref 8.4–10.5)
CALCIUM SERPL-MCNC: 9.6 MG/DL — SIGNIFICANT CHANGE UP (ref 8.4–10.5)
CHLORIDE SERPL-SCNC: 102 MMOL/L — SIGNIFICANT CHANGE UP (ref 96–108)
CHLORIDE SERPL-SCNC: 105 MMOL/L — SIGNIFICANT CHANGE UP (ref 96–108)
CO2 SERPL-SCNC: 19 MMOL/L — LOW (ref 22–31)
CO2 SERPL-SCNC: 20 MMOL/L — LOW (ref 22–31)
CREAT SERPL-MCNC: 1.44 MG/DL — HIGH (ref 0.5–1.3)
CREAT SERPL-MCNC: 1.45 MG/DL — HIGH (ref 0.5–1.3)
CYCLOSPORINE SER-MCNC: 170 NG/ML — SIGNIFICANT CHANGE UP (ref 150–400)
EGFR: 45 ML/MIN/1.73M2 — LOW
GLUCOSE BLDC GLUCOMTR-MCNC: 110 MG/DL — HIGH (ref 70–99)
GLUCOSE BLDC GLUCOMTR-MCNC: 119 MG/DL — HIGH (ref 70–99)
GLUCOSE BLDC GLUCOMTR-MCNC: 123 MG/DL — HIGH (ref 70–99)
GLUCOSE BLDC GLUCOMTR-MCNC: 95 MG/DL — SIGNIFICANT CHANGE UP (ref 70–99)
GLUCOSE SERPL-MCNC: 117 MG/DL — HIGH (ref 70–99)
GLUCOSE SERPL-MCNC: 82 MG/DL — SIGNIFICANT CHANGE UP (ref 70–99)
HCT VFR BLD CALC: 32.6 % — LOW (ref 34.5–45)
HGB BLD-MCNC: 10.4 G/DL — LOW (ref 11.5–15.5)
INR BLD: 0.87 RATIO — SIGNIFICANT CHANGE UP (ref 0.85–1.16)
MAGNESIUM SERPL-MCNC: 1.9 MG/DL — SIGNIFICANT CHANGE UP (ref 1.6–2.6)
MCHC RBC-ENTMCNC: 30.8 PG — SIGNIFICANT CHANGE UP (ref 27–34)
MCHC RBC-ENTMCNC: 31.9 G/DL — LOW (ref 32–36)
MCV RBC AUTO: 96.4 FL — SIGNIFICANT CHANGE UP (ref 80–100)
NRBC # BLD AUTO: 0 K/UL — SIGNIFICANT CHANGE UP (ref 0–0)
NRBC # FLD: 0 K/UL — SIGNIFICANT CHANGE UP (ref 0–0)
NRBC BLD AUTO-RTO: 0 /100 WBCS — SIGNIFICANT CHANGE UP (ref 0–0)
PHOSPHATE SERPL-MCNC: 4.1 MG/DL — SIGNIFICANT CHANGE UP (ref 2.5–4.5)
PLATELET # BLD AUTO: 195 K/UL — SIGNIFICANT CHANGE UP (ref 150–400)
PMV BLD: 9.9 FL — SIGNIFICANT CHANGE UP (ref 7–13)
POTASSIUM SERPL-MCNC: 5.5 MMOL/L — HIGH (ref 3.5–5.3)
POTASSIUM SERPL-MCNC: 5.8 MMOL/L — HIGH (ref 3.5–5.3)
POTASSIUM SERPL-SCNC: 5.5 MMOL/L — HIGH (ref 3.5–5.3)
POTASSIUM SERPL-SCNC: 5.8 MMOL/L — HIGH (ref 3.5–5.3)
PROT SERPL-MCNC: 5.7 G/DL — LOW (ref 6–8.3)
PROT SERPL-MCNC: 6.7 G/DL — SIGNIFICANT CHANGE UP (ref 6–8.3)
PROTHROM AB SERPL-ACNC: 9.9 SEC — SIGNIFICANT CHANGE UP (ref 9.9–13.4)
RBC # BLD: 3.38 M/UL — LOW (ref 3.8–5.2)
RBC # FLD: 18.1 % — HIGH (ref 10.3–14.5)
RH IG SCN BLD-IMP: POSITIVE — SIGNIFICANT CHANGE UP
SODIUM SERPL-SCNC: 136 MMOL/L — SIGNIFICANT CHANGE UP (ref 135–145)
SODIUM SERPL-SCNC: 138 MMOL/L — SIGNIFICANT CHANGE UP (ref 135–145)
TRIGL SERPL-MCNC: 288 MG/DL — HIGH
WBC # BLD: 9.11 K/UL — SIGNIFICANT CHANGE UP (ref 3.8–10.5)
WBC # FLD AUTO: 9.11 K/UL — SIGNIFICANT CHANGE UP (ref 3.8–10.5)

## 2025-08-11 RX ORDER — SODIUM CHLORIDE 9 G/1000ML
1000 INJECTION, SOLUTION INTRAVENOUS
Refills: 0 | Status: DISCONTINUED | OUTPATIENT
Start: 2025-08-11 | End: 2025-08-13

## 2025-08-11 RX ORDER — DEXTROSE 50 % IN WATER 50 %
12.5 SYRINGE (ML) INTRAVENOUS ONCE
Refills: 0 | Status: DISCONTINUED | OUTPATIENT
Start: 2025-08-11 | End: 2025-08-13

## 2025-08-11 RX ORDER — OXYCODONE HYDROCHLORIDE 30 MG/1
2 TABLET ORAL
Qty: 20 | Refills: 0
Start: 2025-08-11 | End: 2025-08-15

## 2025-08-11 RX ORDER — SODIUM ZIRCONIUM CYCLOSILICATE 5 G/5G
10 POWDER, FOR SUSPENSION ORAL ONCE
Refills: 0 | Status: COMPLETED | OUTPATIENT
Start: 2025-08-11 | End: 2025-08-11

## 2025-08-11 RX ORDER — DEXTROSE 50 % IN WATER 50 %
25 SYRINGE (ML) INTRAVENOUS ONCE
Refills: 0 | Status: DISCONTINUED | OUTPATIENT
Start: 2025-08-11 | End: 2025-08-13

## 2025-08-11 RX ORDER — MIRTAZAPINE 7.5 MG/1
7.5 TABLET, FILM COATED ORAL
Qty: 30 | Refills: 2 | Status: ACTIVE | COMMUNITY
Start: 2025-08-11 | End: 1900-01-01

## 2025-08-11 RX ORDER — SODIUM ZIRCONIUM CYCLOSILICATE 5 G/5G
1 POWDER, FOR SUSPENSION ORAL
Qty: 30 | Refills: 0
Start: 2025-08-11 | End: 2025-09-09

## 2025-08-11 RX ORDER — ALBUMIN (HUMAN) 12.5 G/50ML
100 INJECTION, SOLUTION INTRAVENOUS ONCE
Refills: 0 | Status: COMPLETED | OUTPATIENT
Start: 2025-08-11 | End: 2025-08-11

## 2025-08-11 RX ORDER — CYCLOSPORINE 100 MG/1
50 CAPSULE, LIQUID FILLED ORAL ONCE
Refills: 0 | Status: COMPLETED | OUTPATIENT
Start: 2025-08-11 | End: 2025-08-11

## 2025-08-11 RX ORDER — GABAPENTIN 100 MG/1
100 CAPSULE ORAL
Qty: 90 | Refills: 5 | Status: ACTIVE | COMMUNITY
Start: 2025-08-11 | End: 1900-01-01

## 2025-08-11 RX ORDER — SODIUM ZIRCONIUM CYCLOSILICATE 5 G/5G
1 POWDER, FOR SUSPENSION ORAL
Refills: 0 | DISCHARGE

## 2025-08-11 RX ORDER — ALBUMIN (HUMAN) 12.5 G/50ML
100 INJECTION, SOLUTION INTRAVENOUS ONCE
Refills: 0 | Status: DISCONTINUED | OUTPATIENT
Start: 2025-08-11 | End: 2025-08-11

## 2025-08-11 RX ORDER — DEXTROSE 50 % IN WATER 50 %
50 SYRINGE (ML) INTRAVENOUS ONCE
Refills: 0 | Status: COMPLETED | OUTPATIENT
Start: 2025-08-11 | End: 2025-08-11

## 2025-08-11 RX ORDER — CYCLOSPORINE 100 MG/1
75 CAPSULE, LIQUID FILLED ORAL
Refills: 0 | Status: DISCONTINUED | OUTPATIENT
Start: 2025-08-12 | End: 2025-08-12

## 2025-08-11 RX ADMIN — Medication 5 MILLIGRAM(S): at 22:16

## 2025-08-11 RX ADMIN — OXYCODONE HYDROCHLORIDE 5 MILLIGRAM(S): 30 TABLET ORAL at 18:10

## 2025-08-11 RX ADMIN — Medication 81 MILLIGRAM(S): at 12:21

## 2025-08-11 RX ADMIN — URSODIOL 300 MILLIGRAM(S): 300 CAPSULE ORAL at 17:32

## 2025-08-11 RX ADMIN — ALBUMIN (HUMAN) 50 MILLILITER(S): 12.5 INJECTION, SOLUTION INTRAVENOUS at 17:30

## 2025-08-11 RX ADMIN — MYCOPHENOLATE MOFETIL 1000 MILLIGRAM(S): 500 TABLET, FILM COATED ORAL at 19:45

## 2025-08-11 RX ADMIN — Medication 40 MILLIGRAM(S): at 05:02

## 2025-08-11 RX ADMIN — OXYCODONE HYDROCHLORIDE 5 MILLIGRAM(S): 30 TABLET ORAL at 02:08

## 2025-08-11 RX ADMIN — CYCLOSPORINE 50 MILLIGRAM(S): 100 CAPSULE, LIQUID FILLED ORAL at 19:46

## 2025-08-11 RX ADMIN — CYCLOSPORINE 100 MILLIGRAM(S): 100 CAPSULE, LIQUID FILLED ORAL at 08:52

## 2025-08-11 RX ADMIN — OXYCODONE HYDROCHLORIDE 10 MILLIGRAM(S): 30 TABLET ORAL at 13:00

## 2025-08-11 RX ADMIN — OXYCODONE HYDROCHLORIDE 5 MILLIGRAM(S): 30 TABLET ORAL at 09:21

## 2025-08-11 RX ADMIN — SODIUM ZIRCONIUM CYCLOSILICATE 10 GRAM(S): 5 POWDER, FOR SUSPENSION ORAL at 12:21

## 2025-08-11 RX ADMIN — POLYETHYLENE GLYCOL 3350 17 GRAM(S): 17 POWDER, FOR SOLUTION ORAL at 12:21

## 2025-08-11 RX ADMIN — Medication 90 MILLIGRAM(S): at 05:02

## 2025-08-11 RX ADMIN — Medication 400 MILLIGRAM(S): at 08:53

## 2025-08-11 RX ADMIN — OXYCODONE HYDROCHLORIDE 5 MILLIGRAM(S): 30 TABLET ORAL at 01:08

## 2025-08-11 RX ADMIN — Medication 1 TABLET(S): at 17:30

## 2025-08-11 RX ADMIN — Medication 1 APPLICATION(S): at 12:24

## 2025-08-11 RX ADMIN — GABAPENTIN 100 MILLIGRAM(S): 400 CAPSULE ORAL at 22:16

## 2025-08-11 RX ADMIN — OXYCODONE HYDROCHLORIDE 5 MILLIGRAM(S): 30 TABLET ORAL at 17:30

## 2025-08-11 RX ADMIN — HEPARIN SODIUM 5000 UNIT(S): 1000 INJECTION INTRAVENOUS; SUBCUTANEOUS at 17:38

## 2025-08-11 RX ADMIN — LEVETIRACETAM 500 MILLIGRAM(S): 10 INJECTION, SOLUTION INTRAVENOUS at 05:01

## 2025-08-11 RX ADMIN — GABAPENTIN 100 MILLIGRAM(S): 400 CAPSULE ORAL at 14:50

## 2025-08-11 RX ADMIN — Medication 1 TABLET(S): at 05:02

## 2025-08-11 RX ADMIN — MIRTAZAPINE 7.5 MILLIGRAM(S): 30 TABLET, FILM COATED ORAL at 22:17

## 2025-08-11 RX ADMIN — VALGANCICLOVIR 450 MILLIGRAM(S): 450 TABLET, FILM COATED ORAL at 12:21

## 2025-08-11 RX ADMIN — LEVETIRACETAM 500 MILLIGRAM(S): 10 INJECTION, SOLUTION INTRAVENOUS at 17:31

## 2025-08-11 RX ADMIN — URSODIOL 300 MILLIGRAM(S): 300 CAPSULE ORAL at 05:02

## 2025-08-11 RX ADMIN — OXYCODONE HYDROCHLORIDE 5 MILLIGRAM(S): 30 TABLET ORAL at 10:00

## 2025-08-11 RX ADMIN — OXYCODONE HYDROCHLORIDE 10 MILLIGRAM(S): 30 TABLET ORAL at 12:24

## 2025-08-11 RX ADMIN — OXYCODONE HYDROCHLORIDE 10 MILLIGRAM(S): 30 TABLET ORAL at 23:31

## 2025-08-11 RX ADMIN — Medication 400 MILLIGRAM(S): at 17:31

## 2025-08-11 RX ADMIN — Medication 400 MILLIGRAM(S): at 12:20

## 2025-08-11 RX ADMIN — MYCOPHENOLATE MOFETIL 1000 MILLIGRAM(S): 500 TABLET, FILM COATED ORAL at 08:53

## 2025-08-11 RX ADMIN — GABAPENTIN 100 MILLIGRAM(S): 400 CAPSULE ORAL at 05:01

## 2025-08-11 RX ADMIN — ALBUMIN (HUMAN) 50 MILLILITER(S): 12.5 INJECTION, SOLUTION INTRAVENOUS at 12:21

## 2025-08-11 RX ADMIN — HEPARIN SODIUM 5000 UNIT(S): 1000 INJECTION INTRAVENOUS; SUBCUTANEOUS at 05:02

## 2025-08-11 RX ADMIN — PREDNISONE 20 MILLIGRAM(S): 20 TABLET ORAL at 05:02

## 2025-08-11 RX ADMIN — Medication 1 TABLET(S): at 12:20

## 2025-08-12 LAB
ALBUMIN SERPL ELPH-MCNC: 3.7 G/DL — SIGNIFICANT CHANGE UP (ref 3.3–5)
ALP SERPL-CCNC: 177 U/L — HIGH (ref 40–120)
ALT FLD-CCNC: 35 U/L — SIGNIFICANT CHANGE UP (ref 10–45)
ANION GAP SERPL CALC-SCNC: 12 MMOL/L — SIGNIFICANT CHANGE UP (ref 5–17)
APTT BLD: 25.7 SEC — LOW (ref 26.1–36.8)
AST SERPL-CCNC: 13 U/L — SIGNIFICANT CHANGE UP (ref 10–40)
BILIRUB SERPL-MCNC: 1.3 MG/DL — HIGH (ref 0.2–1.2)
BUN SERPL-MCNC: 18 MG/DL — SIGNIFICANT CHANGE UP (ref 7–23)
CALCIUM SERPL-MCNC: 9.1 MG/DL — SIGNIFICANT CHANGE UP (ref 8.4–10.5)
CHLORIDE SERPL-SCNC: 104 MMOL/L — SIGNIFICANT CHANGE UP (ref 96–108)
CO2 SERPL-SCNC: 20 MMOL/L — LOW (ref 22–31)
CREAT SERPL-MCNC: 1.3 MG/DL — SIGNIFICANT CHANGE UP (ref 0.5–1.3)
CYCLOSPORINE SER-MCNC: 97 NG/ML — LOW (ref 150–400)
EGFR: 51 ML/MIN/1.73M2 — LOW
EGFR: 51 ML/MIN/1.73M2 — LOW
GLUCOSE BLDC GLUCOMTR-MCNC: 103 MG/DL — HIGH (ref 70–99)
GLUCOSE BLDC GLUCOMTR-MCNC: 106 MG/DL — HIGH (ref 70–99)
GLUCOSE BLDC GLUCOMTR-MCNC: 109 MG/DL — HIGH (ref 70–99)
GLUCOSE BLDC GLUCOMTR-MCNC: 110 MG/DL — HIGH (ref 70–99)
GLUCOSE BLDC GLUCOMTR-MCNC: 113 MG/DL — HIGH (ref 70–99)
GLUCOSE BLDC GLUCOMTR-MCNC: 125 MG/DL — HIGH (ref 70–99)
GLUCOSE BLDC GLUCOMTR-MCNC: 182 MG/DL — HIGH (ref 70–99)
GLUCOSE BLDC GLUCOMTR-MCNC: 209 MG/DL — HIGH (ref 70–99)
GLUCOSE BLDC GLUCOMTR-MCNC: 72 MG/DL — SIGNIFICANT CHANGE UP (ref 70–99)
GLUCOSE SERPL-MCNC: 109 MG/DL — HIGH (ref 70–99)
HCT VFR BLD CALC: 29.4 % — LOW (ref 34.5–45)
HGB BLD-MCNC: 9.3 G/DL — LOW (ref 11.5–15.5)
INR BLD: 0.9 RATIO — SIGNIFICANT CHANGE UP (ref 0.85–1.16)
MAGNESIUM SERPL-MCNC: 1.6 MG/DL — SIGNIFICANT CHANGE UP (ref 1.6–2.6)
MCHC RBC-ENTMCNC: 30.3 PG — SIGNIFICANT CHANGE UP (ref 27–34)
MCHC RBC-ENTMCNC: 31.6 G/DL — LOW (ref 32–36)
MCV RBC AUTO: 95.8 FL — SIGNIFICANT CHANGE UP (ref 80–100)
NRBC # BLD AUTO: 0 K/UL — SIGNIFICANT CHANGE UP (ref 0–0)
NRBC # FLD: 0 K/UL — SIGNIFICANT CHANGE UP (ref 0–0)
NRBC BLD AUTO-RTO: 0 /100 WBCS — SIGNIFICANT CHANGE UP (ref 0–0)
PHOSPHATE SERPL-MCNC: 3.4 MG/DL — SIGNIFICANT CHANGE UP (ref 2.5–4.5)
PLATELET # BLD AUTO: 223 K/UL — SIGNIFICANT CHANGE UP (ref 150–400)
PMV BLD: 9.7 FL — SIGNIFICANT CHANGE UP (ref 7–13)
POTASSIUM SERPL-MCNC: 5 MMOL/L — SIGNIFICANT CHANGE UP (ref 3.5–5.3)
POTASSIUM SERPL-SCNC: 5 MMOL/L — SIGNIFICANT CHANGE UP (ref 3.5–5.3)
PROT SERPL-MCNC: 5.8 G/DL — LOW (ref 6–8.3)
PROTHROM AB SERPL-ACNC: 10.4 SEC — SIGNIFICANT CHANGE UP (ref 9.9–13.4)
RBC # BLD: 3.07 M/UL — LOW (ref 3.8–5.2)
RBC # FLD: 17.6 % — HIGH (ref 10.3–14.5)
SODIUM SERPL-SCNC: 136 MMOL/L — SIGNIFICANT CHANGE UP (ref 135–145)
TRIGL SERPL-MCNC: 190 MG/DL — HIGH
WBC # BLD: 10.07 K/UL — SIGNIFICANT CHANGE UP (ref 3.8–10.5)
WBC # FLD AUTO: 10.07 K/UL — SIGNIFICANT CHANGE UP (ref 3.8–10.5)

## 2025-08-12 RX ORDER — ATOVAQUONE 750 MG/5ML
10 SUSPENSION ORAL
Qty: 300 | Refills: 0
Start: 2025-08-12 | End: 2025-09-10

## 2025-08-12 RX ORDER — CYCLOSPORINE 100 MG/1
75 CAPSULE, LIQUID FILLED ORAL
Refills: 0 | Status: DISCONTINUED | OUTPATIENT
Start: 2025-08-12 | End: 2025-08-13

## 2025-08-12 RX ORDER — MAGNESIUM SULFATE 500 MG/ML
2 SYRINGE (ML) INJECTION ONCE
Refills: 0 | Status: COMPLETED | OUTPATIENT
Start: 2025-08-12 | End: 2025-08-12

## 2025-08-12 RX ORDER — ATOVAQUONE 750 MG/5ML
1500 SUSPENSION ORAL DAILY
Refills: 0 | Status: DISCONTINUED | OUTPATIENT
Start: 2025-08-12 | End: 2025-08-13

## 2025-08-12 RX ORDER — OXYCODONE HYDROCHLORIDE 30 MG/1
1 TABLET ORAL
Qty: 21 | Refills: 0
Start: 2025-08-12 | End: 2025-08-18

## 2025-08-12 RX ORDER — PATIROMER 25.2 G/1
1 POWDER, FOR SUSPENSION ORAL
Qty: 30 | Refills: 0
Start: 2025-08-12 | End: 2025-09-10

## 2025-08-12 RX ORDER — CYCLOSPORINE 100 MG/1
100 CAPSULE, LIQUID FILLED ORAL
Refills: 0 | Status: DISCONTINUED | OUTPATIENT
Start: 2025-08-13 | End: 2025-08-13

## 2025-08-12 RX ORDER — LIDOCAINE HCL/PF 10 MG/ML
5 VIAL (ML) INJECTION ONCE
Refills: 0 | Status: COMPLETED | OUTPATIENT
Start: 2025-08-12 | End: 2025-08-12

## 2025-08-12 RX ORDER — MAGNESIUM OXIDE 400 MG
800 TABLET ORAL
Refills: 0 | Status: DISCONTINUED | OUTPATIENT
Start: 2025-08-12 | End: 2025-08-13

## 2025-08-12 RX ORDER — CYCLOSPORINE 100 MG/1
25 CAPSULE, LIQUID FILLED ORAL ONCE
Refills: 0 | Status: COMPLETED | OUTPATIENT
Start: 2025-08-12 | End: 2025-08-12

## 2025-08-12 RX ADMIN — MIRTAZAPINE 7.5 MILLIGRAM(S): 30 TABLET, FILM COATED ORAL at 21:36

## 2025-08-12 RX ADMIN — Medication 81 MILLIGRAM(S): at 11:52

## 2025-08-12 RX ADMIN — VALGANCICLOVIR 450 MILLIGRAM(S): 450 TABLET, FILM COATED ORAL at 11:52

## 2025-08-12 RX ADMIN — Medication 5 UNIT(S): at 00:18

## 2025-08-12 RX ADMIN — SODIUM ZIRCONIUM CYCLOSILICATE 10 GRAM(S): 5 POWDER, FOR SUSPENSION ORAL at 00:10

## 2025-08-12 RX ADMIN — OXYCODONE HYDROCHLORIDE 10 MILLIGRAM(S): 30 TABLET ORAL at 05:38

## 2025-08-12 RX ADMIN — Medication 1 APPLICATION(S): at 11:53

## 2025-08-12 RX ADMIN — CYCLOSPORINE 25 MILLIGRAM(S): 100 CAPSULE, LIQUID FILLED ORAL at 11:52

## 2025-08-12 RX ADMIN — Medication 1 TABLET(S): at 17:07

## 2025-08-12 RX ADMIN — GABAPENTIN 100 MILLIGRAM(S): 400 CAPSULE ORAL at 05:38

## 2025-08-12 RX ADMIN — OXYCODONE HYDROCHLORIDE 10 MILLIGRAM(S): 30 TABLET ORAL at 06:38

## 2025-08-12 RX ADMIN — Medication 50 MILLILITER(S): at 00:17

## 2025-08-12 RX ADMIN — Medication 25 GRAM(S): at 10:08

## 2025-08-12 RX ADMIN — Medication 400 MILLIGRAM(S): at 11:52

## 2025-08-12 RX ADMIN — CYCLOSPORINE 75 MILLIGRAM(S): 100 CAPSULE, LIQUID FILLED ORAL at 19:35

## 2025-08-12 RX ADMIN — Medication 40 MILLIGRAM(S): at 05:39

## 2025-08-12 RX ADMIN — HEPARIN SODIUM 5000 UNIT(S): 1000 INJECTION INTRAVENOUS; SUBCUTANEOUS at 05:38

## 2025-08-12 RX ADMIN — Medication 5 MILLIGRAM(S): at 21:36

## 2025-08-12 RX ADMIN — GABAPENTIN 100 MILLIGRAM(S): 400 CAPSULE ORAL at 13:22

## 2025-08-12 RX ADMIN — LEVETIRACETAM 500 MILLIGRAM(S): 10 INJECTION, SOLUTION INTRAVENOUS at 05:39

## 2025-08-12 RX ADMIN — HEPARIN SODIUM 5000 UNIT(S): 1000 INJECTION INTRAVENOUS; SUBCUTANEOUS at 17:08

## 2025-08-12 RX ADMIN — LEVETIRACETAM 500 MILLIGRAM(S): 10 INJECTION, SOLUTION INTRAVENOUS at 17:07

## 2025-08-12 RX ADMIN — GABAPENTIN 100 MILLIGRAM(S): 400 CAPSULE ORAL at 21:36

## 2025-08-12 RX ADMIN — Medication 400 MILLIGRAM(S): at 08:36

## 2025-08-12 RX ADMIN — URSODIOL 300 MILLIGRAM(S): 300 CAPSULE ORAL at 05:39

## 2025-08-12 RX ADMIN — OXYCODONE HYDROCHLORIDE 5 MILLIGRAM(S): 30 TABLET ORAL at 17:07

## 2025-08-12 RX ADMIN — Medication 90 MILLIGRAM(S): at 05:44

## 2025-08-12 RX ADMIN — OXYCODONE HYDROCHLORIDE 5 MILLIGRAM(S): 30 TABLET ORAL at 17:50

## 2025-08-12 RX ADMIN — POLYETHYLENE GLYCOL 3350 17 GRAM(S): 17 POWDER, FOR SOLUTION ORAL at 11:53

## 2025-08-12 RX ADMIN — URSODIOL 300 MILLIGRAM(S): 300 CAPSULE ORAL at 17:06

## 2025-08-12 RX ADMIN — OXYCODONE HYDROCHLORIDE 10 MILLIGRAM(S): 30 TABLET ORAL at 11:45

## 2025-08-12 RX ADMIN — PREDNISONE 20 MILLIGRAM(S): 20 TABLET ORAL at 05:39

## 2025-08-12 RX ADMIN — MYCOPHENOLATE MOFETIL 1000 MILLIGRAM(S): 500 TABLET, FILM COATED ORAL at 19:35

## 2025-08-12 RX ADMIN — OXYCODONE HYDROCHLORIDE 10 MILLIGRAM(S): 30 TABLET ORAL at 23:10

## 2025-08-12 RX ADMIN — OXYCODONE HYDROCHLORIDE 10 MILLIGRAM(S): 30 TABLET ORAL at 00:31

## 2025-08-12 RX ADMIN — Medication 800 MILLIGRAM(S): at 17:07

## 2025-08-12 RX ADMIN — OXYCODONE HYDROCHLORIDE 10 MILLIGRAM(S): 30 TABLET ORAL at 10:58

## 2025-08-12 RX ADMIN — MYCOPHENOLATE MOFETIL 1000 MILLIGRAM(S): 500 TABLET, FILM COATED ORAL at 08:36

## 2025-08-12 RX ADMIN — CYCLOSPORINE 75 MILLIGRAM(S): 100 CAPSULE, LIQUID FILLED ORAL at 08:36

## 2025-08-12 RX ADMIN — Medication 1 TABLET(S): at 05:39

## 2025-08-13 VITALS
OXYGEN SATURATION: 98 % | TEMPERATURE: 99 F | HEART RATE: 101 BPM | DIASTOLIC BLOOD PRESSURE: 73 MMHG | RESPIRATION RATE: 18 BRPM | SYSTOLIC BLOOD PRESSURE: 133 MMHG

## 2025-08-13 DIAGNOSIS — Z01.818 ENCOUNTER FOR OTHER PREPROCEDURAL EXAMINATION: ICD-10-CM

## 2025-08-13 LAB
ALBUMIN SERPL ELPH-MCNC: 3.6 G/DL — SIGNIFICANT CHANGE UP (ref 3.3–5)
ALP SERPL-CCNC: 209 U/L — HIGH (ref 40–120)
ALT FLD-CCNC: 36 U/L — SIGNIFICANT CHANGE UP (ref 10–45)
ANION GAP SERPL CALC-SCNC: 14 MMOL/L — SIGNIFICANT CHANGE UP (ref 5–17)
APTT BLD: 24.5 SEC — LOW (ref 26.1–36.8)
AST SERPL-CCNC: 15 U/L — SIGNIFICANT CHANGE UP (ref 10–40)
BILIRUB SERPL-MCNC: 1.3 MG/DL — HIGH (ref 0.2–1.2)
BUN SERPL-MCNC: 18 MG/DL — SIGNIFICANT CHANGE UP (ref 7–23)
CALCIUM SERPL-MCNC: 9.6 MG/DL — SIGNIFICANT CHANGE UP (ref 8.4–10.5)
CHLORIDE SERPL-SCNC: 104 MMOL/L — SIGNIFICANT CHANGE UP (ref 96–108)
CO2 SERPL-SCNC: 22 MMOL/L — SIGNIFICANT CHANGE UP (ref 22–31)
CREAT SERPL-MCNC: 1.21 MG/DL — SIGNIFICANT CHANGE UP (ref 0.5–1.3)
CYCLOSPORINE SER-MCNC: 104 NG/ML — LOW (ref 150–400)
EGFR: 56 ML/MIN/1.73M2 — LOW
EGFR: 56 ML/MIN/1.73M2 — LOW
GLUCOSE BLDC GLUCOMTR-MCNC: 110 MG/DL — HIGH (ref 70–99)
GLUCOSE BLDC GLUCOMTR-MCNC: 130 MG/DL — HIGH (ref 70–99)
GLUCOSE SERPL-MCNC: 106 MG/DL — HIGH (ref 70–99)
HCT VFR BLD CALC: 30.2 % — LOW (ref 34.5–45)
HGB BLD-MCNC: 9.4 G/DL — LOW (ref 11.5–15.5)
INR BLD: 0.91 RATIO — SIGNIFICANT CHANGE UP (ref 0.85–1.16)
MAGNESIUM SERPL-MCNC: 1.8 MG/DL — SIGNIFICANT CHANGE UP (ref 1.6–2.6)
MCHC RBC-ENTMCNC: 29.7 PG — SIGNIFICANT CHANGE UP (ref 27–34)
MCHC RBC-ENTMCNC: 31.1 G/DL — LOW (ref 32–36)
MCV RBC AUTO: 95.6 FL — SIGNIFICANT CHANGE UP (ref 80–100)
NRBC # BLD AUTO: 0 K/UL — SIGNIFICANT CHANGE UP (ref 0–0)
NRBC # FLD: 0 K/UL — SIGNIFICANT CHANGE UP (ref 0–0)
NRBC BLD AUTO-RTO: 0 /100 WBCS — SIGNIFICANT CHANGE UP (ref 0–0)
PHOSPHATE SERPL-MCNC: 3 MG/DL — SIGNIFICANT CHANGE UP (ref 2.5–4.5)
PLATELET # BLD AUTO: 253 K/UL — SIGNIFICANT CHANGE UP (ref 150–400)
PMV BLD: 9.8 FL — SIGNIFICANT CHANGE UP (ref 7–13)
POTASSIUM SERPL-MCNC: 4.5 MMOL/L — SIGNIFICANT CHANGE UP (ref 3.5–5.3)
POTASSIUM SERPL-SCNC: 4.5 MMOL/L — SIGNIFICANT CHANGE UP (ref 3.5–5.3)
PROT SERPL-MCNC: 5.8 G/DL — LOW (ref 6–8.3)
PROTHROM AB SERPL-ACNC: 10.4 SEC — SIGNIFICANT CHANGE UP (ref 9.9–13.4)
RBC # BLD: 3.16 M/UL — LOW (ref 3.8–5.2)
RBC # FLD: 17.4 % — HIGH (ref 10.3–14.5)
SODIUM SERPL-SCNC: 140 MMOL/L — SIGNIFICANT CHANGE UP (ref 135–145)
TRIGL SERPL-MCNC: 114 MG/DL — SIGNIFICANT CHANGE UP
WBC # BLD: 10.2 K/UL — SIGNIFICANT CHANGE UP (ref 3.8–10.5)
WBC # FLD AUTO: 10.2 K/UL — SIGNIFICANT CHANGE UP (ref 3.8–10.5)

## 2025-08-13 PROCEDURE — 82435 ASSAY OF BLOOD CHLORIDE: CPT

## 2025-08-13 PROCEDURE — G0480: CPT

## 2025-08-13 PROCEDURE — 82570 ASSAY OF URINE CREATININE: CPT

## 2025-08-13 PROCEDURE — 82962 GLUCOSE BLOOD TEST: CPT

## 2025-08-13 PROCEDURE — 83735 ASSAY OF MAGNESIUM: CPT

## 2025-08-13 PROCEDURE — 87340 HEPATITIS B SURFACE AG IA: CPT

## 2025-08-13 PROCEDURE — 86225 DNA ANTIBODY NATIVE: CPT

## 2025-08-13 PROCEDURE — 86704 HEP B CORE ANTIBODY TOTAL: CPT

## 2025-08-13 PROCEDURE — P9059: CPT

## 2025-08-13 PROCEDURE — P9016: CPT

## 2025-08-13 PROCEDURE — 86381 MITOCHONDRIAL ANTIBODY EACH: CPT

## 2025-08-13 PROCEDURE — 87205 SMEAR GRAM STAIN: CPT

## 2025-08-13 PROCEDURE — 86765 RUBEOLA ANTIBODY: CPT

## 2025-08-13 PROCEDURE — 86695 HERPES SIMPLEX TYPE 1 TEST: CPT

## 2025-08-13 PROCEDURE — 82947 ASSAY GLUCOSE BLOOD QUANT: CPT

## 2025-08-13 PROCEDURE — 85384 FIBRINOGEN ACTIVITY: CPT

## 2025-08-13 PROCEDURE — 93308 TTE F-UP OR LMTD: CPT

## 2025-08-13 PROCEDURE — 83935 ASSAY OF URINE OSMOLALITY: CPT

## 2025-08-13 PROCEDURE — 84295 ASSAY OF SERUM SODIUM: CPT

## 2025-08-13 PROCEDURE — 84478 ASSAY OF TRIGLYCERIDES: CPT

## 2025-08-13 PROCEDURE — 84100 ASSAY OF PHOSPHORUS: CPT

## 2025-08-13 PROCEDURE — 97164 PT RE-EVAL EST PLAN CARE: CPT

## 2025-08-13 PROCEDURE — 86762 RUBELLA ANTIBODY: CPT

## 2025-08-13 PROCEDURE — 97165 OT EVAL LOW COMPLEX 30 MIN: CPT

## 2025-08-13 PROCEDURE — 80307 DRUG TEST PRSMV CHEM ANLYZR: CPT

## 2025-08-13 PROCEDURE — 84481 FREE ASSAY (FT-3): CPT

## 2025-08-13 PROCEDURE — 87521 HEPATITIS C PROBE&RVRS TRNSC: CPT

## 2025-08-13 PROCEDURE — 86682 HELMINTH ANTIBODY: CPT

## 2025-08-13 PROCEDURE — A9585: CPT

## 2025-08-13 PROCEDURE — 81001 URINALYSIS AUTO W/SCOPE: CPT

## 2025-08-13 PROCEDURE — 86480 TB TEST CELL IMMUN MEASURE: CPT

## 2025-08-13 PROCEDURE — 80053 COMPREHEN METABOLIC PANEL: CPT

## 2025-08-13 PROCEDURE — P9012: CPT

## 2025-08-13 PROCEDURE — 86140 C-REACTIVE PROTEIN: CPT

## 2025-08-13 PROCEDURE — 82378 CARCINOEMBRYONIC ANTIGEN: CPT

## 2025-08-13 PROCEDURE — 97110 THERAPEUTIC EXERCISES: CPT

## 2025-08-13 PROCEDURE — 82330 ASSAY OF CALCIUM: CPT

## 2025-08-13 PROCEDURE — P9045: CPT

## 2025-08-13 PROCEDURE — 85027 COMPLETE CBC AUTOMATED: CPT

## 2025-08-13 PROCEDURE — 86923 COMPATIBILITY TEST ELECTRIC: CPT

## 2025-08-13 PROCEDURE — 87522 HEPATITIS C REVRS TRNSCRPJ: CPT

## 2025-08-13 PROCEDURE — 85610 PROTHROMBIN TIME: CPT

## 2025-08-13 PROCEDURE — 83550 IRON BINDING TEST: CPT

## 2025-08-13 PROCEDURE — 86769 SARS-COV-2 COVID-19 ANTIBODY: CPT

## 2025-08-13 PROCEDURE — 84484 ASSAY OF TROPONIN QUANT: CPT

## 2025-08-13 PROCEDURE — 76856 US EXAM PELVIC COMPLETE: CPT

## 2025-08-13 PROCEDURE — 94002 VENT MGMT INPAT INIT DAY: CPT

## 2025-08-13 PROCEDURE — 86664 EPSTEIN-BARR NUCLEAR ANTIGEN: CPT

## 2025-08-13 PROCEDURE — 86965 POOLING BLOOD PLATELETS: CPT

## 2025-08-13 PROCEDURE — P9073: CPT

## 2025-08-13 PROCEDURE — 87385 HISTOPLASMA CAPSUL AG IA: CPT

## 2025-08-13 PROCEDURE — 95700 EEG CONT REC W/VID EEG TECH: CPT

## 2025-08-13 PROCEDURE — 87040 BLOOD CULTURE FOR BACTERIA: CPT

## 2025-08-13 PROCEDURE — 86305 HUMAN EPIDIDYMIS PROTEIN 4: CPT

## 2025-08-13 PROCEDURE — C1889: CPT

## 2025-08-13 PROCEDURE — 82550 ASSAY OF CK (CPK): CPT

## 2025-08-13 PROCEDURE — 86038 ANTINUCLEAR ANTIBODIES: CPT

## 2025-08-13 PROCEDURE — 88312 SPECIAL STAINS GROUP 1: CPT

## 2025-08-13 PROCEDURE — 80202 ASSAY OF VANCOMYCIN: CPT

## 2025-08-13 PROCEDURE — 82247 BILIRUBIN TOTAL: CPT

## 2025-08-13 PROCEDURE — P9011: CPT

## 2025-08-13 PROCEDURE — 81025 URINE PREGNANCY TEST: CPT

## 2025-08-13 PROCEDURE — 87637 SARSCOV2&INF A&B&RSV AMP PRB: CPT

## 2025-08-13 PROCEDURE — 86900 BLOOD TYPING SEROLOGIC ABO: CPT

## 2025-08-13 PROCEDURE — 86891 AUTOLOGOUS BLOOD OP SALVAGE: CPT

## 2025-08-13 PROCEDURE — 92610 EVALUATE SWALLOWING FUNCTION: CPT

## 2025-08-13 PROCEDURE — 83540 ASSAY OF IRON: CPT

## 2025-08-13 PROCEDURE — 76641 ULTRASOUND BREAST COMPLETE: CPT

## 2025-08-13 PROCEDURE — 93005 ELECTROCARDIOGRAM TRACING: CPT

## 2025-08-13 PROCEDURE — 86160 COMPLEMENT ANTIGEN: CPT

## 2025-08-13 PROCEDURE — 86985 SPLIT BLOOD OR PRODUCTS: CPT

## 2025-08-13 PROCEDURE — 86663 EPSTEIN-BARR ANTIBODY: CPT

## 2025-08-13 PROCEDURE — 94799 UNLISTED PULMONARY SVC/PX: CPT

## 2025-08-13 PROCEDURE — 87641 MR-STAPH DNA AMP PROBE: CPT

## 2025-08-13 PROCEDURE — 84133 ASSAY OF URINE POTASSIUM: CPT

## 2025-08-13 PROCEDURE — 84540 ASSAY OF URINE/UREA-N: CPT

## 2025-08-13 PROCEDURE — 84132 ASSAY OF SERUM POTASSIUM: CPT

## 2025-08-13 PROCEDURE — 86709 HEPATITIS A IGM ANTIBODY: CPT

## 2025-08-13 PROCEDURE — P9047: CPT

## 2025-08-13 PROCEDURE — 86147 CARDIOLIPIN ANTIBODY EA IG: CPT

## 2025-08-13 PROCEDURE — 88313 SPECIAL STAINS GROUP 2: CPT

## 2025-08-13 PROCEDURE — 82553 CREATINE MB FRACTION: CPT

## 2025-08-13 PROCEDURE — 86696 HERPES SIMPLEX TYPE 2 TEST: CPT

## 2025-08-13 PROCEDURE — 82105 ALPHA-FETOPROTEIN SERUM: CPT

## 2025-08-13 PROCEDURE — 80197 ASSAY OF TACROLIMUS: CPT

## 2025-08-13 PROCEDURE — 87077 CULTURE AEROBIC IDENTIFY: CPT

## 2025-08-13 PROCEDURE — 36415 COLL VENOUS BLD VENIPUNCTURE: CPT

## 2025-08-13 PROCEDURE — 86780 TREPONEMA PALLIDUM: CPT

## 2025-08-13 PROCEDURE — 86803 HEPATITIS C AB TEST: CPT

## 2025-08-13 PROCEDURE — P9100: CPT

## 2025-08-13 PROCEDURE — 85014 HEMATOCRIT: CPT

## 2025-08-13 PROCEDURE — 86255 FLUORESCENT ANTIBODY SCREEN: CPT

## 2025-08-13 PROCEDURE — P9037: CPT

## 2025-08-13 PROCEDURE — 86146 BETA-2 GLYCOPROTEIN ANTIBODY: CPT

## 2025-08-13 PROCEDURE — 82728 ASSAY OF FERRITIN: CPT

## 2025-08-13 PROCEDURE — 84550 ASSAY OF BLOOD/URIC ACID: CPT

## 2025-08-13 PROCEDURE — 87799 DETECT AGENT NOS DNA QUANT: CPT

## 2025-08-13 PROCEDURE — 36430 TRANSFUSION BLD/BLD COMPNT: CPT

## 2025-08-13 PROCEDURE — 86481 TB AG RESPONSE T-CELL SUSP: CPT

## 2025-08-13 PROCEDURE — 85025 COMPLETE CBC W/AUTO DIFF WBC: CPT

## 2025-08-13 PROCEDURE — 86787 VARICELLA-ZOSTER ANTIBODY: CPT

## 2025-08-13 PROCEDURE — 93975 VASCULAR STUDY: CPT

## 2025-08-13 PROCEDURE — 73080 X-RAY EXAM OF ELBOW: CPT

## 2025-08-13 PROCEDURE — 87624 HPV HI-RISK TYP POOLED RSLT: CPT

## 2025-08-13 PROCEDURE — 82565 ASSAY OF CREATININE: CPT

## 2025-08-13 PROCEDURE — 86901 BLOOD TYPING SEROLOGIC RH(D): CPT

## 2025-08-13 PROCEDURE — 84145 PROCALCITONIN (PCT): CPT

## 2025-08-13 PROCEDURE — C1751: CPT

## 2025-08-13 PROCEDURE — 82140 ASSAY OF AMMONIA: CPT

## 2025-08-13 PROCEDURE — 97112 NEUROMUSCULAR REEDUCATION: CPT

## 2025-08-13 PROCEDURE — 97161 PT EVAL LOW COMPLEX 20 MIN: CPT

## 2025-08-13 PROCEDURE — 86850 RBC ANTIBODY SCREEN: CPT

## 2025-08-13 PROCEDURE — 85613 RUSSELL VIPER VENOM DILUTED: CPT

## 2025-08-13 PROCEDURE — 76705 ECHO EXAM OF ABDOMEN: CPT

## 2025-08-13 PROCEDURE — 73200 CT UPPER EXTREMITY W/O DYE: CPT

## 2025-08-13 PROCEDURE — 36600 WITHDRAWAL OF ARTERIAL BLOOD: CPT

## 2025-08-13 PROCEDURE — 97535 SELF CARE MNGMENT TRAINING: CPT

## 2025-08-13 PROCEDURE — 86735 MUMPS ANTIBODY: CPT

## 2025-08-13 PROCEDURE — 87150 DNA/RNA AMPLIFIED PROBE: CPT

## 2025-08-13 PROCEDURE — 87640 STAPH A DNA AMP PROBE: CPT

## 2025-08-13 PROCEDURE — 93971 EXTREMITY STUDY: CPT

## 2025-08-13 PROCEDURE — 86304 IMMUNOASSAY TUMOR CA 125: CPT

## 2025-08-13 PROCEDURE — 85732 THROMBOPLASTIN TIME PARTIAL: CPT

## 2025-08-13 PROCEDURE — 80061 LIPID PANEL: CPT

## 2025-08-13 PROCEDURE — 87086 URINE CULTURE/COLONY COUNT: CPT

## 2025-08-13 PROCEDURE — 76377 3D RENDER W/INTRP POSTPROCES: CPT

## 2025-08-13 PROCEDURE — 86301 IMMUNOASSAY TUMOR CA 19-9: CPT

## 2025-08-13 PROCEDURE — 74018 RADEX ABDOMEN 1 VIEW: CPT

## 2025-08-13 PROCEDURE — 92612 ENDOSCOPY SWALLOW (FEES) VID: CPT

## 2025-08-13 PROCEDURE — 86880 COOMBS TEST DIRECT: CPT

## 2025-08-13 PROCEDURE — 70450 CT HEAD/BRAIN W/O DYE: CPT

## 2025-08-13 PROCEDURE — 94003 VENT MGMT INPAT SUBQ DAY: CPT

## 2025-08-13 PROCEDURE — 85730 THROMBOPLASTIN TIME PARTIAL: CPT

## 2025-08-13 PROCEDURE — 86777 TOXOPLASMA ANTIBODY: CPT

## 2025-08-13 PROCEDURE — 86706 HEP B SURFACE ANTIBODY: CPT

## 2025-08-13 PROCEDURE — 82390 ASSAY OF CERULOPLASMIN: CPT

## 2025-08-13 PROCEDURE — 93325 DOPPLER ECHO COLOR FLOW MAPG: CPT

## 2025-08-13 PROCEDURE — 93970 EXTREMITY STUDY: CPT

## 2025-08-13 PROCEDURE — 86665 EPSTEIN-BARR CAPSID VCA: CPT

## 2025-08-13 PROCEDURE — 88309 TISSUE EXAM BY PATHOLOGIST: CPT

## 2025-08-13 PROCEDURE — 86708 HEPATITIS A ANTIBODY: CPT

## 2025-08-13 PROCEDURE — 92526 ORAL FUNCTION THERAPY: CPT

## 2025-08-13 PROCEDURE — 85018 HEMOGLOBIN: CPT

## 2025-08-13 PROCEDURE — 87070 CULTURE OTHR SPECIMN AEROBIC: CPT

## 2025-08-13 PROCEDURE — 86644 CMV ANTIBODY: CPT

## 2025-08-13 PROCEDURE — 84702 CHORIONIC GONADOTROPIN TEST: CPT

## 2025-08-13 PROCEDURE — 82784 ASSAY IGA/IGD/IGG/IGM EACH: CPT

## 2025-08-13 PROCEDURE — 80158 DRUG ASSAY CYCLOSPORINE: CPT

## 2025-08-13 PROCEDURE — 88304 TISSUE EXAM BY PATHOLOGIST: CPT

## 2025-08-13 PROCEDURE — 70553 MRI BRAIN STEM W/O & W/DYE: CPT

## 2025-08-13 PROCEDURE — 71045 X-RAY EXAM CHEST 1 VIEW: CPT

## 2025-08-13 PROCEDURE — 85396 CLOTTING ASSAY WHOLE BLOOD: CPT

## 2025-08-13 PROCEDURE — 82977 ASSAY OF GGT: CPT

## 2025-08-13 PROCEDURE — 93306 TTE W/DOPPLER COMPLETE: CPT

## 2025-08-13 PROCEDURE — 86376 MICROSOMAL ANTIBODY EACH: CPT

## 2025-08-13 PROCEDURE — 84156 ASSAY OF PROTEIN URINE: CPT

## 2025-08-13 PROCEDURE — 83036 HEMOGLOBIN GLYCOSYLATED A1C: CPT

## 2025-08-13 PROCEDURE — 84300 ASSAY OF URINE SODIUM: CPT

## 2025-08-13 PROCEDURE — C1769: CPT

## 2025-08-13 PROCEDURE — 84443 ASSAY THYROID STIM HORMONE: CPT

## 2025-08-13 PROCEDURE — 72197 MRI PELVIS W/O & W/DYE: CPT

## 2025-08-13 PROCEDURE — 74177 CT ABD & PELVIS W/CONTRAST: CPT

## 2025-08-13 PROCEDURE — 85652 RBC SED RATE AUTOMATED: CPT

## 2025-08-13 PROCEDURE — P9040: CPT

## 2025-08-13 PROCEDURE — 97530 THERAPEUTIC ACTIVITIES: CPT

## 2025-08-13 PROCEDURE — 88307 TISSUE EXAM BY PATHOLOGIST: CPT

## 2025-08-13 PROCEDURE — 93320 DOPPLER ECHO COMPLETE: CPT

## 2025-08-13 PROCEDURE — 87389 HIV-1 AG W/HIV-1&-2 AB AG IA: CPT

## 2025-08-13 PROCEDURE — 82803 BLOOD GASES ANY COMBINATION: CPT

## 2025-08-13 PROCEDURE — 97116 GAIT TRAINING THERAPY: CPT

## 2025-08-13 PROCEDURE — 95714 VEEG EA 12-26 HR UNMNTR: CPT

## 2025-08-13 PROCEDURE — 83605 ASSAY OF LACTIC ACID: CPT

## 2025-08-13 RX ORDER — MYCOPHENOLATE MOFETIL 500 MG/1
500 TABLET ORAL TWICE DAILY
Qty: 120 | Refills: 5 | Status: DISCONTINUED | COMMUNITY
Start: 2025-08-07 | End: 2025-08-13

## 2025-08-13 RX ORDER — MYCOPHENOLATE MOFETIL 500 MG/1
500 TABLET ORAL
Qty: 120 | Refills: 5 | Status: ACTIVE | COMMUNITY
Start: 2025-08-13 | End: 1900-01-01

## 2025-08-13 RX ORDER — SODIUM ZIRCONIUM CYCLOSILICATE 5 G/5G
1 POWDER, FOR SUSPENSION ORAL
Qty: 30 | Refills: 0
Start: 2025-08-13 | End: 2025-09-11

## 2025-08-13 RX ORDER — VALGANCICLOVIR HYDROCHLORIDE 450 MG/1
450 TABLET ORAL
Qty: 30 | Refills: 2 | Status: DISCONTINUED | COMMUNITY
Start: 2025-08-07 | End: 2025-08-13

## 2025-08-13 RX ORDER — SULFAMETHOXAZOLE AND TRIMETHOPRIM 400; 80 MG/1; MG/1
400-80 TABLET ORAL
Qty: 30 | Refills: 3 | Status: DISCONTINUED | COMMUNITY
Start: 2025-08-07 | End: 2025-08-13

## 2025-08-13 RX ORDER — CYCLOSPORINE 25 MG/1
25 CAPSULE, LIQUID FILLED ORAL TWICE DAILY
Qty: 120 | Refills: 5 | Status: DISCONTINUED | COMMUNITY
Start: 2025-08-07 | End: 2025-08-13

## 2025-08-13 RX ORDER — ATOVAQUONE 750 MG/5ML
750 SUSPENSION ORAL DAILY
Qty: 300 | Refills: 5 | Status: ACTIVE | COMMUNITY
Start: 2025-08-13 | End: 1900-01-01

## 2025-08-13 RX ORDER — CYCLOSPORINE 100 MG/1
1 CAPSULE, LIQUID FILLED ORAL
Qty: 0 | Refills: 0 | DISCHARGE
Start: 2025-08-13

## 2025-08-13 RX ORDER — CYCLOSPORINE 100 MG/1
100 CAPSULE, LIQUID FILLED ORAL DAILY
Qty: 30 | Refills: 5 | Status: ACTIVE | COMMUNITY
Start: 2025-08-13 | End: 1900-01-01

## 2025-08-13 RX ORDER — SODIUM ZIRCONIUM CYCLOSILICATE 10 G/10G
10 POWDER, FOR SUSPENSION ORAL DAILY
Qty: 30 | Refills: 2 | Status: ACTIVE | COMMUNITY
Start: 2025-08-13 | End: 1900-01-01

## 2025-08-13 RX ORDER — CYCLOSPORINE 100 MG/1
3 CAPSULE, LIQUID FILLED ORAL
Qty: 0 | Refills: 0 | DISCHARGE
Start: 2025-08-13

## 2025-08-13 RX ORDER — VALGANCICLOVIR HYDROCHLORIDE 450 MG/1
450 TABLET ORAL
Qty: 30 | Refills: 2 | Status: ACTIVE | COMMUNITY
Start: 2025-08-13 | End: 1900-01-01

## 2025-08-13 RX ORDER — CYCLOSPORINE 25 MG/1
25 CAPSULE, LIQUID FILLED ORAL DAILY
Qty: 90 | Refills: 5 | Status: ACTIVE | COMMUNITY
Start: 2025-08-13 | End: 1900-01-01

## 2025-08-13 RX ORDER — MAGNESIUM SULFATE 500 MG/ML
2 SYRINGE (ML) INJECTION ONCE
Refills: 0 | Status: COMPLETED | OUTPATIENT
Start: 2025-08-13 | End: 2025-08-13

## 2025-08-13 RX ORDER — CYCLOSPORINE 100 MG/1
100 CAPSULE, LIQUID FILLED ORAL TWICE DAILY
Qty: 120 | Refills: 5 | Status: DISCONTINUED | COMMUNITY
Start: 2025-08-07 | End: 2025-08-13

## 2025-08-13 RX ADMIN — PREDNISONE 20 MILLIGRAM(S): 20 TABLET ORAL at 05:23

## 2025-08-13 RX ADMIN — OXYCODONE HYDROCHLORIDE 10 MILLIGRAM(S): 30 TABLET ORAL at 00:10

## 2025-08-13 RX ADMIN — OXYCODONE HYDROCHLORIDE 10 MILLIGRAM(S): 30 TABLET ORAL at 08:55

## 2025-08-13 RX ADMIN — Medication 1 APPLICATION(S): at 13:43

## 2025-08-13 RX ADMIN — MYCOPHENOLATE MOFETIL 1000 MILLIGRAM(S): 500 TABLET, FILM COATED ORAL at 08:55

## 2025-08-13 RX ADMIN — Medication 81 MILLIGRAM(S): at 13:41

## 2025-08-13 RX ADMIN — URSODIOL 300 MILLIGRAM(S): 300 CAPSULE ORAL at 05:23

## 2025-08-13 RX ADMIN — OXYCODONE HYDROCHLORIDE 10 MILLIGRAM(S): 30 TABLET ORAL at 09:25

## 2025-08-13 RX ADMIN — Medication 90 MILLIGRAM(S): at 08:56

## 2025-08-13 RX ADMIN — Medication 40 MILLIGRAM(S): at 05:19

## 2025-08-13 RX ADMIN — Medication 800 MILLIGRAM(S): at 08:55

## 2025-08-13 RX ADMIN — GABAPENTIN 100 MILLIGRAM(S): 400 CAPSULE ORAL at 05:19

## 2025-08-13 RX ADMIN — Medication 25 GRAM(S): at 10:28

## 2025-08-13 RX ADMIN — ATOVAQUONE 1500 MILLIGRAM(S): 750 SUSPENSION ORAL at 13:41

## 2025-08-13 RX ADMIN — Medication 400 MILLIGRAM(S): at 13:41

## 2025-08-13 RX ADMIN — VALGANCICLOVIR 450 MILLIGRAM(S): 450 TABLET, FILM COATED ORAL at 13:41

## 2025-08-13 RX ADMIN — LEVETIRACETAM 500 MILLIGRAM(S): 10 INJECTION, SOLUTION INTRAVENOUS at 05:19

## 2025-08-13 RX ADMIN — Medication 1 TABLET(S): at 05:19

## 2025-08-13 RX ADMIN — GABAPENTIN 100 MILLIGRAM(S): 400 CAPSULE ORAL at 13:41

## 2025-08-13 RX ADMIN — CYCLOSPORINE 100 MILLIGRAM(S): 100 CAPSULE, LIQUID FILLED ORAL at 08:56

## 2025-08-13 RX ADMIN — HEPARIN SODIUM 5000 UNIT(S): 1000 INJECTION INTRAVENOUS; SUBCUTANEOUS at 05:19

## 2025-08-14 PROBLEM — Z94.4 STATUS POST LIVER TRANSPLANT: Status: ACTIVE | Noted: 2025-08-07

## 2025-08-18 ENCOUNTER — APPOINTMENT (OUTPATIENT)
Dept: TRANSPLANT | Facility: CLINIC | Age: 45
End: 2025-08-18

## 2025-08-18 VITALS
OXYGEN SATURATION: 99 % | BODY MASS INDEX: 21.61 KG/M2 | WEIGHT: 138 LBS | DIASTOLIC BLOOD PRESSURE: 82 MMHG | SYSTOLIC BLOOD PRESSURE: 137 MMHG | HEART RATE: 98 BPM | TEMPERATURE: 98.1 F

## 2025-08-19 LAB
ALBUMIN SERPL ELPH-MCNC: 3.7 G/DL
ALP BLD-CCNC: 317 U/L
ALT SERPL-CCNC: 69 U/L
ANION GAP SERPL CALC-SCNC: 16 MMOL/L
AST SERPL-CCNC: 53 U/L
BILIRUB SERPL-MCNC: 1.6 MG/DL
BUN SERPL-MCNC: 22 MG/DL
CALCIUM SERPL-MCNC: 10.2 MG/DL
CHLORIDE SERPL-SCNC: 105 MMOL/L
CMV DNA SPEC QL NAA+PROBE: NOT DETECTED IU/ML
CMVPCR LOG: NOT DETECTED LOG10IU/ML
CO2 SERPL-SCNC: 18 MMOL/L
CREAT SERPL-MCNC: 1.01 MG/DL
CYCLOSPORINE SER-MCNC: 134 NG/ML
EGFRCR SERPLBLD CKD-EPI 2021: 70 ML/MIN/1.73M2
GGT SERPL-CCNC: 1085 U/L
GLUCOSE SERPL-MCNC: 73 MG/DL
HCT VFR BLD CALC: 32.3 %
HGB BLD-MCNC: 9.9 G/DL
MAGNESIUM SERPL-MCNC: 1.7 MG/DL
MCHC RBC-ENTMCNC: 30.5 PG
MCHC RBC-ENTMCNC: 30.7 G/DL
MCV RBC AUTO: 99.4 FL
PHOSPHATE SERPL-MCNC: 4.8 MG/DL
PLATELET # BLD AUTO: 270 K/UL
POTASSIUM SERPL-SCNC: 5.7 MMOL/L
PROT SERPL-MCNC: 6 G/DL
RBC # BLD: 3.25 M/UL
RBC # FLD: 17.5 %
SODIUM SERPL-SCNC: 139 MMOL/L
WBC # FLD AUTO: 7.69 K/UL

## 2025-08-20 RX ORDER — FUROSEMIDE 40 MG/1
40 TABLET ORAL
Qty: 14 | Refills: 1 | Status: ACTIVE | COMMUNITY
Start: 2025-08-20 | End: 1900-01-01

## 2025-08-21 ENCOUNTER — LABORATORY RESULT (OUTPATIENT)
Age: 45
End: 2025-08-21

## 2025-08-21 ENCOUNTER — NON-APPOINTMENT (OUTPATIENT)
Age: 45
End: 2025-08-21

## 2025-08-21 LAB — SURGICAL PATHOLOGY STUDY: SIGNIFICANT CHANGE UP

## 2025-08-22 DIAGNOSIS — Z94.4 LIVER TRANSPLANT STATUS: ICD-10-CM

## 2025-08-22 LAB
ALBUMIN SERPL ELPH-MCNC: 3.6 G/DL
ALP BLD-CCNC: 225 U/L
ALT SERPL-CCNC: 20 U/L
ANION GAP SERPL CALC-SCNC: 14 MMOL/L
AST SERPL-CCNC: 18 U/L
BASOPHILS # BLD AUTO: 0.02 K/UL
BASOPHILS NFR BLD AUTO: 0.4 %
BILIRUB SERPL-MCNC: 1 MG/DL
BUN SERPL-MCNC: 24 MG/DL
CALCIUM SERPL-MCNC: 9.5 MG/DL
CHLORIDE SERPL-SCNC: 108 MMOL/L
CMV DNA SPEC QL NAA+PROBE: NOT DETECTED IU/ML
CMVPCR LOG: NOT DETECTED LOG10IU/ML
CO2 SERPL-SCNC: 21 MMOL/L
CREAT SERPL-MCNC: 0.99 MG/DL
EGFRCR SERPLBLD CKD-EPI 2021: 71 ML/MIN/1.73M2
EOSINOPHIL # BLD AUTO: 0.06 K/UL
EOSINOPHIL NFR BLD AUTO: 1.1 %
GGT SERPL-CCNC: 759 U/L
GLUCOSE SERPL-MCNC: 86 MG/DL
HCT VFR BLD CALC: 31.7 %
HGB BLD-MCNC: 10 G/DL
IMM GRANULOCYTES NFR BLD AUTO: 1.2 %
LYMPHOCYTES # BLD AUTO: 0.38 K/UL
LYMPHOCYTES NFR BLD AUTO: 6.7 %
MAGNESIUM SERPL-MCNC: 1.5 MG/DL
MAN DIFF?: NORMAL
MCHC RBC-ENTMCNC: 30.7 PG
MCHC RBC-ENTMCNC: 31.5 G/DL
MCV RBC AUTO: 97.2 FL
MONOCYTES # BLD AUTO: 0.33 K/UL
MONOCYTES NFR BLD AUTO: 5.8 %
NEUTROPHILS # BLD AUTO: 4.82 K/UL
NEUTROPHILS NFR BLD AUTO: 84.8 %
PHOSPHATE SERPL-MCNC: 3.8 MG/DL
PLATELET # BLD AUTO: 311 K/UL
POTASSIUM SERPL-SCNC: 5.4 MMOL/L
PROT SERPL-MCNC: 6 G/DL
RBC # BLD: 3.26 M/UL
RBC # FLD: 16.8 %
SODIUM SERPL-SCNC: 143 MMOL/L
TACROLIMUS SERPL-MCNC: <2 NG/ML
WBC # FLD AUTO: 5.68 K/UL

## 2025-08-25 ENCOUNTER — APPOINTMENT (OUTPATIENT)
Dept: TRANSPLANT | Facility: CLINIC | Age: 45
End: 2025-08-25

## 2025-09-11 ENCOUNTER — TRANSCRIPTION ENCOUNTER (OUTPATIENT)
Age: 45
End: 2025-09-11

## 2025-09-15 ENCOUNTER — LABORATORY RESULT (OUTPATIENT)
Age: 45
End: 2025-09-15

## 2025-09-17 ENCOUNTER — APPOINTMENT (OUTPATIENT)
Dept: TRANSPLANT | Facility: CLINIC | Age: 45
End: 2025-09-17

## 2025-09-17 ENCOUNTER — TRANSCRIPTION ENCOUNTER (OUTPATIENT)
Age: 45
End: 2025-09-17

## 2025-09-17 VITALS
SYSTOLIC BLOOD PRESSURE: 130 MMHG | HEART RATE: 67 BPM | TEMPERATURE: 98.4 F | OXYGEN SATURATION: 100 % | DIASTOLIC BLOOD PRESSURE: 83 MMHG | BODY MASS INDEX: 21.77 KG/M2 | WEIGHT: 139 LBS

## 2025-09-17 RX ORDER — PREDNISONE 5 MG/1
5 TABLET ORAL
Qty: 30 | Refills: 5 | Status: ACTIVE | COMMUNITY
Start: 2025-09-17 | End: 1900-01-01

## 2025-09-19 ENCOUNTER — APPOINTMENT (OUTPATIENT)
Dept: NEUROLOGY | Facility: CLINIC | Age: 45
End: 2025-09-19
Payer: COMMERCIAL

## 2025-09-19 ENCOUNTER — NON-APPOINTMENT (OUTPATIENT)
Age: 45
End: 2025-09-19

## 2025-09-19 VITALS
HEART RATE: 86 BPM | BODY MASS INDEX: 21.19 KG/M2 | DIASTOLIC BLOOD PRESSURE: 66 MMHG | SYSTOLIC BLOOD PRESSURE: 138 MMHG | OXYGEN SATURATION: 97 % | WEIGHT: 135 LBS | HEIGHT: 67 IN

## 2025-09-19 DIAGNOSIS — G43.709 CHRONIC MIGRAINE W/OUT AURA, NOT INTRACTABLE, W/OUT STATUS MIGRAINOSUS: ICD-10-CM

## 2025-09-19 DIAGNOSIS — G40.909 EPILEPSY, UNSPECIFIED, NOT INTRACTABLE, W/OUT STATUS EPILEPTICUS: ICD-10-CM

## 2025-09-19 PROCEDURE — 99215 OFFICE O/P EST HI 40 MIN: CPT

## 2025-09-19 RX ORDER — LEVETIRACETAM 250 MG/1
250 TABLET, FILM COATED ORAL TWICE DAILY
Qty: 14 | Refills: 0 | Status: ACTIVE | COMMUNITY
Start: 2025-09-19 | End: 1900-01-01

## 2025-09-19 RX ORDER — TOPIRAMATE 25 MG/1
25 TABLET, FILM COATED ORAL
Qty: 60 | Refills: 3 | Status: ACTIVE | COMMUNITY
Start: 2025-09-19 | End: 1900-01-01

## 2025-09-21 PROBLEM — Z79.60 LONG-TERM USE OF IMMUNOSUPPRESSANT MEDICATION: Status: ACTIVE | Noted: 2025-09-21

## 2025-09-24 LAB
ALBUMIN SERPL ELPH-MCNC: 3.7 G/DL
ALP BLD-CCNC: 307 U/L
ALT SERPL-CCNC: 43 U/L
ANION GAP SERPL CALC-SCNC: 14 MMOL/L
AST SERPL-CCNC: 25 U/L
BILIRUB SERPL-MCNC: 0.5 MG/DL
BUN SERPL-MCNC: 49 MG/DL
CALCIUM SERPL-MCNC: 9.4 MG/DL
CHLORIDE SERPL-SCNC: 107 MMOL/L
CMV DNA SPEC QL NAA+PROBE: NOT DETECTED IU/ML
CMVPCR LOG: NOT DETECTED LOG10IU/ML
CO2 SERPL-SCNC: 20 MMOL/L
CREAT SERPL-MCNC: 1.28 MG/DL
CYCLOSPORINE SER-MCNC: 180 NG/ML
EGFRCR SERPLBLD CKD-EPI 2021: 52 ML/MIN/1.73M2
GGT SERPL-CCNC: 618 U/L
GLUCOSE SERPL-MCNC: 124 MG/DL
HCT VFR BLD CALC: 31.5 %
HGB BLD-MCNC: 10.2 G/DL
MAGNESIUM SERPL-MCNC: 1.8 MG/DL
MCHC RBC-ENTMCNC: 30.9 PG
MCHC RBC-ENTMCNC: 32.4 G/DL
MCV RBC AUTO: 95.5 FL
PHOSPHATE SERPL-MCNC: 5 MG/DL
PLATELET # BLD AUTO: 221 K/UL
POTASSIUM SERPL-SCNC: 4.3 MMOL/L
PROT SERPL-MCNC: 6 G/DL
RBC # BLD: 3.3 M/UL
RBC # FLD: 16.1 %
SODIUM SERPL-SCNC: 140 MMOL/L
WBC # FLD AUTO: 3 K/UL

## (undated) DEVICE — BIOPSY FORCEP RADIAL JAW 4 STANDARD WITH NEEDLE

## (undated) DEVICE — SUT SOFSILK 3-0 18" V-20 (POP-OFF)

## (undated) DEVICE — Device

## (undated) DEVICE — SUT PDS II 6-0 24" BV-1

## (undated) DEVICE — DRSG CURITY GAUZE SPONGE 4 X 4" 12-PLY

## (undated) DEVICE — PACK IV START WITH CHG

## (undated) DEVICE — GLV 7.5 PROTEXIS (BLUE)

## (undated) DEVICE — SUT POLYSORB 3-0 30" V-20 UNDYED

## (undated) DEVICE — TUBING SUCTION CONN 6FT STERILE

## (undated) DEVICE — SUT MONOSOF 3-0 18" C-14

## (undated) DEVICE — DRSG OPSITE 13.75 X 4"

## (undated) DEVICE — SYR LUER LOK 10CC

## (undated) DEVICE — ELCTR BIPOLAR CORD J&J 12FT DISP

## (undated) DEVICE — LIVANOVA SMART PERFUSION TUBING 0.375X3/32" X 6FT

## (undated) DEVICE — BAG DECANTER IV STERILE

## (undated) DEVICE — IRRIGATOR BIO SHIELD

## (undated) DEVICE — SPECIMEN CONTAINER 100ML

## (undated) DEVICE — FORCEP RADIAL JAW 4 JUMBO 2.8MM 3.2MM 240CM ORANGE DISP

## (undated) DEVICE — SYR LUER LOK 50CC

## (undated) DEVICE — DRSG TAPE MEDIPORE 3"

## (undated) DEVICE — CONNECTOR "Y" 1/4 X 1/4 X 1/4"

## (undated) DEVICE — SUT PDS II 3-0 36" SH

## (undated) DEVICE — DRAIN RESERVOIR FOR JACKSON PRATT 100CC CARDINAL

## (undated) DEVICE — SYR ASEPTO

## (undated) DEVICE — SYR ALLIANCE II INFLATION 60ML

## (undated) DEVICE — SUT PROLENE 4-0 36" SH

## (undated) DEVICE — CLAMP BX HOT RAD JAW 3

## (undated) DEVICE — TUBING SUCTION NON CONDUCTIVE 316X18" STERILE

## (undated) DEVICE — DRAPE SLUSH / WARMER 44 X 66"

## (undated) DEVICE — NDL COUNTER FOAM AND MAGNET 40-70

## (undated) DEVICE — CATH NG SALEM SUMP 16FR

## (undated) DEVICE — DRSG STERISTRIPS 0.5 X 4"

## (undated) DEVICE — DRAPE MAYO STAND 30"

## (undated) DEVICE — DRSG TEGADERM 6 X 8"

## (undated) DEVICE — SUT PDS II 6-0 30" C-1

## (undated) DEVICE — PACK CARDIOVASCULAR UNIVERSAL

## (undated) DEVICE — SOL INJ NS 0.9% 500ML 2 PORT

## (undated) DEVICE — CANNULA IRR ALCON ANTERIOR CHAMBER 27G

## (undated) DEVICE — BITE BLOCK ADULT 20 X 27MM (GREEN)

## (undated) DEVICE — SET CATH RIC RAPID INFUSION EXCHANGE 7FRX5.08CM

## (undated) DEVICE — FOLEY HOLDER STATLOCK 2 WAY ADULT

## (undated) DEVICE — SUT PDS II PLUS 4-0 27" SH

## (undated) DEVICE — PACK MAJOR ABDOMINAL SUPINE

## (undated) DEVICE — BRUSH COLONOSCOPY CYTOLOGY

## (undated) DEVICE — DRAPE ISOLATION BAG 20X20"

## (undated) DEVICE — DRAPE C ARM UNIVERSAL

## (undated) DEVICE — TUBING IV SET GRAVITY 3Y 100" MACRO

## (undated) DEVICE — POSITIONER FOAM EGG CRATE ULNAR 2PCS (PINK)

## (undated) DEVICE — BIPOLAR FORCEP CODMAN VERSATRU 8" X 0.5MM (BLUE)

## (undated) DEVICE — POLY TRAP ETRAP

## (undated) DEVICE — SUT SOFSILK 4-0 18" TIES

## (undated) DEVICE — SUT SOFSILK 0 18" TIES

## (undated) DEVICE — DRAPE INSTRUMENT POUCH 6.75" X 11"

## (undated) DEVICE — SUCTION YANKAUER OPEN TIP NO VENT CURVE

## (undated) DEVICE — SYR LUER LOK 3CC

## (undated) DEVICE — ELCTR BOVIE PENCIL SMOKE EVACUATION

## (undated) DEVICE — GOWN TRIMAX LG

## (undated) DEVICE — SOL IRR BAG NS 0.9% 3000ML

## (undated) DEVICE — ELCTR DEFIB PAD PRO-PADZ W 10FT LEAD WIRES ADULT

## (undated) DEVICE — DRAPE TOWEL BLUE 17" X 24"

## (undated) DEVICE — PACK BASIN SPECIAL PROCEDURE

## (undated) DEVICE — PREP CHLORAPREP HI-LITE ORANGE 26ML

## (undated) DEVICE — VESSEL LOOP MAXI-RED  0.120" X 16"

## (undated) DEVICE — NDL HYPO SAFE 18G X 1.5" (PINK)

## (undated) DEVICE — SYR LUER LOK 20CC

## (undated) DEVICE — SUT SOFSILK 3-0 30" TIES

## (undated) DEVICE — DRSG XEROFORM 5 X 9"

## (undated) DEVICE — CATH IV SAFE BC 20G X 1.16" (PINK)

## (undated) DEVICE — LAP PAD 4 X 18"

## (undated) DEVICE — STAPLER SKIN VISI-STAT 35 WIDE

## (undated) DEVICE — WARMING BLANKET LOWER ADULT

## (undated) DEVICE — MERCIAN VISABILITY BACKROUND YELLOW

## (undated) DEVICE — SUT PROLENE 3-0 36" SH

## (undated) DEVICE — SOL IRR POUR H2O 250ML

## (undated) DEVICE — SUT SOFSILK 2-0 18" V-20 (POP-OFF)

## (undated) DEVICE — SUT PROLENE 6-0 30" C-1

## (undated) DEVICE — ELCTR BOVIE PENCIL HANDPIECE ROCKER SWITCH 15FT

## (undated) DEVICE — LAP PAD 18 X 18"

## (undated) DEVICE — DRAPE 1/2 SHEET 40X57"

## (undated) DEVICE — WARMING BLANKET UPPER ADULT

## (undated) DEVICE — TUBING KIT FAST START ATF 40

## (undated) DEVICE — DRAPE IOBAN 33" X 23"

## (undated) DEVICE — FILTER LIP GRD SB 40/CA

## (undated) DEVICE — SUT SOFSILK 2-0 30" TIES

## (undated) DEVICE — LIGASURE EXACT DISSECTOR

## (undated) DEVICE — BALLOON US ENDO

## (undated) DEVICE — DRAPE ARMATEC MICROSSCOPE HANDLE 5" X 10"

## (undated) DEVICE — TUBING TRUWAVE PRESSURE MALE/FEMALE 72"

## (undated) DEVICE — STOPCOCK 4-WAY W SWIVEL MALE LUER LOCK NON VENTED RED CAP

## (undated) DEVICE — GOWN TRIMAX XXL

## (undated) DEVICE — SUT SOFSILK 4-0 30" TIES

## (undated) DEVICE — WARMING BLANKET FULL UNDERBODY

## (undated) DEVICE — SUT SOFSILK 2-0 18" TIES

## (undated) DEVICE — SUT SURGIPRO 1 60" GS-26

## (undated) DEVICE — SUT PDS II 5-0 30" C-1

## (undated) DEVICE — FOLEY TRAY 16FR 5CC LTX UMETER CLOSED

## (undated) DEVICE — TUBING FLUID ADMINISTRATION SET PRIM 70"

## (undated) DEVICE — TUBING SUCTION 20FT

## (undated) DEVICE — SUT BOOT STANDARD (ASSORTED) 5 PAIR

## (undated) DEVICE — ELCTR HANDPIECE ARGON BEND A BEAM 6"

## (undated) DEVICE — CATH IV SAFE BC 22G X 1" (BLUE)

## (undated) DEVICE — PACK CV SPLIT PACK II

## (undated) DEVICE — SUT SOFSILK 0 30" TIES

## (undated) DEVICE — SUCTION YANKAUER NO CONTROL VENT

## (undated) DEVICE — FOLEY TRAY 16FR SURESTEP LF URINE METER TEMP SENSING STATLOCK

## (undated) DEVICE — ELCTR GROUNDING PAD ADULT COVIDIEN

## (undated) DEVICE — SYR LUER LOK 1CC

## (undated) DEVICE — BLADE SCALPEL SAFETYLOCK #11

## (undated) DEVICE — VISITEC 4X4

## (undated) DEVICE — DRSG MASTISOL

## (undated) DEVICE — SENSOR O2 FINGER ADULT

## (undated) DEVICE — SOL IRR POUR NS 0.9% 500ML

## (undated) DEVICE — VENODYNE/SCD SLEEVE CALF MEDIUM